# Patient Record
Sex: FEMALE | Race: WHITE | NOT HISPANIC OR LATINO | Employment: OTHER | URBAN - METROPOLITAN AREA
[De-identification: names, ages, dates, MRNs, and addresses within clinical notes are randomized per-mention and may not be internally consistent; named-entity substitution may affect disease eponyms.]

---

## 2019-02-21 ENCOUNTER — HOSPITAL ENCOUNTER (OUTPATIENT)
Dept: RADIOLOGY | Facility: MEDICAL CENTER | Age: 84
End: 2019-02-21

## 2019-02-22 ENCOUNTER — HOSPITAL ENCOUNTER (OUTPATIENT)
Dept: RADIATION ONCOLOGY | Facility: MEDICAL CENTER | Age: 84
End: 2019-02-28
Attending: RADIOLOGY
Payer: MEDICARE

## 2019-02-22 VITALS
OXYGEN SATURATION: 94 % | HEART RATE: 69 BPM | TEMPERATURE: 97.4 F | SYSTOLIC BLOOD PRESSURE: 155 MMHG | DIASTOLIC BLOOD PRESSURE: 70 MMHG | WEIGHT: 147.4 LBS

## 2019-02-22 PROCEDURE — 99214 OFFICE O/P EST MOD 30 MIN: CPT | Performed by: RADIOLOGY

## 2019-02-22 PROCEDURE — 99205 OFFICE O/P NEW HI 60 MIN: CPT | Performed by: RADIOLOGY

## 2019-02-22 RX ORDER — OXYCODONE HYDROCHLORIDE AND ACETAMINOPHEN 5; 325 MG/1; MG/1
1 TABLET ORAL 3 TIMES DAILY PRN
COMMUNITY
End: 2023-04-28

## 2019-02-22 RX ORDER — LORAZEPAM 0.5 MG/1
0.5 TABLET ORAL EVERY 4 HOURS PRN
COMMUNITY
End: 2022-05-12

## 2019-02-22 RX ORDER — ATENOLOL 25 MG/1
25 TABLET ORAL DAILY
COMMUNITY
End: 2022-04-13

## 2019-02-22 RX ORDER — TEMAZEPAM 30 MG/1
30 CAPSULE ORAL NIGHTLY PRN
COMMUNITY
End: 2022-05-12

## 2019-02-22 RX ORDER — RANITIDINE 150 MG/1
150 TABLET ORAL 2 TIMES DAILY
COMMUNITY
End: 2021-02-04

## 2019-02-22 RX ORDER — CYANOCOBALAMIN (VITAMIN B-12) 5000 MCG
TABLET,DISINTEGRATING ORAL
COMMUNITY
End: 2022-09-27

## 2019-02-22 RX ORDER — LISINOPRIL 20 MG/1
20 TABLET ORAL 2 TIMES DAILY
COMMUNITY
End: 2022-06-23

## 2019-02-22 ASSESSMENT — PAIN SCALES - GENERAL: PAINLEVEL: 4=SLIGHT-MODERATE PAIN

## 2019-02-22 NOTE — CONSULTS
RADIATION ONCOLOGY CONSULT    DATE OF SERVICE: 2019    IDENTIFICATION: A 83 y.o. female with PET positive solitary nodule that has increased in size since prior CT scans in the left lower lobe.  She is here at the kind request of Dr. Martinez.      HISTORY OF PRESENT ILLNESS: Patient's history dates back to December of last year when she underwent a CT scan of the chest abdomen and pelvis 2018 for other reasons.  She was found to have a 13 mm spiculated left lower lobe pulmonary nodule.  Subsequently a PET CT scan was performed 2019 confirming this spiculated hypermetabolic left lower lobe nodule measuring 13 mm with an SUV of 3.5 highly concerning for malignancy.  She is seen in consultation today about the possibility of stereotactic radiosurgery for definitive management.      PAST MEDICAL HISTORY:   Past Medical History:   Diagnosis Date   • Asthma    • Cervical cancer (HCC)     1968   • COPD (chronic obstructive pulmonary disease) (HCC)    • GERD (gastroesophageal reflux disease)    • Hypertension    • Solitary pulmonary nodule    • Spinal stenosis        PAST SURGICAL HISTORY:  Past Surgical History:   Procedure Laterality Date   • APPENDECTOMY     • CARPAL TUNNEL ENDOSCOPIC      bilat   • CHOLECYSTECTOMY     • HIP ARTHROPLASTY TOTAL Right    • HYSTERECTOMY LAPAROSCOPY     • OTHER      kidney stones removed   • TONSILLECTOMY         GYNECOLOGICAL STATUS:  , no hormone replacement    CURRENT MEDICATIONS:  Current Outpatient Prescriptions   Medication Sig Dispense Refill   • atenolol (TENORMIN) 25 MG Tab Take 25 mg by mouth every day.     • lisinopril (PRINIVIL) 20 MG Tab Take 20 mg by mouth every day.     • oxyCODONE-acetaminophen (PERCOCET) 5-325 MG Tab Take 1-2 Tabs by mouth every four hours as needed.     • raNITidine (ZANTAC) 150 MG Tab Take 150 mg by mouth 2 times a day.     • CYCLOBENZAPRINE HCL PO Take  by mouth.     • Potassium (POTASSIMIN PO) Take  by mouth.     • Thyroid  (NATURE-THROID PO) Take  by mouth.     • HYDROCORTISONE PO Take  by mouth.     • temazepam (RESTORIL) 30 MG capsule Take 30 mg by mouth at bedtime as needed for Sleep.     • LORazepam (ATIVAN) 0.5 MG Tab Take 0.5 mg by mouth every four hours as needed for Anxiety.     • Magnesium 400 MG Cap Take  by mouth.     • Melatonin 5 MG Cap Take  by mouth.     • Pumpkin Seed-Soy Germ (AZO BLADDER CONTROL/GO-LESS PO) Take  by mouth.     • Polyethylene Glycol 3350 (MIRALAX PO) Take  by mouth.     • Simethicone (GAS-X PO) Take  by mouth.     • Cyanocobalamin (VITAMIN B-12) 5000 MCG TABLET DISPERSIBLE Take  by mouth.     • Cholecalciferol (VITAMIN D3) 5000 units Cap Take 1 Cap by mouth every day.       No current facility-administered medications for this encounter.        ALLERGIES:    Erythromycin    FAMILY HISTORY:    No family history of cancer        SOCIAL HISTORY:     reports that she quit smoking about 4 years ago. She has a 60.00 pack-year smoking history. She has quit using smokeless tobacco. She reports that she does not drink alcohol or use drugs.  Patient lives in [place] near her daughter    REVIEW OF SYSTEMS: Pertinent positives consist of fatigue, dry mouth from her medications, abdominal pain constipation diarrhea hemorrhoids urinary frequency urgency nocturia she has an unsteady gait and decreased mobility of her lower extremities and neuropathy.  Her biggest complaint is pain with bowel movements and that is going to be worked up at [facility].  The rest of the review of systems is negative and has been reviewed by me. It is in the nursing note dated 2/22/2019 in [facility]  Patient has pelvic pain unrelated to her diagnosis and she has chronic back pain which is 4 out of 10 which increases to 8 out of 10 when she stands up  What makes the pain better: Lying flat  What makes the pain worse: Standing up and having bowel movements  Pain controlled with current regimen: yes  Pain related to condition being seen here  for: no      PHYSICAL EXAM:    1= Restricted in physically strenuous activity, but ambulatory and able to carry out work of a light sedentary nature, e.g., light housework, office work.  Vitals:    02/22/19 1337   BP: 155/70   BP Location: Left arm   Patient Position: Sitting   BP Cuff Size: Adult   Pulse: 69   Temp: 36.3 °C (97.4 °F)   TempSrc: Temporal   SpO2: 94%   Weight: 66.9 kg (147 lb 6.4 oz)   Pain Score: 4=Slight-Moderate Pain (back pain)        GENERAL: Well-appearing alert and oriented x3 appearing her stated age of 83  HEENT:  Pupils are equal, round, and reactive to light.  Extraocular muscles   are intact. Sclerae nonicteric.  Conjunctivae pink.  Oral cavity, tongue   protrudes midline.   NECK:   No peripheral adenopathy of the neck, supraclavicular fossa or axillae   bilaterally.  LUNGS:  Clear to ascultation, decreased breath sounds throughout  HEART:  Regular rate and rhythm.  No murmur appreciated  ABDOMEN:  Soft. No evidence of hepatosplenomegaly.    EXTREMITIES:  Without Edema.  NEUROLOGIC:  Cranial nerves II through XII were intact.  Decreased mobility because of pain getting from a sitting to a standing position but motor and sensory are grossly within normal limits              IMPRESSION:    A 83 y.o. with T1 N0 PET positive nodule consistent with a primary lung cancer, on biopsied.      RECOMMENDATIONS:   I had a long discussion with the patient and her daughter regarding diagnosis prognosis and treatment.  They understand there is a very high rate of control over the next 5 years with radiosurgery.  They understand that we certainly can biopsy this area but there was a concern that she could drop the lung and this would require a chest tube.  After discussing these options they would prefer to proceed forward with stereotactic radiosurgery assuming this is a lung cancer.  I've described the details of radiation along with the side effects both acute and chronic, including but not exclusive  to fatigue, dry cough, possibility of radiation pneumonitis anywhere between 6weeks to 6 months, skin reaction, local soreness, swelling. Ample time was allowed for questions, and patient understands.    We have her tentatively scheduled for simulation to get started soon there after.  The understand the treatments will be 5 treatments after we have completed a simulation    Thank you for the opportunity to participate in her care.  If any questions or comments, please do not hesitate in calling.    Please note that this dictation was created using voice recognition software. I have made every reasonable attempt to correct obvious errors, but I expect that there are errors of grammar and possibly content that I did not discover before finalizing the note.

## 2019-02-22 NOTE — NON-PROVIDER
Patient was seen today in clinic with Dr. Khan for Consult.  Vitals signs and weight were obtained and pain assessment was completed.  Allergies and medications were reviewed with the patient.  Review of systems completed.     Vitals/Pain:  Vitals:    02/22/19 1337   BP: 155/70   BP Location: Left arm   Patient Position: Sitting   BP Cuff Size: Adult   Pulse: 69   Temp: 36.3 °C (97.4 °F)   TempSrc: Temporal   SpO2: 94%   Weight: 66.9 kg (147 lb 6.4 oz)   Pain Score: 4=Slight-Moderate Pain (back pain)        Allergies:   Erythromycin    Current Medications:  Current Outpatient Prescriptions   Medication Sig Dispense Refill   • atenolol (TENORMIN) 25 MG Tab Take 25 mg by mouth every day.     • lisinopril (PRINIVIL) 20 MG Tab Take 20 mg by mouth every day.     • oxyCODONE-acetaminophen (PERCOCET) 5-325 MG Tab Take 1-2 Tabs by mouth every four hours as needed.     • raNITidine (ZANTAC) 150 MG Tab Take 150 mg by mouth 2 times a day.     • CYCLOBENZAPRINE HCL PO Take  by mouth.     • Potassium (POTASSIMIN PO) Take  by mouth.     • Thyroid (NATURE-THROID PO) Take  by mouth.     • HYDROCORTISONE PO Take  by mouth.     • temazepam (RESTORIL) 30 MG capsule Take 30 mg by mouth at bedtime as needed for Sleep.     • LORazepam (ATIVAN) 0.5 MG Tab Take 0.5 mg by mouth every four hours as needed for Anxiety.     • Magnesium 400 MG Cap Take  by mouth.     • Melatonin 5 MG Cap Take  by mouth.     • Pumpkin Seed-Soy Germ (AZO BLADDER CONTROL/GO-LESS PO) Take  by mouth.     • Polyethylene Glycol 3350 (MIRALAX PO) Take  by mouth.     • Simethicone (GAS-X PO) Take  by mouth.     • Cyanocobalamin (VITAMIN B-12) 5000 MCG TABLET DISPERSIBLE Take  by mouth.     • Cholecalciferol (VITAMIN D3) 5000 units Cap Take 1 Cap by mouth every day.       No current facility-administered medications for this encounter.          PCP:  Eugene Peoples, Med Ass't

## 2019-03-01 ENCOUNTER — PATIENT OUTREACH (OUTPATIENT)
Dept: OTHER | Facility: MEDICAL CENTER | Age: 84
End: 2019-03-01

## 2019-03-01 ENCOUNTER — HOSPITAL ENCOUNTER (OUTPATIENT)
Dept: RADIATION ONCOLOGY | Facility: MEDICAL CENTER | Age: 84
End: 2019-03-31
Attending: RADIOLOGY
Payer: MEDICARE

## 2019-03-01 NOTE — LETTER
[facility]   [address]  [place] [address]  Phone: [phone] - Fax: [phone]              Address:  [address]   [place] [address]     Date: 03/01/19  Medical Record Number: [id number]    Dear Chelly,    I am a Diagnostic Nurse Navigator, a certified oncology nurse and I spoke with you on the telephone Friday afternoon. My role is to assess any needs you may have with education, guidance and support. I am available to you and your family from diagnosis through your survivorship.       I am available to address your needs during your journey with the following services:     Care Coordination  I can assist you in facilitating communication between your cancer care treatment team to ensure timely treatment and follow-up.  I can also assist with transition of care back to your primary care provider, or other specialist, as needed.  My goal is to bridge gaps for you throughout the course of your active treatment.       Education Services  Understanding the recommended treatment course by your physician is key. I can provide educational resources personalized to your cancer diagnosis to help you understand your diagnosis and treatment. Please let me know if you would like to receive information about your diagnosis and treatment plan.  I am here to help.     Support Services/Resource Information  [facility] we offer a full scope of support services.  I can assist you with referral information to:  · Cancer Clinical Trials & Research  · Nutrition counseling  · Support groups  · Complementary Therapies such as Healing Touch and Mind-Body Techniques Meditation  · Patient Financial Advocates  ·   · [facility], an [employer] affiliate office, our volunteers can assist you with accessing our [employer] library, support services information, head coverings and comfort items  · Community and national resources, included  eligibility based get assistance and pharmaceutical access programs, if you are in need of additional information.     [facility] offers services that include:  · Behavioral Health  · Genetic counseling & testing  · Acupuncture  · Lymphedema prevention/treatment program  · Palliative care services.       Our care team includes a Survivorship Nurse Navigator, who meets with you after your treatment is completed to review your survivorship care plan and treatment summary.      I hope you have an excellent patient experience.  Please feel free to share with me your comments regarding the care you have received- we value your feedback.  I look forward to meeting you in person next Wednesday in the Radiation Therapy unit.    Sincerely,     Loren Car RN  Diagnostic Nurse Navigator  Office: [phone]  E-mail: [email or web address]

## 2019-03-01 NOTE — PROGRESS NOTES
Called to check in with patient.  Had gone to radiation therapy unit to see her but she was not there.  Introduced myself and explained role of navigator.  Planning to meet with patient next  Wed March 6 at 3 in rad onc.  Will send follow-up letter to patient

## 2019-03-06 ENCOUNTER — HOSPITAL ENCOUNTER (OUTPATIENT)
Dept: RADIATION ONCOLOGY | Facility: MEDICAL CENTER | Age: 84
End: 2019-03-06

## 2019-03-06 ENCOUNTER — PATIENT OUTREACH (OUTPATIENT)
Dept: OTHER | Facility: MEDICAL CENTER | Age: 84
End: 2019-03-06

## 2019-03-06 PROCEDURE — 77334 RADIATION TREATMENT AID(S): CPT | Mod: 26 | Performed by: RADIOLOGY

## 2019-03-06 PROCEDURE — 77290 THER RAD SIMULAJ FIELD CPLX: CPT | Performed by: RADIOLOGY

## 2019-03-06 PROCEDURE — 77334 RADIATION TREATMENT AID(S): CPT | Performed by: RADIOLOGY

## 2019-03-06 PROCEDURE — 77470 SPECIAL RADIATION TREATMENT: CPT | Performed by: RADIOLOGY

## 2019-03-06 PROCEDURE — 77263 THER RADIOLOGY TX PLNG CPLX: CPT | Performed by: RADIOLOGY

## 2019-03-06 PROCEDURE — 77470 SPECIAL RADIATION TREATMENT: CPT | Mod: 26 | Performed by: RADIOLOGY

## 2019-03-06 PROCEDURE — 77290 THER RAD SIMULAJ FIELD CPLX: CPT | Mod: 26 | Performed by: RADIOLOGY

## 2019-03-06 NOTE — PROGRESS NOTES
Met with patient and her daughter briefly in Radiation.  Gave her another card and re-introduced them to navigation.  They were also meeting with the [facility] Resource Advocate Shelby regarding the patient's health coverage.  Patient is here for mapping

## 2019-03-08 ENCOUNTER — PATIENT OUTREACH (OUTPATIENT)
Dept: OTHER | Facility: MEDICAL CENTER | Age: 84
End: 2019-03-08

## 2019-03-08 NOTE — LETTER
[facility]   [address]  [place] [address]  Phone: [phone] - Fax: [phone]              [place] [address] [place] [address]     Date: 03/08/19    Dear Chelly,      I am a Cancer Nurse Navigator, a certified oncology nurse. My role is to assess any needs you may have with education, guidance and support. I am available to you and your family from diagnosis through your survivorship.       I am available to address your needs during your journey with the following services:     Care Coordination  I can assist you in facilitating communication between your cancer care treatment team to ensure timely treatment and follow-up.  I can also assist with transition of care back to your primary care provider, or other specialist, as needed.  My goal is to bridge gaps for you throughout the course of your active treatment.       Education Services  Understanding the recommended treatment course by your physician is key. I can provide educational resources personalized to your cancer diagnosis to help you understand your diagnosis and treatment. Please let me know if you would like to receive information about your diagnosis and treatment plan.  I am here to help.     Support Services/Resource Information  [facility] we offer a full scope of support services.  I can assist you with referral information to:  · Cancer Clinical Trials & Research  · Nutrition counseling  · Support groups  · Complementary Therapies such as Healing Touch and Mind-Body Techniques Meditation  · Patient Financial Advocates  ·   · [facility], an [employer] affiliate office, our volunteers can assist you with accessing our [employer] library, support services information, head coverings and comfort items  · Community and national resources, included eligibility based get assistance and pharmaceutical access programs, if you are  in need of additional information.     [facility] offers services that include:  · Behavioral Health  · Genetic counseling & testing  · Acupuncture  · Lymphedema prevention/treatment program  · Palliative care services.       Our care team includes a Survivorship Nurse Navigator, who meets with you after your treatment is completed to review your survivorship care plan and treatment summary.      I hope you have an excellent patient experience.  Please feel free to share with me your comments regarding the care you have received- we value your feedback.    Sincerely,       Merline Ordaz R.N.  Cancer Nurse Navigator    Main: [phone]   Office:  [phone]  Email:  [email or web address]

## 2019-03-08 NOTE — PROGRESS NOTES
Oncology Nurse Navigator introduction letter sent    Pt will transition from diagnostic navigator to cancer nurse navigator.  Will follow up with patient next week.

## 2019-03-11 PROCEDURE — 77370 RADIATION PHYSICS CONSULT: CPT | Performed by: RADIOLOGY

## 2019-03-12 PROCEDURE — 77300 RADIATION THERAPY DOSE PLAN: CPT | Performed by: RADIOLOGY

## 2019-03-12 PROCEDURE — 77334 RADIATION TREATMENT AID(S): CPT | Performed by: RADIOLOGY

## 2019-03-12 PROCEDURE — 77295 3-D RADIOTHERAPY PLAN: CPT | Mod: 26 | Performed by: RADIOLOGY

## 2019-03-12 PROCEDURE — 77300 RADIATION THERAPY DOSE PLAN: CPT | Mod: 26 | Performed by: RADIOLOGY

## 2019-03-12 PROCEDURE — 77295 3-D RADIOTHERAPY PLAN: CPT | Performed by: RADIOLOGY

## 2019-03-12 PROCEDURE — 77334 RADIATION TREATMENT AID(S): CPT | Mod: 26 | Performed by: RADIOLOGY

## 2019-03-12 PROCEDURE — 77293 RESPIRATOR MOTION MGMT SIMUL: CPT | Performed by: RADIOLOGY

## 2019-03-12 PROCEDURE — 77293 RESPIRATOR MOTION MGMT SIMUL: CPT | Mod: 26 | Performed by: RADIOLOGY

## 2019-03-13 ENCOUNTER — HOSPITAL ENCOUNTER (OUTPATIENT)
Dept: RADIATION ONCOLOGY | Facility: MEDICAL CENTER | Age: 84
End: 2019-03-13

## 2019-03-13 PROCEDURE — 77435 SBRT MANAGEMENT: CPT | Performed by: RADIOLOGY

## 2019-03-13 PROCEDURE — 77280 THER RAD SIMULAJ FIELD SMPL: CPT | Performed by: RADIOLOGY

## 2019-03-13 PROCEDURE — 77280 THER RAD SIMULAJ FIELD SMPL: CPT | Mod: 26 | Performed by: RADIOLOGY

## 2019-03-13 PROCEDURE — 77373 STRTCTC BDY RAD THER TX DLVR: CPT | Performed by: RADIOLOGY

## 2019-03-14 ENCOUNTER — HOSPITAL ENCOUNTER (OUTPATIENT)
Dept: RADIATION ONCOLOGY | Facility: MEDICAL CENTER | Age: 84
End: 2019-03-14

## 2019-03-14 PROCEDURE — 77280 THER RAD SIMULAJ FIELD SMPL: CPT | Mod: 26 | Performed by: RADIOLOGY

## 2019-03-14 PROCEDURE — 77280 THER RAD SIMULAJ FIELD SMPL: CPT | Performed by: RADIOLOGY

## 2019-03-14 PROCEDURE — 77373 STRTCTC BDY RAD THER TX DLVR: CPT | Performed by: RADIOLOGY

## 2019-03-15 ENCOUNTER — HOSPITAL ENCOUNTER (OUTPATIENT)
Dept: RADIATION ONCOLOGY | Facility: MEDICAL CENTER | Age: 84
End: 2019-03-15

## 2019-03-15 PROCEDURE — 77336 RADIATION PHYSICS CONSULT: CPT | Mod: XU | Performed by: RADIOLOGY

## 2019-03-15 PROCEDURE — 77373 STRTCTC BDY RAD THER TX DLVR: CPT | Performed by: RADIOLOGY

## 2019-03-15 PROCEDURE — 77280 THER RAD SIMULAJ FIELD SMPL: CPT | Mod: 26 | Performed by: RADIOLOGY

## 2019-03-15 PROCEDURE — 77280 THER RAD SIMULAJ FIELD SMPL: CPT | Performed by: RADIOLOGY

## 2019-03-18 ENCOUNTER — HOSPITAL ENCOUNTER (OUTPATIENT)
Dept: RADIOLOGY | Facility: MEDICAL CENTER | Age: 84
End: 2019-03-18

## 2019-03-18 ENCOUNTER — HOSPITAL ENCOUNTER (OUTPATIENT)
Dept: RADIATION ONCOLOGY | Facility: MEDICAL CENTER | Age: 84
End: 2019-03-18

## 2019-03-18 LAB
CHEMOTHERAPY INFUSION START DATE: NORMAL
CHEMOTHERAPY RECORDS: 12
CHEMOTHERAPY RECORDS: 6000
CHEMOTHERAPY RECORDS: NORMAL
CHEMOTHERAPY RX CANCER: NORMAL
RAD ONC ARIA COURSE TREATMENT ELAPSED DAYS: NORMAL
RAD ONC ARIA PLAN TREATMENT DATES: NORMAL
RAD ONC ARIA REFERENCE POINT DOSAGE GIVEN TO DATE: 48
RAD ONC ARIA REFERENCE POINT DOSAGE GIVEN TO DATE: 54.9
RAD ONC ARIA REFERENCE POINT ID: NORMAL
RAD ONC ARIA REFERENCE POINT ID: NORMAL
RAD ONC ARIA REFERENCE POINT SESSION DOSAGE GIVEN: 12
RAD ONC ARIA REFERENCE POINT SESSION DOSAGE GIVEN: 13.73

## 2019-03-18 PROCEDURE — 77280 THER RAD SIMULAJ FIELD SMPL: CPT | Mod: 26 | Performed by: RADIOLOGY

## 2019-03-18 PROCEDURE — 77280 THER RAD SIMULAJ FIELD SMPL: CPT | Performed by: RADIOLOGY

## 2019-03-18 PROCEDURE — 77373 STRTCTC BDY RAD THER TX DLVR: CPT | Performed by: RADIOLOGY

## 2019-03-19 ENCOUNTER — HOSPITAL ENCOUNTER (OUTPATIENT)
Dept: RADIATION ONCOLOGY | Facility: MEDICAL CENTER | Age: 84
End: 2019-03-19

## 2019-03-19 LAB
CHEMOTHERAPY INFUSION START DATE: NORMAL
CHEMOTHERAPY RECORDS: 12
CHEMOTHERAPY RECORDS: 6000
CHEMOTHERAPY RECORDS: NORMAL
CHEMOTHERAPY RX CANCER: NORMAL
RAD ONC ARIA COURSE TREATMENT ELAPSED DAYS: NORMAL
RAD ONC ARIA PLAN TREATMENT DATES: NORMAL
RAD ONC ARIA REFERENCE POINT DOSAGE GIVEN TO DATE: 60
RAD ONC ARIA REFERENCE POINT DOSAGE GIVEN TO DATE: 68.63
RAD ONC ARIA REFERENCE POINT ID: NORMAL
RAD ONC ARIA REFERENCE POINT ID: NORMAL
RAD ONC ARIA REFERENCE POINT SESSION DOSAGE GIVEN: 12
RAD ONC ARIA REFERENCE POINT SESSION DOSAGE GIVEN: 13.73

## 2019-03-19 PROCEDURE — 77280 THER RAD SIMULAJ FIELD SMPL: CPT | Performed by: RADIOLOGY

## 2019-03-19 PROCEDURE — 77280 THER RAD SIMULAJ FIELD SMPL: CPT | Mod: 26 | Performed by: RADIOLOGY

## 2019-03-19 PROCEDURE — 77373 STRTCTC BDY RAD THER TX DLVR: CPT | Performed by: RADIOLOGY

## 2019-03-25 LAB
CHEMOTHERAPY INFUSION START DATE: NORMAL
CHEMOTHERAPY RECORDS: 12
CHEMOTHERAPY RECORDS: 6000
CHEMOTHERAPY RECORDS: NORMAL
CHEMOTHERAPY RX CANCER: NORMAL
RAD ONC ARIA COURSE TREATMENT ELAPSED DAYS: NORMAL
RAD ONC ARIA PLAN TREATMENT DATES: NORMAL
RAD ONC ARIA REFERENCE POINT DOSAGE GIVEN TO DATE: 60
RAD ONC ARIA REFERENCE POINT DOSAGE GIVEN TO DATE: 68.63
RAD ONC ARIA REFERENCE POINT ID: NORMAL
RAD ONC ARIA REFERENCE POINT ID: NORMAL

## 2019-03-26 ENCOUNTER — PATIENT OUTREACH (OUTPATIENT)
Dept: OTHER | Facility: MEDICAL CENTER | Age: 84
End: 2019-03-26

## 2019-03-26 NOTE — PROGRESS NOTES
Call placed to patient for follow up.  She reports she is doing fine and denies questions at this time.  Pt talked about is coughing a little bit, but unsure if from treatment or just has a cold.  Pt states she has no needs from navigator at this time.  She is aware of her follow up with Dr Khan at end of next month.  Available if needed.

## 2019-04-30 ENCOUNTER — HOSPITAL ENCOUNTER (OUTPATIENT)
Dept: RADIATION ONCOLOGY | Facility: MEDICAL CENTER | Age: 84
End: 2019-04-30
Attending: RADIOLOGY
Payer: MEDICARE

## 2019-04-30 ENCOUNTER — PATIENT OUTREACH (OUTPATIENT)
Dept: OTHER | Facility: MEDICAL CENTER | Age: 84
End: 2019-04-30

## 2019-04-30 VITALS
HEART RATE: 49 BPM | SYSTOLIC BLOOD PRESSURE: 139 MMHG | WEIGHT: 149.1 LBS | TEMPERATURE: 97.6 F | OXYGEN SATURATION: 96 % | DIASTOLIC BLOOD PRESSURE: 75 MMHG

## 2019-04-30 DIAGNOSIS — C34.90 MALIGNANT NEOPLASM OF UNSPECIFIED PART OF UNSPECIFIED BRONCHUS OR LUNG (HCC): ICD-10-CM

## 2019-04-30 PROCEDURE — 99212 OFFICE O/P EST SF 10 MIN: CPT | Performed by: RADIOLOGY

## 2019-04-30 ASSESSMENT — PAIN SCALES - GENERAL: PAINLEVEL: NO PAIN

## 2019-04-30 NOTE — NON-PROVIDER
Patient was seen today in clinic with Dr. Khan for follow up.  Vitals signs and weight were obtained and pain assessment was completed.  Allergies and medications were reviewed with the patient.  Toxicities of treatment assessed.     Vitals/Pain:  Vitals:    04/30/19 1457   BP: 139/75   BP Location: Left arm   Patient Position: Sitting   BP Cuff Size: Adult   Pulse: (!) 49   Temp: 36.4 °C (97.6 °F)   TempSrc: Oral   SpO2: 96%   Weight: 67.6 kg (149 lb 1.6 oz)   Pain Score: No pain        Allergies:   Erythromycin    Current Medications:  Current Outpatient Prescriptions   Medication Sig Dispense Refill   • atenolol (TENORMIN) 25 MG Tab Take 25 mg by mouth every day.     • lisinopril (PRINIVIL) 20 MG Tab Take 20 mg by mouth every day.     • raNITidine (ZANTAC) 150 MG Tab Take 150 mg by mouth 2 times a day.     • CYCLOBENZAPRINE HCL PO Take  by mouth.     • Potassium (POTASSIMIN PO) Take  by mouth.     • Thyroid (NATURE-THROID PO) Take  by mouth.     • HYDROCORTISONE PO Take  by mouth.     • temazepam (RESTORIL) 30 MG capsule Take 30 mg by mouth at bedtime as needed for Sleep.     • LORazepam (ATIVAN) 0.5 MG Tab Take 0.5 mg by mouth every four hours as needed for Anxiety.     • Magnesium 400 MG Cap Take  by mouth.     • Melatonin 5 MG Cap Take  by mouth.     • Polyethylene Glycol 3350 (MIRALAX PO) Take  by mouth.     • Simethicone (GAS-X PO) Take  by mouth.     • Cyanocobalamin (VITAMIN B-12) 5000 MCG TABLET DISPERSIBLE Take  by mouth.     • Cholecalciferol (VITAMIN D3) 5000 units Cap Take 1 Cap by mouth every day.     • oxyCODONE-acetaminophen (PERCOCET) 5-325 MG Tab Take 1-2 Tabs by mouth every four hours as needed.     • Pumpkin Seed-Soy Germ (AZO BLADDER CONTROL/GO-LESS PO) Take  by mouth.       No current facility-administered medications for this encounter.          PCP:  Eugene Peoples, Med Ass't

## 2019-04-30 NOTE — PROGRESS NOTES
RADIATION ONCOLOGY FOLLOW-UP    DATE OF SERVICE: 4/30/2019    IDENTIFICATION:   A 83 y.o. female with PET positive solitary nodule that has increased in size since prior CT scans in the left lower lobe.  Now status post SBRT to the left lower lobe lung lesion complete 3/20/2018    HISTORY OF PRESENT ILLNESS:   Since last seen patient has been essentially asymptomatic from our standpoint.  She has shortness of breath with exertion which is chronic fatigue shaking and weakness but her biggest complaint is rectal pain.  She also has immobility because of spinal stenosis and a total hip those are her biggest complaints and essentially nothing from the radiation.    CURRENT MEDICATIONS:  Current Outpatient Prescriptions   Medication Sig Dispense Refill   • atenolol (TENORMIN) 25 MG Tab Take 25 mg by mouth every day.     • lisinopril (PRINIVIL) 20 MG Tab Take 20 mg by mouth every day.     • raNITidine (ZANTAC) 150 MG Tab Take 150 mg by mouth 2 times a day.     • CYCLOBENZAPRINE HCL PO Take  by mouth.     • Potassium (POTASSIMIN PO) Take  by mouth.     • Thyroid (NATURE-THROID PO) Take  by mouth.     • HYDROCORTISONE PO Take  by mouth.     • temazepam (RESTORIL) 30 MG capsule Take 30 mg by mouth at bedtime as needed for Sleep.     • LORazepam (ATIVAN) 0.5 MG Tab Take 0.5 mg by mouth every four hours as needed for Anxiety.     • Magnesium 400 MG Cap Take  by mouth.     • Melatonin 5 MG Cap Take  by mouth.     • Polyethylene Glycol 3350 (MIRALAX PO) Take  by mouth.     • Simethicone (GAS-X PO) Take  by mouth.     • Cyanocobalamin (VITAMIN B-12) 5000 MCG TABLET DISPERSIBLE Take  by mouth.     • Cholecalciferol (VITAMIN D3) 5000 units Cap Take 1 Cap by mouth every day.     • oxyCODONE-acetaminophen (PERCOCET) 5-325 MG Tab Take 1-2 Tabs by mouth every four hours as needed.     • Pumpkin Seed-Soy Germ (AZO BLADDER CONTROL/GO-LESS PO) Take  by mouth.       No current facility-administered medications for this encounter.         ALLERGIES:  Erythromycin    FAMILY HISTORY:    No family history on file.[unfilled]        SOCIAL HISTORY:     reports that she quit smoking about 4 years ago. She has a 60.00 pack-year smoking history. She has quit using smokeless tobacco. She reports that she does not drink alcohol or use drugs.        REVIEW OF SYSTEMS: Is significant for that mentioned in the HPI  The rest of the review of systems has been reviewed by me and is documented in the nursing note in [facility] dated 2/22/2019    PHYSICAL EXAM:     ECOG PERFORMANCE STATUS:  1= Restricted in physically strenuous activity, but ambulatory and able to carry out work of a light sedentary nature, e.g., light housework, office work.       Vitals:    04/30/19 1457   BP: 139/75   BP Location: Left arm   Patient Position: Sitting   BP Cuff Size: Adult   Pulse: (!) 49   Temp: 36.4 °C (97.6 °F)   TempSrc: Oral   SpO2: 96%   Weight: 67.6 kg (149 lb 1.6 oz)   Pain Score: No pain        GENERAL: Well-appearing alert and oriented x3 somewhat anxious because of her rectal pain  HEENT:  Pupils are equal, round, and reactive to light.  Extraocular muscles   are intact. Sclerae nonicteric.  Conjunctivae pink.  Oral cavity, tongue   protrudes midline.   NECK: No adenopathy  LUNGS:  Clear to ascultation, decreased breath sounds throughout  HEART:  Regular rate and rhythm.  No murmur appreciated  EXTREMITIES:  Without Edema.  NEUROLOGIC:  Cranial nerves II through XII were intact.  Motor and sensory grossly within normal limits stance and gait not tested        IMPRESSION:    A 83 y.o. female with PET positive solitary nodule that has increased in size since prior CT scans in the left lower lobe.  Status post SBRT to the left lower lobe of the lung complete 3/20/2019    RECOMMENDATIONS:   I will see her in follow-up with a CT scan in about 2 months of the chest to assess response.  Her biggest concern is her rectal pain and she is going to see a specialist at [facility] for  that.  I am happy to see her sooner as needed      Thank you for the opportunity to participate in her care.  If any questions or comments, please do not hesitate in calling.      Please note that this dictation was created using voice recognition software. I have made every reasonable attempt to correct obvious errors, but I expect that there are errors of grammar and possibly content that I did not discover before finalizing the note.

## 2019-04-30 NOTE — PROGRESS NOTES
Touched base with patient after radiation oncology follow up.  Provided patient with treatment summary/survivorship care plan and information.  Pt denies any current question.  Does report fatigue and weakness, has had other medical issues going on.  Physicians are aware of concerns.  Pt understands follow up.  Summary scanned into EP[facility].  Pt has completed cancer nurse navigation.

## 2019-06-21 ENCOUNTER — OFFICE VISIT (OUTPATIENT)
Dept: URGENT CARE | Facility: CLINIC | Age: 84
End: 2019-06-21
Payer: MEDICARE

## 2019-06-21 ENCOUNTER — APPOINTMENT (OUTPATIENT)
Dept: RADIOLOGY | Facility: IMAGING CENTER | Age: 84
End: 2019-06-21
Attending: NURSE PRACTITIONER
Payer: MEDICARE

## 2019-06-21 VITALS
HEIGHT: 67 IN | RESPIRATION RATE: 12 BRPM | BODY MASS INDEX: 23.86 KG/M2 | HEART RATE: 75 BPM | WEIGHT: 152 LBS | TEMPERATURE: 97.3 F | OXYGEN SATURATION: 93 % | SYSTOLIC BLOOD PRESSURE: 132 MMHG | DIASTOLIC BLOOD PRESSURE: 90 MMHG

## 2019-06-21 DIAGNOSIS — M79.601 RIGHT ARM PAIN: ICD-10-CM

## 2019-06-21 PROCEDURE — 99214 OFFICE O/P EST MOD 30 MIN: CPT | Performed by: NURSE PRACTITIONER

## 2019-06-21 PROCEDURE — 73080 X-RAY EXAM OF ELBOW: CPT | Mod: TC,RT | Performed by: NURSE PRACTITIONER

## 2019-06-21 PROCEDURE — 73060 X-RAY EXAM OF HUMERUS: CPT | Mod: TC,RT | Performed by: NURSE PRACTITIONER

## 2019-06-22 NOTE — PROGRESS NOTES
"Subjective:      Chelly Decker is a 83 y.o. female who presents with Arm Pain (right arm)    Past Medical History:   Diagnosis Date   • Asthma    • Cervical cancer (HCC)     1968   • COPD (chronic obstructive pulmonary disease) (HCC)    • GERD (gastroesophageal reflux disease)    • Hypertension    • Solitary pulmonary nodule    • Spinal stenosis      Social History     Social History   • Marital status:      Spouse name: N/A   • Number of children: N/A   • Years of education: N/A     Occupational History   • retired book keeper      Social History Main Topics   • Smoking status: Former Smoker     Packs/day: 1.00     Years: 60.00     Quit date: 2015   • Smokeless tobacco: Former User   • Alcohol use No   • Drug use: No   • Sexual activity: Not on file     Other Topics Concern   • Not on file     Social History Narrative   • No narrative on file     History reviewed. No pertinent family history.    Allergies: Erythromycin    Patient is an 83-year-old female who presents today with complaint of pain to the right arm.  Pain is reproduced with pronation/supination and certain other random ranges of motion.  Patient states overall she believes she injured her arm 1 week ago when she slept on it wrong.  She states yesterday she pushed a heavy box and felt sudden pain at the level of her elbow that now radiates down the forearm and also up into the upper arm.  No numbness, tingling, or weakness.            Other   This is a new problem. The problem occurs constantly. The problem has been unchanged. Nothing aggravates the symptoms. She has tried nothing for the symptoms. The treatment provided no relief.       Review of Systems   Musculoskeletal:        Right arm pain   All other systems reviewed and are negative.         Objective:     /90 (BP Location: Left arm, Patient Position: Sitting)   Pulse 75   Temp 36.3 °C (97.3 °F)   Resp 12   Ht 1.702 m (5' 7\")   Wt 68.9 kg (152 lb)   SpO2 93%   BMI 23.81 " kg/m²      Physical Exam   Constitutional: She is oriented to person, place, and time. She appears well-developed and well-nourished.   Musculoskeletal:        Arms:  There is point tenderness over the forearm, elbow to the ventral aspect, and upper arm.  She is able to pronate and supinate though this is painful.  There is no asymmetry between the right and the left arm.   Neurological: She is alert and oriented to person, place, and time.   Skin: Skin is warm and dry.   Psychiatric: She has a normal mood and affect. Her behavior is normal. Judgment and thought content normal.   Vitals reviewed.    X-ray, elbow:       No radiographic evidence of acute traumatic injury.    Mild bony spurring    X-ray, humerus:     Normal right humerus radiographs     Assessment/Plan:     1. Right arm pain  2. Right arm strain    Ice PRN  Ibuprofen PRN  Follow up for persistent symptoms

## 2019-06-28 ENCOUNTER — TELEPHONE (OUTPATIENT)
Dept: RADIATION ONCOLOGY | Facility: MEDICAL CENTER | Age: 84
End: 2019-06-28

## 2019-06-28 DIAGNOSIS — C34.32 MALIGNANT NEOPLASM OF LOWER LOBE BRONCHUS, LEFT (HCC): ICD-10-CM

## 2019-09-03 ENCOUNTER — HOSPITAL ENCOUNTER (OUTPATIENT)
Dept: RADIATION ONCOLOGY | Facility: MEDICAL CENTER | Age: 84
End: 2019-09-30
Attending: RADIOLOGY
Payer: MEDICARE

## 2019-09-03 VITALS
HEART RATE: 76 BPM | OXYGEN SATURATION: 93 % | TEMPERATURE: 98.4 F | WEIGHT: 152.68 LBS | DIASTOLIC BLOOD PRESSURE: 83 MMHG | SYSTOLIC BLOOD PRESSURE: 158 MMHG | BODY MASS INDEX: 23.91 KG/M2

## 2019-09-03 DIAGNOSIS — C34.32 MALIGNANT NEOPLASM OF LOWER LOBE OF LEFT LUNG (HCC): ICD-10-CM

## 2019-09-03 PROCEDURE — 99213 OFFICE O/P EST LOW 20 MIN: CPT | Performed by: RADIOLOGY

## 2019-09-03 PROCEDURE — 99212 OFFICE O/P EST SF 10 MIN: CPT | Performed by: RADIOLOGY

## 2019-09-04 ENCOUNTER — HOSPITAL ENCOUNTER (OUTPATIENT)
Dept: RADIOLOGY | Facility: MEDICAL CENTER | Age: 84
End: 2019-09-04

## 2019-09-04 NOTE — PROGRESS NOTES
RADIATION ONCOLOGY FOLLOW-UP    DATE OF SERVICE: 9/3/2019    IDENTIFICATION:   83 y.o. female with PET positive solitary nodule that has increased in  size since prior CT scans in the left lower lobe. Now status post SBRT to  the left lower lobe lung lesion complete 3/20/2018     HISTORY OF PRESENT ILLNESS:   Patient is here to review her most recent CT scan.  This was done at [facility] on 8/21/2019.  It shows that the spiculated left lower lobe nodule is slightly decreased in size and may relate to treatment response or volume averaging from respiratory motion artifact.  There is a new tree-in-bud nodularity in the left lower lobe indeterminate for disease progression or posttreatment related change recommend attention to follow-up.  There is no pathologic adenopathy.  Patient's symptoms are essentially unchanged to what they were previously from a lung standpoint she is got more problems with her hip and she is going to be seeing a surgeon regarding that issue.    CURRENT MEDICATIONS:  Current Outpatient Medications   Medication Sig Dispense Refill   • ALBUTEROL INH Inhale  by mouth.     • atenolol (TENORMIN) 25 MG Tab Take 25 mg by mouth every day.     • lisinopril (PRINIVIL) 20 MG Tab Take 20 mg by mouth every day.     • oxyCODONE-acetaminophen (PERCOCET) 5-325 MG Tab Take 1-2 Tabs by mouth every four hours as needed.     • raNITidine (ZANTAC) 150 MG Tab Take 150 mg by mouth 2 times a day.     • CYCLOBENZAPRINE HCL PO Take  by mouth.     • Potassium (POTASSIMIN PO) Take  by mouth.     • Thyroid (NATURE-THROID PO) Take  by mouth.     • HYDROCORTISONE PO Take  by mouth.     • temazepam (RESTORIL) 30 MG capsule Take 30 mg by mouth at bedtime as needed for Sleep.     • LORazepam (ATIVAN) 0.5 MG Tab Take 0.5 mg by mouth every four hours as needed for Anxiety.     • Magnesium 400 MG Cap Take  by mouth.     • Melatonin 5 MG Cap Take  by mouth.     • Pumpkin Seed-Soy Germ (AZO BLADDER  CONTROL/GO-LESS PO) Take  by mouth.     • Polyethylene Glycol 3350 (MIRALAX PO) Take  by mouth.     • Simethicone (GAS-X PO) Take  by mouth.     • Cyanocobalamin (VITAMIN B-12) 5000 MCG TABLET DISPERSIBLE Take  by mouth.     • Cholecalciferol (VITAMIN D3) 5000 units Cap Take 1 Cap by mouth every day.       No current facility-administered medications for this encounter.        ALLERGIES:  Erythromycin    FAMILY HISTORY:    No family history on file.[unfilled]        SOCIAL HISTORY:     reports that she quit smoking about 4 years ago. She has a 60.00 pack-year smoking history. She has quit using smokeless tobacco. She reports that she does not drink alcohol or use drugs.    PAIN: Chronic pain in the hip as well as rectum unrelated to radiation    REVIEW OF SYSTEMS: Is significant for dry mouth nosebleeds constipation abdominal pain heartburn indigestion hemorrhoids urinary urgency nocturia muscle pain joint pain unsteady gait because of that neuropathy and cough all unchanged.  The rest of the review of systems has been reviewed by me and is documented in the nursing note in [facility] dated 9/3/2018    PHYSICAL EXAM:     ECOG PERFORMANCE STATUS:  3 = Capable of only limited self care, confined to bed or chair more than 50% of waking hours       Vitals:    09/03/19 1551   BP: 158/83   BP Location: Left arm   Patient Position: Sitting   BP Cuff Size: Adult   Pulse: 76   Temp: 36.9 °C (98.4 °F)   TempSrc: Temporal   SpO2: 93%   Weight: 69.3 kg (152 lb 10.9 oz)            GENERAL: Well-appearing alert and oriented x3 in no apparent distress  HEENT:  Pupils are equal, round, and reactive to light.  Extraocular muscles   are intact. Sclerae nonicteric.  Conjunctivae pink.  Oral cavity, tongue   protrudes midline.   NECK: No palpable nodes in the neck or supraclavicular fossa  LUNGS:  Clear to ascultation, decreased breath sounds throughout  HEART:  Regular rate and rhythm.  No murmur appreciated  ABDOMEN:  Soft. No evidence of  hepatosplenomegaly.  Positive bowel sounds.  EXTREMITIES:  Without Edema.  NEUROLOGIC:  Cranial nerves II through XII were intact.  Motor and sensory grossly within normal limits has limited walking ability due to rectal pain and hip pain        IMPRESSION:    83 y.o. female with PET positive solitary nodule that has increased in  size since prior CT scans in the left lower lobe. Now status post SBRT to  the left lower lobe lung lesion complete 3/20/2018    RECOMMENDATIONS:   We have a long discussion about her report.  I need to get the films and review them I will give her a call after I have done so.  Most likely this is post radiation change and will just repeat the CT scan in 3 to 4 months.  I will give her a call tomorrow.      Thank you for the opportunity to participate in her care.  If any questions or comments, please do not hesitate in calling.      Please note that this dictation was created using voice recognition software. I have made every reasonable attempt to correct obvious errors, but I expect that there are errors of grammar and possibly content that I did not discover before finalizing the note.      9/4/2019: I reviewed CT scan and it looks like post radiation change.  We will see her back in follow-up in 4 months with a repeat CT scan from [facility] at that time.

## 2019-09-04 NOTE — ADDENDUM NOTE
Encounter addended by: Katalina Khan M.D. on: 9/4/2019 1:40 PM   Actions taken: Sign clinical note, Visit diagnoses modified, Order list changed, Diagnosis association updated

## 2019-10-07 ENCOUNTER — OFFICE VISIT (OUTPATIENT)
Dept: URGENT CARE | Facility: CLINIC | Age: 84
End: 2019-10-07
Payer: MEDICARE

## 2019-10-07 VITALS
HEART RATE: 76 BPM | TEMPERATURE: 97.8 F | OXYGEN SATURATION: 95 % | RESPIRATION RATE: 14 BRPM | BODY MASS INDEX: 23.54 KG/M2 | SYSTOLIC BLOOD PRESSURE: 142 MMHG | WEIGHT: 150 LBS | DIASTOLIC BLOOD PRESSURE: 86 MMHG | HEIGHT: 67 IN

## 2019-10-07 DIAGNOSIS — H43.392 VITREOUS FLOATERS OF LEFT EYE: ICD-10-CM

## 2019-10-07 PROCEDURE — 99213 OFFICE O/P EST LOW 20 MIN: CPT | Performed by: PHYSICIAN ASSISTANT

## 2019-10-07 RX ORDER — TEMAZEPAM 15 MG/1
CAPSULE ORAL
Refills: 5 | COMMUNITY
Start: 2019-09-15 | End: 2021-02-04

## 2019-10-07 ASSESSMENT — ENCOUNTER SYMPTOMS
DOUBLE VISION: 1
HEADACHES: 1
WEAKNESS: 0
LOSS OF CONSCIOUSNESS: 0
SENSORY CHANGE: 0
FOCAL WEAKNESS: 0
SEIZURES: 0
BLURRED VISION: 1
SPEECH CHANGE: 0
EYE PAIN: 0

## 2019-10-07 NOTE — PROGRESS NOTES
"  Subjective:   Chelly Decker is a 83 y.o. female who presents today with   Chief Complaint   Patient presents with   • Migraine       HPI  Patient presents today with history of a new problem that is been occurring over the past 3 to 4 days.  She states that she has had ocular migraines in the past but they typically go away after 10 to 15 minutes.  She   Describes that this migraine feels \"different\" and she has had a floater in her left eye and loss of peripheral vision over the past 4 days.  Patient denies any recent trauma and does states she had cataract surgery approximately 1 year ago.  Patient denies any weakness, slurred speech, chest pain.  PMH:  has a past medical history of Asthma, Cervical cancer (HCC), COPD (chronic obstructive pulmonary disease) (HCC), GERD (gastroesophageal reflux disease), Hypertension, Solitary pulmonary nodule, and Spinal stenosis.  MEDS:   Current Outpatient Medications:   •  ALBUTEROL INH, Inhale  by mouth., Disp: , Rfl:   •  atenolol (TENORMIN) 25 MG Tab, Take 25 mg by mouth every day., Disp: , Rfl:   •  lisinopril (PRINIVIL) 20 MG Tab, Take 20 mg by mouth every day., Disp: , Rfl:   •  oxyCODONE-acetaminophen (PERCOCET) 5-325 MG Tab, Take 1-2 Tabs by mouth every four hours as needed., Disp: , Rfl:   •  CYCLOBENZAPRINE HCL PO, Take  by mouth., Disp: , Rfl:   •  Potassium (POTASSIMIN PO), Take  by mouth., Disp: , Rfl:   •  HYDROCORTISONE PO, Take  by mouth., Disp: , Rfl:   •  temazepam (RESTORIL) 30 MG capsule, Take 30 mg by mouth at bedtime as needed for Sleep., Disp: , Rfl:   •  temazepam (RESTORIL) 15 MG Cap, TAKE 1 TO 2 CAPSULES DAILY AT BEDTIME AS NEEDED BY MOUTH FOR 30 DAYS, Disp: , Rfl: 5  •  raNITidine (ZANTAC) 150 MG Tab, Take 150 mg by mouth 2 times a day., Disp: , Rfl:   •  Thyroid (NATURE-THROID PO), Take  by mouth., Disp: , Rfl:   •  LORazepam (ATIVAN) 0.5 MG Tab, Take 0.5 mg by mouth every four hours as needed for Anxiety., Disp: , Rfl:   •  Magnesium 400 MG " "Cap, Take  by mouth., Disp: , Rfl:   •  Melatonin 5 MG Cap, Take  by mouth., Disp: , Rfl:   •  Pumpkin Seed-Soy Germ (AZO BLADDER CONTROL/GO-LESS PO), Take  by mouth., Disp: , Rfl:   •  Polyethylene Glycol 3350 (MIRALAX PO), Take  by mouth., Disp: , Rfl:   •  Simethicone (GAS-X PO), Take  by mouth., Disp: , Rfl:   •  Cyanocobalamin (VITAMIN B-12) 5000 MCG TABLET DISPERSIBLE, Take  by mouth., Disp: , Rfl:   •  Cholecalciferol (VITAMIN D3) 5000 units Cap, Take 1 Cap by mouth every day., Disp: , Rfl:   ALLERGIES:   Allergies   Allergen Reactions   • Erythromycin Nausea     SURGHX:   Past Surgical History:   Procedure Laterality Date   • APPENDECTOMY     • CARPAL TUNNEL ENDOSCOPIC      bilat   • CHOLECYSTECTOMY     • HIP ARTHROPLASTY TOTAL Right    • HYSTERECTOMY LAPAROSCOPY     • OTHER      kidney stones removed   • TONSILLECTOMY       SOCHX:  reports that she quit smoking about 4 years ago. She has a 60.00 pack-year smoking history. She has quit using smokeless tobacco. She reports that she does not drink alcohol or use drugs.  FH: Reviewed with patient, not pertinent to this visit.       Review of Systems   Eyes: Positive for blurred vision and double vision. Negative for pain.   Neurological: Positive for headaches. Negative for sensory change, speech change, focal weakness, seizures, loss of consciousness and weakness.        Objective:   /86 (BP Location: Right arm, Patient Position: Sitting)   Pulse 76   Temp 36.6 °C (97.8 °F)   Resp 14   Ht 1.702 m (5' 7\")   Wt 68 kg (150 lb)   SpO2 95%   BMI 23.49 kg/m²   Physical Exam   Constitutional: She appears well-developed and well-nourished. No distress.   HENT:   Head: Normocephalic and atraumatic.   Right Ear: Hearing normal.   Left Ear: Hearing normal.   Eyes: Pupils are equal, round, and reactive to light.   Questionable retinal detachment of left eye limited examination given that we do not have proper equipment in the urgent care to evaluate the eye. "   Cardiovascular: Normal rate, regular rhythm and normal heart sounds.   Pulmonary/Chest: Effort normal and breath sounds normal.   Musculoskeletal:   Equal strength in upper and lower extremities bilaterally   Neurological: She is alert. She has normal strength. No cranial nerve deficit or sensory deficit. Coordination normal. GCS eye subscore is 4. GCS verbal subscore is 5. GCS motor subscore is 6.   Skin: Skin is warm and dry.   Psychiatric: She has a normal mood and affect.   Nursing note and vitals reviewed.    Assessment/Plan:   Assessment    1. Vitreous floaters of left eye    Other orders  - temazepam (RESTORIL) 15 MG Cap; TAKE 1 TO 2 CAPSULES DAILY AT BEDTIME AS NEEDED BY MOUTH FOR 30 DAYS; Refill: 5  Discussed with patient and her son that my concern would be potential retinal detachment given her symptoms and clinical presentation.  She needs to seek immediate evaluation.  Called [facility] and discussed patient with unit clerk.  He states that he did have an ophthalmologist on call and would be willing to evaluate the patient at this time.      Please note that this dictation was created using voice recognition software. I have made every reasonable attempt to correct obvious errors, but I expect that there are errors of grammar and possibly content that I did not discover before finalizing the note.    Jeancarlos Maldonado PA-C

## 2019-10-21 ENCOUNTER — APPOINTMENT (OUTPATIENT)
Dept: RADIOLOGY | Facility: IMAGING CENTER | Age: 84
End: 2019-10-21
Attending: PHYSICIAN ASSISTANT
Payer: MEDICARE

## 2019-10-21 ENCOUNTER — OFFICE VISIT (OUTPATIENT)
Dept: URGENT CARE | Facility: CLINIC | Age: 84
End: 2019-10-21
Payer: MEDICARE

## 2019-10-21 VITALS
WEIGHT: 152.2 LBS | DIASTOLIC BLOOD PRESSURE: 84 MMHG | OXYGEN SATURATION: 92 % | SYSTOLIC BLOOD PRESSURE: 130 MMHG | HEART RATE: 81 BPM | HEIGHT: 67 IN | BODY MASS INDEX: 23.89 KG/M2 | TEMPERATURE: 98.7 F | RESPIRATION RATE: 18 BRPM

## 2019-10-21 DIAGNOSIS — J06.9 UPPER RESPIRATORY TRACT INFECTION, UNSPECIFIED TYPE: ICD-10-CM

## 2019-10-21 DIAGNOSIS — R05.9 COUGH: ICD-10-CM

## 2019-10-21 PROCEDURE — 71046 X-RAY EXAM CHEST 2 VIEWS: CPT | Mod: TC | Performed by: PHYSICIAN ASSISTANT

## 2019-10-21 PROCEDURE — 99214 OFFICE O/P EST MOD 30 MIN: CPT | Performed by: PHYSICIAN ASSISTANT

## 2019-10-21 RX ORDER — DOXYCYCLINE HYCLATE 100 MG
100 TABLET ORAL 2 TIMES DAILY
Qty: 14 TAB | Refills: 0 | Status: SHIPPED | OUTPATIENT
Start: 2019-10-21 | End: 2019-10-28

## 2019-10-21 ASSESSMENT — ENCOUNTER SYMPTOMS
COUGH: 1
SHORTNESS OF BREATH: 1
MYALGIAS: 0
WHEEZING: 1
NAUSEA: 0
FEVER: 0
HEADACHES: 0
SORE THROAT: 0
EYE DISCHARGE: 0
VOMITING: 0
EYE REDNESS: 0

## 2019-10-22 NOTE — PATIENT INSTRUCTIONS
"Upper Respiratory Infection, Adult  Most upper respiratory infections (URIs) are a viral infection of the air passages leading to the lungs. A URI affects the nose, throat, and upper air passages. The most common type of URI is nasopharyngitis and is typically referred to as \"the common cold.\"  URIs run their course and usually go away on their own. Most of the time, a URI does not require medical attention, but sometimes a bacterial infection in the upper airways can follow a viral infection. This is called a secondary infection. Sinus and middle ear infections are common types of secondary upper respiratory infections.  Bacterial pneumonia can also complicate a URI. A URI can worsen asthma and chronic obstructive pulmonary disease (COPD). Sometimes, these complications can require emergency medical care and may be life threatening.  What are the causes?  Almost all URIs are caused by viruses. A virus is a type of germ and can spread from one person to another.  What increases the risk?  You may be at risk for a URI if:  · You smoke.  · You have chronic heart or lung disease.  · You have a weakened defense (immune) system.  · You are very young or very old.  · You have nasal allergies or asthma.  · You work in crowded or poorly ventilated areas.  · You work in health care facilities or schools.  What are the signs or symptoms?  Symptoms typically develop 2-3 days after you come in contact with a cold virus. Most viral URIs last 7-10 days. However, viral URIs from the influenza virus (flu virus) can last 14-18 days and are typically more severe. Symptoms may include:  · Runny or stuffy (congested) nose.  · Sneezing.  · Cough.  · Sore throat.  · Headache.  · Fatigue.  · Fever.  · Loss of appetite.  · Pain in your forehead, behind your eyes, and over your cheekbones (sinus pain).  · Muscle aches.  How is this diagnosed?  Your health care provider may diagnose a URI by:  · Physical exam.  · Tests to check that your " symptoms are not due to another condition such as:  ¨ Strep throat.  ¨ Sinusitis.  ¨ Pneumonia.  ¨ Asthma.  How is this treated?  A URI goes away on its own with time. It cannot be cured with medicines, but medicines may be prescribed or recommended to relieve symptoms. Medicines may help:  · Reduce your fever.  · Reduce your cough.  · Relieve nasal congestion.  Follow these instructions at home:  · Take medicines only as directed by your health care provider.  · Gargle warm saltwater or take cough drops to comfort your throat as directed by your health care provider.  · Use a warm mist humidifier or inhale steam from a shower to increase air moisture. This may make it easier to breathe.  · Drink enough fluid to keep your urine clear or pale yellow.  · Eat soups and other clear broths and maintain good nutrition.  · Rest as needed.  · Return to work when your temperature has returned to normal or as your health care provider advises. You may need to stay home longer to avoid infecting others. You can also use a face mask and careful hand washing to prevent spread of the virus.  · Increase the usage of your inhaler if you have asthma.  · Do not use any tobacco products, including cigarettes, chewing tobacco, or electronic cigarettes. If you need help quitting, ask your health care provider.  How is this prevented?  The best way to protect yourself from getting a cold is to practice good hygiene.  · Avoid oral or hand contact with people with cold symptoms.  · Wash your hands often if contact occurs.  There is no clear evidence that vitamin C, vitamin E, echinacea, or exercise reduces the chance of developing a cold. However, it is always recommended to get plenty of rest, exercise, and practice good nutrition.  Contact a health care provider if:  · You are getting worse rather than better.  · Your symptoms are not controlled by medicine.  · You have chills.  · You have worsening shortness of breath.  · You have brown  or red mucus.  · You have yellow or brown nasal discharge.  · You have pain in your face, especially when you bend forward.  · You have a fever.  · You have swollen neck glands.  · You have pain while swallowing.  · You have white areas in the back of your throat.  Get help right away if:  · You have severe or persistent:  ¨ Headache.  ¨ Ear pain.  ¨ Sinus pain.  ¨ Chest pain.  · You have chronic lung disease and any of the following:  ¨ Wheezing.  ¨ Prolonged cough.  ¨ Coughing up blood.  ¨ A change in your usual mucus.  · You have a stiff neck.  · You have changes in your:  ¨ Vision.  ¨ Hearing.  ¨ Thinking.  ¨ Mood.  This information is not intended to replace advice given to you by your health care provider. Make sure you discuss any questions you have with your health care provider.  Document Released: 06/13/2002 Document Revised: 08/20/2017 Document Reviewed: 03/25/2015  [employer] Interactive Patient Education © 2017 [employer]

## 2019-10-22 NOTE — PROGRESS NOTES
Subjective:      Chelly Decker is a 83 y.o. female who presents with Cough (x 2 days) and Laryngitis        Cough   This is a new problem. Episode onset: x 2-3 days. The problem occurs hourly. The cough is productive of sputum. Associated symptoms include shortness of breath and wheezing (intermittent, now improved). Pertinent negatives include no chest pain, ear pain, eye redness, fever, headaches, myalgias, nasal congestion, rash or sore throat. Nothing aggravates the symptoms. She has tried a beta-agonist inhaler and OTC cough suppressant for the symptoms. The treatment provided mild relief. Her past medical history is significant for asthma.     PMH:  has a past medical history of Asthma, Cervical cancer (HCC), COPD (chronic obstructive pulmonary disease) ([facility]), GERD (gastroesophageal reflux disease), Hypertension, Solitary pulmonary nodule, and Spinal stenosis.  MEDS:   Current Outpatient Medications:   •  ALBUTEROL INH, Inhale  by mouth., Disp: , Rfl:   •  atenolol (TENORMIN) 25 MG Tab, Take 25 mg by mouth every day., Disp: , Rfl:   •  lisinopril (PRINIVIL) 20 MG Tab, Take 20 mg by mouth every day., Disp: , Rfl:   •  oxyCODONE-acetaminophen (PERCOCET) 5-325 MG Tab, Take 1-2 Tabs by mouth every four hours as needed., Disp: , Rfl:   •  CYCLOBENZAPRINE HCL PO, Take  by mouth., Disp: , Rfl:   •  Potassium (POTASSIMIN PO), Take  by mouth., Disp: , Rfl:   •  temazepam (RESTORIL) 30 MG capsule, Take 30 mg by mouth at bedtime as needed for Sleep., Disp: , Rfl:   •  LORazepam (ATIVAN) 0.5 MG Tab, Take 0.5 mg by mouth every four hours as needed for Anxiety., Disp: , Rfl:   •  Magnesium 400 MG Cap, Take  by mouth., Disp: , Rfl:   •  Melatonin 5 MG Cap, Take  by mouth., Disp: , Rfl:   •  Polyethylene Glycol 3350 (MIRALAX PO), Take  by mouth., Disp: , Rfl:   •  Simethicone (GAS-X PO), Take  by mouth., Disp: , Rfl:   •  Cyanocobalamin (VITAMIN B-12) 5000 MCG TABLET DISPERSIBLE, Take  by mouth., Disp: , Rfl:   •   "Cholecalciferol (VITAMIN D3) 5000 units Cap, Take 1 Cap by mouth every day., Disp: , Rfl:   •  temazepam (RESTORIL) 15 MG Cap, TAKE 1 TO 2 CAPSULES DAILY AT BEDTIME AS NEEDED BY MOUTH FOR 30 DAYS, Disp: , Rfl: 5  •  raNITidine (ZANTAC) 150 MG Tab, Take 150 mg by mouth 2 times a day., Disp: , Rfl:   •  Thyroid (NATURE-THROID PO), Take  by mouth., Disp: , Rfl:   •  HYDROCORTISONE PO, Take  by mouth., Disp: , Rfl:   •  Pumpkin Seed-Soy Germ (AZO BLADDER CONTROL/GO-LESS PO), Take  by mouth., Disp: , Rfl:   ALLERGIES:   Allergies   Allergen Reactions   • Erythromycin Nausea     SURGHX:   Past Surgical History:   Procedure Laterality Date   • APPENDECTOMY     • CARPAL TUNNEL ENDOSCOPIC      bilat   • CHOLECYSTECTOMY     • HIP ARTHROPLASTY TOTAL Right    • HYSTERECTOMY LAPAROSCOPY     • OTHER      kidney stones removed   • TONSILLECTOMY       SOCHX:  reports that she quit smoking about 4 years ago. She has a 60.00 pack-year smoking history. She has quit using smokeless tobacco. She reports that she does not drink alcohol or use drugs.  FH: Family history was reviewed, no pertinent findings to report      Review of Systems   Constitutional: Negative for fever.   HENT: Negative for congestion, ear pain and sore throat.    Eyes: Negative for discharge and redness.   Respiratory: Positive for cough, shortness of breath and wheezing (intermittent, now improved).    Cardiovascular: Negative for chest pain and leg swelling.   Gastrointestinal: Negative for nausea and vomiting.   Musculoskeletal: Negative for myalgias.   Skin: Negative for rash.   Neurological: Negative for headaches.          Objective:     /84   Pulse 81   Temp 37.1 °C (98.7 °F) (Temporal)   Resp 18   Ht 1.702 m (5' 7\")   Wt 69 kg (152 lb 3.2 oz)   SpO2 92%   BMI 23.84 kg/m²      Physical Exam   Constitutional: She is oriented to person, place, and time. She appears well-developed and well-nourished. No distress.   HENT:   Head: Normocephalic and " atraumatic.   Right Ear: External ear normal.   Left Ear: External ear normal.   Nose: Nose normal.   Eyes: Conjunctivae and EOM are normal.   Neck: Normal range of motion.   Cardiovascular: Normal rate, regular rhythm and normal heart sounds.   Pulmonary/Chest: Effort normal and breath sounds normal. No respiratory distress. She has no wheezes.   Musculoskeletal: Normal range of motion.   Moves all 4 extremities.   Neurological: She is alert and oriented to person, place, and time.   Skin: Skin is warm and dry.          Progress:  CXR:  COMPARISON:  None.    FINDINGS:  The heart is normal in size.  No pulmonary infiltrates or consolidations are noted.  No pleural effusions are appreciated.        Impression       No active disease.        Assessment/Plan:     1. Upper respiratory tract infection, unspecified type  - DX-CHEST-2 VIEWS; Future  - doxycycline (VIBRAMYCIN) 100 MG Tab; Take 1 Tab by mouth 2 times a day for 7 days.  Dispense: 14 Tab; Refill: 0    Differential diagnoses, supportive care, and indications for immediate follow-up discussed with patient.   Instructed to return to clinic or nearest emergency department for any change in condition, further concerns, or worsening of symptoms.    OTC Tylenol or Motrin for fever/discomfort.  OTC cough/cold medication for symptomatic relief  OTC Supportive Care for Congestion - saline nasal spray or neti pot  Drink plenty of fluids  Follow-up with PCP  AVS Printed  Return to clinic or go to the ED if symptoms worsen or fail to improve, or if the patient should develop worsening/increasing cough, congestion, ear pain, sore throat, shortness of breath, wheezing, chest pain, fever/chills, and/or any concerning symptoms.    Discussed plan with the patient, and she agrees to the above.

## 2019-12-19 ENCOUNTER — HOSPITAL ENCOUNTER (OUTPATIENT)
Dept: RADIOLOGY | Facility: MEDICAL CENTER | Age: 84
End: 2019-12-19

## 2020-01-07 ENCOUNTER — HOSPITAL ENCOUNTER (OUTPATIENT)
Dept: RADIATION ONCOLOGY | Facility: MEDICAL CENTER | Age: 85
End: 2020-01-31
Attending: RADIOLOGY
Payer: MEDICARE

## 2020-01-07 VITALS
HEART RATE: 88 BPM | DIASTOLIC BLOOD PRESSURE: 94 MMHG | WEIGHT: 146.96 LBS | SYSTOLIC BLOOD PRESSURE: 164 MMHG | TEMPERATURE: 98.2 F | BODY MASS INDEX: 23.02 KG/M2 | OXYGEN SATURATION: 97 %

## 2020-01-07 DIAGNOSIS — C34.90 MALIGNANT NEOPLASM OF UNSPECIFIED PART OF UNSPECIFIED BRONCHUS OR LUNG (HCC): ICD-10-CM

## 2020-01-07 PROCEDURE — 99213 OFFICE O/P EST LOW 20 MIN: CPT | Performed by: RADIOLOGY

## 2020-01-07 PROCEDURE — 99212 OFFICE O/P EST SF 10 MIN: CPT | Performed by: RADIOLOGY

## 2020-01-07 ASSESSMENT — PAIN SCALES - GENERAL: PAINLEVEL: NO PAIN

## 2020-01-07 NOTE — PROGRESS NOTES
RADIATION ONCOLOGY FOLLOW-UP    DATE OF SERVICE: 1/7/2020    IDENTIFICATION:   A 84 y.o. female with PET positive solitary nodule that  has increased in size since prior CT scans in the left lower lobe radiation therapy to the left lower lobe SBRT complete 3/20/2019.  HISTORY OF PRESENT ILLNESS:   Since last seen patient is the same she still has the spasm problems in her pelvis going down to her knees but she is seeing different doctors for that.  She has had a weight loss she has some fatigue dry mouth abdominal pain nocturia muscle pain joint pain unsteady gait neuropathy all chronic.  She is here to review the results of her CT scan.  The CT scan shows no evidence of the tumor that was irradiated however posterior to this there is a discoid shaped left lower lobe subpleural pulmonary nodule measuring 14 mm.  It does not appear malignant could represent atelectasis or scarring.  Recommend 3-month follow-up.    CURRENT MEDICATIONS:  Current Outpatient Medications   Medication Sig Dispense Refill   • temazepam (RESTORIL) 15 MG Cap TAKE 1 TO 2 CAPSULES DAILY AT BEDTIME AS NEEDED BY MOUTH FOR 30 DAYS  5   • ALBUTEROL INH Inhale  by mouth.     • atenolol (TENORMIN) 25 MG Tab Take 25 mg by mouth every day.     • lisinopril (PRINIVIL) 20 MG Tab Take 20 mg by mouth every day.     • oxyCODONE-acetaminophen (PERCOCET) 5-325 MG Tab Take 1-2 Tabs by mouth every four hours as needed.     • raNITidine (ZANTAC) 150 MG Tab Take 150 mg by mouth 2 times a day.     • CYCLOBENZAPRINE HCL PO Take  by mouth.     • Potassium (POTASSIMIN PO) Take  by mouth.     • Thyroid (NATURE-THROID PO) Take  by mouth.     • HYDROCORTISONE PO Take  by mouth.     • temazepam (RESTORIL) 30 MG capsule Take 30 mg by mouth at bedtime as needed for Sleep.     • LORazepam (ATIVAN) 0.5 MG Tab Take 0.5 mg by mouth every four hours as needed for Anxiety.     • Magnesium 400 MG Cap Take  by mouth.     • Melatonin 5 MG Cap Take  by mouth.     • Pumpkin Seed-Soy  Germ (AZO BLADDER CONTROL/GO-LESS PO) Take  by mouth.     • Polyethylene Glycol 3350 (MIRALAX PO) Take  by mouth.     • Simethicone (GAS-X PO) Take  by mouth.     • Cyanocobalamin (VITAMIN B-12) 5000 MCG TABLET DISPERSIBLE Take  by mouth.     • Cholecalciferol (VITAMIN D3) 5000 units Cap Take 1 Cap by mouth every day.       No current facility-administered medications for this encounter.        ALLERGIES:  Erythromycin    FAMILY HISTORY:    No family history on file.[unfilled]        SOCIAL HISTORY:     reports that she quit smoking about 5 years ago. She has a 60.00 pack-year smoking history. She has quit using smokeless tobacco. She reports that she does not drink alcohol or use drugs.   She no longer smoke cigarettes.  She is here with her daughter.    REVIEW OF SYSTEMS: Is significant for weight loss fatigue dry mouth abdominal pain nocturia muscle pain joint pain unsteady gait neuropathy all which is chronic and being followed by other physicians  The rest of the review of systems has been reviewed by me and is documented in the nursing note in [facility] dated 1/7/2020    PHYSICAL EXAM:     ECOG PERFORMANCE STATUS:  3 = Capable of only limited self care, confined to bed or chair more than 50% of waking hours       Vitals:    01/07/20 1453   BP: (!) 164/94   BP Location: Left arm   Patient Position: Sitting   BP Cuff Size: Adult   Pulse: 88   Temp: 36.8 °C (98.2 °F)   TempSrc: Temporal   SpO2: 97%   Weight: 66.7 kg (146 lb 15.4 oz)   Pain Score: No pain        GENERAL: Well-appearing alert and oriented x3 in no apparent distress  HEENT:  Pupils are equal, round, and reactive to light.  Extraocular muscles   are intact. Sclerae nonicteric.  Conjunctivae pink.  Oral cavity, tongue   protrudes midline.   NECK: No palpable nodes in the neck supraclavicular fossa   lUNGS:  Clear to ascultation, decreased breath sounds throughout  HEART:  Regular rate and rhythm.  No murmur appreciated  ABDOMEN:  Soft. No evidence of  hepatosplenomegaly.  Positive bowel sounds.  EXTREMITIES:  Without Edema.  NEUROLOGIC:  Cranial nerves II through XII were intact. Normal stance and gait motor and sensory grossly within normal limits          IMPRESSION:    A 84 y.o. with PET positive solitary nodule that  has increased in size since prior CT scans in the left lower lobe.  Status post SBRT to the left lower lobe complete 3/20/2019.  Now with atelectatic changes probably on CT scan just posterior to this area  RECOMMENDATIONS:   We are going to repeat a CT scan in 3 months and I will see her after that.  I am happy to see her sooner as needed.      Thank you for the opportunity to participate in her care.  If any questions or comments, please do not hesitate in calling.      Please note that this dictation was created using voice recognition software. I have made every reasonable attempt to correct obvious errors, but I expect that there are errors of grammar and possibly content that I did not discover before finalizing the note.

## 2020-01-07 NOTE — NON-PROVIDER
Patient was seen today in clinic with Dr. Khan for follow/up.  Vitals signs and weight were obtained and pain assessment was completed.  Allergies and medications were reviewed with the patient.  Review of systems completed.     Vitals/Pain:  Vitals:    01/07/20 1453   BP: (!) 164/94   BP Location: Left arm   Patient Position: Sitting   BP Cuff Size: Adult   Pulse: 88   Temp: 36.8 °C (98.2 °F)   TempSrc: Temporal   SpO2: 97%   Weight: 66.7 kg (146 lb 15.4 oz)   Pain Score: No pain        Allergies:   Erythromycin    Current Medications:  Current Outpatient Medications   Medication Sig Dispense Refill   • temazepam (RESTORIL) 15 MG Cap TAKE 1 TO 2 CAPSULES DAILY AT BEDTIME AS NEEDED BY MOUTH FOR 30 DAYS  5   • ALBUTEROL INH Inhale  by mouth.     • atenolol (TENORMIN) 25 MG Tab Take 25 mg by mouth every day.     • lisinopril (PRINIVIL) 20 MG Tab Take 20 mg by mouth every day.     • oxyCODONE-acetaminophen (PERCOCET) 5-325 MG Tab Take 1-2 Tabs by mouth every four hours as needed.     • raNITidine (ZANTAC) 150 MG Tab Take 150 mg by mouth 2 times a day.     • CYCLOBENZAPRINE HCL PO Take  by mouth.     • Potassium (POTASSIMIN PO) Take  by mouth.     • Thyroid (NATURE-THROID PO) Take  by mouth.     • HYDROCORTISONE PO Take  by mouth.     • temazepam (RESTORIL) 30 MG capsule Take 30 mg by mouth at bedtime as needed for Sleep.     • LORazepam (ATIVAN) 0.5 MG Tab Take 0.5 mg by mouth every four hours as needed for Anxiety.     • Magnesium 400 MG Cap Take  by mouth.     • Melatonin 5 MG Cap Take  by mouth.     • Pumpkin Seed-Soy Germ (AZO BLADDER CONTROL/GO-LESS PO) Take  by mouth.     • Polyethylene Glycol 3350 (MIRALAX PO) Take  by mouth.     • Simethicone (GAS-X PO) Take  by mouth.     • Cyanocobalamin (VITAMIN B-12) 5000 MCG TABLET DISPERSIBLE Take  by mouth.     • Cholecalciferol (VITAMIN D3) 5000 units Cap Take 1 Cap by mouth every day.       No current facility-administered medications for this encounter.           PCP:  Eugene Rea R.N.

## 2020-01-15 PROBLEM — C34.32 PRIMARY CANCER OF LEFT LOWER LOBE OF LUNG (HCC): Status: ACTIVE | Noted: 2020-01-15

## 2020-04-10 ENCOUNTER — HOSPITAL ENCOUNTER (OUTPATIENT)
Dept: RADIOLOGY | Facility: MEDICAL CENTER | Age: 85
End: 2020-04-10
Payer: MEDICARE

## 2020-04-13 ENCOUNTER — TELEPHONE (OUTPATIENT)
Dept: RADIATION ONCOLOGY | Facility: MEDICAL CENTER | Age: 85
End: 2020-04-13

## 2020-04-13 ENCOUNTER — HOSPITAL ENCOUNTER (OUTPATIENT)
Dept: RADIATION ONCOLOGY | Facility: MEDICAL CENTER | Age: 85
End: 2020-04-30
Attending: RADIOLOGY
Payer: MEDICARE

## 2020-04-13 DIAGNOSIS — C34.90 MALIGNANT NEOPLASM OF UNSPECIFIED PART OF UNSPECIFIED BRONCHUS OR LUNG (HCC): ICD-10-CM

## 2020-04-13 PROCEDURE — 99441 PR PHYSICIAN TELEPHONE EVALUATION 5-10 MIN: CPT | Mod: CR | Performed by: RADIOLOGY

## 2020-04-13 NOTE — PROGRESS NOTES
Telephone Appointment Visit   As a means of avoiding spread of COVID-19, this visit is being conducted by telephone. This telephone visit was initiated by the patient and they verbally consented.    Time at start of call: 9:08    Reason for Call:  Review CT scan results    Patient Comments / History:   Since last seen, patient has been doing well with no new symptoms.     Labs / Images Reviewed   CT scan of the chest without done 4/7/20 shows no change from prior exam.     Assessment and Plan:     A 84 y.o. female with PET positive solitary nodule that  has increased in size since prior CT scans in the left lower lobe  radiation therapy to the left lower lobe SBRT complete 3/20/2019. Clinically JOSIE    Follow-up: I will follow up with the patient in 4 months with a repeat CT at that time.  Time at end of call: 9:15  Total Time Spent: 5-10 minutes    Katalina Khan M.D.

## 2020-08-04 ENCOUNTER — HOSPITAL ENCOUNTER (OUTPATIENT)
Dept: RADIATION ONCOLOGY | Facility: MEDICAL CENTER | Age: 85
End: 2020-08-31
Attending: RADIOLOGY
Payer: MEDICARE

## 2020-08-04 ENCOUNTER — HOSPITAL ENCOUNTER (OUTPATIENT)
Dept: RADIOLOGY | Facility: MEDICAL CENTER | Age: 85
End: 2020-08-04
Payer: MEDICARE

## 2020-08-04 DIAGNOSIS — C34.32 PRIMARY CANCER OF LEFT LOWER LOBE OF LUNG (HCC): ICD-10-CM

## 2020-08-04 PROCEDURE — 99442 PR PHYSICIAN TELEPHONE EVALUATION 11-20 MIN: CPT | Mod: 95 | Performed by: RADIOLOGY

## 2020-08-04 NOTE — PROGRESS NOTES
Telephone Appointment Visit   As a means of avoiding spread of COVID-19, this visit is being conducted by telephone. This telephone visit was initiated by the patient and they verbally consented.    Time at start of call: 2:39    Reason for Call:  Review CT chest.    HPI:    Since last seen patient has been doing fairly well she has been trying to be isolating herself because of her respiratory issues.  But she really has had no change in her symptoms or any of her review of systems since her last visit by phone in April.     Labs / Images Reviewed:   CT scan of the chest was performed 7/29/2020 and compared to the prior scan from April.  There was increased bandlike consolidation of the left lung base with subjacent groundglass opacities reflective of pneumonitis or evolving posttreatment change.  There was no nodular consolidation to suggest recurrent or residual disease.  They just recommend continued surveillance.  Other small nodules were unchanged.    Assessment and Plan:     84 y.o. female with PET positive solitary nodule that  has increased in size since prior CT scans in the left lower lobe  radiation therapy to the left lower lobe SBRT complete 3/20/2019. Clinically no evidence of disease.    Follow-up: In December with a repeat CT scan of the chest.    Time at end of call: 2:55  Total Time Spent: 11-20 minutes    Katalina Khan M.D.

## 2020-12-15 ENCOUNTER — HOSPITAL ENCOUNTER (OUTPATIENT)
Dept: RADIOLOGY | Facility: MEDICAL CENTER | Age: 85
End: 2020-12-15
Payer: MEDICARE

## 2020-12-15 ENCOUNTER — HOSPITAL ENCOUNTER (OUTPATIENT)
Dept: RADIATION ONCOLOGY | Facility: MEDICAL CENTER | Age: 85
End: 2020-12-31
Attending: RADIOLOGY
Payer: MEDICARE

## 2020-12-15 PROCEDURE — 99442 PR PHYSICIAN TELEPHONE EVALUATION 11-20 MIN: CPT | Mod: 95 | Performed by: RADIOLOGY

## 2020-12-15 NOTE — PROGRESS NOTES
Telephone Appointment Visit   As a means of avoiding spread of COVID-19, this visit is being conducted by telephone. This telephone visit was initiated by the patient and they verbally consented.    Time at start of call: 2:58    Reason for Call:  Review CT scans    HPI:    Patient is here to review results of her CT scan of the chest.  She also had a CT scan of the abdomen pelvis ordered by her primary care physician that she would like me to review.  CT scan of the chest done 11/30/20 shows stable left lower lobe bandlike scarring with surrounding groundglass consolidation.  There is stable posterior left upper lobe tree-in-bud opacities.  There is a new 5 mm right upper lobe nodule no additional nodules.  There is no mediastinal hilar or axillary adenopathy.  CT scan of the abdomen and pelvis also shows no metastatic disease.  She does have a diverticulosis without diverticulitis.  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  Her biggest complaint is just been her cough and issues with dry mucus in her nose but her doctor has been giving her some Flonase.Otherwise no major symptoms have changed.     Labs / Images Reviewed:   CT scan chest abdomen pelvis done 11/30/2020     Assessment and Plan:     85 y.o. female with PET positive solitary nodule that  has increased in size since prior CT scans in the left lower lobe  radiation therapy to the left lower lobe SBRT complete 3/20/2019. Clinically no  evidence of disease.    Follow-up: She has told me that her primary care physician has ordered and a CT scan of the chest to be seen in follow-up in February.  I told her we will check on that if that is not done we will certainly order it and I will do a phone follow up with her after that is done to review it with her as well.    Time at end of call: 3:09  Total Time Spent: 11-20 minutes    Katalina Khan M.D.

## 2021-01-11 DIAGNOSIS — Z23 NEED FOR VACCINATION: ICD-10-CM

## 2021-02-22 ENCOUNTER — HOSPITAL ENCOUNTER (OUTPATIENT)
Dept: RADIATION ONCOLOGY | Facility: MEDICAL CENTER | Age: 86
End: 2021-02-28
Attending: RADIOLOGY
Payer: MEDICARE

## 2021-02-22 DIAGNOSIS — C34.32 PRIMARY CANCER OF LEFT LOWER LOBE OF LUNG (HCC): ICD-10-CM

## 2021-02-22 PROCEDURE — 99442 PR PHYSICIAN TELEPHONE EVALUATION 11-20 MIN: CPT | Mod: 95 | Performed by: RADIOLOGY

## 2021-02-23 NOTE — PROGRESS NOTES
Telephone Appointment Visit   As a means of avoiding spread of COVID-19, this visit is being conducted by telephone. This telephone visit was initiated by the patient and they verbally consented.    Time at start of call: 1:57    Reason for Call:  Review results of most recent CT scan.    HPI:    Since last seen patient has been doing well from our standpoint she is got a lot of pain issues she had a pain pump placed as she has lumbosacral and bilateral hip pain.CT scan of the chest was done on 2/16/2021.  This showed that the 5 mm right upper lobe pulmonary nodule is not significantly changed from the prior exam this is new from the previous scan however.  It is thought to either be malignancy or infection.  Otherwise sub-5 mm nodules and areas of consolidation are not significantly changed suggestive of scarring or chronic lung disease.     Labs / Images Reviewed:   CT scan of the chest as outlined on 2/16/2021 in the HPI     Assessment and Plan:     85-year-old female with history of a left lower lobe lung cancer treated with SBRT 3/21/2019. This appears to be stable but she did develop a 5 mm right upper lobe pulmonary nodule on the prior scan from November 30, 2020 but appears to be stable at this time.    Follow-up: I am going to see the patient back in follow-up in 3 months after the next CT scan of the chest.    Time at end of call: 2:15  Total Time Spent: 11-20 minutes    Katalina Khan M.D.

## 2021-05-05 ENCOUNTER — PATIENT OUTREACH (OUTPATIENT)
Dept: HEALTH INFORMATION MANAGEMENT | Facility: OTHER | Age: 86
End: 2021-05-05

## 2021-05-05 NOTE — PROGRESS NOTES
Outcome: PHONE NUMBER INVALID    Please transfer to Patient Outreach Team at [phone] when patient returns call.    [employer] [employer] Checked & [employer] Updated:  no    [facility] Verified: no    Attempt # 1

## 2021-05-17 ENCOUNTER — HOSPITAL ENCOUNTER (OUTPATIENT)
Dept: RADIOLOGY | Facility: MEDICAL CENTER | Age: 86
End: 2021-05-17

## 2021-05-18 ENCOUNTER — HOSPITAL ENCOUNTER (OUTPATIENT)
Dept: RADIATION ONCOLOGY | Facility: MEDICAL CENTER | Age: 86
End: 2021-05-31
Attending: RADIOLOGY
Payer: MEDICARE

## 2021-05-18 DIAGNOSIS — C34.32 PRIMARY CANCER OF LEFT LOWER LOBE OF LUNG (HCC): ICD-10-CM

## 2021-05-18 PROCEDURE — 99442 PR PHYSICIAN TELEPHONE EVALUATION 11-20 MIN: CPT | Mod: 95 | Performed by: RADIOLOGY

## 2021-05-18 NOTE — PROGRESS NOTES
Patient would like communication of their results via:      Home Phone: [phone] (home)  Okay to leave a message containing results? Yes     RADIATION ONCOLOGY FOLLOW-UP  Telephone Appointment Visit   As a means of avoiding spread of COVID-19, this visit is being conducted by telephone. This telephone visit was initiated by the patient and they verbally consented    DATE OF SERVICE: 5/18/2021    IDENTIFICATION:   85-year-old female with history of a left lower lobe lung cancer treated with SBRT 3/21/2019. Here to review results of most recent CT scan     HISTORY OF PRESENT ILLNESS:   Last seen she has had a lot of other problems with failure to thrive chest pain for which she was admitted to the hospital at [facility] and a CT scan of the chest was done on 5/1/2021.  This scan showed no evidence of lung cancer no evidence of PE.  Because of the scan we did not order the scan that we had originally scheduled for this week.  From our standpoint she is doing well but as mentioned she has multiple other medical problems which she is being followed for by her primary care physician.    CURRENT MEDICATIONS:  Current Outpatient Medications   Medication Sig Dispense Refill   • Fesoterodine Fumarate 8 MG TABLET SR 24 HR Take 8 mg by mouth every day.     • sennosides (SENOKOT) 8.6 MG Tab Take 8.6 mg by mouth 1 time a day as needed.     • Non Formulary Request Swiss maya laxative     • psyllium (METAMUCIL) 58.12 % Pack Take 1 Packet by mouth every day.     • Omeprazole (PRILOSEC PO) Take 20 mg by mouth every day.     • Fexofenadine HCl (MUCINEX ALLERGY PO) Take  by mouth every day.     • Probiotic Product (PROBIOTIC ADVANCED PO) Take  by mouth every day.     • therapeutic multivitamin-minerals (THERAGRAN-M) Tab Take 1 Tab by mouth every day.     • fluticasone (FLONASE) 50 MCG/ACT nasal spray Administer 1 Spray into affected nostril(S) every day.     • ALBUTEROL INH Inhale  by mouth.     • atenolol (TENORMIN) 25 MG Tab Take 25 mg by mouth every day.     • lisinopril (PRINIVIL) 20 MG Tab Take 20 mg by mouth 2 times a day.     • oxyCODONE-acetaminophen  (PERCOCET) 5-325 MG Tab Take 1-2 Tabs by mouth every four hours as needed.     • CYCLOBENZAPRINE HCL PO Take  by mouth.     • Potassium (POTASSIMIN PO) Take 20 mEq by mouth every evening.     • Thyroid (NATURE-THROID PO) Take  by mouth.     • temazepam (RESTORIL) 30 MG capsule Take 30 mg by mouth at bedtime as needed for Sleep.     • LORazepam (ATIVAN) 0.5 MG Tab Take 0.5 mg by mouth every four hours as needed for Anxiety.     • Magnesium 400 MG Cap Take  by mouth.     • Melatonin 5 MG Cap Take  by mouth.     • Polyethylene Glycol 3350 (MIRALAX PO) Take  by mouth.     • Simethicone (GAS-X PO) Take  by mouth.     • Cyanocobalamin (VITAMIN B-12) 5000 MCG TABLET DISPERSIBLE Take  by mouth.     • Cholecalciferol (VITAMIN D3) 5000 units Cap Take 1 Cap by mouth every day.       No current facility-administered medications for this encounter.       ALLERGIES:  Erythromycin    FAMILY HISTORY:    No family history on file.[unfilled]        SOCIAL HISTORY:     reports that she quit smoking about 6 years ago. She has a 60.00 pack-year smoking history. She has never used smokeless tobacco. She reports that she does not drink alcohol and does not use drugs.    PAIN: had pain pump placed since last visit    REVIEW OF SYSTEMS: Is significant for SOB, weak and shaky  The rest of the review of systems has been reviewed.    PHYSICAL EXAM:     ECOG PERFORMANCE STATUS:  2= Ambulatory and capable of all self care, but unable to carry out any work activities.  Up and about more than 50% of waking hours.   There were no vitals filed for this visit.         GENERAL: Alert and oriented x3 no further exam because this was a telephone visit    IMPRESSION:    85-year-old female with history of a left lower lobe lung cancer treated with SBRT 3/21/2019.  CT scan shows no residual tumor.   .    RECOMMENDATIONS:     Going to repeat CT scan in 6 months with follow-up at that time.  She will continue to follow-up with her primary care physician  regarding her other multiple problems      Total Time 15 minutes:2:23-2:38    Thank you for the opportunity to participate in her care.  If any questions or comments, please do not hesitate in calling.      Please note that this dictation was created using voice recognition software. I have made every reasonable attempt to correct obvious errors, but I expect that there are errors of grammar and possibly content that I did not discover before finalizing the note.

## 2021-07-30 ENCOUNTER — PATIENT OUTREACH (OUTPATIENT)
Dept: HEALTH INFORMATION MANAGEMENT | Facility: OTHER | Age: 86
End: 2021-07-30

## 2021-07-30 NOTE — LETTER
[facility]   [address]  [place] [address]  Phone: [phone] - Fax: [phone]              Address:  [address]   [place] [address]     Date: 07/30/21  Medical Record Number: [id number]    Dear Chelly Decker,    I am a Cancer Nurse Navigator, a certified oncology nurse. My role is to assess any needs you may have with education, guidance and support. I am available to you and your family from diagnosis through your survivorship.       I am available to address your needs during your journey with the following services:     Care Coordination  I can assist you in facilitating communication between your cancer care treatment team to ensure timely treatment and follow-up.  I can also assist with transition of care back to your primary care provider, or other specialist, as needed.  My goal is to bridge gaps for you throughout the course of your active treatment.       Education Services  Understanding the recommended treatment course by your physician is key. I can provide educational resources personalized to your cancer diagnosis to help you understand your diagnosis and treatment. Please let me know if you would like to receive information about your diagnosis and treatment plan.  I am here to help.     Support Services/Resource Information  [facility] we offer a full scope of support services.  I can assist you with referral information to:  · Cancer Clinical Trials & Research  · Nutrition counseling  · Support groups  · Complementary Therapies such as Healing Touch and Mind-Body Techniques Meditation  · Patient Financial Advocates  ·   · [facility], an [employer] affiliate office, our volunteers can assist you with accessing our [employer]ing library, support services information, head coverings and comfort items  · Community and national resources, included eligibility based get assistance and  pharmaceutical access programs, if you are in need of additional information.     [facility] offers services that include:  · Behavioral Health  · Genetic counseling & testing  · Acupuncture  · Lymphedema prevention/treatment program  · Palliative care services.       Our care team includes a Survivorship Nurse Navigator, who meets with you after your treatment is completed to review your survivorship care plan and treatment summary.      I hope you have an excellent patient experience.  Please feel free to share with me your comments regarding the care you have received- we value your feedback.    Sincerely,     Palak Car R.N.  Cancer Nurse Navigator    Office: [phone]   Email:

## 2021-07-30 NOTE — PROGRESS NOTES
Patient called in because she was having trouble scheduling her CT scan ordered by Dr. Khan - that patient wanted to have completed in [place].  ( Chelly still has my card from when I met up with her in 2019)   Several attempts made by navigator to contact patient via telephone were unsuccessful.  Eventually patient called Dr. Khan's MA Sil Peoples.   Sil was able to explain to Chelly that Dr. Khan had ordered the CT scan to be done in November (6 months from OV with Dr. Khan).   Afterwards, Sil forwarded the call to me.  Patient now understands when her CT should be scheduled.  .  I told her that she is free to call me anytime she has questions or concerns, but that Sil would be the best person to call when she needs to schedule with Dr Khan, as I am not able to help her with that.   Verified that patient has Sil's full name and direct phone line.  Patient is reassured and problem resolved

## 2021-11-09 ENCOUNTER — HOSPITAL ENCOUNTER (OUTPATIENT)
Dept: RADIOLOGY | Facility: MEDICAL CENTER | Age: 86
End: 2021-11-09

## 2021-11-09 ENCOUNTER — HOSPITAL ENCOUNTER (OUTPATIENT)
Dept: RADIATION ONCOLOGY | Facility: MEDICAL CENTER | Age: 86
End: 2021-11-30
Attending: RADIOLOGY
Payer: MEDICARE

## 2021-11-09 PROCEDURE — 99441 PR PHYSICIAN TELEPHONE EVALUATION 5-10 MIN: CPT | Mod: 95 | Performed by: RADIOLOGY

## 2021-11-09 NOTE — PROGRESS NOTES
Telephone Appointment Visit   As a means of avoiding spread of COVID-19, this visit is being conducted by telephone. This telephone visit was initiated by the patient and they verbally consented.    Time at start of call: 10:05    Reason for Call:  Review CT scan done 11/2/2021  85-year-old female with history of a left lower lobe lung cancer treated with SBRT 3/21/2019  HPI:    Since last seen patient has had no breathing problems she really has only this rectal pain issue which is not been able to be solved.  The CT scan compared to the August scan shows nodules or not significantly changed.     Labs / Images Reviewed:   CT chest 11/2/2021     Assessment and Plan:     Left lower lobe lung cancer status post SBRT complete 3/21/2019.  Stable disease no evidence of tumor recurrence  Follow-up: Patient has an insurance change and she will be seeing doctors down in [place].  I am recommending a 6-month follow-up with CT scan as she is stable.    Time at end of call: 10.14  Total Time Spent: 5-10 minutes    Katalina Khan M.D.

## 2021-11-30 ENCOUNTER — TELEPHONE (OUTPATIENT)
Dept: HEALTH INFORMATION MANAGEMENT | Facility: OTHER | Age: 86
End: 2021-11-30

## 2021-11-30 NOTE — TELEPHONE ENCOUNTER
Outcome:   Warmed Transferred to [facility] for [facility] in [place]    Please transfer to Patient Outreach Team at [phone] when patient returns call.    [facility] Verified: yes    Attempt # 1

## 2022-02-10 ENCOUNTER — TELEPHONE (OUTPATIENT)
Dept: HEALTH INFORMATION MANAGEMENT | Facility: OTHER | Age: 87
End: 2022-02-10

## 2022-02-24 ENCOUNTER — PATIENT MESSAGE (OUTPATIENT)
Dept: HEALTH INFORMATION MANAGEMENT | Facility: OTHER | Age: 87
End: 2022-02-24
Payer: MEDICARE

## 2022-03-15 ENCOUNTER — APPOINTMENT (OUTPATIENT)
Dept: MEDICAL GROUP | Facility: PHYSICIAN GROUP | Age: 87
End: 2022-03-15
Payer: MEDICARE

## 2022-03-29 ENCOUNTER — OFFICE VISIT (OUTPATIENT)
Dept: MEDICAL GROUP | Facility: PHYSICIAN GROUP | Age: 87
End: 2022-03-29
Payer: MEDICARE

## 2022-03-29 VITALS
TEMPERATURE: 97.6 F | OXYGEN SATURATION: 94 % | HEIGHT: 67 IN | DIASTOLIC BLOOD PRESSURE: 60 MMHG | HEART RATE: 69 BPM | BODY MASS INDEX: 24.71 KG/M2 | RESPIRATION RATE: 15 BRPM | SYSTOLIC BLOOD PRESSURE: 134 MMHG | WEIGHT: 157.4 LBS

## 2022-03-29 DIAGNOSIS — R09.02 HYPOXIA: ICD-10-CM

## 2022-03-29 DIAGNOSIS — C34.32 PRIMARY CANCER OF LEFT LOWER LOBE OF LUNG (HCC): ICD-10-CM

## 2022-03-29 DIAGNOSIS — K62.89 RECTAL PAIN, CHRONIC: ICD-10-CM

## 2022-03-29 DIAGNOSIS — J45.909 MODERATE ASTHMA WITHOUT COMPLICATION, UNSPECIFIED WHETHER PERSISTENT: ICD-10-CM

## 2022-03-29 DIAGNOSIS — E55.9 VITAMIN D DEFICIENCY: ICD-10-CM

## 2022-03-29 DIAGNOSIS — M62.89 PELVIC FLOOR DYSFUNCTION: ICD-10-CM

## 2022-03-29 DIAGNOSIS — G89.29 RECTAL PAIN, CHRONIC: ICD-10-CM

## 2022-03-29 DIAGNOSIS — K22.70 BARRETT'S ESOPHAGUS WITHOUT DYSPLASIA: ICD-10-CM

## 2022-03-29 DIAGNOSIS — R35.0 URINE FREQUENCY: ICD-10-CM

## 2022-03-29 DIAGNOSIS — F51.01 PRIMARY INSOMNIA: ICD-10-CM

## 2022-03-29 DIAGNOSIS — E53.8 VITAMIN B12 DEFICIENCY: ICD-10-CM

## 2022-03-29 DIAGNOSIS — R15.0 INCOMPLETE DEFECATION: ICD-10-CM

## 2022-03-29 DIAGNOSIS — Z13.220 SCREENING CHOLESTEROL LEVEL: ICD-10-CM

## 2022-03-29 DIAGNOSIS — I49.9 IRREGULAR HEART RATE: ICD-10-CM

## 2022-03-29 DIAGNOSIS — M48.061 SPINAL STENOSIS OF LUMBAR REGION WITHOUT NEUROGENIC CLAUDICATION: ICD-10-CM

## 2022-03-29 DIAGNOSIS — I10 PRIMARY HYPERTENSION: ICD-10-CM

## 2022-03-29 PROCEDURE — 99214 OFFICE O/P EST MOD 30 MIN: CPT | Performed by: INTERNAL MEDICINE

## 2022-03-29 ASSESSMENT — PATIENT HEALTH QUESTIONNAIRE - PHQ9: CLINICAL INTERPRETATION OF PHQ2 SCORE: 0

## 2022-03-29 NOTE — PROGRESS NOTES
Annual Health Assessment Questions:    1.  Are you currently engaging in any exercise or physical activity? No    2.  How would you describe your mood or emotional well-being today? fair    3.  Have you had any falls in the last year? No    4.  Have you noticed any problems with your balance or had difficulty walking? Yes    5.  In the last six months have you experienced any leakage of urine? No    6. DPA/Advanced Directive: Patient has Advanced Directive, but it is not on file. Instructed to bring in a copy to scan into their chart.

## 2022-04-07 ENCOUNTER — HOSPITAL ENCOUNTER (OUTPATIENT)
Dept: RADIOLOGY | Facility: MEDICAL CENTER | Age: 87
End: 2022-04-07
Payer: MEDICARE

## 2022-04-11 ENCOUNTER — HOSPITAL ENCOUNTER (OUTPATIENT)
Dept: RADIATION ONCOLOGY | Facility: MEDICAL CENTER | Age: 87
End: 2022-04-30
Attending: RADIOLOGY
Payer: MEDICARE

## 2022-04-11 DIAGNOSIS — C34.32 PRIMARY CANCER OF LEFT LOWER LOBE OF LUNG (HCC): ICD-10-CM

## 2022-04-11 DIAGNOSIS — C34.90 MALIGNANT NEOPLASM OF UNSPECIFIED PART OF UNSPECIFIED BRONCHUS OR LUNG (HCC): ICD-10-CM

## 2022-04-11 PROCEDURE — 99442 PR PHYSICIAN TELEPHONE EVALUATION 11-20 MIN: CPT | Mod: 95 | Performed by: RADIOLOGY

## 2022-04-11 NOTE — PROGRESS NOTES
Telephone Appointment Visit   This telephone visit was initiated by the patient and they verbally consented.    Reason for Call:  Review CT scan    HPI:    Patient received SBRT to the left lower lobe for non-small cell lung cancer complete 3/21/2019.  Since that time she is really not had any issues from that.  She is on oxygen because of her COPD.  She also has continued rectal issues which is her biggest problem.  She is here to review the most recent CT scan of her abdomen pelvis even though I had ordered a CT scan of the chest.     Labs / Images Reviewed:   CT scan abdomen and pelvis     Assessment and Plan:     Left lower lobe lung cancer status post SBRT complete 3/21/2019.  I reviewed the CT and there are some changes in the left lower lobe probably consistent with the SBRT.  Because she is so far out I think it is reasonable just to repeat this the CT scan of the chest without contrast in about 6 months.  She is in agreement with that.  Follow-up: 6 months with CT chest    Total Time Spent (mins): 15    Katalina Khan M.D.

## 2022-04-13 ENCOUNTER — OFFICE VISIT (OUTPATIENT)
Dept: URGENT CARE | Facility: CLINIC | Age: 87
End: 2022-04-13
Payer: MEDICARE

## 2022-04-13 ENCOUNTER — HOSPITAL ENCOUNTER (OUTPATIENT)
Facility: MEDICAL CENTER | Age: 87
End: 2022-04-13
Attending: NURSE PRACTITIONER
Payer: MEDICARE

## 2022-04-13 VITALS
SYSTOLIC BLOOD PRESSURE: 142 MMHG | HEART RATE: 73 BPM | HEIGHT: 67 IN | BODY MASS INDEX: 24.64 KG/M2 | RESPIRATION RATE: 16 BRPM | OXYGEN SATURATION: 94 % | WEIGHT: 157 LBS | TEMPERATURE: 97.3 F | DIASTOLIC BLOOD PRESSURE: 88 MMHG

## 2022-04-13 DIAGNOSIS — R09.81 NASAL CONGESTION WITH RHINORRHEA: ICD-10-CM

## 2022-04-13 DIAGNOSIS — J06.9 VIRAL URI WITH COUGH: ICD-10-CM

## 2022-04-13 DIAGNOSIS — R05.9 COUGH: ICD-10-CM

## 2022-04-13 DIAGNOSIS — Z87.09 HISTORY OF ASTHMA: ICD-10-CM

## 2022-04-13 DIAGNOSIS — J34.89 NASAL CONGESTION WITH RHINORRHEA: ICD-10-CM

## 2022-04-13 PROCEDURE — U0005 INFEC AGEN DETEC AMPLI PROBE: HCPCS

## 2022-04-13 PROCEDURE — U0003 INFECTIOUS AGENT DETECTION BY NUCLEIC ACID (DNA OR RNA); SEVERE ACUTE RESPIRATORY SYNDROME CORONAVIRUS 2 (SARS-COV-2) (CORONAVIRUS DISEASE [COVID-19]), AMPLIFIED PROBE TECHNIQUE, MAKING USE OF HIGH THROUGHPUT TECHNOLOGIES AS DESCRIBED BY CMS-2020-01-R: HCPCS

## 2022-04-13 PROCEDURE — 99204 OFFICE O/P NEW MOD 45 MIN: CPT | Performed by: NURSE PRACTITIONER

## 2022-04-13 ASSESSMENT — ENCOUNTER SYMPTOMS
CHILLS: 0
GASTROINTESTINAL NEGATIVE: 1
COUGH: 1
SINUS PAIN: 0
RHINORRHEA: 1
HEARTBURN: 0
HEADACHES: 0
ORTHOPNEA: 0
NEUROLOGICAL NEGATIVE: 1
MYALGIAS: 0
SHORTNESS OF BREATH: 0
PALPITATIONS: 0
SORE THROAT: 0
WHEEZING: 0
FEVER: 0
MUSCULOSKELETAL NEGATIVE: 1

## 2022-04-13 NOTE — PROGRESS NOTES
Subjective     Chelly Decker is a 86 y.o. female who presents with Cough (X 1 week; Mucous runs down back of throat, cannot blow nose; cough)            States post nasal drainage, cough x 1 week. Nasal stuffiness, unable to get mucus out of throat. States feels like phlegm is stuck in throat with post nasal drainage. Taking Mucinex Allergy, Flonase, saline rinse 1x/day. No fever, ST, mild cough with post nasal drainage. States cough has improved just the sensation of phlegm caught in throat. Will use albuterol inhaler as needed for asthma. Has used over the counter cough syrup as needed. History of hypoxia and uses oxygen at night and during sometimes as needed on exertion. Uses wheelchair. History of lung left lung cancer but states currently stable.     Cough  The current episode started in the past 7 days. The problem has been gradually improving. The problem occurs constantly. The cough is non-productive. Associated symptoms include nasal congestion, postnasal drip and rhinorrhea. Pertinent negatives include no chest pain, chills, ear congestion, ear pain, fever, headaches, heartburn, myalgias, rash, sore throat, shortness of breath or wheezing. Nothing aggravates the symptoms. She has tried a beta-agonist inhaler and OTC cough suppressant for the symptoms. The treatment provided mild relief. Her past medical history is significant for asthma.       Review of Systems   Constitutional: Negative for chills, fever and malaise/fatigue.   HENT: Positive for congestion, postnasal drip and rhinorrhea. Negative for ear pain, sinus pain and sore throat.    Respiratory: Positive for cough. Negative for shortness of breath and wheezing.    Cardiovascular: Negative for chest pain, palpitations and orthopnea.   Gastrointestinal: Negative.  Negative for heartburn.   Musculoskeletal: Negative.  Negative for myalgias.   Skin: Negative for rash.   Neurological: Negative.  Negative for headaches.   All other systems reviewed  and are negative.    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    She has no past medical history of Malignant hyperthermia or PONV (postoperative nausea and vomiting).  MEDS:   Current Outpatient Medications:   •  HYDROmorphone HCl (DILAUDID INJ), Inject  as directed., Disp: , Rfl:   •  Fesoterodine Fumarate 8 MG TABLET SR 24 HR, Take 8 mg by mouth every day., Disp: , Rfl:   •  psyllium (METAMUCIL) 58.12 % Pack, Take 1 Packet by mouth every day., Disp: , Rfl:   •  Omeprazole (PRILOSEC PO), Take 20 mg by mouth every day., Disp: , Rfl:   •  Fexofenadine HCl (MUCINEX ALLERGY PO), Take  by mouth every day., Disp: , Rfl:   •  Probiotic Product (PROBIOTIC ADVANCED PO), Take  by mouth every day., Disp: , Rfl:   •  therapeutic multivitamin-minerals (THERAGRAN-M) Tab, Take 1 Tab by mouth every day., Disp: , Rfl:   •  fluticasone (FLONASE) 50 MCG/ACT nasal spray, Administer 1 Spray into affected nostril(S) every day., Disp: , Rfl:   •  ALBUTEROL INH, Inhale  by mouth., Disp: , Rfl:   •  oxyCODONE-acetaminophen (PERCOCET) 5-325 MG Tab, Take 1-2 Tabs by mouth every four hours as needed., Disp: , Rfl:   •  CYCLOBENZAPRINE HCL PO, Take  by mouth., Disp: , Rfl:   •  Potassium (POTASSIMIN PO), Take 20 mEq by mouth every evening., Disp: , Rfl:   •  temazepam (RESTORIL) 30 MG capsule, Take 30 mg by mouth at bedtime as needed for Sleep., Disp: , Rfl:   •  Magnesium 400 MG Cap, Take  by mouth., Disp: , Rfl:   •  Polyethylene Glycol 3350 (MIRALAX PO), Take  by  "mouth., Disp: , Rfl:   •  Simethicone (GAS-X PO), Take  by mouth., Disp: , Rfl:   •  Cyanocobalamin (VITAMIN B-12) 5000 MCG TABLET DISPERSIBLE, Take  by mouth., Disp: , Rfl:   •  Cholecalciferol (VITAMIN D3) 5000 units Cap, Take 1 Cap by mouth every day., Disp: , Rfl:   •  Non Formulary Request, Swiss maya laxative (Patient not taking: Reported on 4/13/2022), Disp: , Rfl:   •  lisinopril (PRINIVIL) 20 MG Tab, Take 20 mg by mouth 2 times a day. (Patient not taking: Reported on 4/13/2022), Disp: , Rfl:   •  LORazepam (ATIVAN) 0.5 MG Tab, Take 0.5 mg by mouth every four hours as needed for Anxiety. (Patient not taking: Reported on 4/13/2022), Disp: , Rfl:   ALLERGIES:   Allergies   Allergen Reactions   • Erythromycin Nausea   • Sulfa Drugs Unspecified     SURGHX:   Past Surgical History:   Procedure Laterality Date   • PB IMPLANT NEUROSTIM/ N/A 2/5/2021    Procedure: IMPLANT PUMP AND CATHETER;  Surgeon: Joel Alanis M.D.;  Location: [facility];  Service: Pain Management   • APPENDECTOMY     • CARPAL TUNNEL ENDOSCOPIC      bilat   • CHOLECYSTECTOMY     • GYN SURGERY      c section   • HIP ARTHROPLASTY TOTAL Right    • HYSTERECTOMY LAPAROSCOPY      total   • OTHER      kidney stones removed   • TONSILLECTOMY       SOCHX:  reports that she quit smoking about 7 years ago. She has a 60.00 pack-year smoking history. She has never used smokeless tobacco. She reports that she does not drink alcohol and does not use drugs.  FH: Family history was reviewed, no pertinent findings to report           Objective     /88 (BP Location: Left arm, Patient Position: Sitting)   Pulse 73   Temp 36.3 °C (97.3 °F) (Temporal)   Resp 16   Ht 1.702 m (5' 7\")   Wt 71.2 kg (157 lb)   SpO2 94%   BMI 24.59 kg/m²      Physical Exam  Vitals reviewed.   Constitutional:       General: She is awake. She is not in acute distress.     Appearance: Normal appearance. She is well-developed. She is not ill-appearing, " toxic-appearing or diaphoretic.   HENT:      Head: Normocephalic.      Right Ear: Ear canal and external ear normal. A middle ear effusion is present. There is no impacted cerumen.      Left Ear: Ear canal and external ear normal. A middle ear effusion is present. There is no impacted cerumen.      Nose: Congestion and rhinorrhea present.      Mouth/Throat:      Lips: Pink.      Mouth: Mucous membranes are dry.      Pharynx: Oropharynx is clear. Uvula midline.   Eyes:      Conjunctiva/sclera: Conjunctivae normal.      Pupils: Pupils are equal, round, and reactive to light.   Cardiovascular:      Rate and Rhythm: Normal rate.   Pulmonary:      Effort: Pulmonary effort is normal. No tachypnea, accessory muscle usage or respiratory distress.      Breath sounds: Normal breath sounds and air entry. No stridor, decreased air movement or transmitted upper airway sounds. No decreased breath sounds, wheezing, rhonchi or rales.   Musculoskeletal:         General: Normal range of motion.      Cervical back: Normal range of motion and neck supple.   Skin:     General: Skin is warm.   Neurological:      Mental Status: She is alert and oriented to person, place, and time.   Psychiatric:         Attention and Perception: Attention normal.         Mood and Affect: Mood normal.         Speech: Speech normal.         Behavior: Behavior normal. Behavior is cooperative.                             Assessment & Plan        1. Cough  - SARS-CoV-2 PCR (24 hour In-House): Collect NP swab in VTM; Future    2. Nasal congestion with rhinorrhea  - SARS-CoV-2 PCR (24 hour In-House): Collect NP swab in VTM; Future    3. Viral URI with cough    -Mild seasonal allergies with post nasal drainage, dry mucus membranes, poor water drinking may be exacerbating dry cough  -Maintain hydration/water intake  -May use humidifier/vaporizer at home as needed for dry cough/nasal passages  -Gargle salt water or throat lozenges as needed for throat  irritation/dry cough  -Get rest  -May use over the counter plain Mucinex, discontinue Mucinex Allergy med  -May use saline nasal spray/Flonase as needed to flush any nasal congestion/post nasal drainage   -May use albuterol inhaler as directed for shortness of breath   -Monitor for fevers, worse cough, phlegm, shortness of breath, wheeze, chest tightness- need re-evaluation    -Education provided: see above    -Return to urgent care as needed if new or worsening symptoms  -Patient expresses understanding to treatment and plan of care  -Questions were encouraged and answered  -Recommended over the counter meds were written down when requested   -Advised to follow-up with the primary care provider for recheck, reevaluation, and consideration of further management as needed     -Time spent evaluating this patient was at least 30 minutes. This time comprises of the following: preparing for visit/chart review, patient history taken into account pertinent to symptoms, patient counseling/education for needed treatment outpatient, exam/evaluation/treatment plan provided, ordering any appropriate lab/test/procedures/meds, independent interpretation of any clinic testing, medication management with any other listed medications and chart documentation.    [employer] release of COVID test, results in approx 48 hrs, patient notified, patient will have daughter help her retrieve this test result

## 2022-04-14 DIAGNOSIS — J34.89 NASAL CONGESTION WITH RHINORRHEA: ICD-10-CM

## 2022-04-14 DIAGNOSIS — R09.81 NASAL CONGESTION WITH RHINORRHEA: ICD-10-CM

## 2022-04-14 DIAGNOSIS — R05.9 COUGH: ICD-10-CM

## 2022-04-14 LAB
COVID ORDER STATUS COVID19: NORMAL
SARS-COV-2 RNA RESP QL NAA+PROBE: NOTDETECTED
SPECIMEN SOURCE: NORMAL

## 2022-04-15 ENCOUNTER — TELEPHONE (OUTPATIENT)
Dept: MEDICAL GROUP | Facility: PHYSICIAN GROUP | Age: 87
End: 2022-04-15
Payer: MEDICARE

## 2022-04-20 ENCOUNTER — TELEPHONE (OUTPATIENT)
Dept: MEDICAL GROUP | Facility: PHYSICIAN GROUP | Age: 87
End: 2022-04-20
Payer: MEDICARE

## 2022-04-20 NOTE — LETTER
[facility]  Kassi Carrillo M.D.  [address]  [place] [address]  Fax: [phone]   Authorization for Release/Disclosure of   Protected Health Information   Name: JACKIE STORY : 1935 SSN: [id number]   Address: [address] [place] [address] [address] [facility] [address] Phone:    [phone] (home) [phone] (work)   I authorize the entity listed below to release/disclose the PHI below to:   [facility]/Kassi Carrillo M.D. and Kassi Carrillo M.D.   Provider or Entity Name:  [facility]   Address   [facility], Zip            [address] [facility], [place] [address] Phone:  [phone]      Fax:       [phone]          Reason for request: continuity of care   Information to be released:    [ X ] LAST COLONOSCOPY,  including any PATH REPORT and follow-up  [ X ] LAST FIT/COLOGUARD RESULT [  ] LAST DEXA  [  ] LAST MAMMOGRAM  [  ] LAST PAP  [  ] LAST LABS [  ] RETINA EXAM REPORT  [  ] IMMUNIZATION RECORDS  [ XXX ] Release all info      [  ] Check here and initial the line next to each item to release ALL health information INCLUDING  _____ Care and treatment for drug and / or alcohol abuse  _____ HIV testing, infection status, or AIDS  _____ Genetic Testing    DATES OF SERVICE OR TIME PERIOD TO BE DISCLOSED: _____________  I understand and acknowledge that:  * This Authorization may be revoked at any time by you in writing, except if your health information has already been used or disclosed.  * Your health information that will be used or disclosed as a result of you signing this authorization could be re-disclosed by the recipient. If this occurs, your re-disclosed health information may no longer be protected by State or Federal laws.  * You may refuse to sign this Authorization. Your refusal will not affect your ability to obtain treatment.  * This Authorization becomes effective upon signing and will  on (date) __________.      If no date is indicated, this Authorization will   one (1) year from the signature date.    Name: Chelly Decker    Signature:Continuity of Care    Date:     2022       PLEASE FAX REQUESTED RECORDS BACK TO: [phone]

## 2022-04-20 NOTE — TELEPHONE ENCOUNTER
Please obtain medical records from the following:  [facility], Dr. Alanis.  Radiation oncologist, Dr. Khan.  [facility].  Urology.  Dr. Garcia in [place].

## 2022-04-20 NOTE — LETTER
[facility]  Kassi Carrillo M.D.  [address]  [place] [address]  Fax: [phone]   Authorization for Release/Disclosure of   Protected Health Information   Name: CHELLY DECKER : 1935 SSN: [id number]   Address: [address] [place] [address] [address] [facility] [address] Phone:    [phone] (home) [phone] (work)   I authorize the entity listed below to release/disclose the PHI below to:   [facility]/Kassi Carrillo M.D. and Kassi Carrillo M.D.   Provider or Entity Name:     Address   City, State, [facility]   Phone:      Fax:     Reason for request: continuity of care   Information to be released:    [  ] LAST COLONOSCOPY,  including any PATH REPORT and follow-up  [  ] LAST FIT/COLOGUARD RESULT [  ] LAST DEXA  [  ] LAST MAMMOGRAM  [  ] LAST PAP  [  ] LAST LABS [  ] RETINA EXAM REPORT  [  ] IMMUNIZATION RECORDS  [  ] Release all info      [  ] Check here and initial the line next to each item to release ALL health information INCLUDING  _____ Care and treatment for drug and / or alcohol abuse  _____ HIV testing, infection status, or AIDS  _____ Genetic Testing    DATES OF SERVICE OR TIME PERIOD TO BE DISCLOSED: _____________  I understand and acknowledge that:  * This Authorization may be revoked at any time by you in writing, except if your health information has already been used or disclosed.  * Your health information that will be used or disclosed as a result of you signing this authorization could be re-disclosed by the recipient. If this occurs, your re-disclosed health information may no longer be protected by State or Federal laws.  * You may refuse to sign this Authorization. Your refusal will not affect your ability to obtain treatment.  * This Authorization becomes effective upon signing and will  on (date) __________.      If no date is indicated, this Authorization will  one (1) year from the signature date.    Name: Chelly Decker    Signature:Continuity of care    Date:      4/20/2022       PLEASE FAX REQUESTED RECORDS BACK TO: [phone]

## 2022-04-20 NOTE — PROGRESS NOTES
CC: Establish medical care.  Prior PCP [facility].  Patient lives in [place], difficulty with transportation.    HPI:  Chelly presents with the following    1. 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  Chronic.  Followed by [facility] consultants in [place].    2. Primary hypertension  Blood pressure stable.  Followed by cardiology.  Patient treated with furosemide, losartan and diltiazem.    3. Incomplete defecation  Patient with history of fecal incontinence, rectal pain.  Followed by GI consultants.  Symptoms are severe.  Recommendations for biofeedback.  Patient has an appointment with [facility] to address rectal pain.    4. Moderate asthma without complication, unspecified whether persistent  Asthma is stable and associated with environmental allergies.  Uses albuterol as needed.  Notes asthma exacerbations.    5. Hypoxia  Patient recently completed all p.o.  Uses 4 L nasal cannula at bedtime.    6. Spinal stenosis of lumbar region without neurogenic claudication  Patient is currently followed by [facility].  Her physician, Dr. Alanis will be retiring in 4 months.  Patient receives pain management at this clinic.  Pain pump implant not helping.  This will be removed.  Patient requires oxycodone 5/325 mg tablet, occasionally 6 times per day.  Narcotic last filled 3/26/2022.  Patient states that laminectomy has been recommended?.    7. Primary cancer of left lower lobe of lung (HCC)  Chronic.  Stable.  Followed by radiation oncology.  Last radiation treatment course July 2021.  CT scan every 6 months per Dr. Khan.    8. Pelvic floor dysfunction  Diagnosed with neurogenic incontinence.  Patient experiences 14-20 times of urination per day.  Followed by Dr. Garcia her urologist in [place].  Patient has also been seen and evaluated at [facility].  Will have follow-up soon.  Pelvic floor biofeedback recommended by urology.    9. Irregular heart rate  Per  patient history.  Followed by her cardiologist in [place].    10. Primary insomnia  Patient occasionally will use temazepam 15 mg tablets for sleep.  Patient states that her sleep quality and rhythm is off.  Patient is up mostly all night.  Sleeps 1 to 2 hours at a time.      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     • Fesoterodine Fumarate 8 MG TABLET SR 24 HR Take 8 mg by mouth every day.     • Non Formulary Request Swiss maya laxative (Patient not taking: Reported on 4/13/2022)     • psyllium (METAMUCIL) 58.12 % Pack Take 1 Packet by mouth every day.     • Omeprazole (PRILOSEC PO) Take 20 mg by mouth every day.     • Fexofenadine HCl (MUCINEX ALLERGY PO) Take  by mouth every day.     • Probiotic Product (PROBIOTIC ADVANCED PO) Take  by mouth every day.     • therapeutic multivitamin-minerals (THERAGRAN-M) Tab Take 1 Tab by mouth every day.     • fluticasone (FLONASE) 50 MCG/ACT nasal spray Administer 1 Spray into affected nostril(S) every day.     • ALBUTEROL INH Inhale  by mouth.     • lisinopril (PRINIVIL) 20 MG Tab Take 20 mg by mouth 2 times a day. (Patient not taking: Reported on 4/13/2022)     • oxyCODONE-acetaminophen (PERCOCET) 5-325 MG Tab Take 1-2 Tabs by mouth every four hours as needed.     • CYCLOBENZAPRINE HCL PO Take  by mouth.     • Potassium (POTASSIMIN PO) Take 20 mEq by mouth every evening.     • temazepam (RESTORIL) 30 MG capsule Take  30 mg by mouth at bedtime as needed for Sleep.     • LORazepam (ATIVAN) 0.5 MG Tab Take 0.5 mg by mouth every four hours as needed for Anxiety. (Patient not taking: Reported on 2022)     • Magnesium 400 MG Cap Take  by mouth.     • Polyethylene Glycol 3350 (MIRALAX PO) Take  by mouth.     • Simethicone (GAS-X PO) Take  by mouth.     • Cyanocobalamin (VITAMIN B-12) 5000 MCG TABLET DISPERSIBLE Take  by mouth.     • Cholecalciferol (VITAMIN D3) 5000 units Cap Take 1 Cap by mouth every day.       No current facility-administered medications for this visit.         Allergies as of 2022 - Reviewed 2022   Allergen Reaction Noted   • Erythromycin Nausea 2019   • Sulfa drugs Unspecified 2021        Social History     Socioeconomic History   • Marital status:      Spouse name: Not on file   • Number of children: Not on file   • Years of education: Not on file   • Highest education level: Not on file   Occupational History   • Occupation: retired book keeper   Tobacco Use   • Smoking status: Former Smoker     Packs/day: 1.00     Years: 60.00     Pack years: 60.00     Quit date:      Years since quittin.3   • Smokeless tobacco: Never Used   Vaping Use   • Vaping Use: Never used   Substance and Sexual Activity   • Alcohol use: No   • Drug use: No   • Sexual activity: Not on file   Other Topics Concern   • Not on file   Social History Narrative   • Not on file     Social Determinants of Health     Financial Resource Strain: Not on file   Food Insecurity: Not on file   Transportation Needs: Not on file   Physical Activity: Not on file   Stress: Not on file   Social Connections: Not on file   Intimate Partner Violence: Not on file   Housing Stability: Not on file       History reviewed. No pertinent family history.    Past Surgical History:   Procedure Laterality Date   • PB IMPLANT NEUROSTIM/ N/A 2021    Procedure: IMPLANT PUMP AND CATHETER;  Surgeon: Joel Alanis M.D.;  " Location: SURGERY [facility];  Service: Pain Management   • APPENDECTOMY     • CARPAL TUNNEL ENDOSCOPIC      bilat   • CHOLECYSTECTOMY     • GYN SURGERY      c section   • HIP ARTHROPLASTY TOTAL Right    • HYSTERECTOMY LAPAROSCOPY      total   • OTHER      kidney stones removed   • TONSILLECTOMY         ROS: Positive ROS per HPI.  Denies any Headache,Chest pain,  Shortness of breath,  Abdominal pain,  Lower ext edema, Fevers, Nights sweats, Weight Changes, Focal weakness or numbness.  All other systems are negative.    /60 (BP Location: Right arm, Patient Position: Sitting, BP Cuff Size: Adult)   Pulse 69   Temp 36.4 °C (97.6 °F) (Temporal)   Resp 15   Ht 1.702 m (5' 7\")   Wt 71.4 kg (157 lb 6.4 oz)   SpO2 94%   BMI 24.65 kg/m²      Constitutional: Alert, no distress, well-groomed.  Skin: Warm, dry, good turgor, no rashes in visible areas.  Eye: Equal, round and reactive, conjunctiva clear, lids normal.  ENMT: Lips without lesions, good dentition, moist mucous membranes.  Neck: Trachea midline, no masses, no thyromegaly.  Respiratory: Unlabored respiratory effort, no cough.  Abdomen: Soft, no gross masses.  MSK: In a wheelchair., moves all extremities.  Neuro: Grossly non-focal. No cranial nerve deficit. Strength and sensation intact.   Psych: Alert and oriented x3, normal affect and mood.      Assessment and Plan.   86 y.o. female presenting with the following.     1. 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  Obtain medical records from [facility].    2. Primary hypertension  Stable.  Continue current antihypertensives at this time.  - Comp Metabolic Panel; Future  - CBC WITH DIFFERENTIAL; Future  - MAGNESIUM; Future    3. Incomplete defecation  Continue current management and treatment recommendations.    4. Moderate asthma without complication, unspecified whether persistent  Stable at this time.  Albuterol as needed.  Control allergy symptoms.    5. Hypoxia  Stable.  Continue current " plan of care per pulmonology.    6. Spinal stenosis of lumbar region without neurogenic claudication  Stable.  Obtain medical records from [facility].    7. Primary cancer of left lower lobe of lung (HCC)  Chronic.  Stable.  Continue follow-up with Dr. Khan.  Obtain medical records for review.    8. Pelvic floor dysfunction  Continue current plan of care.    9. Irregular heart rate  Obtain medical records from cardiology.  - MAGNESIUM; Future  - TSH WITH REFLEX TO FT4; Future    10. Rectal pain, chronic  Stable.  Follow-up with GI.    11. Screening cholesterol level    - Lipid Profile; Future    12. Vitamin D deficiency    - VITAMIN D,25 HYDROXY; Future    13. Vitamin B12 deficiency    - VITAMIN B12; Future  - FOLATE; Future    14. Urine frequency    - URINALYSIS,CULTURE IF INDICATED; Future    15. Primary insomnia  Temazepam as needed.  Sleep hygiene.  Control pain.    My total time spent caring for the patient on the day of the encounter was 45 minutes.   This does not include time spent on separately billable procedures/tests.

## 2022-04-22 DIAGNOSIS — M48.061 SPINAL STENOSIS OF LUMBAR REGION, UNSPECIFIED WHETHER NEUROGENIC CLAUDICATION PRESENT: ICD-10-CM

## 2022-04-27 ENCOUNTER — TELEPHONE (OUTPATIENT)
Dept: HEALTH INFORMATION MANAGEMENT | Facility: OTHER | Age: 87
End: 2022-04-27
Payer: MEDICARE

## 2022-04-27 ENCOUNTER — PATIENT MESSAGE (OUTPATIENT)
Dept: HEALTH INFORMATION MANAGEMENT | Facility: OTHER | Age: 87
End: 2022-04-27

## 2022-05-03 ENCOUNTER — HOSPITAL ENCOUNTER (OUTPATIENT)
Dept: LAB | Facility: MEDICAL CENTER | Age: 87
End: 2022-05-03
Attending: INTERNAL MEDICINE
Payer: MEDICARE

## 2022-05-03 DIAGNOSIS — E53.8 VITAMIN B12 DEFICIENCY: ICD-10-CM

## 2022-05-03 DIAGNOSIS — I10 PRIMARY HYPERTENSION: ICD-10-CM

## 2022-05-03 DIAGNOSIS — E55.9 VITAMIN D DEFICIENCY: ICD-10-CM

## 2022-05-03 DIAGNOSIS — I49.9 IRREGULAR HEART RATE: ICD-10-CM

## 2022-05-03 DIAGNOSIS — Z13.220 SCREENING CHOLESTEROL LEVEL: ICD-10-CM

## 2022-05-03 LAB
25(OH)D3 SERPL-MCNC: 67 NG/ML (ref 30–100)
ALBUMIN SERPL BCP-MCNC: 4.2 G/DL (ref 3.2–4.9)
ALBUMIN/GLOB SERPL: 1.5 G/DL
ALP SERPL-CCNC: 108 U/L (ref 30–99)
ALT SERPL-CCNC: 14 U/L (ref 2–50)
ANION GAP SERPL CALC-SCNC: 11 MMOL/L (ref 7–16)
AST SERPL-CCNC: 16 U/L (ref 12–45)
BASOPHILS # BLD AUTO: 0.8 % (ref 0–1.8)
BASOPHILS # BLD: 0.05 K/UL (ref 0–0.12)
BILIRUB SERPL-MCNC: 0.4 MG/DL (ref 0.1–1.5)
BUN SERPL-MCNC: 24 MG/DL (ref 8–22)
CALCIUM SERPL-MCNC: 10.4 MG/DL (ref 8.5–10.5)
CHLORIDE SERPL-SCNC: 104 MMOL/L (ref 96–112)
CHOLEST SERPL-MCNC: 125 MG/DL (ref 100–199)
CO2 SERPL-SCNC: 25 MMOL/L (ref 20–33)
CREAT SERPL-MCNC: 0.93 MG/DL (ref 0.5–1.4)
EOSINOPHIL # BLD AUTO: 0.57 K/UL (ref 0–0.51)
EOSINOPHIL NFR BLD: 8.9 % (ref 0–6.9)
ERYTHROCYTE [DISTWIDTH] IN BLOOD BY AUTOMATED COUNT: 45.3 FL (ref 35.9–50)
FOLATE SERPL-MCNC: 11.8 NG/ML
GFR SERPLBLD CREATININE-BSD FMLA CKD-EPI: 60 ML/MIN/1.73 M 2
GLOBULIN SER CALC-MCNC: 2.8 G/DL (ref 1.9–3.5)
GLUCOSE SERPL-MCNC: 108 MG/DL (ref 65–99)
HCT VFR BLD AUTO: 41.1 % (ref 37–47)
HDLC SERPL-MCNC: 65 MG/DL
HGB BLD-MCNC: 13.1 G/DL (ref 12–16)
IMM GRANULOCYTES # BLD AUTO: 0.01 K/UL (ref 0–0.11)
IMM GRANULOCYTES NFR BLD AUTO: 0.2 % (ref 0–0.9)
LDLC SERPL CALC-MCNC: 42 MG/DL
LYMPHOCYTES # BLD AUTO: 2.47 K/UL (ref 1–4.8)
LYMPHOCYTES NFR BLD: 38.7 % (ref 22–41)
MAGNESIUM SERPL-MCNC: 1.9 MG/DL (ref 1.5–2.5)
MCH RBC QN AUTO: 30.2 PG (ref 27–33)
MCHC RBC AUTO-ENTMCNC: 31.9 G/DL (ref 33.6–35)
MCV RBC AUTO: 94.7 FL (ref 81.4–97.8)
MONOCYTES # BLD AUTO: 0.74 K/UL (ref 0–0.85)
MONOCYTES NFR BLD AUTO: 11.6 % (ref 0–13.4)
NEUTROPHILS # BLD AUTO: 2.54 K/UL (ref 2–7.15)
NEUTROPHILS NFR BLD: 39.8 % (ref 44–72)
NRBC # BLD AUTO: 0 K/UL
NRBC BLD-RTO: 0 /100 WBC
PLATELET # BLD AUTO: 195 K/UL (ref 164–446)
PMV BLD AUTO: 10.7 FL (ref 9–12.9)
POTASSIUM SERPL-SCNC: 4.2 MMOL/L (ref 3.6–5.5)
PROT SERPL-MCNC: 7 G/DL (ref 6–8.2)
RBC # BLD AUTO: 4.34 M/UL (ref 4.2–5.4)
SODIUM SERPL-SCNC: 140 MMOL/L (ref 135–145)
TRIGL SERPL-MCNC: 90 MG/DL (ref 0–149)
TSH SERPL DL<=0.005 MIU/L-ACNC: 1.1 UIU/ML (ref 0.38–5.33)
VIT B12 SERPL-MCNC: 1258 PG/ML (ref 211–911)
WBC # BLD AUTO: 6.4 K/UL (ref 4.8–10.8)

## 2022-05-03 PROCEDURE — 83735 ASSAY OF MAGNESIUM: CPT

## 2022-05-03 PROCEDURE — 80061 LIPID PANEL: CPT

## 2022-05-03 PROCEDURE — 36415 COLL VENOUS BLD VENIPUNCTURE: CPT

## 2022-05-03 PROCEDURE — 80053 COMPREHEN METABOLIC PANEL: CPT

## 2022-05-03 PROCEDURE — 82306 VITAMIN D 25 HYDROXY: CPT

## 2022-05-03 PROCEDURE — 82746 ASSAY OF FOLIC ACID SERUM: CPT

## 2022-05-03 PROCEDURE — 85025 COMPLETE CBC W/AUTO DIFF WBC: CPT

## 2022-05-03 PROCEDURE — 82607 VITAMIN B-12: CPT

## 2022-05-03 PROCEDURE — 84443 ASSAY THYROID STIM HORMONE: CPT

## 2022-05-12 ENCOUNTER — OFFICE VISIT (OUTPATIENT)
Dept: MEDICAL GROUP | Facility: PHYSICIAN GROUP | Age: 87
End: 2022-05-12
Payer: MEDICARE

## 2022-05-12 VITALS
SYSTOLIC BLOOD PRESSURE: 110 MMHG | HEIGHT: 67 IN | DIASTOLIC BLOOD PRESSURE: 50 MMHG | OXYGEN SATURATION: 95 % | HEART RATE: 78 BPM | TEMPERATURE: 97.2 F | BODY MASS INDEX: 24.33 KG/M2 | RESPIRATION RATE: 15 BRPM | WEIGHT: 155 LBS

## 2022-05-12 DIAGNOSIS — L98.9 SKIN LESIONS: ICD-10-CM

## 2022-05-12 DIAGNOSIS — Z79.899 MEDICATION MANAGEMENT: ICD-10-CM

## 2022-05-12 DIAGNOSIS — N39.41 URGE INCONTINENCE OF URINE: ICD-10-CM

## 2022-05-12 DIAGNOSIS — K62.89 RECTAL PAIN: ICD-10-CM

## 2022-05-12 DIAGNOSIS — M62.89 PELVIC FLOOR DYSFUNCTION: ICD-10-CM

## 2022-05-12 DIAGNOSIS — R05.9 COUGH: ICD-10-CM

## 2022-05-12 DIAGNOSIS — M48.061 SPINAL STENOSIS OF LUMBAR REGION WITHOUT NEUROGENIC CLAUDICATION: ICD-10-CM

## 2022-05-12 PROBLEM — Z86.19 H/O CLOSTRIDIUM DIFFICILE INFECTION: Status: ACTIVE | Noted: 2022-05-12

## 2022-05-12 PROCEDURE — 99215 OFFICE O/P EST HI 40 MIN: CPT | Performed by: INTERNAL MEDICINE

## 2022-05-12 RX ORDER — LANOLIN ALCOHOL/MO/W.PET/CERES
CREAM (GRAM) TOPICAL
COMMUNITY
Start: 2022-04-22 | End: 2022-05-12

## 2022-05-12 RX ORDER — ALBUTEROL SULFATE 90 UG/1
2 AEROSOL, METERED RESPIRATORY (INHALATION) EVERY 6 HOURS PRN
COMMUNITY

## 2022-05-12 RX ORDER — CLONIDINE HYDROCHLORIDE 0.1 MG/1
0.1 TABLET ORAL 3 TIMES DAILY
COMMUNITY
Start: 2022-03-23 | End: 2022-05-12

## 2022-05-12 RX ORDER — DILTIAZEM HYDROCHLORIDE 180 MG/1
180 CAPSULE, COATED, EXTENDED RELEASE ORAL
COMMUNITY
Start: 2022-04-14 | End: 2022-09-27

## 2022-05-12 RX ORDER — LOSARTAN POTASSIUM 50 MG/1
25 TABLET ORAL EVERY EVENING
COMMUNITY
End: 2023-11-29

## 2022-05-12 RX ORDER — POTASSIUM CHLORIDE 20 MEQ/1
TABLET, EXTENDED RELEASE ORAL
COMMUNITY
Start: 2021-12-02 | End: 2022-06-23

## 2022-05-12 RX ORDER — POTASSIUM CHLORIDE 20 MEQ/1
20 TABLET, EXTENDED RELEASE ORAL DAILY
COMMUNITY
Start: 2022-02-12 | End: 2022-06-23

## 2022-05-12 RX ORDER — MULTIVIT WITH MINERALS/LUTEIN
TABLET ORAL DAILY
Status: ON HOLD | COMMUNITY
End: 2022-10-07

## 2022-05-12 RX ORDER — DIPHENHYDRAMINE HCL 25 MG
CAPSULE ORAL
COMMUNITY
End: 2022-09-27

## 2022-05-12 RX ORDER — AMLODIPINE BESYLATE 5 MG/1
TABLET ORAL
COMMUNITY
End: 2022-05-12

## 2022-05-12 RX ORDER — LOSARTAN POTASSIUM 25 MG/1
TABLET ORAL
COMMUNITY
Start: 2022-04-04 | End: 2022-06-23

## 2022-05-12 RX ORDER — LOSARTAN POTASSIUM 50 MG/1
50 TABLET ORAL DAILY
COMMUNITY
Start: 2022-01-14 | End: 2022-05-12

## 2022-05-12 RX ORDER — ATENOLOL 25 MG/1
25 TABLET ORAL DAILY
COMMUNITY
End: 2022-09-27

## 2022-05-12 RX ORDER — DOXYCYCLINE HYCLATE 100 MG
TABLET ORAL
COMMUNITY
Start: 2022-05-05 | End: 2022-06-23

## 2022-05-12 RX ORDER — FUROSEMIDE 20 MG/1
20 TABLET ORAL EVERY MORNING
COMMUNITY
Start: 2021-12-02 | End: 2023-10-31

## 2022-05-12 RX ORDER — MAGNESIUM OXIDE TAB 400 MG (241.3 MG ELEMENTAL MG) 400 (241.3 MG) MG
TAB ORAL
COMMUNITY
Start: 2022-04-22 | End: 2022-06-23

## 2022-05-12 RX ORDER — IPRATROPIUM BROMIDE 42 UG/1
SPRAY, METERED NASAL
COMMUNITY
Start: 2022-04-21 | End: 2022-09-27

## 2022-05-12 RX ORDER — OMEPRAZOLE 20 MG/1
CAPSULE, DELAYED RELEASE ORAL DAILY
COMMUNITY
Start: 2022-05-10 | End: 2022-09-27

## 2022-05-12 RX ORDER — LORAZEPAM 0.5 MG/1
TABLET ORAL
COMMUNITY
End: 2022-05-12

## 2022-05-12 RX ORDER — ATORVASTATIN CALCIUM 40 MG/1
40 TABLET, FILM COATED ORAL EVERY EVENING
COMMUNITY
Start: 2022-04-14 | End: 2023-11-29

## 2022-05-12 RX ORDER — VIBEGRON 75 MG/1
75 TABLET, FILM COATED ORAL DAILY
COMMUNITY
End: 2022-05-12

## 2022-05-12 RX ORDER — SIMETHICONE 80 MG
TABLET,CHEWABLE ORAL
COMMUNITY
End: 2022-05-12

## 2022-05-12 RX ORDER — FLUTICASONE PROPIONATE 50 MCG
2 SPRAY, SUSPENSION (ML) NASAL DAILY
COMMUNITY
Start: 2022-01-06 | End: 2022-05-12

## 2022-05-12 RX ORDER — AMLODIPINE BESYLATE 5 MG/1
5 TABLET ORAL
COMMUNITY
Start: 2022-03-06 | End: 2022-05-12

## 2022-05-12 RX ORDER — AZELASTINE 1 MG/ML
2 SPRAY, METERED NASAL 2 TIMES DAILY
COMMUNITY
Start: 2022-05-05 | End: 2023-04-04

## 2022-05-12 RX ORDER — MELATONIN 3 MG
LOZENGE ORAL
COMMUNITY
End: 2022-05-12

## 2022-05-12 ASSESSMENT — FIBROSIS 4 INDEX: FIB4 SCORE: 1.84

## 2022-06-22 ENCOUNTER — OFFICE VISIT (OUTPATIENT)
Dept: MEDICAL GROUP | Facility: PHYSICIAN GROUP | Age: 87
End: 2022-06-22
Payer: MEDICARE

## 2022-06-22 VITALS
OXYGEN SATURATION: 92 % | SYSTOLIC BLOOD PRESSURE: 130 MMHG | HEART RATE: 97 BPM | HEIGHT: 67 IN | RESPIRATION RATE: 15 BRPM | WEIGHT: 159.8 LBS | TEMPERATURE: 97.1 F | BODY MASS INDEX: 25.08 KG/M2 | DIASTOLIC BLOOD PRESSURE: 70 MMHG

## 2022-06-22 DIAGNOSIS — M48.061 SPINAL STENOSIS OF LUMBAR REGION WITHOUT NEUROGENIC CLAUDICATION: ICD-10-CM

## 2022-06-22 DIAGNOSIS — M62.89 PELVIC FLOOR DYSFUNCTION: ICD-10-CM

## 2022-06-22 DIAGNOSIS — N39.41 URGE INCONTINENCE OF URINE: ICD-10-CM

## 2022-06-22 DIAGNOSIS — R60.0 LOWER EXTREMITY EDEMA: ICD-10-CM

## 2022-06-22 DIAGNOSIS — J44.0 CHRONIC OBSTRUCTIVE PULMONARY DISEASE WITH ACUTE LOWER RESPIRATORY INFECTION (HCC): ICD-10-CM

## 2022-06-22 PROBLEM — I50.30 DIASTOLIC HEART FAILURE (HCC): Status: ACTIVE | Noted: 2022-06-22

## 2022-06-22 PROBLEM — I49.3 PVC'S (PREMATURE VENTRICULAR CONTRACTIONS): Status: ACTIVE | Noted: 2022-06-06

## 2022-06-22 PROBLEM — J44.1 ACUTE EXACERBATION OF CHRONIC OBSTRUCTIVE AIRWAYS DISEASE (HCC): Status: ACTIVE | Noted: 2022-06-22

## 2022-06-22 PROBLEM — I34.0 MODERATE MITRAL INSUFFICIENCY: Status: ACTIVE | Noted: 2022-06-06

## 2022-06-22 PROBLEM — R91.1 SOLITARY PULMONARY NODULE: Status: ACTIVE | Noted: 2022-06-22

## 2022-06-22 PROBLEM — G89.4 CHRONIC PAIN SYNDROME: Status: ACTIVE | Noted: 2022-06-06

## 2022-06-22 PROBLEM — R09.89 BILATERAL CAROTID BRUITS: Status: ACTIVE | Noted: 2022-06-06

## 2022-06-22 PROBLEM — I35.0 MODERATE AORTIC STENOSIS: Status: ACTIVE | Noted: 2022-06-06

## 2022-06-22 PROBLEM — J44.9 CHRONIC OBSTRUCTIVE PULMONARY DISEASE (HCC): Status: ACTIVE | Noted: 2022-06-22

## 2022-06-22 PROCEDURE — 99214 OFFICE O/P EST MOD 30 MIN: CPT | Performed by: INTERNAL MEDICINE

## 2022-06-22 ASSESSMENT — FIBROSIS 4 INDEX: FIB4 SCORE: 1.84

## 2022-06-23 NOTE — PROGRESS NOTES
CC:  Medication management, spinal stenosis, leg swelling.  Patient lives in [place].  Here with her daughter.    HPI:  Chelly presents with the following    1. Pelvic floor dysfunction  Diagnosed with neurogenic incontinence.  Patient experiences 14-20 times of urination per day.  Followed by Dr. Garcia her urologist in [place].  Patient has also been seen and evaluated at [facility].  Will have follow-up soon.  Patient did not complete pelvic MRI as ordered.    2. Urge incontinence of urine  5/12/2022:Patient with severe urge incontinence is followed by [facility].  Her symptoms are severe and spends 9 to 10 hours in the restroom.  She states that she uses the restroom about 14-20 times per day.  It is debilitating.  Patient tried Toviaz in the past without any benefit.  Currently trying Gemtesa.  She is also undergoing PTNS treatments and is currently on her fifth treatment of 12.  She has also been referred to physical therapy for pelvic floor therapy along with biofeedback.  Patient believes that she would do well with the catheter.  Urology notes reviewed with patient and daughter.    6/22/2022:.  Patient followed up with urology.  After a short insertion of urinary catheter, patient removed it due to discomfort.  Patient was prescribed a trial of Gemtesa.  She has 2 more weeks of acupuncture left with 3 visits.  Not very helpful at this time.    3. Spinal stenosis of lumbar region without neurogenic claudication  5/12/2022:.  Patient is currently followed by [facility].  Her physician, Dr. Alanis will be retiring in 4 months.  Patient receives pain management at this clinic.  Pain pump implant not helping.  This will be removed.  Patient requires oxycodone 5/325 mg tablet, occasionally 6 times per day.  Narcotic last filled 4/25/2022.  Patient states that laminectomy has been recommended.      Unfortunately, Dr. Alanis will be retiring.  Patient is experiencing  malfunctioning of her pain pump with Dilaudid.  She has been found multiple times admitted to the ER with withdrawal symptoms from Dilaudid.  Daughter will be requesting referral to pain management physician in [place] will be able to change/manage her Dilaudid pain pump.    6/22/2022:.  Patient was seen and evaluated at [facility].  Patient will have her intrathecal pain pump removed and reinserted.  Patient will be following up with Dr. Lozoya for pain management.  Patient is requiring intrathecal pain control with Dilaudid.  Patient is also taking oxycodone 5 mg 4 times daily for breakthrough pain.  Last filled June 8, 2022.    4. Chronic obstructive pulmonary disease with acute lower respiratory infection (HCC)  Chronic.  Stable.  Requiring 2 L of oxygen 24/7.    5. Lower extremity edema  Patient was seen by her cardiologist on June 6.  Patient has complaints of increasing lower extremity edema.  Her Lasix was increased to 40 mg and increasing her potassium chloride supplementation.  Patient completed lower extremity ultrasound, DVT negative.  Patient is scheduled for an echocardiogram in July.  Patient is very sedentary.  Uses a walker for ambulation.  BNP ordered.      Patient Active Problem List    Diagnosis Date Noted   • Acute exacerbation of chronic obstructive airways disease (HCC) 06/22/2022   • Chronic obstructive pulmonary disease (HCC) 06/22/2022   • Solitary pulmonary nodule 06/22/2022   • Diastolic heart failure (HCC) 06/22/2022   • Bilateral carotid bruits 06/06/2022   • Chronic pain syndrome 06/06/2022   • PVC's (premature ventricular contractions) 06/06/2022   • Moderate mitral insufficiency 06/06/2022   • Moderate aortic stenosis 06/06/2022   • Urge incontinence of urine 05/12/2022   • H/O Clostridium difficile infection 05/12/2022   • Skin lesions 05/12/2022   • 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   • Primary hypertension 03/29/2022   • Incomplete defecation  03/29/2022   • Moderate asthma without complication 03/29/2022   • Spinal stenosis of lumbar region without neurogenic claudication 03/29/2022   • Pelvic floor dysfunction 03/29/2022   • Irregular heart rate 03/29/2022   • Hypoxia 03/29/2022   • Primary cancer of left lower lobe of lung (HCC) 01/15/2020       Current Outpatient Medications   Medication Sig Dispense Refill   • Cranberry-Vit C-Lactobacillus (RA CRANBERRY SUPPLEMENTS) 450-30 MG Tab as directed     • atorvastatin (LIPITOR) 40 MG Tab TAKE 1 TABLET BY MOUTH ONCE A DAY AT NIGHT     • potassium chloride SA (KDUR) 20 MEQ Tab CR TAKE 1 TABLET DAILY. TAKE 2 TABLETS WHEN YOU TAKE THE HIGHER LASIX DOSING     • omeprazole (PRILOSEC) 20 MG delayed-release capsule      • losartan (COZAAR) 25 MG Tab 1 tab(s)     • ipratropium (ATROVENT) 0.06 % Solution 2 SPRAY(S) INTRANASALLY 3 TIMES A DAY     • furosemide (LASIX) 20 MG Tab Take 40 mg (2 tablets) daily for then next 7 days then take 20 mg (1 tablet) daily.  Take an extra tablet daily as needed for weight gain of 1 pound in 24 hours or 4 pounds in 1 week.     • doxycycline (VIBRAMYCIN) 100 MG Tab TAKE 1 TABLET BY MOUTH TWICE A DAY FOR 7 DAYS     • diphenhydrAMINE (BENADRYL) 25 MG capsule Take  by mouth.     • DILTIAZem CD (CARDIZEM CD) 180 MG CAPSULE SR 24 HR Take 180 mg by mouth every day.     • azelastine (ASTELIN) 137 MCG/SPRAY nasal spray 2 Sprays 2 times a day.     • atenolol (TENORMIN) 25 MG Tab 1 tab(s)     • Ascorbic Acid (VITAMIN C) 1000 MG Tab 1 tab(s)     • albuterol 108 (90 Base) MCG/ACT Aero Soln inhalation aerosol 1 puff(s)     • HYDROmorphone HCl (DILAUDID INJ) Inject  as directed.     • Non Formulary Request Swiss maya laxative     • psyllium (METAMUCIL) 58.12 % Pack Take 1 Packet by mouth every day.     • Fexofenadine HCl (MUCINEX ALLERGY PO) Take  by mouth every day.     • Probiotic Product (PROBIOTIC ADVANCED PO) Take  by mouth every day.     • lisinopril (PRINIVIL) 20 MG Tab Take 20 mg by mouth  in the morning and 20 mg in the evening.     • oxyCODONE-acetaminophen (PERCOCET) 5-325 MG Tab Take 1-2 Tabs by mouth every four hours as needed.     • Magnesium 400 MG Cap Take  by mouth.     • Polyethylene Glycol 3350 (MIRALAX PO) Take  by mouth.     • Simethicone (GAS-X PO) Take  by mouth.     • Cyanocobalamin (VITAMIN B-12) 5000 MCG TABLET DISPERSIBLE Take  by mouth.     • Cholecalciferol (VITAMIN D3) 5000 units Cap Take 1 Cap by mouth every day.     • diphenhydrAMINE (BENADRYL) 25 MG capsule 1 tab(s) (Patient not taking: Reported on 6/22/2022)     • CRANBERRY EXTRACT PO Take 500 mcg by mouth every day.     • potassium chloride SA (KDUR) 20 MEQ Tab CR Take 1 tablet daily.  Take 2 tablets when you take the higher Lasix dosing (Patient not taking: Reported on 6/22/2022)     • MAGNESIUM-OXIDE 400 (240 Mg) MG Tab TAKE ONE TABLET IN THE AM AND 1/2 TABLET IN THE EVENING. (Patient not taking: Reported on 6/22/2022)     • losartan (COZAAR) 25 MG Tab  (Patient not taking: Reported on 6/22/2022)     • vitamin D3 (CHOLECALCIFEROL) 5000 Unit (125 mcg) Tab Take 5,000 Units by mouth every day. (Patient not taking: Reported on 6/22/2022)     • Fesoterodine Fumarate 8 MG TABLET SR 24 HR Take 8 mg by mouth every day.     • therapeutic multivitamin-minerals (THERAGRAN-M) Tab Take 1 Tab by mouth every day.     • ALBUTEROL INH Inhale  by mouth. (Patient not taking: Reported on 6/22/2022)     • CYCLOBENZAPRINE HCL PO Take  by mouth.     • Potassium (POTASSIMIN PO) Take 20 mEq by mouth every evening. (Patient not taking: Reported on 6/22/2022)       No current facility-administered medications for this visit.         Allergies as of 06/22/2022 - Reviewed 06/22/2022   Allergen Reaction Noted   • Erythromycin Nausea 02/22/2019   • Sulfa drugs Unspecified 07/12/2021        Social History     Socioeconomic History   • Marital status:      Spouse name: Not on file   • Number of children: Not on file   • Years of education: Not on  "file   • Highest education level: Not on file   Occupational History   • Occupation: retired book keeper   Tobacco Use   • Smoking status: Former Smoker     Packs/day: 1.00     Years: 60.00     Pack years: 60.00     Quit date:      Years since quittin.4   • Smokeless tobacco: Never Used   Vaping Use   • Vaping Use: Never used   Substance and Sexual Activity   • Alcohol use: No   • Drug use: No   • Sexual activity: Not on file   Other Topics Concern   • Not on file   Social History Narrative   • Not on file     Social Determinants of Health     Financial Resource Strain: Not on file   Food Insecurity: Not on file   Transportation Needs: Not on file   Physical Activity: Not on file   Stress: Not on file   Social Connections: Not on file   Intimate Partner Violence: Not on file   Housing Stability: Not on file       History reviewed. No pertinent family history.    Past Surgical History:   Procedure Laterality Date   • PB IMPLANT NEUROSTIM/ N/A 2021    Procedure: IMPLANT PUMP AND CATHETER;  Surgeon: Joel Alanis M.D.;  Location: SURGERY [facility];  Service: Pain Management   • APPENDECTOMY     • CARPAL TUNNEL ENDOSCOPIC      bilat   • CHOLECYSTECTOMY     • GYN SURGERY      c section   • HIP ARTHROPLASTY TOTAL Right    • HYSTERECTOMY LAPAROSCOPY      total   • OTHER      kidney stones removed   • TONSILLECTOMY         ROS: Positive ROS per HPI.  Denies any Headache,Chest pain,  Shortness of breath,  Abdominal pain, Fevers, Nights sweats, Weight Changes, Focal weakness or numbness.  All other systems are negative.    /70 (BP Location: Right arm, Patient Position: Sitting, BP Cuff Size: Adult)   Pulse 97   Temp 36.2 °C (97.1 °F) (Temporal)   Resp 15   Ht 1.702 m (5' 7\")   Wt 72.5 kg (159 lb 12.8 oz)   SpO2 92%   BMI 25.03 kg/m²      Constitutional: Alert, no distress, well-groomed.  Skin: Warm, dry, good turgor, no rashes in visible areas.  Eye: Equal, round and reactive, " conjunctiva clear, lids normal.  ENMT: Lips without lesions, good dentition, moist mucous membranes.  Neck: Trachea midline, no masses, no thyromegaly.  Respiratory: Unlabored respiratory effort, no cough.  Abdomen: Soft, no gross masses.  MSK: Normal gait, moves all extremities.  2+ pitting edema lower extremity bilaterally.  Mild erythema.  Neuro: Grossly non-focal. No cranial nerve deficit. Strength and sensation intact.   Psych: Alert and oriented x3, normal affect and mood.      Assessment and Plan.   86 y.o. female presenting with the following.     1. Pelvic floor dysfunction  Follow-up with urology.  Complete pelvic MRI.    2. Urge incontinence of urine  Follow-up with urology.  Complete acupuncture series.  Continue Gemtesa.    3. Spinal stenosis of lumbar region without neurogenic claudication  Stable.  Continue follow-up with pain management.    4. Chronic obstructive pulmonary disease with acute lower respiratory infection (HCC)  Chronic.  Stable.  Continue O2 supplementation.    5. Lower extremity edema  Continue increased dose of Lasix.  Complete echocardiogram as ordered.  Follow-up with cardiology as scheduled.  BNP results pending.    My total time spent caring for the patient on the day of the encounter was 32 minutes.   This does not include time spent on separately billable procedures/tests.

## 2022-07-12 ENCOUNTER — TELEPHONE (OUTPATIENT)
Dept: MEDICAL GROUP | Facility: PHYSICIAN GROUP | Age: 87
End: 2022-07-12
Payer: MEDICARE

## 2022-07-12 DIAGNOSIS — N18.32 STAGE 3B CHRONIC KIDNEY DISEASE: ICD-10-CM

## 2022-07-12 NOTE — TELEPHONE ENCOUNTER
Patient was recently in the hospital for cellulitis. Patients furosemide was doubled and was told to f/v with her nephrologist. Her previous Nephrologist was Dr. Glez.  She needs a new nephrology referral please advise

## 2022-07-18 ENCOUNTER — PATIENT OUTREACH (OUTPATIENT)
Dept: HEALTH INFORMATION MANAGEMENT | Facility: OTHER | Age: 87
End: 2022-07-18
Payer: MEDICARE

## 2022-07-18 DIAGNOSIS — J44.1 ACUTE EXACERBATION OF CHRONIC OBSTRUCTIVE AIRWAYS DISEASE (HCC): ICD-10-CM

## 2022-07-18 NOTE — PROGRESS NOTES
"7/18/22 3:45pm:  Nurse CM outreach call to patient for follow-up. Pt left message with MA at  PCP office to schedule ER follow-up after several visits to ER in [facility]. Pt states it is very difficult for her to make early morning appointments. Nurse was able to schedule soonest afternoon appointment for patient for 8/3/22 with provider at [facility].   Nurse provided patient information on CCM program.  Pt is agreeable to enroll in program. Nurse did partial intake assessment. Pt agreeable to follow-up call on Thursday 7/21/22 to complete rest of intake.   Patient states she is concerned about the number of follow-up appointments she currently has. Pt states she is going to contact her cardiologist, Dr. Salavdor to confirm her lasix dose after she completes her current dose of 80 mg daily. Pt reports she will also contact nephrology provider's office to schedule an appointment. Pt reports she was seen at [facility] for edema in her legs. Reports she has lost 4 lbs over the past week. States her legs were so swollen that she had a hard time ambulating. Pt using a walker to assist with mobility. Pt reports she is always very sleepy. Reports she has followed with a sleep specialist. Pt reports she also follows with urology. Reports she is constantly going to the bathroom and needs to stand to urinate. Pt states she lives in in-law quarters in her daughter's house.   Plan:  Nurse will follow-up with patient on Thursday 7/21.     7/21/22 3:20pm:  Nurse outreach call to patient to complete intake assessment for CCM program. Pt states she didn't sleep well last night because \"I pulled something in my shoulder\".  Pt requesting nurse call back tomorrow in the afternoon to complete intake assessment.  Nurse offered patient sooner appt at PCP office. Pt declined.    7/22/22 2:00pm:  Nurse CM outreach call to complete intake. Pt answered telephone. Pt states she is ready to complete intake assessment today. Pt states she " has been a little lightheaded at times when getting up. Pt states she doesn't have her blood pressure monitor to check a reading. States she continues to take lasix and potassium as instructed from hospital discharge. States today is her last day of taking 80 mg of lasix. Nurse recommended patient contact her cardiologist regarding feeling intermittent dizziness. Pt states she does have a call to her cardiologist and is waiting on a call back. Nurse discussed if any worsening symptoms or feeling faint to go to ER for evaluation. Pt voiced understanding. States her daughter is available to help her. Pt informs nurse DYLAN that she has a pain pump. Reports she follows with pain management specialist. States she has chronic back pain. Pt reports her new pain management provider is Dr. Lozoya in [place].    INITIAL CARE MANAGEMENT CARE PLAN/ASSESEMENT         Medication Self-Management Goals:  Nurse reviewed current medication list. Nurse recommended discuss with PCP referral to pharmacist for review of medications. Pt states she follows closely with her pharmacist for medication review. Pt reports pulmonary provider recently added Trelegy inhaler to her medications.     Reviewed medications listed below with patient.      Current Outpatient Medications:   •  pantoprazole (PROTONIX) 40 MG Tablet Delayed Response, Take 40 mg by mouth every day. Indications: Gastroesophageal Reflux Disease, Disp: , Rfl:   •  Glucosamine-Chondroit-Vit C-Mn (GLUCOSAMINE 1500 COMPLEX PO), Take  by mouth 3 times a day., Disp: , Rfl:   •  potassium chloride SA (KDUR) 20 MEQ Tab CR, Take 20 mEq by mouth every day. One daily, but takes 2 daily when she has edema, Disp: , Rfl:   •  Vibegron (GEMTESA) 75 MG Tab, Take  by mouth every day. Indications: Overactive Bladder, Frequent Urination, Urinary Incontinence, Urinary Urgency, Disp: , Rfl:   •  vitamin D3 (CHOLECALCIFEROL) 400 UNIT Tab, Take 1,000 Units by mouth every day., Disp: , Rfl:   •   Cyanocobalamin (B-12) 500 MCG SL Tab, Place  under the tongue every day., Disp: , Rfl:   •  temazepam (RESTORIL) 15 MG Cap, Take 15 mg by mouth at bedtime as needed for Sleep., Disp: , Rfl:   •  diphenhydrAMINE (BENADRYL) 25 MG capsule, 1 tab(s) (Patient not taking: No sig reported), Disp: , Rfl:   •  Cranberry-Vit C-Lactobacillus (RA CRANBERRY SUPPLEMENTS) 450-30 MG Tab, as directed, Disp: , Rfl:   •  CRANBERRY EXTRACT PO, Take 500 mcg by mouth every day., Disp: , Rfl:   •  atorvastatin (LIPITOR) 40 MG Tab, 40 mg. Indications: High Amount of Fats in the Blood, Disp: , Rfl:   •  omeprazole (PRILOSEC) 20 MG delayed-release capsule, every day. Takes at nite, Disp: , Rfl:   •  losartan (COZAAR) 50 MG Tab, Take 50 mg by mouth every day., Disp: , Rfl:   •  ipratropium (ATROVENT) 0.06 % Solution, 2 SPRAY(S) INTRANASALLY 3 TIMES A DAY, Disp: , Rfl:   •  furosemide (LASIX) 20 MG Tab, Edema  Indications: Edema, Disp: , Rfl:   •  diphenhydrAMINE (BENADRYL) 25 MG capsule, Take  by mouth., Disp: , Rfl:   •  DILTIAZem CD (CARDIZEM CD) 180 MG CAPSULE SR 24 HR, Take 180 mg by mouth every day. Indications: High Blood Pressure Disorder, Disp: , Rfl:   •  azelastine (ASTELIN) 137 MCG/SPRAY nasal spray, 2 Sprays 2 times a day. Indications: Hayfever, Disp: , Rfl:   •  atenolol (TENORMIN) 25 MG Tab, 25 mg every day. Taking 2 tabs daily, Disp: , Rfl:   •  Ascorbic Acid (VITAMIN C) 1000 MG Tab, 1 tab(s), Disp: , Rfl:   •  albuterol 108 (90 Base) MCG/ACT Aero Soln inhalation aerosol, 2 Puffs every 6 hours as needed for Shortness of Breath., Disp: , Rfl:   •  HYDROmorphone HCl (DILAUDID INJ), Inject  as directed., Disp: , Rfl:   •  Fesoterodine Fumarate 8 MG TABLET SR 24 HR, Take 8 mg by mouth every day., Disp: , Rfl:   •  Non Formulary Request, Swiss maya laxative, Disp: , Rfl:   •  psyllium (METAMUCIL) 58.12 % Pack, Take 1 Packet by mouth every day. 2 tsp otc, Disp: , Rfl:   •  Fexofenadine HCl (MUCINEX ALLERGY PO), Take  by mouth every  day., Disp: , Rfl:   •  Probiotic Product (PROBIOTIC ADVANCED PO), Take  by mouth every day., Disp: , Rfl:   •  therapeutic multivitamin-minerals (THERAGRAN-M) Tab, Take 1 Tab by mouth every day., Disp: , Rfl:   •  ALBUTEROL INH, Inhale  by mouth. (Patient not taking: Reported on 6/22/2022), Disp: , Rfl:   •  oxyCODONE-acetaminophen (PERCOCET) 5-325 MG Tab, Take 1 Tablet by mouth 3 times a day as needed for Severe Pain., Disp: , Rfl:   •  CYCLOBENZAPRINE HCL PO, Take  by mouth., Disp: , Rfl:   •  Magnesium 400 MG Cap, Take  by mouth. Takes one tab in a.m., 1\2 tab in evening, Disp: , Rfl:   •  Polyethylene Glycol 3350 (MIRALAX PO), Take  by mouth every day., Disp: , Rfl:   •  Simethicone (GAS-X PO), Take  by mouth 3 times a day., Disp: , Rfl:   •  Cyanocobalamin (VITAMIN B-12) 5000 MCG TABLET DISPERSIBLE, Take  by mouth., Disp: , Rfl:   •  Cholecalciferol (VITAMIN D3) 5000 units Cap, Take 1 Cap by mouth every day., Disp: , Rfl:          Goal: Follow-up with pharmacist for medication review       Physical/Functional/Environmental Status:  Improve strength, prevent falls        One or more falls in the last year: Yes  Advised to use a cane or walker to get around safely: Yes  Feels unsteady when walking: No  Steadies self on furniture while walking at home: No  Worried about falling: No  Needs to push with hands when rising from a chair: No  Has trouble stepping up onto a curb / using stairs: No  Often has to rush to the toilet: No  Has lost some feeling in feet: Yes  Takes medicine that makes him/her feel lightheaded or more tired than usual: Yes  Takes medicine to sleep or improve mood:  (reports sometimes)  Often feels sad or depressed: No       Goal: Reports no problems related to depression     Financial Status: Reports no problems     Transportation Status:  Pt states she coordinates transportation with her daughter.    Mental/Behavioral/Psychosocial Status:    Depression Screen (PHQ-2/PHQ-9) 3/29/2022 7/18/2022    PHQ-2 Total Score 0 0       Interpretation of PHQ-9 Total Score   Score Severity   1-4 No Depression   5-9 Mild Depression   10-14 Moderate Depression   15-19 Moderately Severe Depression   20-27 Severe Depression       Goal:  Reports no current concerns      Chronic Care Management Care Plan         Goal:  Improve strength, prevent falls.   Barriers:Chronic pain, leg edema, weakness  Interventions: Continue to use walker at all times to prevent falls  Take medications as directed  Follow-up with specialist  Discuss physical therapy at PCP appt.    Start Date: 7/22  End Date:          Next Scheduled patient outreach: One month  Care Coordinator  Radha Martinez RN  Personal Care Management  [phone]

## 2022-07-19 ENCOUNTER — TELEPHONE (OUTPATIENT)
Dept: MEDICAL GROUP | Facility: PHYSICIAN GROUP | Age: 87
End: 2022-07-19
Payer: MEDICARE

## 2022-07-19 NOTE — TELEPHONE ENCOUNTER
Patient called and LVM requesting an appointment with . Called neha and offered her appointment on 7/27. Patient denied appointment and will call when she's ready

## 2022-07-19 NOTE — TELEPHONE ENCOUNTER
"Nurse DYLAN spoke to patient and provided information on CCM program. Pt is interested in enrollment. Pt states she has been to [facility] ER several times over the past month for bilateral leg edema. Pt states provider in ER has increased her Lasix to 80 mg daily for 7 days. Pt was also given prescription for Aldactone. Pt states she was told she does not have cellulitis. Reports she is having a difficult time ambulating related to increased edema in her legs. Pt would like referral to home care if possible. States she used [facility] in the past and would like referral. Pt may benefit from home care evaluation for safety as pt reports she has a hard time ambulating related to leg edema and \"falling asleep while standing\". Pt states she has been following with sleep specialist and was told she does not have narcolepsy. Update to PCP.  "

## 2022-07-21 ENCOUNTER — HOME HEALTH ADMISSION (OUTPATIENT)
Dept: HOME HEALTH SERVICES | Facility: HOME HEALTHCARE | Age: 87
End: 2022-07-21
Payer: MEDICARE

## 2022-07-21 DIAGNOSIS — R60.0 BILATERAL LOWER EXTREMITY EDEMA: ICD-10-CM

## 2022-07-21 DIAGNOSIS — R54 AGE-RELATED PHYSICAL DEBILITY: ICD-10-CM

## 2022-07-22 SDOH — ECONOMIC STABILITY: FOOD INSECURITY: WITHIN THE PAST 12 MONTHS, THE FOOD YOU BOUGHT JUST DIDN'T LAST AND YOU DIDN'T HAVE MONEY TO GET MORE.: NEVER TRUE

## 2022-07-22 SDOH — ECONOMIC STABILITY: HOUSING INSECURITY
IN THE LAST 12 MONTHS, WAS THERE A TIME WHEN YOU DID NOT HAVE A STEADY PLACE TO SLEEP OR SLEPT IN A SHELTER (INCLUDING NOW)?: NO

## 2022-07-22 SDOH — ECONOMIC STABILITY: TRANSPORTATION INSECURITY
IN THE PAST 12 MONTHS, HAS THE LACK OF TRANSPORTATION KEPT YOU FROM MEDICAL APPOINTMENTS OR FROM GETTING MEDICATIONS?: YES

## 2022-07-22 SDOH — ECONOMIC STABILITY: INCOME INSECURITY: IN THE LAST 12 MONTHS, WAS THERE A TIME WHEN YOU WERE NOT ABLE TO PAY THE MORTGAGE OR RENT ON TIME?: NO

## 2022-07-22 SDOH — HEALTH STABILITY: PHYSICAL HEALTH: ON AVERAGE, HOW MANY MINUTES DO YOU ENGAGE IN EXERCISE AT THIS LEVEL?: 0 MIN

## 2022-07-22 SDOH — ECONOMIC STABILITY: TRANSPORTATION INSECURITY
IN THE PAST 12 MONTHS, HAS LACK OF TRANSPORTATION KEPT YOU FROM MEETINGS, WORK, OR FROM GETTING THINGS NEEDED FOR DAILY LIVING?: NO

## 2022-07-22 SDOH — ECONOMIC STABILITY: FOOD INSECURITY: WITHIN THE PAST 12 MONTHS, YOU WORRIED THAT YOUR FOOD WOULD RUN OUT BEFORE YOU GOT MONEY TO BUY MORE.: NEVER TRUE

## 2022-07-22 SDOH — HEALTH STABILITY: PHYSICAL HEALTH: ON AVERAGE, HOW MANY DAYS PER WEEK DO YOU ENGAGE IN MODERATE TO STRENUOUS EXERCISE (LIKE A BRISK WALK)?: 0 DAYS

## 2022-07-22 ASSESSMENT — PATIENT HEALTH QUESTIONNAIRE - PHQ9: CLINICAL INTERPRETATION OF PHQ2 SCORE: 0

## 2022-07-22 ASSESSMENT — SOCIAL DETERMINANTS OF HEALTH (SDOH): HOW HARD IS IT FOR YOU TO PAY FOR THE VERY BASICS LIKE FOOD, HOUSING, MEDICAL CARE, AND HEATING?: NOT VERY HARD

## 2022-08-03 ENCOUNTER — OFFICE VISIT (OUTPATIENT)
Dept: MEDICAL GROUP | Facility: PHYSICIAN GROUP | Age: 87
End: 2022-08-03

## 2022-08-03 VITALS
BODY MASS INDEX: 24.23 KG/M2 | RESPIRATION RATE: 12 BRPM | HEART RATE: 65 BPM | HEIGHT: 67 IN | DIASTOLIC BLOOD PRESSURE: 52 MMHG | WEIGHT: 154.4 LBS | OXYGEN SATURATION: 97 % | SYSTOLIC BLOOD PRESSURE: 116 MMHG | TEMPERATURE: 97.6 F

## 2022-08-03 DIAGNOSIS — N28.9 RENAL INSUFFICIENCY: ICD-10-CM

## 2022-08-03 DIAGNOSIS — M48.061 SPINAL STENOSIS OF LUMBAR REGION WITHOUT NEUROGENIC CLAUDICATION: ICD-10-CM

## 2022-08-03 DIAGNOSIS — R60.0 BILATERAL LOWER EXTREMITY EDEMA: ICD-10-CM

## 2022-08-03 DIAGNOSIS — I35.0 MODERATE AORTIC STENOSIS: ICD-10-CM

## 2022-08-03 DIAGNOSIS — I10 PRIMARY HYPERTENSION: ICD-10-CM

## 2022-08-03 PROCEDURE — 99215 OFFICE O/P EST HI 40 MIN: CPT | Performed by: INTERNAL MEDICINE

## 2022-08-03 ASSESSMENT — FIBROSIS 4 INDEX: FIB4 SCORE: 2.19

## 2022-08-03 NOTE — PROGRESS NOTES
CC: ER follow up    HPI:  Chelly presents with the following    1. Bilateral lower extremity edema  Patient is here for ER follow-up visit from [facility] emergency room.  Patient was seen 3 times in the last month.  Most recent ER visit was July 15.  Patient presented to the ER with severely swollen extremities.  DVT ruled out.  Patient has been struggling with her Lasix dose.  Upon discharge from most recent ER visit, Lasix was increased to 80 mg daily for 7 days with the addition of Aldactone 50 mg daily for 7 days.  She then resumed her Lasix dose of 40 mg daily along with 40 mEq of potassium chloride.  Patient was also discharged with oral linezolid which she completed.  Follow-up lab work completed 7/28/2022 showed a GFR of 60 with a BUN/creatinine of 31/1.13.  Potassium level 4.2.  Patient is almost at her baseline, responded well to the adjustments of diuretics.    2. Moderate aortic stenosis  Patient is followed by her cardiologist and was last seen on June 6.  Patient completed an echocardiogram which showed moderate aortic stenosis with an EF of 55 to 60%.  Patient does not have a follow-up appointment with cardiology at this time.    3. Primary hypertension  Patient is concerned with her low blood pressure readings.  Patient has been feeling a bit weak.  Patient's blood pressure monitor has not been working.  Blood pressure today 115/54 which is on the low side.    4. Renal insufficiency  Most recent BUN and creatinine 31/1.3 up from 24/0.93.  GFR 60.  Cardiology would like patient to follow-up with nephrology.    5. Spinal stenosis of lumbar region without neurogenic claudication  Patient is due for her Dilaudid intrathecal pain pump refill.      Patient Active Problem List    Diagnosis Date Noted   • Bilateral lower extremity edema 08/03/2022   • Renal insufficiency 08/03/2022   • Acute exacerbation of chronic obstructive airways disease (HCC) 06/22/2022   • Chronic obstructive pulmonary  disease ([facility]) 06/22/2022   • Solitary pulmonary nodule 06/22/2022   • Diastolic heart failure ([facility]) 06/22/2022   • Bilateral carotid bruits 06/06/2022   • Chronic pain syndrome 06/06/2022   • PVC's (premature ventricular contractions) 06/06/2022   • Moderate mitral insufficiency 06/06/2022   • Moderate aortic stenosis 06/06/2022   • Urge incontinence of urine 05/12/2022   • H/O Clostridium difficile infection 05/12/2022   • Skin lesions 05/12/2022   • 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 Indications: Gastroesophageal Reflux Disease     • Glucosamine-Chondroit-Vit C-Mn (GLUCOSAMINE 1500 COMPLEX PO) Take  by mouth 3 times a day.     • potassium chloride SA (KDUR) 20 MEQ Tab CR Take 20 mEq by mouth every day. One daily, but takes 2 daily when she has edema     • Vibegron (GEMTESA) 75 MG Tab Take  by mouth every day. Indications: Overactive Bladder, Frequent Urination, Urinary Incontinence, Urinary Urgency     • vitamin D3 (CHOLECALCIFEROL) 400 UNIT Tab Take 1,000 Units by mouth every day.     • Cyanocobalamin (B-12) 500 MCG SL Tab Place  under the tongue every day.     • temazepam (RESTORIL) 15 MG Cap Take 15 mg by mouth at bedtime as needed for Sleep.     • diphenhydrAMINE (BENADRYL) 25 MG capsule 1 tab(s)     • Cranberry-Vit C-Lactobacillus (RA CRANBERRY SUPPLEMENTS) 450-30 MG Tab as directed     • CRANBERRY EXTRACT PO Take 500 mcg by mouth every day.     • atorvastatin (LIPITOR) 40 MG Tab  40 mg. Indications: High Amount of Fats in the Blood     • omeprazole (PRILOSEC) 20 MG delayed-release capsule every day. Takes at nite     • losartan (COZAAR) 50 MG Tab Take 50 mg by mouth every day.     • ipratropium (ATROVENT) 0.06 % Solution 2 SPRAY(S) INTRANASALLY 3 TIMES A DAY     • furosemide (LASIX) 20 MG Tab Edema  Indications: Edema     • diphenhydrAMINE (BENADRYL) 25 MG capsule Take  by mouth.     • DILTIAZem CD (CARDIZEM CD) 180 MG CAPSULE SR 24 HR Take 180 mg by mouth every day. Indications: High Blood Pressure Disorder     • azelastine (ASTELIN) 137 MCG/SPRAY nasal spray 2 Sprays 2 times a day. Indications: Hayfever     • atenolol (TENORMIN) 25 MG Tab 25 mg every day. Taking 2 tabs daily     • Ascorbic Acid (VITAMIN C) 1000 MG Tab 1 tab(s)     • albuterol 108 (90 Base) MCG/ACT Aero Soln inhalation aerosol 2 Puffs every 6 hours as needed for Shortness of Breath.     • HYDROmorphone HCl (DILAUDID INJ) Inject  as directed.     • Fesoterodine Fumarate 8 MG TABLET SR 24 HR Take 8 mg by mouth every day.     • Non Formulary Request Swiss maya laxative     • psyllium (METAMUCIL) 58.12 % Pack Take 1 Packet by mouth every day. 2 tsp otc     • Fexofenadine HCl (MUCINEX ALLERGY PO) Take  by mouth every day.     • Probiotic Product (PROBIOTIC ADVANCED PO) Take  by mouth every day.     • therapeutic multivitamin-minerals (THERAGRAN-M) Tab Take 1 Tab by mouth every day.     • ALBUTEROL INH Inhale.     • oxyCODONE-acetaminophen (PERCOCET) 5-325 MG Tab Take 1 Tablet by mouth 3 times a day as needed for Severe Pain.     • CYCLOBENZAPRINE HCL PO Take  by mouth.     • Magnesium 400 MG Cap Take  by mouth. Takes one tab in a.m., 1\2 tab in evening     • Polyethylene Glycol 3350 (MIRALAX PO) Take  by mouth every day.     • Simethicone (GAS-X PO) Take  by mouth 3 times a day.     • Cyanocobalamin (VITAMIN B-12) 5000 MCG TABLET DISPERSIBLE Take  by mouth.     • Cholecalciferol (VITAMIN D3) 5000 units Cap Take 1 Cap by mouth  every day.       No current facility-administered medications for this visit.         Allergies as of 2022 - Reviewed 2022   Allergen Reaction Noted   • Erythromycin Nausea 2019   • Sulfa drugs Unspecified 2021        Social History     Socioeconomic History   • Marital status:      Spouse name: Not on file   • Number of children: Not on file   • Years of education: Not on file   • Highest education level: Not on file   Occupational History   • Occupation: retired book keeper   Tobacco Use   • Smoking status: Former Smoker     Packs/day: 1.00     Years: 60.00     Pack years: 60.00     Quit date:      Years since quittin.5   • Smokeless tobacco: Never Used   Vaping Use   • Vaping Use: Former   • Substances: Nicotine   Substance and Sexual Activity   • Alcohol use: No   • Drug use: No   • Sexual activity: Not on file   Other Topics Concern   • Not on file   Social History Narrative   • Not on file     Social Determinants of Health     Financial Resource Strain: Low Risk    • Difficulty of Paying Living Expenses: Not very hard   Food Insecurity: No Food Insecurity   • Worried About Running Out of Food in the Last Year: Never true   • Ran Out of Food in the Last Year: Never true   Transportation Needs: Unmet Transportation Needs   • Lack of Transportation (Medical): Yes   • Lack of Transportation (Non-Medical): No   Physical Activity: Inactive   • Days of Exercise per Week: 0 days   • Minutes of Exercise per Session: 0 min   Stress: Not on file   Social Connections: Not on file   Intimate Partner Violence: Not on file   Housing Stability: Unknown   • Unable to Pay for Housing in the Last Year: No   • Number of Places Lived in the Last Year: Not on file   • Unstable Housing in the Last Year: No       History reviewed. No pertinent family history.    Past Surgical History:   Procedure Laterality Date   • PB IMPLANT NEUROSTIM/ N/A 2021    Procedure: IMPLANT PUMP AND  "CATHETER;  Surgeon: Joel Alanis M.D.;  Location: SURGERY [facility];  Service: Pain Management   • APPENDECTOMY     • CARPAL TUNNEL ENDOSCOPIC      bilat   • CHOLECYSTECTOMY     • GYN SURGERY      c section   • HIP ARTHROPLASTY TOTAL Right    • HYSTERECTOMY LAPAROSCOPY      total   • OTHER      kidney stones removed   • TONSILLECTOMY         ROS:  Denies any Headache,Chest pain,  Shortness of breath,  Abdominal pain, Changes of bowel or bladder, Lower ext edema, Fevers, Nights sweats, Weight Changes, Focal weakness or numbness.  All other systems are negative.    /52   Pulse 65   Temp 36.4 °C (97.6 °F) (Temporal)   Resp 12   Ht 1.702 m (5' 7\")   Wt 70 kg (154 lb 6.4 oz)   SpO2 97%   BMI 24.18 kg/m²      Constitutional: Alert, no distress, well-groomed.  Skin: Warm, dry, good turgor, no rashes in visible areas.  Eye: Equal, round and reactive, conjunctiva clear, lids normal.  ENMT: Lips without lesions, good dentition, moist mucous membranes.  Neck: Trachea midline, no masses, no thyromegaly.  Respiratory: Unlabored respiratory effort, no cough.  Abdomen: Soft, no gross masses.  MSK: +1 pitting edema lower extremity bilaterally, resolving erythema of right lower extremity greater than left.  No sign of cellulitis.  Neuro: Grossly non-focal. No cranial nerve deficit. Strength and sensation intact.   Psych: Alert and oriented x3, normal affect and mood.        Assessment and Plan.   86 y.o. female presenting with the following.     1. Bilateral lower extremity edema  Patient to continue Lasix 40 mg daily with potassium chloride 40 mEq daily.  Patient will check daily weights.  Consider adding low-dose Aldactone if clinically indicated.  ER records reviewed.  Instructed to wear compression hose, elevate.  Low-sodium diet.    2. Moderate aortic stenosis  Echocardiogram reviewed with patient and daughter.  Patient to make follow-up appointment with cardiology.    3. Primary hypertension  Patient will " monitor her blood pressure with new blood pressure monitor.  Continue losartan 50 mg, diltiazem at current dose at this time.  May need adjusting.    4. Renal insufficiency  Continue to monitor CMP.  Follow-up with nephrology per cardiology.    5. Spinal stenosis of lumbar region without neurogenic claudication  Patient is stable and will have her dialysis pump refilled for pain management.    My total time spent caring for the patient on the day of the encounter was 43 minutes.   This does not include time spent on separately billable procedures/tests.

## 2022-08-05 ENCOUNTER — HOME CARE VISIT (OUTPATIENT)
Dept: HOME HEALTH SERVICES | Facility: HOME HEALTHCARE | Age: 87
End: 2022-08-05

## 2022-08-17 ENCOUNTER — TELEMEDICINE (OUTPATIENT)
Dept: MEDICAL GROUP | Facility: PHYSICIAN GROUP | Age: 87
End: 2022-08-17
Payer: MEDICARE

## 2022-08-17 VITALS
BODY MASS INDEX: 23.7 KG/M2 | DIASTOLIC BLOOD PRESSURE: 63 MMHG | RESPIRATION RATE: 16 BRPM | HEIGHT: 67 IN | WEIGHT: 151 LBS | SYSTOLIC BLOOD PRESSURE: 113 MMHG | HEART RATE: 75 BPM

## 2022-08-17 DIAGNOSIS — M87.052 AVASCULAR NECROSIS OF BONE OF LEFT HIP (HCC): ICD-10-CM

## 2022-08-17 DIAGNOSIS — N18.31 CHRONIC KIDNEY DISEASE, STAGE 3A: ICD-10-CM

## 2022-08-17 DIAGNOSIS — I95.89 HYPOTENSION DUE TO HYPOVOLEMIA: ICD-10-CM

## 2022-08-17 DIAGNOSIS — R60.0 BILATERAL LOWER EXTREMITY EDEMA: ICD-10-CM

## 2022-08-17 DIAGNOSIS — E86.1 HYPOTENSION DUE TO HYPOVOLEMIA: ICD-10-CM

## 2022-08-17 PROBLEM — N28.9 RENAL INSUFFICIENCY: Status: RESOLVED | Noted: 2022-08-03 | Resolved: 2022-08-17

## 2022-08-17 PROCEDURE — 99214 OFFICE O/P EST MOD 30 MIN: CPT | Mod: 95 | Performed by: INTERNAL MEDICINE

## 2022-08-17 ASSESSMENT — FIBROSIS 4 INDEX: FIB4 SCORE: 2.19

## 2022-08-17 NOTE — PROGRESS NOTES
Virtual Visit: Established Patient   This visit was conducted via Zoom using secure and encrypted videoconferencing technology.   The patient was in their home in the [facility].    The patient's identity was confirmed and verbal consent was obtained for this virtual visit.     Subjective:   CC:   Chief Complaint   Patient presents with    Follow-Up       Chelly Decker is a 86 y.o. female presenting for evaluation and management of:    1. Chronic kidney disease, stage 3a (HCC)  Chronic.  Stable.  Labs completed 7/20/2022.  BUN/creatinine 31/1.13.  GFR 46.  Patient has an appointment with nephrology 9/12/2022 with Dr. Glez in [place].    2. Hypotension due to hypovolemia  Recently, patient's Lasix dosage has been adjusted.  Taking Lasix 40 mg daily.  Patient's blood pressure ranges in the systolic 103-119.  Currently taking losartan 50 mg daily, Cardizem 180 mg daily.  Patient has been feeling lightheaded and dizzy    3. Bilateral lower extremity edema  Patient has been monitoring her weight.  Current weight is 151 pounds, down from 154.  Currently taking Lasix 40 mg grams daily with potassium chloride supplementation.  Patient has an appointment with nephrology next month.  Patient is also followed by cardiology.    4. Avascular necrosis of bone of left hip (HCC)  Chronic.  Stable.  Ongoing.  Pelvic MRI was ordered by Dr. Escoto a colorectal surgeon.  Patient has been having rectal pain for the last 2 to 3 years.  Recent MRI showed avascular necrosis of the left femoral head with resulting mild subchondral collapse of the left femoral head.  Severe osteoarthritis of left hip joint.  Large incompletely visualized lipoma in the abductor musculature of the right upper thigh.  Patient has also complained about right leg discomfort.  Status post right hip replacement.  Patient has an appointment with her orthopedist on August 24 to discuss MRI results.  Patient also recently received a cortisone injection  into the left hip.      ROS   Patient complains of lightheadedness and dizziness along with rectal pain, right leg pain, mild lower extremity edema.  Patient denies all other cardiopulmonary, GI, neurologic symptoms.    Current medicines (including changes today)  Current Outpatient Medications   Medication Sig Dispense Refill    pantoprazole (PROTONIX) 40 MG Tablet Delayed Response Take 40 mg by mouth every day. Indications: Gastroesophageal Reflux Disease      potassium chloride SA (KDUR) 20 MEQ Tab CR Take 20 mEq by mouth every day. One daily, but takes 2 daily when she has edema      Vibegron (GEMTESA) 75 MG Tab Take  by mouth every day. Indications: Overactive Bladder, Frequent Urination, Urinary Incontinence, Urinary Urgency      temazepam (RESTORIL) 15 MG Cap Take 15 mg by mouth at bedtime as needed for Sleep.      CRANBERRY EXTRACT PO Take 500 mcg by mouth every day.      atorvastatin (LIPITOR) 40 MG Tab 40 mg. Indications: High Amount of Fats in the Blood      losartan (COZAAR) 50 MG Tab Take 50 mg by mouth every day.      furosemide (LASIX) 20 MG Tab Edema  Indications: Edema      DILTIAZem CD (CARDIZEM CD) 180 MG CAPSULE SR 24 HR Take 180 mg by mouth every day. Indications: High Blood Pressure Disorder      azelastine (ASTELIN) 137 MCG/SPRAY nasal spray 2 Sprays 2 times a day. Indications: Hayfever      Ascorbic Acid (VITAMIN C) 1000 MG Tab 1 tab(s)      albuterol 108 (90 Base) MCG/ACT Aero Soln inhalation aerosol 2 Puffs every 6 hours as needed for Shortness of Breath.      HYDROmorphone HCl (DILAUDID INJ) Inject  as directed.      Fesoterodine Fumarate 8 MG TABLET SR 24 HR Take 8 mg by mouth every day.      psyllium (METAMUCIL) 58.12 % Pack Take 1 Packet by mouth every day. 2 tsp otc      Fexofenadine HCl (MUCINEX ALLERGY PO) Take  by mouth every day.      Probiotic Product (PROBIOTIC ADVANCED PO) Take  by mouth every day.      oxyCODONE-acetaminophen (PERCOCET) 5-325 MG Tab Take 1 Tablet by mouth 3  times a day as needed for Severe Pain.      Magnesium 400 MG Cap Take  by mouth. Takes one tab in a.m., 1\2 tab in evening      Polyethylene Glycol 3350 (MIRALAX PO) Take  by mouth every day.      Simethicone (GAS-X PO) Take  by mouth 3 times a day.      Glucosamine-Chondroit-Vit C-Mn (GLUCOSAMINE 1500 COMPLEX PO) Take  by mouth 3 times a day.      vitamin D3 (CHOLECALCIFEROL) 400 UNIT Tab Take 1,000 Units by mouth every day.      Cyanocobalamin (B-12) 500 MCG SL Tab Place  under the tongue every day.      diphenhydrAMINE (BENADRYL) 25 MG capsule 1 tab(s)      Cranberry-Vit C-Lactobacillus (RA CRANBERRY SUPPLEMENTS) 450-30 MG Tab as directed      omeprazole (PRILOSEC) 20 MG delayed-release capsule every day. Takes at nite      ipratropium (ATROVENT) 0.06 % Solution 2 SPRAY(S) INTRANASALLY 3 TIMES A DAY      diphenhydrAMINE (BENADRYL) 25 MG capsule Take  by mouth.      atenolol (TENORMIN) 25 MG Tab 25 mg every day. Taking 2 tabs daily      Non Formulary Request Swiss maya laxative      therapeutic multivitamin-minerals (THERAGRAN-M) Tab Take 1 Tab by mouth every day.      ALBUTEROL INH Inhale.      CYCLOBENZAPRINE HCL PO Take  by mouth.      Cyanocobalamin (VITAMIN B-12) 5000 MCG TABLET DISPERSIBLE Take  by mouth.      Cholecalciferol (VITAMIN D3) 5000 units Cap Take 1 Cap by mouth every day.       No current facility-administered medications for this visit.       Patient Active Problem List    Diagnosis Date Noted    Chronic kidney disease, stage 3a ([facility]) 08/17/2022    Hypotension due to hypovolemia 08/17/2022    Avascular necrosis of bone of left hip (HCC) 08/17/2022    Bilateral lower extremity edema 08/03/2022    Acute exacerbation of chronic obstructive airways disease (HCC) 06/22/2022    Chronic obstructive pulmonary disease (HCC) 06/22/2022    Solitary pulmonary nodule 06/22/2022    Diastolic heart failure (HCC) 06/22/2022    Bilateral carotid bruits 06/06/2022    Chronic pain syndrome 06/06/2022    PVC's  "(premature ventricular contractions) 06/06/2022    Moderate mitral insufficiency 06/06/2022    Moderate aortic stenosis 06/06/2022    Urge incontinence of urine 05/12/2022    H/O Clostridium difficile infection 05/12/2022    Skin lesions 05/12/2022    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  Skin: No rashes in visible areas.  Eye: Round. Conjunctiva clear, lids normal. No icterus.   ENMT: Lips pink without lesions, good dentition, moist mucous membranes. Phonation normal.  Neck: No masses, no thyromegaly. Moves freely without pain.  Respiratory: Unlabored respiratory effort, no cough or audible wheeze  Psych: Alert and oriented x3, normal affect and mood.     Assessment and Plan:   The following treatment plan was discussed:     1. Chronic kidney disease, stage 3a (HCC)  Chronic.  Stable.  Patient will follow-up with nephrology in September.  Monitor BMP.    2. Hypotension due to hypovolemia  Patient will decrease losartan 50 mg tablets to half a tablet daily.  Continue Cardizem at current dose.  Continue to monitor blood pressure readings.    3. Bilateral lower extremity edema  Continue to monitor.  Continue to check daily weights.  Continue Lasix 40 mg daily along with potassium chloride supplementation.  Keep follow-up appointment with nephrology.    4. Avascular necrosis of bone of left hip (HCC)  Chronic.  Stable.  Patient to keep appointment with " orthopedist to review MRI results.    Follow-up: No follow-ups on file.      My total time spent caring for the patient on the day of the encounter was 33 minutes.   This does not include time spent on separately billable procedures/tests.

## 2022-08-18 ENCOUNTER — PATIENT OUTREACH (OUTPATIENT)
Dept: HEALTH INFORMATION MANAGEMENT | Facility: OTHER | Age: 87
End: 2022-08-18
Payer: MEDICARE

## 2022-08-18 DIAGNOSIS — J44.0 CHRONIC OBSTRUCTIVE PULMONARY DISEASE WITH ACUTE LOWER RESPIRATORY INFECTION (HCC): ICD-10-CM

## 2022-08-18 NOTE — PROGRESS NOTES
8/18/22 3:55pm:  Nurse CM outreach call for follow-up on monthly CCM program. No answer at home number. No VM option. CM will attempt outreach call at a later date/time.    8/19/22 10:40am:  Nurse CM outreach call to patient for follow-up call.  No answer at home number. Nurse tried mobile number. Patient answered telephone. Pt reports she has been coughing. States she sent a message to PCP and it was recommended she go to urgent care. Pt states her daughter will take her to urgent care. Pt agreeable to nurse outreach call on Monday for follow-up.

## 2022-08-22 ENCOUNTER — PATIENT OUTREACH (OUTPATIENT)
Dept: MEDICAL GROUP | Facility: PHYSICIAN GROUP | Age: 87
End: 2022-08-22
Payer: MEDICARE

## 2022-08-22 DIAGNOSIS — J44.0 CHRONIC OBSTRUCTIVE PULMONARY DISEASE WITH ACUTE LOWER RESPIRATORY INFECTION (HCC): ICD-10-CM

## 2022-08-22 NOTE — PROGRESS NOTES
8/22/22 3:45pm:  Nurse outreach call for follow-up. No answer. No VM option. CM will attempt another outreach later this week.    8/24/22 4:20pm:  Nurse outreach call for follow-up on monthly program. Pt states she is currently at the urgent care because of her cough and elevated blood pressure. States she had a reading earlier of 159/77. Pt agreeable to outreach call tomorrow from nurse CM for follow-up.

## 2022-08-24 ENCOUNTER — APPOINTMENT (OUTPATIENT)
Dept: RADIOLOGY | Facility: IMAGING CENTER | Age: 87
End: 2022-08-24
Attending: NURSE PRACTITIONER
Payer: MEDICARE

## 2022-08-24 ENCOUNTER — OFFICE VISIT (OUTPATIENT)
Dept: URGENT CARE | Facility: CLINIC | Age: 87
End: 2022-08-24
Payer: MEDICARE

## 2022-08-24 VITALS
SYSTOLIC BLOOD PRESSURE: 140 MMHG | WEIGHT: 157.9 LBS | HEART RATE: 84 BPM | DIASTOLIC BLOOD PRESSURE: 62 MMHG | TEMPERATURE: 98.8 F | OXYGEN SATURATION: 95 % | HEIGHT: 67 IN | BODY MASS INDEX: 24.78 KG/M2 | RESPIRATION RATE: 16 BRPM

## 2022-08-24 DIAGNOSIS — R60.0 BILATERAL LEG EDEMA: ICD-10-CM

## 2022-08-24 DIAGNOSIS — Z87.448 HISTORY OF CHRONIC KIDNEY DISEASE: ICD-10-CM

## 2022-08-24 DIAGNOSIS — R05.9 COUGH: ICD-10-CM

## 2022-08-24 DIAGNOSIS — Z87.09 HISTORY OF COPD: ICD-10-CM

## 2022-08-24 DIAGNOSIS — I10 ELEVATED BLOOD PRESSURE READING IN OFFICE WITH DIAGNOSIS OF HYPERTENSION: ICD-10-CM

## 2022-08-24 DIAGNOSIS — R09.82 PND (POST-NASAL DRIP): ICD-10-CM

## 2022-08-24 PROCEDURE — 99214 OFFICE O/P EST MOD 30 MIN: CPT | Performed by: NURSE PRACTITIONER

## 2022-08-24 PROCEDURE — 71046 X-RAY EXAM CHEST 2 VIEWS: CPT | Mod: TC | Performed by: NURSE PRACTITIONER

## 2022-08-24 ASSESSMENT — ENCOUNTER SYMPTOMS
CARDIOVASCULAR NEGATIVE: 1
WEAKNESS: 0
COUGH: 1
MYALGIAS: 0
FEVER: 0
EYE DISCHARGE: 0
WHEEZING: 0
HEADACHES: 0
EYE REDNESS: 0
SPUTUM PRODUCTION: 1
CHILLS: 0
VOMITING: 0
CONSTIPATION: 0
HEARTBURN: 0
NAUSEA: 0
SORE THROAT: 0
ABDOMINAL PAIN: 0
SHORTNESS OF BREATH: 1
DIZZINESS: 0
DIARRHEA: 0

## 2022-08-24 ASSESSMENT — FIBROSIS 4 INDEX: FIB4 SCORE: 2.19

## 2022-08-24 NOTE — PROGRESS NOTES
Subjective     Chelly Decker is a 86 y.o. female who presents with Cough (Pt states had for a couple months now, phlegm, pneumonia concern ) and Hypertension (High hp of 163/89 at home)            HPI  States has been experiencing intermittent cough with phlegm x 6 days. States phlegm is intermittent clear to green but feels this is not in her chest. Denies shortness of breath and will use her albuterol twice daily if needed. History of COPD and left lower lobe cancer. Uses oxygen at night and when she is outside her home. Denies fever but feels fatigued. No body aches. No sore throat but does have post nasal drainage. Uses azelastine and saline nasal rinse daily for nasal congestion. Feels she has mucus stuck in her throat that she cannot clear. History of stage 3 chronic kidney disease, she is taking lasix 40 mg daily with her KCL 40 meq. Denies any increase in lower extremity swelling as she measures this daily. States concern for pneumonia with cough. States recently in [facility] ER for leg swelling and had increased her lasix to 80 mg x 7 days. States she is back down to 40 mg. Seen by her primary care provider after ER visit on 3 weeks ago. She is taking Losartan 25 mg (as instructed recently by her primary care provider due to low blood pressure) and Cardizem 180 mg daily as instructed. She has been taking generic Mucinex this week as well. Unsure if there is a decongestant with this. Seen in  4 months ago with similar symptoms and blood pressure reading.    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 (03/29/2022), Bilateral lower extremity edema (8/3/2022), Cervical cancer ([facility]), GERD (gastroesophageal reflux disease), H/O Clostridium difficile infection (05/12/2022), Hypertension, Hypotension due to hypovolemia (8/17/2022), Hypoxia (03/29/2022), Incomplete defecation (03/29/2022), Irregular heart  rate (03/29/2022), Moderate asthma without complication (03/29/2022), Pain, Pelvic floor dysfunction (03/29/2022), Primary hypertension (03/29/2022), Renal insufficiency (8/3/2022), Skin lesions (05/12/2022), Solitary pulmonary nodule, Spinal stenosis, Spinal stenosis of lumbar region without neurogenic claudication (03/29/2022), and Urge incontinence of urine (05/12/2022).    She has no past medical history of Malignant hyperthermia or PONV (postoperative nausea and vomiting).  MEDS:   Current Outpatient Medications:     pantoprazole (PROTONIX) 40 MG Tablet Delayed Response, Take 40 mg by mouth every day. Indications: Gastroesophageal Reflux Disease, Disp: , Rfl:     potassium chloride SA (KDUR) 20 MEQ Tab CR, Take 20 mEq by mouth every day. One daily, but takes 2 daily when she has edema, Disp: , Rfl:     Vibegron (GEMTESA) 75 MG Tab, Take  by mouth every day. Indications: Overactive Bladder, Frequent Urination, Urinary Incontinence, Urinary Urgency, Disp: , Rfl:     temazepam (RESTORIL) 15 MG Cap, Take 15 mg by mouth at bedtime as needed for Sleep., Disp: , Rfl:     CRANBERRY EXTRACT PO, Take 500 mcg by mouth every day., Disp: , Rfl:     atorvastatin (LIPITOR) 40 MG Tab, 40 mg. Indications: High Amount of Fats in the Blood, Disp: , Rfl:     losartan (COZAAR) 50 MG Tab, Take 50 mg by mouth every day., Disp: , Rfl:     furosemide (LASIX) 20 MG Tab, Edema  Indications: Edema, Disp: , Rfl:     DILTIAZem CD (CARDIZEM CD) 180 MG CAPSULE SR 24 HR, Take 180 mg by mouth every day. Indications: High Blood Pressure Disorder, Disp: , Rfl:     azelastine (ASTELIN) 137 MCG/SPRAY nasal spray, 2 Sprays 2 times a day. Indications: Hayfever, Disp: , Rfl:     Ascorbic Acid (VITAMIN C) 1000 MG Tab, 1 tab(s), Disp: , Rfl:     albuterol 108 (90 Base) MCG/ACT Aero Soln inhalation aerosol, 2 Puffs every 6 hours as needed for Shortness of Breath., Disp: , Rfl:     HYDROmorphone HCl (DILAUDID INJ), Inject  as directed., Disp: , Rfl:      Fesoterodine Fumarate 8 MG TABLET SR 24 HR, Take 8 mg by mouth every day., Disp: , Rfl:     psyllium (METAMUCIL) 58.12 % Pack, Take 1 Packet by mouth every day. 2 tsp otc, Disp: , Rfl:     Fexofenadine HCl (MUCINEX ALLERGY PO), Take  by mouth every day., Disp: , Rfl:     Probiotic Product (PROBIOTIC ADVANCED PO), Take  by mouth every day., Disp: , Rfl:     oxyCODONE-acetaminophen (PERCOCET) 5-325 MG Tab, Take 1 Tablet by mouth 3 times a day as needed for Severe Pain., Disp: , Rfl:     Magnesium 400 MG Cap, Take  by mouth. Takes one tab in a.m., 1\2 tab in evening, Disp: , Rfl:     Polyethylene Glycol 3350 (MIRALAX PO), Take  by mouth every day., Disp: , Rfl:     Simethicone (GAS-X PO), Take  by mouth 3 times a day., Disp: , Rfl:     Glucosamine-Chondroit-Vit C-Mn (GLUCOSAMINE 1500 COMPLEX PO), Take  by mouth 3 times a day., Disp: , Rfl:     vitamin D3 (CHOLECALCIFEROL) 400 UNIT Tab, Take 1,000 Units by mouth every day., Disp: , Rfl:     Cyanocobalamin (B-12) 500 MCG SL Tab, Place  under the tongue every day., Disp: , Rfl:     diphenhydrAMINE (BENADRYL) 25 MG capsule, 1 tab(s), Disp: , Rfl:     Cranberry-Vit C-Lactobacillus (RA CRANBERRY SUPPLEMENTS) 450-30 MG Tab, as directed, Disp: , Rfl:     omeprazole (PRILOSEC) 20 MG delayed-release capsule, every day. Takes at nite, Disp: , Rfl:     ipratropium (ATROVENT) 0.06 % Solution, 2 SPRAY(S) INTRANASALLY 3 TIMES A DAY, Disp: , Rfl:     diphenhydrAMINE (BENADRYL) 25 MG capsule, Take  by mouth., Disp: , Rfl:     atenolol (TENORMIN) 25 MG Tab, 25 mg every day. Taking 2 tabs daily, Disp: , Rfl:     Non Formulary Request, Swiss maya laxative, Disp: , Rfl:     therapeutic multivitamin-minerals (THERAGRAN-M) Tab, Take 1 Tab by mouth every day., Disp: , Rfl:     ALBUTEROL INH, Inhale., Disp: , Rfl:     CYCLOBENZAPRINE HCL PO, Take  by mouth., Disp: , Rfl:     Cyanocobalamin (VITAMIN B-12) 5000 MCG TABLET DISPERSIBLE, Take  by mouth., Disp: , Rfl:     Cholecalciferol (VITAMIN  "D3) 5000 units Cap, Take 1 Cap by mouth every day., Disp: , Rfl:   ALLERGIES:   Allergies   Allergen Reactions    Erythromycin Nausea    Sulfa Drugs Unspecified     Nauseated, diarrhea      SURGHX:   Past Surgical History:   Procedure Laterality Date    PB IMPLANT NEUROSTIM/ N/A 2/5/2021    Procedure: IMPLANT PUMP AND CATHETER;  Surgeon: Joel Alanis M.D.;  [facility] SURG[facility];  Service: Pain Management    APPENDECTOMY      CARPAL TUNNEL ENDOSCOPIC      bilat    CHOLECYSTECTOMY      GYN SURGERY      c section    HIP ARTHROPLASTY TOTAL Right     HYSTERECTOMY LAPAROSCOPY      total    OTHER      kidney stones removed    TONSILLECTOMY       SOCHX:  reports that she quit smoking about 7 years ago. Her smoking use included cigarettes. She has a 60.00 pack-year smoking history. She has never used smokeless tobacco. She reports that she does not drink alcohol and does not use drugs.  FH: Family history was reviewed, no pertinent findings to report    Review of Systems   Constitutional:  Positive for malaise/fatigue. Negative for chills and fever.   HENT:  Positive for congestion. Negative for ear pain and sore throat.    Eyes:  Negative for discharge and redness.   Respiratory:  Positive for cough, sputum production and shortness of breath. Negative for wheezing.    Cardiovascular: Negative.    Gastrointestinal:  Negative for abdominal pain, constipation, diarrhea, heartburn, nausea and vomiting.   Musculoskeletal:  Negative for myalgias.   Skin:  Negative for itching and rash.   Neurological:  Negative for dizziness, weakness and headaches.   Endo/Heme/Allergies:  Positive for environmental allergies.   All other systems reviewed and are negative.           Objective     BP (!) 140/62 (BP Location: Left arm, Patient Position: Sitting, BP Cuff Size: Adult)   Pulse 84   Temp 37.1 °C (98.8 °F) (Temporal)   Resp 16   Ht 1.702 m (5' 7\")   Wt 71.6 kg (157 lb 14.4 oz)   SpO2 95%   BMI 24.73 kg/m²  "     Physical Exam  Vitals reviewed.   Constitutional:       General: She is awake. She is not in acute distress.     Appearance: Normal appearance. She is not ill-appearing, toxic-appearing or diaphoretic.   HENT:      Head: Normocephalic.      Nose: Congestion present. No rhinorrhea.      Mouth/Throat:      Lips: Pink.      Mouth: Mucous membranes are dry.      Pharynx: Uvula midline.   Eyes:      Conjunctiva/sclera: Conjunctivae normal.   Cardiovascular:      Rate and Rhythm: Normal rate and regular rhythm.   Pulmonary:      Effort: Pulmonary effort is normal. No tachypnea, bradypnea, accessory muscle usage, prolonged expiration, respiratory distress or retractions.      Breath sounds: Normal breath sounds and air entry. No stridor, decreased air movement or transmitted upper airway sounds. No decreased breath sounds, wheezing, rhonchi or rales.   Musculoskeletal:      Cervical back: Normal range of motion and neck supple.   Neurological:      Mental Status: She is alert and oriented to person, place, and time.   Psychiatric:         Attention and Perception: Attention normal.         Mood and Affect: Mood normal.         Speech: Speech normal.         Behavior: Behavior normal. Behavior is cooperative.              IMPRESSION:     1.  No gross interval change in the LEFT lower lobe linear opacity, favor scarring  2.  No evidence of acute intrathoracic pathology  3.  Atherosclerosis             Assessment & Plan        1. Cough    - DX-CHEST-2 VIEWS; Future    2. PND (post-nasal drip)      3. History of COPD      4. Bilateral leg edema      5. History of chronic kidney disease       6. Elevated blood pressure reading in office with diagnosis of hypertension         -Recommend to look at generic Mucinex to only contain guaifenesin with any decongestant of cough suppressant as this may be elevating blood pressure (may not need to take this as her lungs are clear)  -Recommend to use albuterol inhaler if experiencing  "any shortness of breath up to 4x/day in 24 hr period  -Recommend to continue Azelastine and saline rinse to nasal passages  -Recommend salt water gargle after nasal sprays and if post nasal drainage continues (use minimal salt 1 tsp in 80 oz water)  -Continue to use Lasix and Cardizem daily as prescribed  -May temporarily increase Losartan to 50 mg and check blood pressure to see if it has come down to your \"normal\" range (do this only if no decongestant component in Mucinex)  -Monitor for any increased blood pressure reading, increased leg edema, shortness of breath, chest pain/pressure, worse coughing, lethargy, fever- must be rechecked in UC or may need to go to ER for possible for further evaluation or possible diuresis  -Contact primary care provider regarding clear chest xray and concern for yo-yo blood pressures (states takes Lasix, KCL 20 mg and Cardizem in AM and Losartan and KCL in PM) and any recommendations to change time of day to take meds  -Follow up with primary care provider as needed   -Keep appt with kidney provider next month    -Education provided: see above    -Return to urgent care as needed if new or worsening symptoms  -Patient expresses understanding to treatment and plan of care  -Questions were encouraged and answered  -Recommended over the counter meds were written down when requested   -Advised to follow-up with the primary care provider for recheck, reevaluation, and consideration of further management as needed     -Time spent evaluating this patient was at least 30 minutes. This time comprises of the following: preparing for visit/chart review, patient history taken into account pertinent to symptoms, patient counseling/education for needed treatment outpatient, exam/evaluation/treatment plan provided, ordering any appropriate lab/test/procedures/meds, independent interpretation of any clinic testing, medication management with any other listed medications and chart " documentation.

## 2022-09-20 ENCOUNTER — OFFICE VISIT (OUTPATIENT)
Dept: URGENT CARE | Facility: CLINIC | Age: 87
End: 2022-09-20
Payer: MEDICARE

## 2022-09-20 VITALS
OXYGEN SATURATION: 96 % | HEIGHT: 67 IN | TEMPERATURE: 97.3 F | DIASTOLIC BLOOD PRESSURE: 66 MMHG | BODY MASS INDEX: 24.77 KG/M2 | RESPIRATION RATE: 16 BRPM | WEIGHT: 157.8 LBS | HEART RATE: 84 BPM | SYSTOLIC BLOOD PRESSURE: 132 MMHG

## 2022-09-20 DIAGNOSIS — R04.0 FREQUENT NOSEBLEEDS: ICD-10-CM

## 2022-09-20 PROCEDURE — 99213 OFFICE O/P EST LOW 20 MIN: CPT | Performed by: PHYSICIAN ASSISTANT

## 2022-09-20 RX ORDER — DIPHENHYDRAMINE HCL 25 MG
CAPSULE ORAL
COMMUNITY
End: 2023-08-23

## 2022-09-20 RX ORDER — AZELASTINE 1 MG/ML
SPRAY, METERED NASAL
COMMUNITY
End: 2022-09-29

## 2022-09-20 RX ORDER — MAGNESIUM OXIDE TAB 400 MG (241.3 MG ELEMENTAL MG) 400 (241.3 MG) MG
400 TAB ORAL DAILY
COMMUNITY
Start: 2022-07-27 | End: 2023-06-07

## 2022-09-20 RX ORDER — FUROSEMIDE 20 MG/1
40 TABLET ORAL DAILY
COMMUNITY
Start: 2022-08-31 | End: 2022-09-27

## 2022-09-20 RX ORDER — PANTOPRAZOLE SODIUM 40 MG/1
40 TABLET, DELAYED RELEASE ORAL
COMMUNITY
End: 2023-11-29

## 2022-09-20 RX ORDER — OXYCODONE HYDROCHLORIDE AND ACETAMINOPHEN 5; 325 MG/1; MG/1
TABLET ORAL
COMMUNITY
End: 2022-09-29

## 2022-09-20 RX ORDER — DILTIAZEM HYDROCHLORIDE 120 MG/1
120 CAPSULE, EXTENDED RELEASE ORAL EVERY EVENING
COMMUNITY
Start: 2022-09-13 | End: 2024-02-14

## 2022-09-20 RX ORDER — LINEZOLID 600 MG/1
TABLET, FILM COATED ORAL
COMMUNITY
Start: 2022-07-13 | End: 2022-09-27

## 2022-09-20 RX ORDER — FUROSEMIDE 80 MG
TABLET ORAL
COMMUNITY
Start: 2022-07-15 | End: 2022-09-27

## 2022-09-20 RX ORDER — SPIRONOLACTONE 25 MG/1
25 TABLET ORAL EVERY MORNING
COMMUNITY
Start: 2022-09-13 | End: 2023-10-31

## 2022-09-20 RX ORDER — SPIRONOLACTONE 50 MG/1
TABLET, FILM COATED ORAL
COMMUNITY
Start: 2022-07-15 | End: 2022-09-27

## 2022-09-20 RX ORDER — VIBEGRON 75 MG/1
TABLET, FILM COATED ORAL
COMMUNITY
End: 2022-09-29

## 2022-09-20 RX ORDER — AMOXICILLIN 500 MG/1
CAPSULE ORAL
COMMUNITY
Start: 2022-07-08 | End: 2022-09-27

## 2022-09-20 ASSESSMENT — ENCOUNTER SYMPTOMS
NAUSEA: 0
VOMITING: 0
EYE DISCHARGE: 0
HEADACHES: 0
EYE REDNESS: 0

## 2022-09-20 ASSESSMENT — FIBROSIS 4 INDEX: FIB4 SCORE: 2.19

## 2022-09-21 DIAGNOSIS — R04.0 EPISTAXIS: ICD-10-CM

## 2022-09-21 NOTE — PROGRESS NOTES
Subjective     Chelly Decker is a 86 y.o. female who presents with Epistaxis (1 everyday X 8 days)            This is a new problem.  The patient presents to clinic complaining of frequent nosebleeds.  The patient states she has been experiencing 1 nosebleed per day for the past 8 days.  The patient states only her right nostril is affected.  The patient states her nosebleeds last approximately 10-15 minutes prior to resolving.  The patient states her nosebleeds can occur at random, but states her nosebleeds typically occur after wiping away congestion from her nostrils.  The patient states that she is trying not to blow her nose due to her recent nosebleeds.  The patient reports no injury or trauma to her nose.  The patient denies the use of anticoagulants.  The patient does use supplemental O2 at night and as needed throughout the day.  However, patient states she has been using her supplemental O2 more frequently due to the increased environmental smoke.  The patient has been applying a saline nasal spray to her nostrils, as well as a water-based nasal gel.  The patient reports no associated headaches, vomiting, or chest pain.    Epistaxis   The bleeding has been from the right nare. This is a new problem. Episode onset: x 8 days ago. The problem has been resolved. The bleeding is associated with nose-picking.     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 (03/29/2022), Bilateral lower extremity edema (8/3/2022), Cervical cancer ([facility]), GERD (gastroesophageal reflux disease), H/O Clostridium difficile infection (05/12/2022), Hypertension, Hypotension due to hypovolemia (8/17/2022), Hypoxia (03/29/2022), Incomplete defecation (03/29/2022), Irregular heart rate (03/29/2022), Moderate asthma without complication (03/29/2022), Pain, Pelvic floor dysfunction (03/29/2022), Primary hypertension (03/29/2022), Renal  insufficiency (8/3/2022), Skin lesions (05/12/2022), Solitary pulmonary nodule, Spinal stenosis, Spinal stenosis of lumbar region without neurogenic claudication (03/29/2022), and Urge incontinence of urine (05/12/2022).    She has no past medical history of Malignant hyperthermia or PONV (postoperative nausea and vomiting).  MEDS:   Current Outpatient Medications:     azelastine (ASTELIN) 137 MCG/SPRAY nasal spray, Administer  into affected nostril(S)., Disp: , Rfl:     diltiazem (TIAZAC) 120 MG SR capsule, Take 120 mg by mouth every day., Disp: , Rfl:     diphenhydrAMINE (BENADRYL) 25 MG capsule, Take  by mouth., Disp: , Rfl:     MAGNESIUM-OXIDE 400 (240 Mg) MG Tab, TAKE ONE TABLET IN THE AM AND 1/2 TABLET IN THE EVENING., Disp: , Rfl:     pantoprazole (PROTONIX) 40 MG Tablet Delayed Response, Take 40 mg by mouth every morning before breakfast., Disp: , Rfl:     spironolactone (ALDACTONE) 25 MG Tab, Take 25 mg by mouth every day., Disp: , Rfl:     furosemide (LASIX) 20 MG Tab, Take 40 mg by mouth every day., Disp: , Rfl:     oxyCODONE-acetaminophen (PERCOCET) 5-325 MG Tab, Take  by mouth., Disp: , Rfl:     Vibegron (GEMTESA) 75 MG Tab, Take  by mouth., Disp: , Rfl:     pantoprazole (PROTONIX) 40 MG Tablet Delayed Response, Take 40 mg by mouth every day. Indications: Gastroesophageal Reflux Disease, Disp: , Rfl:     Vibegron (GEMTESA) 75 MG Tab, Take  by mouth every day. Indications: Overactive Bladder, Frequent Urination, Urinary Incontinence, Urinary Urgency, Disp: , Rfl:     temazepam (RESTORIL) 15 MG Cap, Take 15 mg by mouth at bedtime as needed for Sleep., Disp: , Rfl:     CRANBERRY EXTRACT PO, Take 500 mcg by mouth every day., Disp: , Rfl:     atorvastatin (LIPITOR) 40 MG Tab, 40 mg. Indications: High Amount of Fats in the Blood, Disp: , Rfl:     losartan (COZAAR) 50 MG Tab, Take 50 mg by mouth every day., Disp: , Rfl:     furosemide (LASIX) 20 MG Tab, Edema  Indications: Edema, Disp: , Rfl:     azelastine  (ASTELIN) 137 MCG/SPRAY nasal spray, 2 Sprays 2 times a day. Indications: Hayfever, Disp: , Rfl:     Ascorbic Acid (VITAMIN C) 1000 MG Tab, 1 tab(s), Disp: , Rfl:     albuterol 108 (90 Base) MCG/ACT Aero Soln inhalation aerosol, 2 Puffs every 6 hours as needed for Shortness of Breath., Disp: , Rfl:     HYDROmorphone HCl (DILAUDID INJ), Inject  as directed., Disp: , Rfl:     Fesoterodine Fumarate 8 MG TABLET SR 24 HR, Take 8 mg by mouth every day., Disp: , Rfl:     psyllium (METAMUCIL) 58.12 % Pack, Take 1 Packet by mouth every day. 2 tsp otc, Disp: , Rfl:     Fexofenadine HCl (MUCINEX ALLERGY PO), Take  by mouth every day., Disp: , Rfl:     Probiotic Product (PROBIOTIC ADVANCED PO), Take  by mouth every day., Disp: , Rfl:     oxyCODONE-acetaminophen (PERCOCET) 5-325 MG Tab, Take 1 Tablet by mouth 3 times a day as needed for Severe Pain., Disp: , Rfl:     Magnesium 400 MG Cap, Take  by mouth. Takes one tab in a.m., 1\2 tab in evening, Disp: , Rfl:     Polyethylene Glycol 3350 (MIRALAX PO), Take  by mouth every day., Disp: , Rfl:     amoxicillin (AMOXIL) 500 MG Cap, TAKE 2 (TWO) TABLETS (1,000 MG) BY MOUTH 3 TIMES DAILY FOR 7 DAYS. (Patient not taking: Reported on 9/20/2022), Disp: , Rfl:     TRELEGY ELLIPTA 100-62.5-25 MCG/INH AEROSOL POWDER, BREATH ACTIVATED inhalation, INHALE 1 PUFF BY MOUTH ONCE A DAY 30 DAYS (Patient not taking: Reported on 9/20/2022), Disp: , Rfl:     Fluticasone-Umeclidin-Vilant (TRELEGY ELLIPTA) 100-62.5-25 MCG/INH AEROSOL POWDER, BREATH ACTIVATED inhalation, 1 puff(s) (Patient not taking: Reported on 9/20/2022), Disp: , Rfl:     linezolid (ZYVOX) 600 MG Tab, TAKE 1 (ONE) TABLET (600 MG) BY MOUTH 2 TIMES A DAY FOR 5 DAYS., Disp: , Rfl:     spironolactone (ALDACTONE) 50 MG Tab, TAKE 1 TABLET (50 MG) BY MOUTH ONCE DAILY FOR 7 DAYS. (Patient not taking: Reported on 9/20/2022), Disp: , Rfl:     furosemide (LASIX) 80 MG Tab, TAKE 1 TABLET (80 MG) BY MOUTH ONCE DAILY FOR 7 DAYS. (Patient not taking:  Reported on 9/20/2022), Disp: , Rfl:     Glucosamine-Chondroit-Vit C-Mn (GLUCOSAMINE 1500 COMPLEX PO), Take  by mouth 3 times a day., Disp: , Rfl:     potassium chloride SA (KDUR) 20 MEQ Tab CR, Take 20 mEq by mouth every day. One daily, but takes 2 daily when she has edema (Patient not taking: Reported on 9/20/2022), Disp: , Rfl:     vitamin D3 (CHOLECALCIFEROL) 400 UNIT Tab, Take 1,000 Units by mouth every day., Disp: , Rfl:     Cyanocobalamin (B-12) 500 MCG SL Tab, Place  under the tongue every day., Disp: , Rfl:     diphenhydrAMINE (BENADRYL) 25 MG capsule, 1 tab(s), Disp: , Rfl:     Cranberry-Vit C-Lactobacillus (RA CRANBERRY SUPPLEMENTS) 450-30 MG Tab, as directed, Disp: , Rfl:     omeprazole (PRILOSEC) 20 MG delayed-release capsule, every day. Takes at nite, Disp: , Rfl:     ipratropium (ATROVENT) 0.06 % Solution, 2 SPRAY(S) INTRANASALLY 3 TIMES A DAY, Disp: , Rfl:     diphenhydrAMINE (BENADRYL) 25 MG capsule, Take  by mouth., Disp: , Rfl:     DILTIAZem CD (CARDIZEM CD) 180 MG CAPSULE SR 24 HR, Take 180 mg by mouth every day. Indications: High Blood Pressure Disorder (Patient not taking: Reported on 9/20/2022), Disp: , Rfl:     atenolol (TENORMIN) 25 MG Tab, 25 mg every day. Taking 2 tabs daily, Disp: , Rfl:     Non Formulary Request, Swiss maya laxative, Disp: , Rfl:     therapeutic multivitamin-minerals (THERAGRAN-M) Tab, Take 1 Tab by mouth every day., Disp: , Rfl:     ALBUTEROL INH, Inhale., Disp: , Rfl:     CYCLOBENZAPRINE HCL PO, Take  by mouth., Disp: , Rfl:     Simethicone (GAS-X PO), Take  by mouth 3 times a day. (Patient not taking: Reported on 9/20/2022), Disp: , Rfl:     Cyanocobalamin (VITAMIN B-12) 5000 MCG TABLET DISPERSIBLE, Take  by mouth., Disp: , Rfl:     Cholecalciferol (VITAMIN D3) 5000 units Cap, Take 1 Cap by mouth every day., Disp: , Rfl:   ALLERGIES:   Allergies   Allergen Reactions    Erythromycin Nausea    Sulfa Drugs Unspecified     Nauseated, diarrhea      SURGHX:   Past  "Surgical History:   Procedure Laterality Date    PB IMPLANT NEUROSTIM/ N/A 2/5/2021    Procedure: IMPLANT PUMP AND CATHETER;  Surgeon: Joel Alanis M.D.;  Location: SURGERY [facility];  Service: Pain Management    APPENDECTOMY      CARPAL TUNNEL ENDOSCOPIC      bilat    CHOLECYSTECTOMY      GYN SURGERY      c section    HIP ARTHROPLASTY TOTAL Right     HYSTERECTOMY LAPAROSCOPY      total    OTHER      kidney stones removed    TONSILLECTOMY       SOCHX:  reports that she quit smoking about 7 years ago. Her smoking use included cigarettes. She has a 60.00 pack-year smoking history. She has never used smokeless tobacco. She reports that she does not drink alcohol and does not use drugs.  FH: Family history was reviewed, no pertinent findings to report    Review of Systems   HENT:  Positive for nosebleeds.    Eyes:  Negative for discharge and redness.   Gastrointestinal:  Negative for nausea and vomiting.   Neurological:  Negative for headaches.            Objective     /66 (BP Location: Right arm, Patient Position: Sitting)   Pulse 84   Temp 36.3 °C (97.3 °F) (Temporal)   Resp 16   Ht 1.702 m (5' 7\")   Wt 71.6 kg (157 lb 12.8 oz)   SpO2 96%   BMI 24.71 kg/m²      Physical Exam  Constitutional:       General: She is not in acute distress.     Appearance: Normal appearance. She is well-developed. She is not ill-appearing.   HENT:      Head: Normocephalic and atraumatic.      Right Ear: External ear normal.      Left Ear: External ear normal.      Nose:      Right Nostril: No epistaxis.      Left Nostril: No epistaxis.      Comments:   The patient has visible dried blood to the right nostril overlying Kiesselbach's plexus with overall dryness of the nasal mucosa.  No active epistaxis  Eyes:      Extraocular Movements: Extraocular movements intact.      Conjunctiva/sclera: Conjunctivae normal.   Cardiovascular:      Rate and Rhythm: Normal rate and regular rhythm.      Heart sounds: Normal " heart sounds.   Pulmonary:      Effort: Pulmonary effort is normal. No respiratory distress.      Breath sounds: Normal breath sounds. No wheezing.   Musculoskeletal:         General: Normal range of motion.      Cervical back: Normal range of motion and neck supple.   Skin:     General: Skin is warm and dry.   Neurological:      Mental Status: She is alert and oriented to person, place, and time.                           Assessment & Plan        1. Frequent nosebleeds    The patient's presenting symptoms and physical exam endings are consistent with frequent nosebleeds.  The patient is currently not experiencing any epistaxis.  On physical exam, the patient had visible dried blood to the right nostril overlying Kiesselbach's plexus with overall dryness of the nasal mucosa.  I suspect the patient's frequent nosebleeds to be related to increased dryness due to increased use of her supplemental O2.  Advised the patient she may apply Vaseline to her nostrils as a moisturizer.  The patient may continue to use the nasal lubricant gel as well.  Instructed the patient to avoid picking/blowing her nose.  Advised the patient to monitor for worsening signs and or symptoms.  Recommend patient follow-up with her PCP as needed.  Discussed return precautions with the patient, and she verbalized understanding.    Differential diagnoses, supportive care, and indications for immediate follow-up discussed with patient.   Instructed to return to clinic or nearest emergency department for any change in condition, further concerns, or worsening of symptoms.      OTC Tylenol or Motrin for fever/discomfort.  Apply Vaseline to the nostrils  Continue use of nasal lubricant  Avoid blowing/picking nose  Follow-up with PCP as needed  Return to clinic or go to the ED if symptoms worsen or fail to improve, or if the patient should develop worsening/increasing/persistent nosebleeds, nasal dryness, pain/tenderness, swelling, increased redness or  warmth, discharge/drainage, fever/chills, and/or any concerning symptoms.    Discussed plan with the patient, and she agrees to the above.     I personally reviewed prior external notes and test results pertinent to today's visit.  I have independently reviewed and interpreted all diagnostics ordered during this urgent care visit.     Please note that this dictation was created using voice recognition software. I have made every reasonable attempt to correct obvious errors, but I expect that there may be errors of grammar and possibly content that I did not discover before finalizing the note.     This note was electronically signed by Opal Redding PA-C

## 2022-09-23 RX ORDER — AMOXICILLIN 875 MG/1
875 TABLET, COATED ORAL 2 TIMES DAILY
Qty: 20 TABLET | Refills: 0 | Status: SHIPPED | OUTPATIENT
Start: 2022-09-23 | End: 2022-11-30

## 2022-09-26 ENCOUNTER — PATIENT OUTREACH (OUTPATIENT)
Dept: HEALTH INFORMATION MANAGEMENT | Facility: OTHER | Age: 87
End: 2022-09-26
Payer: MEDICARE

## 2022-09-26 DIAGNOSIS — I10 PRIMARY HYPERTENSION: ICD-10-CM

## 2022-09-26 DIAGNOSIS — J45.909 MODERATE ASTHMA WITHOUT COMPLICATION, UNSPECIFIED WHETHER PERSISTENT: ICD-10-CM

## 2022-09-26 PROCEDURE — 99999 PR NO CHARGE: CPT | Performed by: INTERNAL MEDICINE

## 2022-09-26 NOTE — PROGRESS NOTES
"9/26/22 11:00am:  Nurse CM outreach call for monthly CCM assessment. Pt states \"I'm sleeping\".  Requesting a call back at another time.    9/26/22 3:40:  Nurse CM outreach call second attempt for CCM assessment. Pt requesting nurse call back at 4:30pm.    9/26/22 5:00pm Nurse called patient back for monthly CCM assessment. Pt reports she has been having ongoing problems with nose bleeds. States she was seen in the urgent care. Reports she continues to have intermittent nose bleeds. Pt will follow-up with PCP at scheduled appointment. Pt states she did message provider and was started on an antibiotic. States she is taking antibiotic as directed. Pt states she has been having a problem with mucus in the back of her throat.  Reports she will see if antibiotic helps. Pt has a scheduled follow-up with PCP in October.    Nurse reviewed care plan for CCM. Pt reports no recent falls.  Patient states she has multiple appointments coming up. Reports she will get labs completed. Pt states she continues  to feel sleepy all the time. Patient will contact PCP office if needing sooner appointment if nose bleeds continue.Pt agreeable to outreach call next week for follow-up. Pt has no further questions or concerns at this time.  "

## 2022-09-27 ENCOUNTER — OFFICE VISIT (OUTPATIENT)
Dept: URGENT CARE | Facility: CLINIC | Age: 87
End: 2022-09-27
Payer: MEDICARE

## 2022-09-27 ENCOUNTER — APPOINTMENT (OUTPATIENT)
Dept: RADIOLOGY | Facility: IMAGING CENTER | Age: 87
End: 2022-09-27
Attending: FAMILY MEDICINE
Payer: MEDICARE

## 2022-09-27 VITALS
WEIGHT: 154 LBS | OXYGEN SATURATION: 93 % | RESPIRATION RATE: 14 BRPM | HEART RATE: 95 BPM | SYSTOLIC BLOOD PRESSURE: 132 MMHG | BODY MASS INDEX: 24.17 KG/M2 | HEIGHT: 67 IN | DIASTOLIC BLOOD PRESSURE: 64 MMHG | TEMPERATURE: 97.8 F

## 2022-09-27 DIAGNOSIS — M79.89 TOE SWELLING: ICD-10-CM

## 2022-09-27 PROCEDURE — 99213 OFFICE O/P EST LOW 20 MIN: CPT | Performed by: FAMILY MEDICINE

## 2022-09-27 PROCEDURE — 73660 X-RAY EXAM OF TOE(S): CPT | Mod: TC,RT | Performed by: FAMILY MEDICINE

## 2022-09-27 ASSESSMENT — ENCOUNTER SYMPTOMS
WEIGHT LOSS: 0
EYE DISCHARGE: 0
MYALGIAS: 0
VOMITING: 0
EYE REDNESS: 0
NAUSEA: 0

## 2022-09-27 ASSESSMENT — FIBROSIS 4 INDEX: FIB4 SCORE: 2.19

## 2022-10-13 ENCOUNTER — OFFICE VISIT (OUTPATIENT)
Dept: MEDICAL GROUP | Facility: PHYSICIAN GROUP | Age: 87
End: 2022-10-13
Payer: MEDICARE

## 2022-10-13 VITALS
DIASTOLIC BLOOD PRESSURE: 50 MMHG | BODY MASS INDEX: 24.01 KG/M2 | HEIGHT: 67 IN | RESPIRATION RATE: 15 BRPM | TEMPERATURE: 98 F | SYSTOLIC BLOOD PRESSURE: 118 MMHG | OXYGEN SATURATION: 95 % | WEIGHT: 153 LBS | HEART RATE: 84 BPM

## 2022-10-13 DIAGNOSIS — Z23 NEED FOR VACCINATION: ICD-10-CM

## 2022-10-13 DIAGNOSIS — R60.0 LOWER EXTREMITY EDEMA: ICD-10-CM

## 2022-10-13 DIAGNOSIS — M25.50 PAIN IN JOINT INVOLVING MULTIPLE SITES: ICD-10-CM

## 2022-10-13 DIAGNOSIS — I10 PRIMARY HYPERTENSION: ICD-10-CM

## 2022-10-13 DIAGNOSIS — M48.061 SPINAL STENOSIS OF LUMBAR REGION WITHOUT NEUROGENIC CLAUDICATION: ICD-10-CM

## 2022-10-13 DIAGNOSIS — M87.052 AVASCULAR NECROSIS OF BONE OF LEFT HIP (HCC): ICD-10-CM

## 2022-10-13 DIAGNOSIS — G47.01 INSOMNIA DUE TO MEDICAL CONDITION: ICD-10-CM

## 2022-10-13 PROBLEM — I49.9 IRREGULAR HEART RATE: Status: RESOLVED | Noted: 2022-03-29 | Resolved: 2022-10-13

## 2022-10-13 PROCEDURE — G0008 ADMIN INFLUENZA VIRUS VAC: HCPCS | Performed by: INTERNAL MEDICINE

## 2022-10-13 PROCEDURE — 99215 OFFICE O/P EST HI 40 MIN: CPT | Mod: 25 | Performed by: INTERNAL MEDICINE

## 2022-10-13 PROCEDURE — 90662 IIV NO PRSV INCREASED AG IM: CPT | Performed by: INTERNAL MEDICINE

## 2022-10-13 ASSESSMENT — FIBROSIS 4 INDEX: FIB4 SCORE: 2.19

## 2022-10-14 NOTE — PROGRESS NOTES
CC: 3-month follow-up visit.  Patient is here with her daughter-in-law Myrtle.    HPI:  Chelly presents with the following    1. Spinal stenosis of lumbar region without neurogenic claudication  Patient is status post [employer] pump replacement on October 7 for her Dilaudid.  Followed by Dr. Alanis.  Patient has not been in for postoperative evaluation/care.  Patient is also using cocci codon 3 times daily however pain is not managed well.  Patient will also have a appointment with pain management physician, Dr. Lozoya in the near future.    2. Avascular necrosis of bone of left hip ([facility])  8/17/2022:Chronic.  Stable.  Ongoing.  Pelvic MRI was ordered by Dr. Escoto a colorectal surgeon.  Patient has been having rectal pain for the last 2 to 3 years.  Recent MRI showed avascular necrosis of the left femoral head with resulting mild subchondral collapse of the left femoral head.  Severe osteoarthritis of left hip joint.  Large incompletely visualized lipoma in the abductor musculature of the right upper thigh.  Patient has also complained about right leg discomfort.  Status post right hip replacement.  Patient has an appointment with her orthopedist on August 24 to discuss MRI results.  Patient also recently received a cortisone injection into the left hip.    10/13/2022: Patient was referred to orthopedics, Dr. Carrillo however missed 2 appointments.  Patient was referred back to Dr. Olsen for further evaluation and treatment.  Patient continues to have severe left hip and groin pain.    3. Insomnia due to medical condition  Patient is having great difficulty with sleep.  Patient states that she sleeps 20 minutes at a time and continues to wake up.  Patient discontinued temazepam about 2 months ago.  Thought that this may interfere with her Dilaudid and oxycodone.  She gets up to use the restroom quite often and pain will wake her up.    4. Lower extremity edema  Patient responded well to spironolactone and  furosemide.  Maintaining weight and checking daily.  Using compression hose.    5. Need for vaccination  Due for flu vaccine    6. Primary hypertension  Patient is monitoring her blood pressure regularly.  Patient discontinued amlodipine but continues on Cozaar, Lasix and spironolactone with diltiazem.    7. Pain in joint involving multiple sites  Patient complains of chronic pain throughout her body involving multiple joints.  Daughter-in-law concerned about autoimmune etiology.      Patient Active Problem List    Diagnosis Date Noted    Insomnia due to medical condition 10/13/2022    Pain in joint involving multiple sites 10/13/2022    Chronic kidney disease, stage 3a ([facility]) 08/17/2022    Hypotension due to hypovolemia 08/17/2022    Avascular necrosis of bone of left hip ([facility]) 08/17/2022    Lower extremity edema 08/03/2022    Acute exacerbation of chronic obstructive airways disease ([facility]) 06/22/2022    Chronic obstructive pulmonary disease ([facility]) 06/22/2022    Solitary pulmonary nodule 06/22/2022    Diastolic heart failure ([facility]) 06/22/2022    Bilateral carotid bruits 06/06/2022    Chronic pain syndrome 06/06/2022    PVC's (premature ventricular contractions) 06/06/2022    Moderate mitral insufficiency 06/06/2022    Moderate aortic stenosis 06/06/2022    Urge incontinence of urine 05/12/2022    H/O Clostridium difficile infection 05/12/2022    Skin lesions 05/12/2022    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 Indications: Cough       Cyanocobalamin (VITAMIN B-12 PO) Take  by mouth every day.      Cholecalciferol (VITAMIN D3 PO) Take  by mouth every day.      diltiazem (TIAZAC) 120 MG SR capsule Take 120 mg by mouth every day.      diphenhydrAMINE (BENADRYL) 25 MG capsule Take  by mouth.      MAGNESIUM-OXIDE 400 (240 Mg) MG Tab 400 mg every day.      pantoprazole (PROTONIX) 40 MG Tablet Delayed Response Take 40 mg by mouth every morning before breakfast.      spironolactone (ALDACTONE) 25 MG Tab Take 25 mg by mouth every day.      temazepam (RESTORIL) 15 MG Cap Take 15 mg by mouth at bedtime as needed for Sleep.      CRANBERRY EXTRACT PO Take 5,000 mcg by mouth every day.      atorvastatin (LIPITOR) 40 MG Tab 40 mg. Indications: High Amount of Fats in the Blood      losartan (COZAAR) 50 MG Tab Take 50 mg by mouth every day. 1/2 a tablet      furosemide (LASIX) 20 MG Tab 20 mg every day. Edema  Indications: Edema      albuterol 108 (90 Base) MCG/ACT Aero Soln inhalation aerosol 2 Puffs every 6 hours as needed for Shortness of Breath.      HYDROmorphone HCl (DILAUDID INJ) Inject  as directed.      psyllium (METAMUCIL) 58.12 % Pack Take 1 Packet by mouth every day. 2 tsp otc      Probiotic Product (PROBIOTIC ADVANCED PO) Take  by mouth every day.      oxyCODONE-acetaminophen (PERCOCET) 5-325 MG Tab Take 1 Tablet by mouth 3 times a day as needed for Severe Pain.      Polyethylene Glycol 3350 (MIRALAX PO) Take  by mouth every day.      Simethicone (GAS-X PO) Take  by mouth 3 times a day.      amLODIPine (NORVASC) 5 MG Tab Take 5 mg by mouth every day. (Patient not taking: Reported on 10/13/2022)      amoxicillin (AMOXIL) 875 MG tablet Take 1 Tablet by mouth 2 times a day. 20 Tablet 0    Vibegron (GEMTESA) 75 MG Tab Take  by mouth every day. Indications: Overactive Bladder, Frequent Urination, Urinary Incontinence, Urinary Urgency      azelastine (ASTELIN) 137 MCG/SPRAY nasal spray 2 Sprays 2 times a day. Indications: Hayfever       No current  "facility-administered medications for this visit.         Allergies as of 10/13/2022 - Reviewed 10/13/2022   Allergen Reaction Noted    Sulfa drugs Unspecified 2021    Erythromycin Nausea 2019        Social History     Socioeconomic History    Marital status:      Spouse name: Not on file    Number of children: Not on file    Years of education: Not on file    Highest education level: Not on file   Occupational History    Occupation: retired book keeper   Tobacco Use    Smoking status: Former     Packs/day: 1.00     Years: 60.00     Pack years: 60.00     Types: Cigarettes     Quit date:      Years since quittin.7    Smokeless tobacco: Never   Vaping Use    Vaping Use: Former    Substances: Nicotine   Substance and Sexual Activity    Alcohol use: No    Drug use: Not Currently     Comment: cocaine while in her 40\"s    Sexual activity: Not on file   Other Topics Concern    Not on file   Social History Narrative    Not on file     Social Determinants of Health     Financial Resource Strain: Low Risk     Difficulty of Paying Living Expenses: Not very hard   Food Insecurity: No Food Insecurity    Worried About Running Out of Food in the Last Year: Never true    Ran Out of Food in the Last Year: Never true   Transportation Needs: Unmet Transportation Needs    Lack of Transportation (Medical): Yes    Lack of Transportation (Non-Medical): No   Physical Activity: Inactive    Days of Exercise per Week: 0 days    Minutes of Exercise per Session: 0 min   Stress: Not on file   Social Connections: Not on file   Intimate Partner Violence: Not on file   Housing Stability: Unknown    Unable to Pay for Housing in the Last Year: No    Number of Places Lived in the Last Year: Not on file    Unstable Housing in the Last Year: No       History reviewed. No pertinent family history.    Past Surgical History:   Procedure Laterality Date    PUMP INSERT/REMOVE Right 10/7/2022    Procedure: MEDTRONIC PUMP " "REPLACEMENT;  Surgeon: Joel Alanis M.D.;  Location: SURGERY [facility];  Service: Pain Management    PB IMPLANT NEUROSTIM/ N/A 2/5/2021    Procedure: IMPLANT PUMP AND CATHETER;  Surgeon: Joel Alanis M.D.;  Location: SURGERY [facility];  Service: Pain Management    APPENDECTOMY      CARPAL TUNNEL ENDOSCOPIC      bilat    CHOLECYSTECTOMY      GYN SURGERY      c section    HIP ARTHROPLASTY TOTAL Right     HYSTERECTOMY LAPAROSCOPY      total    OTHER      kidney stones removed    TONSILLECTOMY         ROS: Positive ROS per HPI.  Denies any Headache,Chest pain,  Shortness of breath,  Abdominal pain, Changes of bowel or bladder, Lower ext edema, Fevers, Nights sweats, Weight Changes, Focal weakness or numbness.  All other systems are negative.    /50 (BP Location: Left arm, Patient Position: Sitting, BP Cuff Size: Adult)   Pulse 84   Temp 36.7 °C (98 °F) (Temporal)   Resp 15   Ht 1.702 m (5' 7\")   Wt 69.4 kg (153 lb)   SpO2 95%   BMI 23.96 kg/m²      Constitutional: Alert, no distress, well-groomed.  Skin: Warm, dry, good turgor, no rashes in visible areas.  Eye: Equal, round and reactive, conjunctiva clear, lids normal.  ENMT: Lips without lesions, good dentition, moist mucous membranes.  Neck: Trachea midline, no masses, no thyromegaly.  Respiratory: Unlabored respiratory effort, no cough.  Abdomen: Soft, no gross masses.  MSK: Ambulating with walker, moves all extremities.  Neuro: Grossly non-focal. No cranial nerve deficit. Strength and sensation intact.   Psych: Alert and oriented x3, normal affect and mood.      Assessment and Plan.   86 y.o. female presenting with the following.     1. 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 Alanis and Dr. Lozoya.  Consider titrating Dilantin and continue to use oxycodone codon for breakthrough pain.    2. Avascular necrosis of bone of left hip (HCC)  Chronic.  " Stable.  Reviewed pelvic MRI from July with patient and daughter-in-law.  Provided copy of MRI to take to her appointment with Dr. Olsen.    3. Insomnia due to medical condition  Patient will restart temazepam 15 mg tablets at bedtime.  Patient still has prescriptions available.    4. Lower extremity edema  Continue current plan of care.  Stable.    5. Need for vaccination    - INFLUENZA VACCINE, HIGH DOSE (65+ ONLY)    6. Primary hypertension  Stable.  Medications reviewed.  Continue current antihypertensives.    7. Pain in joint involving multiple sites    - RHEUMATOID ARTHRITIS FACTOR; Future  - Sed Rate; Future  - CCP ANTIBODY; Future  - CARRIE W/REFLEX IF POSITIVE  - CRP QUANTITIVE (NON-CARDIAC); Future    My total time spent caring for the patient on the day of the encounter was 41 minutes.   This does not include time spent on separately billable procedures/tests.

## 2022-10-21 ENCOUNTER — PATIENT OUTREACH (OUTPATIENT)
Dept: HEALTH INFORMATION MANAGEMENT | Facility: OTHER | Age: 87
End: 2022-10-21
Payer: MEDICARE

## 2022-10-21 DIAGNOSIS — I10 PRIMARY HYPERTENSION: ICD-10-CM

## 2022-10-21 DIAGNOSIS — J44.0 CHRONIC OBSTRUCTIVE PULMONARY DISEASE WITH ACUTE LOWER RESPIRATORY INFECTION (HCC): ICD-10-CM

## 2022-10-21 NOTE — PROGRESS NOTES
Nurse CM outreach call for monthly assessment. Pt reports she is doing okay. Reports her pain management provider made a visit. Reports her pain pump has not been working. Pt states she is scheduled to see a new pain management provider in [place]. States no recent falls. Pt states she will be getting her labs completed on Monday. Pt states she is currently resting and doesn't need anything from CM. Pt agreeable to outreach call next month. Nurse provided patient with contact number of nurse CM to call if any questions or needing assistance. Pt states she doesn't need anything and ended call. Nurse will follow-up next month.

## 2022-11-04 ENCOUNTER — TELEPHONE (OUTPATIENT)
Dept: MEDICAL GROUP | Facility: PHYSICIAN GROUP | Age: 87
End: 2022-11-04
Payer: MEDICARE

## 2022-11-04 NOTE — TELEPHONE ENCOUNTER
Chelly having issues with dizziness. Please schedule an appointment on November 11th, I have availabilities. Thanks!

## 2022-11-09 ENCOUNTER — PATIENT MESSAGE (OUTPATIENT)
Dept: HEALTH INFORMATION MANAGEMENT | Facility: OTHER | Age: 87
End: 2022-11-09

## 2022-11-22 ENCOUNTER — PATIENT OUTREACH (OUTPATIENT)
Dept: HEALTH INFORMATION MANAGEMENT | Facility: OTHER | Age: 87
End: 2022-11-22
Payer: MEDICARE

## 2022-11-22 DIAGNOSIS — J44.0 CHRONIC OBSTRUCTIVE PULMONARY DISEASE WITH ACUTE LOWER RESPIRATORY INFECTION (HCC): ICD-10-CM

## 2022-11-22 NOTE — PROGRESS NOTES
Nurse CM outreach call for monthly CCM assessment. Pt reports she just got home from an appointment. Reports she went to receive injection for back pain. Reports she didn't receive injection as she is to follow-up with her pain management provider, Dr. Lozoya. Pt reports she has a lot of upcoming appointments with urology, kidney specialist and orthopedic provider. Pt reports she does get short of breath with exertion. Reports her lower extremity edema has improved. States she was wearing compression stockings. Pt states she has all of her medications. Reports she will follow-up with PCP in December. Pt has no other concerns at this time. Patient will contact nurse CM if any questions or concerns. Nurse will follow-up with patient next month.

## 2022-11-30 ENCOUNTER — OFFICE VISIT (OUTPATIENT)
Dept: URGENT CARE | Facility: CLINIC | Age: 87
End: 2022-11-30
Payer: MEDICARE

## 2022-11-30 VITALS
RESPIRATION RATE: 16 BRPM | SYSTOLIC BLOOD PRESSURE: 136 MMHG | OXYGEN SATURATION: 96 % | WEIGHT: 153 LBS | HEIGHT: 67 IN | BODY MASS INDEX: 24.01 KG/M2 | TEMPERATURE: 99 F | DIASTOLIC BLOOD PRESSURE: 58 MMHG | HEART RATE: 92 BPM

## 2022-11-30 DIAGNOSIS — R05.8 PRODUCTIVE COUGH: ICD-10-CM

## 2022-11-30 DIAGNOSIS — J98.8 RESPIRATORY TRACT INFECTION: ICD-10-CM

## 2022-11-30 PROCEDURE — 99213 OFFICE O/P EST LOW 20 MIN: CPT | Performed by: PHYSICIAN ASSISTANT

## 2022-11-30 RX ORDER — FLUTICASONE FUROATE, UMECLIDINIUM BROMIDE AND VILANTEROL TRIFENATATE 100; 62.5; 25 UG/1; UG/1; UG/1
POWDER RESPIRATORY (INHALATION)
COMMUNITY
Start: 2022-07-20 | End: 2023-04-28

## 2022-11-30 RX ORDER — ERYTHROMYCIN 5 MG/G
OINTMENT OPHTHALMIC
COMMUNITY
Start: 2022-11-18 | End: 2023-04-28

## 2022-11-30 RX ORDER — MAGNESIUM OXIDE 400 MG/1
TABLET ORAL
COMMUNITY
Start: 2022-10-24 | End: 2023-08-23

## 2022-11-30 RX ORDER — ESTRADIOL 0.1 MG/G
CREAM VAGINAL
COMMUNITY
Start: 2022-11-16 | End: 2023-04-28

## 2022-11-30 RX ORDER — AMOXICILLIN 875 MG/1
875 TABLET, COATED ORAL 2 TIMES DAILY
Qty: 20 TABLET | Refills: 0 | Status: SHIPPED | OUTPATIENT
Start: 2022-11-30 | End: 2022-12-10

## 2022-11-30 ASSESSMENT — ENCOUNTER SYMPTOMS
EYE REDNESS: 0
COUGH: 1
EYE DISCHARGE: 0
NAUSEA: 0
DIARRHEA: 0
SORE THROAT: 0
HEADACHES: 0
MYALGIAS: 0
FEVER: 0
VOMITING: 0

## 2022-11-30 ASSESSMENT — FIBROSIS 4 INDEX: FIB4 SCORE: 2.21

## 2022-12-01 ASSESSMENT — ENCOUNTER SYMPTOMS: SHORTNESS OF BREATH: 1

## 2022-12-01 NOTE — PROGRESS NOTES
Subjective     Chelly Decker is a 87 y.o. female who presents with Cough (Thick phlegm x July )            Cough  This is a new problem. Episode onset: x several months - worse in the past 10 days.  The patient reports a history of a similar cough, which occurs intermittently.  The patient states she developed her current cough 10 days ago. The problem has been gradually worsening. The cough is Productive of sputum. Associated symptoms include shortness of breath (The patient reports shortness of breath at baseline.  The patient states she currently uses 4 L of supplemental O2 as needed.  The patient states she has not needed to increase her supplemental O2.). Pertinent negatives include no ear pain, eye redness, fever, headaches, myalgias or sore throat. She has tried OTC cough suppressant (The patient has been taking OTC guaifenesin for her increased mucus production and cough.) for the symptoms.     The patient reports no recent sick contacts.  No known exposure to COVID-19.  No known exposure to influenza.    The patient states overall she does not feel sick.    The patient states in the past she was prescribed amoxicillin with improvement of her symptoms.  The patient is hoping to receive an additional prescription for amoxicillin at this time.    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 (03/29/2022), Bilateral lower extremity edema (08/03/2022), Breath shortness, Cancer (HCC), Cervical cancer (HCC), Congestive heart failure (HCC), GERD (gastroesophageal reflux disease), H/O Clostridium difficile infection (05/12/2022), Heart burn, Heart murmur, Hypertension, Hypotension due to hypovolemia (08/17/2022), Hypoxia (03/29/2022), Incomplete defecation (03/29/2022), Insomnia due to medical condition (10/13/2022), Irregular heart rate (03/29/2022), Kidney stones, Moderate asthma without complication  (03/29/2022), Oxygen dependent, Pain, Pain in joint involving multiple sites (10/13/2022), Pain in joint involving multiple sites (10/13/2022), Pelvic floor dysfunction (03/29/2022), Primary hypertension (03/29/2022), Renal insufficiency (08/03/2022), Skin lesions (05/12/2022), Solitary pulmonary nodule, Spinal stenosis, Spinal stenosis of lumbar region without neurogenic claudication (03/29/2022), and Urge incontinence of urine (05/12/2022).    She has no past medical history of Anesthesia, Malignant hyperthermia, or PONV (postoperative nausea and vomiting).  MEDS:   Current Outpatient Medications:     magnesium oxide (MAG-OX) 400 MG Tab tablet, 1 tablet (400 mg) every morning AND 0.5 tablets (200 mg) every evening., Disp: , Rfl:     fluticasone-umeclidin-vilant (TRELEGY ELLIPTA) 100-62.5-25 MCG/ACT AEROSOL POWDER, BREATH ACTIVATED inhalation, 1 puff(s), Disp: , Rfl:     erythromycin 5 MG/GM Ointment, 1 APPLICATION IN EACH NOSTRIL 2 TIMES A DAY 30 DAY(S), Disp: , Rfl:     estradiol (ESTRACE) 0.1 MG/GM vaginal cream, USE 0.5 GRAM VAGINAL OR VULVAR EVERY NIGHT FOR 2 WEEKS, THEN DECREASE TO ONLY TWICE PER WEEK 30 DAYS, Disp: , Rfl:     guaiFENesin ER (MUCINEX) 600 MG TABLET SR 12 HR, Take 600 mg by mouth every 12 hours. Indications: Cough, Disp: , Rfl:     Cyanocobalamin (VITAMIN B-12 PO), Take  by mouth every day., Disp: , Rfl:     Cholecalciferol (VITAMIN D3 PO), Take  by mouth every day., Disp: , Rfl:     diltiazem (TIAZAC) 120 MG SR capsule, Take 120 mg by mouth every day., Disp: , Rfl:     diphenhydrAMINE (BENADRYL) 25 MG capsule, Take  by mouth., Disp: , Rfl:     MAGNESIUM-OXIDE 400 (240 Mg) MG Tab, 400 mg every day., Disp: , Rfl:     pantoprazole (PROTONIX) 40 MG Tablet Delayed Response, Take 40 mg by mouth every morning before breakfast., Disp: , Rfl:     spironolactone (ALDACTONE) 25 MG Tab, Take 25 mg by mouth every day., Disp: , Rfl:     temazepam (RESTORIL) 15 MG Cap, Take 15 mg by mouth at bedtime as  needed for Sleep., Disp: , Rfl:     CRANBERRY EXTRACT PO, Take 5,000 mcg by mouth every day., Disp: , Rfl:     atorvastatin (LIPITOR) 40 MG Tab, 40 mg. Indications: High Amount of Fats in the Blood, Disp: , Rfl:     losartan (COZAAR) 50 MG Tab, Take 50 mg by mouth every day. 1/2 a tablet, Disp: , Rfl:     furosemide (LASIX) 20 MG Tab, 20 mg every day. Edema  Indications: Edema, Disp: , Rfl:     albuterol 108 (90 Base) MCG/ACT Aero Soln inhalation aerosol, 2 Puffs every 6 hours as needed for Shortness of Breath., Disp: , Rfl:     HYDROmorphone HCl (DILAUDID INJ), Inject  as directed., Disp: , Rfl:     psyllium (METAMUCIL) 58.12 % Pack, Take 1 Packet by mouth every day. 2 tsp otc, Disp: , Rfl:     Probiotic Product (PROBIOTIC ADVANCED PO), Take  by mouth every day., Disp: , Rfl:     oxyCODONE-acetaminophen (PERCOCET) 5-325 MG Tab, Take 1 Tablet by mouth 3 times a day as needed for Severe Pain., Disp: , Rfl:     Polyethylene Glycol 3350 (MIRALAX PO), Take  by mouth every day., Disp: , Rfl:     Simethicone (GAS-X PO), Take  by mouth 3 times a day., Disp: , Rfl:     amLODIPine (NORVASC) 5 MG Tab, Take 5 mg by mouth every day. (Patient not taking: Reported on 10/13/2022), Disp: , Rfl:     amoxicillin (AMOXIL) 875 MG tablet, Take 1 Tablet by mouth 2 times a day., Disp: 20 Tablet, Rfl: 0    Vibegron (GEMTESA) 75 MG Tab, Take  by mouth every day. Indications: Overactive Bladder, Frequent Urination, Urinary Incontinence, Urinary Urgency, Disp: , Rfl:     azelastine (ASTELIN) 137 MCG/SPRAY nasal spray, 2 Sprays 2 times a day. Indications: Hayfever, Disp: , Rfl:   ALLERGIES:   Allergies   Allergen Reactions    Sulfa Drugs Unspecified     Nauseated, diarrhea     Erythromycin Nausea     SURGHX:   Past Surgical History:   Procedure Laterality Date    PUMP INSERT/REMOVE Right 10/7/2022    Procedure: MEDTRONIC PUMP REPLACEMENT;  Surgeon: Joel Alanis M.D.;  Location: SURGERY [facility];  Service: Pain Management    PB  "IMPLANT NEUROSTIM/ N/A 2/5/2021    Procedure: IMPLANT PUMP AND CATHETER;  Surgeon: Joel Alanis M.D.;  Location: SURGERY [facility];  Service: Pain Management    APPENDECTOMY      CARPAL TUNNEL ENDOSCOPIC      bilat    CHOLECYSTECTOMY      GYN SURGERY      c section    HIP ARTHROPLASTY TOTAL Right     HYSTERECTOMY LAPAROSCOPY      total    OTHER      kidney stones removed    TONSILLECTOMY       SOCHX:  reports that she quit smoking about 7 years ago. Her smoking use included cigarettes. She has a 60.00 pack-year smoking history. She has never used smokeless tobacco. She reports that she does not currently use drugs. She reports that she does not drink alcohol.  FH: Family history was reviewed, no pertinent findings to report      Review of Systems   Constitutional:  Negative for fever.   HENT:  Positive for congestion. Negative for ear pain and sore throat.    Eyes:  Negative for discharge and redness.   Respiratory:  Positive for cough and shortness of breath (The patient reports shortness of breath at baseline.  The patient states she currently uses 4 L of supplemental O2 as needed.  The patient states she has not needed to increase her supplemental O2.).    Gastrointestinal:  Negative for diarrhea, nausea and vomiting.   Musculoskeletal:  Negative for myalgias.   Neurological:  Negative for headaches.            Objective     /58 (BP Location: Right arm, Patient Position: Sitting, BP Cuff Size: Adult)   Pulse 92   Temp 37.2 °C (99 °F) (Temporal)   Resp 16   Ht 1.702 m (5' 7\")   Wt 69.4 kg (153 lb)   SpO2 96%   BMI 23.96 kg/m²      Physical Exam  Constitutional:       General: She is not in acute distress.     Appearance: Normal appearance. She is not ill-appearing.   HENT:      Head: Normocephalic and atraumatic.      Right Ear: External ear normal.      Left Ear: External ear normal.      Nose: Nose normal.      Mouth/Throat:      Mouth: Mucous membranes are moist.      Pharynx: " Oropharynx is clear. Uvula midline. No posterior oropharyngeal erythema.      Tonsils: No tonsillar exudate.      Comments: + post-nasal drip  Eyes:      Extraocular Movements: Extraocular movements intact.      Conjunctiva/sclera: Conjunctivae normal.   Cardiovascular:      Rate and Rhythm: Normal rate and regular rhythm.      Heart sounds: Normal heart sounds.   Pulmonary:      Effort: Pulmonary effort is normal. No respiratory distress.      Breath sounds: Normal breath sounds. No wheezing.   Musculoskeletal:         General: Normal range of motion.      Cervical back: Normal range of motion and neck supple.   Skin:     General: Skin is warm and dry.   Neurological:      Mental Status: She is alert and oriented to person, place, and time.             Progress:  The patient declined a chest x-ray at this time.             Assessment & Plan          1. Respiratory tract infection  - amoxicillin (AMOXIL) 875 MG tablet; Take 1 Tablet by mouth 2 times a day for 10 days.  Dispense: 20 Tablet; Refill: 0    2. Productive cough    The patient's presenting symptoms and physical exam endings are consistent with a respiratory tract infection with an associated productive cough.  On physical exam, the patient had visible postnasal drip.  The remainder the patient's physical exam today in clinic was normal.  The patient's lungs were clear to auscultation without wheezing, rhonchi, or rales, and her pulse ox was within normal limits.  Patient is nontoxic and appears in no acute distress.  The patient's vital signs are stable and within normal limits.  She is afebrile today in clinic.  Offered the patient a chest x-ray today in clinic to further evaluate her current symptoms.  The patient declined a chest x-ray at this time.  The patient states overall she does not feel sick.  However -given the duration of the patient's symptoms, will prescribe the patient amoxicillin to cover for a possible respiratory tract infection.   Advised the patient to monitor worsening signs and or symptoms.  Instructed the patient to continue OTC guaifenesin as needed for her current symptoms.  Recommend OTC medications and supportive care for symptomatic management.  Recommend patient follow-up with her PCP as needed.  Discussed return precautions with the patient, and she verbalized understanding.    Differential diagnoses, supportive care, and indications for immediate follow-up discussed with patient.   Instructed to return to clinic or nearest emergency department for any change in condition, further concerns, or worsening of symptoms.    OTC Tylenol or Motrin for fever/discomfort.  OTC cough/cold medication for symptomatic relief  OTC Supportive Care for Congestion - saline nasal spray or neti pot  Drink plenty of fluids  Follow-up with PCP  Return to clinic or go to the ED if symptoms worsen or fail to improve, or if the patient should develop worsening/increasing cough, congestion, ear pain, sore throat, shortness of breath, wheezing, chest pain, fever/chills, and/or any concerning symptoms.    Discussed plan with the patient, and she agrees to the above.     I personally reviewed prior external notes and test results pertinent to today's visit.  I have independently reviewed and interpreted all diagnostics ordered during this urgent care visit.     Please note that this dictation was created using voice recognition software. I have made every reasonable attempt to correct obvious errors, but I expect that there may be errors of grammar and possibly content that I did not discover before finalizing the note.     This note was electronically signed by Opal Redding PA-C

## 2022-12-07 DIAGNOSIS — C34.32 PRIMARY CANCER OF LEFT LOWER LOBE OF LUNG (HCC): ICD-10-CM

## 2022-12-15 ENCOUNTER — APPOINTMENT (OUTPATIENT)
Dept: MEDICAL GROUP | Facility: PHYSICIAN GROUP | Age: 87
End: 2022-12-15
Payer: MEDICARE

## 2022-12-15 DIAGNOSIS — N39.41 URGE INCONTINENCE OF URINE: ICD-10-CM

## 2022-12-15 DIAGNOSIS — M62.89 PELVIC FLOOR DYSFUNCTION: ICD-10-CM

## 2022-12-29 ENCOUNTER — OFFICE VISIT (OUTPATIENT)
Dept: MEDICAL GROUP | Facility: PHYSICIAN GROUP | Age: 87
End: 2022-12-29
Payer: MEDICARE

## 2022-12-29 VITALS
OXYGEN SATURATION: 95 % | TEMPERATURE: 96.7 F | DIASTOLIC BLOOD PRESSURE: 62 MMHG | WEIGHT: 155 LBS | HEIGHT: 67 IN | HEART RATE: 87 BPM | BODY MASS INDEX: 24.33 KG/M2 | SYSTOLIC BLOOD PRESSURE: 136 MMHG | RESPIRATION RATE: 16 BRPM

## 2022-12-29 DIAGNOSIS — L03.115 CELLULITIS AND ABSCESS OF RIGHT LEG: ICD-10-CM

## 2022-12-29 DIAGNOSIS — L02.415 CELLULITIS AND ABSCESS OF RIGHT LEG: ICD-10-CM

## 2022-12-29 DIAGNOSIS — M48.061 SPINAL STENOSIS OF LUMBAR REGION WITHOUT NEUROGENIC CLAUDICATION: ICD-10-CM

## 2022-12-29 DIAGNOSIS — R42 DIZZINESS: ICD-10-CM

## 2022-12-29 DIAGNOSIS — M20.41 HAMMER TOES OF BOTH FEET: ICD-10-CM

## 2022-12-29 DIAGNOSIS — M20.42 HAMMER TOES OF BOTH FEET: ICD-10-CM

## 2022-12-29 DIAGNOSIS — N39.41 URGE INCONTINENCE OF URINE: ICD-10-CM

## 2022-12-29 PROCEDURE — 99214 OFFICE O/P EST MOD 30 MIN: CPT | Performed by: INTERNAL MEDICINE

## 2022-12-29 RX ORDER — DULOXETIN HYDROCHLORIDE 20 MG/1
20 CAPSULE, DELAYED RELEASE ORAL 2 TIMES DAILY
COMMUNITY
Start: 2022-12-12

## 2022-12-29 RX ORDER — CEPHALEXIN 500 MG/1
500 CAPSULE ORAL 4 TIMES DAILY
Qty: 28 CAPSULE | Refills: 0 | Status: SHIPPED | OUTPATIENT
Start: 2022-12-29 | End: 2023-04-04

## 2022-12-29 RX ORDER — DILTIAZEM HYDROCHLORIDE 120 MG/1
120 CAPSULE, COATED, EXTENDED RELEASE ORAL DAILY
COMMUNITY
Start: 2022-12-01 | End: 2023-04-28

## 2022-12-29 ASSESSMENT — FIBROSIS 4 INDEX: FIB4 SCORE: 1.92

## 2022-12-30 ENCOUNTER — PATIENT OUTREACH (OUTPATIENT)
Dept: HEALTH INFORMATION MANAGEMENT | Facility: OTHER | Age: 87
End: 2022-12-30
Payer: MEDICARE

## 2022-12-30 DIAGNOSIS — J44.0 CHRONIC OBSTRUCTIVE PULMONARY DISEASE WITH ACUTE LOWER RESPIRATORY INFECTION (HCC): ICD-10-CM

## 2022-12-30 NOTE — PROGRESS NOTES
Nurse CM outreach call to patient for monthly CCM assessment. Patient had recent visit with PCP on 12/29/22. Nurse reviewed PCP recommendations with patient. Pt reports she did  antibiotic for cellulitis and abscess on right leg. Pt states she is taking as recommended. Reports she does have a scheduled follow-up with ENT for 1/3/23. Pt reports no recent falls. States she is using her walker to assist with mobility. Pt reports no current respiratory symptoms. States she currently has all of her medications. Reports she follows with Dr. Lozoya for pain management. Patient states she doesn't need any assistance at this time and has no current care management needs. Education provided on watching for any s/s of respiratory problems, following closely with specialists. Pt will contact nurse CM if needing any assistance or resources for care. Nurse will follow-up with patient next month.

## 2022-12-30 NOTE — PROGRESS NOTES
CC: 3-month follow-up, lumbar spinal stenosis, hammertoes.    HPI:  Chelly presents with the following    1. Hammer toes of both feet  Patient requesting a referral to podiatry in [place].  She has been struggling with hammertoes for quite some time.    2. Spinal stenosis of lumbar region without neurogenic claudication  10/13/2022:Patient is status post [employer] pump replacement on October 7 for her Dilaudid.  Followed by Dr. Alanis.  Patient has not been in for postoperative evaluation/care.  Patient is also using oxycodone 3 times daily however pain is not managed well.  Patient will also have a appointment with pain management physician, Dr. Lozoya in the near future.    12/29/2022:.  Patient was seen by Dr. Lozoya for pain management.  She would like to change from oxycodone to hydrocodone, however patient left the office without a prescription.  She was started on duloxetine 20 mg tablets and is currently taking it daily and will titrate up to twice daily.    3. Dizziness  Dizziness/lightheadedness has been an ongoing problem and patient finally has an appointment with her ENT, Dr. Qureshi on January 3.    4. Urge incontinence of urine  Patient also has an appointment with urology with Dr. Melendez.  Currently being treated with Gemtesa.    5. Cellulitis and abscess of right leg  About 3 days ago, patient scratched her right lower shin.  This left a large excoriation.  Using topical antibiotics but is concerned about infection over the weekend.      Patient Active Problem List    Diagnosis Date Noted    Hammer toes of both feet 12/29/2022    Dizziness 12/29/2022    Insomnia due to medical condition 10/13/2022    Pain in joint involving multiple sites 10/13/2022    Chronic kidney disease, stage 3a (HCC) 08/17/2022    Hypotension due to hypovolemia 08/17/2022    Avascular necrosis of bone of left hip (HCC) 08/17/2022    Lower extremity edema 08/03/2022    Acute exacerbation of chronic obstructive  airways disease ([facility]) 06/22/2022    Chronic obstructive pulmonary disease ([facility]) 06/22/2022    Solitary pulmonary nodule 06/22/2022    Diastolic heart failure ([facility]) 06/22/2022    Bilateral carotid bruits 06/06/2022    Chronic pain syndrome 06/06/2022    PVC's (premature ventricular contractions) 06/06/2022    Moderate mitral insufficiency 06/06/2022    Moderate aortic stenosis 06/06/2022    Urge incontinence of urine 05/12/2022    H/O Clostridium difficile infection 05/12/2022    Skin lesions 05/12/2022    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      cephALEXin (KEFLEX) 500 MG Cap Take 1 Capsule by mouth 4 times a day. 28 Capsule 0    magnesium oxide (MAG-OX) 400 MG Tab tablet 1 tablet (400 mg) every morning AND 0.5 tablets (200 mg) every evening.      guaiFENesin ER (MUCINEX) 600 MG TABLET SR 12 HR Take 600 mg by mouth every 12 hours. Indications: Cough      Cyanocobalamin (VITAMIN B-12 PO) Take  by mouth every day.      Cholecalciferol (VITAMIN D3 PO) Take  by mouth every day.      diltiazem (TIAZAC) 120 MG SR capsule Take 120 mg by mouth every day.      diphenhydrAMINE (BENADRYL) 25 MG capsule Take  by mouth.      MAGNESIUM-OXIDE 400 (240 Mg) MG Tab 400 mg every day.      pantoprazole (PROTONIX) 40 MG Tablet Delayed Response Take 40 mg by mouth every morning before breakfast.      spironolactone (ALDACTONE) 25 MG Tab Take 25 mg by mouth every day.      CRANBERRY EXTRACT PO Take 5,000 mcg by mouth every day.      atorvastatin (LIPITOR) 40  MG Tab 40 mg. Indications: High Amount of Fats in the Blood      furosemide (LASIX) 20 MG Tab 20 mg every day. Edema  Indications: Edema      albuterol 108 (90 Base) MCG/ACT Aero Soln inhalation aerosol 2 Puffs every 6 hours as needed for Shortness of Breath.      HYDROmorphone HCl (DILAUDID INJ) Inject  as directed.      Probiotic Product (PROBIOTIC ADVANCED PO) Take  by mouth every day.      Polyethylene Glycol 3350 (MIRALAX PO) Take  by mouth every day.      Simethicone (GAS-X PO) Take  by mouth 3 times a day.      DILTIAZem CD (CARDIZEM CD) 120 MG CAPSULE SR 24 HR Take 120 mg by mouth every day. (Patient not taking: Reported on 12/29/2022)      fluticasone-umeclidin-vilant (TRELEGY ELLIPTA) 100-62.5-25 MCG/ACT AEROSOL POWDER, BREATH ACTIVATED inhalation 1 puff(s)      erythromycin 5 MG/GM Ointment 1 APPLICATION IN EACH NOSTRIL 2 TIMES A DAY 30 DAY(S)      estradiol (ESTRACE) 0.1 MG/GM vaginal cream USE 0.5 GRAM VAGINAL OR VULVAR EVERY NIGHT FOR 2 WEEKS, THEN DECREASE TO ONLY TWICE PER WEEK 30 DAYS      amLODIPine (NORVASC) 5 MG Tab Take 5 mg by mouth every day. (Patient not taking: Reported on 10/13/2022)      Vibegron (GEMTESA) 75 MG Tab Take  by mouth every day. Indications: Overactive Bladder, Frequent Urination, Urinary Incontinence, Urinary Urgency      temazepam (RESTORIL) 15 MG Cap Take 15 mg by mouth at bedtime as needed for Sleep.      losartan (COZAAR) 50 MG Tab Take 50 mg by mouth every day. 1/2 a tablet      azelastine (ASTELIN) 137 MCG/SPRAY nasal spray 2 Sprays 2 times a day. Indications: Hayfever      psyllium (METAMUCIL) 58.12 % Pack Take 1 Packet by mouth every day. 2 tsp otc      oxyCODONE-acetaminophen (PERCOCET) 5-325 MG Tab Take 1 Tablet by mouth 3 times a day as needed for Severe Pain.       No current facility-administered medications for this visit.         Allergies as of 12/29/2022 - Reviewed 12/29/2022   Allergen Reaction Noted    Sulfa drugs Unspecified 07/12/2021    Erythromycin  "Nausea 2019        Social History     Socioeconomic History    Marital status:      Spouse name: Not on file    Number of children: Not on file    Years of education: Not on file    Highest education level: Not on file   Occupational History    Occupation: retired book keeper   Tobacco Use    Smoking status: Former     Packs/day: 1.00     Years: 60.00     Pack years: 60.00     Types: Cigarettes     Quit date:      Years since quittin.9    Smokeless tobacco: Never   Vaping Use    Vaping Use: Former    Substances: Nicotine   Substance and Sexual Activity    Alcohol use: No    Drug use: Not Currently     Comment: cocaine while in her 40\"s    Sexual activity: Not on file   Other Topics Concern    Not on file   Social History Narrative    Not on file     Social Determinants of Health     Financial Resource Strain: Low Risk     Difficulty of Paying Living Expenses: Not very hard   Food Insecurity: No Food Insecurity    Worried About Running Out of Food in the Last Year: Never true    Ran Out of Food in the Last Year: Never true   Transportation Needs: Unmet Transportation Needs    Lack of Transportation (Medical): Yes    Lack of Transportation (Non-Medical): No   Physical Activity: Inactive    Days of Exercise per Week: 0 days    Minutes of Exercise per Session: 0 min   Stress: Not on file   Social Connections: Not on file   Intimate Partner Violence: Not on file   Housing Stability: Unknown    Unable to Pay for Housing in the Last Year: No    Number of Places Lived in the Last Year: Not on file    Unstable Housing in the Last Year: No       History reviewed. No pertinent family history.    Past Surgical History:   Procedure Laterality Date    PUMP INSERT/REMOVE Right 10/7/2022    Procedure: MEDTRONIC PUMP REPLACEMENT;  Surgeon: Joel Alanis M.D.;  Location: SURGERY [facility];  Service: Pain Management    PB IMPLANT NEUROSTIM/ N/A 2021    Procedure: IMPLANT PUMP AND CATHETER;  " "Surgeon: Joel Alanis M.D.;  Location: SURGERY [facility];  Service: Pain Management    APPENDECTOMY      CARPAL TUNNEL ENDOSCOPIC      bilat    CHOLECYSTECTOMY      GYN SURGERY      c section    HIP ARTHROPLASTY TOTAL Right     HYSTERECTOMY LAPAROSCOPY      total    OTHER      kidney stones removed    TONSILLECTOMY         ROS: Positive ROS per HPI.  Denies any Headache,Chest pain,  Shortness of breath,  Abdominal pain, Changes of bowel or bladder, Lower ext edema, Fevers, Nights sweats, Weight Changes, Focal weakness or numbness.  All other systems are negative.    /62   Pulse 87   Temp 35.9 °C (96.7 °F) (Temporal)   Resp 16   Ht 1.702 m (5' 7\")   Wt 70.3 kg (155 lb)   SpO2 95%   BMI 24.28 kg/m²      Constitutional: Alert, no distress, well-groomed.  Skin: Warm, dry, good turgor, no rashes in visible areas.  Eye: Equal, round and reactive, conjunctiva clear, lids normal.  ENMT: Lips without lesions, good dentition, moist mucous membranes.  Neck: Trachea midline, no masses, no thyromegaly.  Respiratory: Unlabored respiratory effort, no cough.  Abdomen: Soft, no gross masses.  MSK: Normal gait, moves all extremities.  No lower extremity edema.  Right lower shin with 5 inch long excoriation with mild erythema.  Neuro: Grossly non-focal. No cranial nerve deficit. Strength and sensation intact.   Psych: Alert and oriented x3, normal affect and mood.      Assessment and Plan.   87 y.o. female presenting with the following.     1. Hammer toes of both feet    - Referral to Podiatry    2. Spinal stenosis of lumbar region without neurogenic claudication  Obtain medical records from pain management, Dr. Lozoya.    3. Dizziness  Follow-up with ENT    4. Urge incontinence of urine  Follow-up with urology    5. Cellulitis and abscess of right leg  Prescription for Keflex sent to the pharmacy    My total time spent caring for the patient on the day of the encounter was 32 minutes.   This does not include " time spent on separately billable procedures/tests.

## 2023-01-13 ENCOUNTER — HOME HEALTH ADMISSION (OUTPATIENT)
Dept: HOME HEALTH SERVICES | Facility: HOME HEALTHCARE | Age: 88
End: 2023-01-13
Payer: MEDICARE

## 2023-01-30 ENCOUNTER — PATIENT OUTREACH (OUTPATIENT)
Dept: HEALTH INFORMATION MANAGEMENT | Facility: OTHER | Age: 88
End: 2023-01-30
Payer: MEDICARE

## 2023-01-30 DIAGNOSIS — J44.0 CHRONIC OBSTRUCTIVE PULMONARY DISEASE WITH ACUTE LOWER RESPIRATORY INFECTION (HCC): ICD-10-CM

## 2023-01-30 PROCEDURE — 99490 CHRNC CARE MGMT STAFF 1ST 20: CPT | Performed by: INTERNAL MEDICINE

## 2023-01-30 NOTE — PROGRESS NOTES
1/30/23 2:25 pm:  Nurse CM outreach call for monthly CCM assessment. VM left with CM contact number requesting return call to nurse CM at [phone].    1/31/23 11:10am:  Nurse CM outreach call for monthly CCM assessment. Pt answered telephone. States she is currently sleeping. Nurse offered to call pt back later this afternoon. Pt states she has an appointment. Pt agreeable to call tomorrow afternoon.     2/1/23 3:30pm:   Nurse CM outreach call for monthly CCM assessment. Nurse spoke to patient and reviewed care plan for CCM program. Pt reports no falls over the past month. Reports [facility] will be making visits. Pt states ENT provider referred her to home care. States she had visit with ENT for ongoing congestion and intermittent cough. States she also continues to feel dizzy.  Pt using walker to assist with mobility. Pt reports her blood pressure readings have been in good range, 112/70's. States weight has been stable at 152.8. Reports no edema in her legs. Pt states she had follow-up with pain management provider. [facility] provider recently increased her duloxetine to 30 mg two times daily. Nurse reviewed upcoming appointments. Pt has nurse CM contact number if any questions or concerns before next appointment. Nurse will follow-up with patient next month.

## 2023-03-07 ENCOUNTER — PATIENT OUTREACH (OUTPATIENT)
Dept: HEALTH INFORMATION MANAGEMENT | Facility: OTHER | Age: 88
End: 2023-03-07
Payer: MEDICARE

## 2023-03-07 DIAGNOSIS — J44.0 CHRONIC OBSTRUCTIVE PULMONARY DISEASE WITH ACUTE LOWER RESPIRATORY INFECTION (HCC): ICD-10-CM

## 2023-03-08 NOTE — PROGRESS NOTES
Nurse CM outreach call to patient for monthly CCM assessment. Pt reports she is currently busy. States [facility] is making visits. Nurse CM will follow-up with patient after discharged from home care. Pt agreeable with plan.

## 2023-03-25 ENCOUNTER — HOME HEALTH ADMISSION (OUTPATIENT)
Dept: HOME HEALTH SERVICES | Facility: HOME HEALTHCARE | Age: 88
End: 2023-03-25
Payer: MEDICARE

## 2023-04-04 ENCOUNTER — OFFICE VISIT (OUTPATIENT)
Dept: MEDICAL GROUP | Facility: PHYSICIAN GROUP | Age: 88
End: 2023-04-04
Payer: MEDICARE

## 2023-04-04 ENCOUNTER — PATIENT OUTREACH (OUTPATIENT)
Dept: HEALTH INFORMATION MANAGEMENT | Facility: OTHER | Age: 88
End: 2023-04-04

## 2023-04-04 VITALS
OXYGEN SATURATION: 90 % | RESPIRATION RATE: 16 BRPM | WEIGHT: 152.5 LBS | DIASTOLIC BLOOD PRESSURE: 58 MMHG | TEMPERATURE: 97.3 F | HEART RATE: 88 BPM | BODY MASS INDEX: 23.93 KG/M2 | HEIGHT: 67 IN | SYSTOLIC BLOOD PRESSURE: 124 MMHG

## 2023-04-04 DIAGNOSIS — N18.31 CHRONIC KIDNEY DISEASE, STAGE 3A: ICD-10-CM

## 2023-04-04 DIAGNOSIS — R42 DIZZY: ICD-10-CM

## 2023-04-04 DIAGNOSIS — J44.0 CHRONIC OBSTRUCTIVE PULMONARY DISEASE WITH ACUTE LOWER RESPIRATORY INFECTION (HCC): ICD-10-CM

## 2023-04-04 DIAGNOSIS — M87.052 AVASCULAR NECROSIS OF BONE OF LEFT HIP (HCC): ICD-10-CM

## 2023-04-04 DIAGNOSIS — J40 BRONCHITIS: ICD-10-CM

## 2023-04-04 DIAGNOSIS — R42 DIZZINESS: ICD-10-CM

## 2023-04-04 DIAGNOSIS — M48.061 SPINAL STENOSIS OF LUMBAR REGION WITHOUT NEUROGENIC CLAUDICATION: ICD-10-CM

## 2023-04-04 DIAGNOSIS — I50.30 DIASTOLIC HEART FAILURE, UNSPECIFIED HF CHRONICITY (HCC): ICD-10-CM

## 2023-04-04 DIAGNOSIS — M20.42 HAMMER TOES OF BOTH FEET: ICD-10-CM

## 2023-04-04 DIAGNOSIS — M20.41 HAMMER TOES OF BOTH FEET: ICD-10-CM

## 2023-04-04 DIAGNOSIS — N39.41 URGE INCONTINENCE OF URINE: ICD-10-CM

## 2023-04-04 DIAGNOSIS — I10 PRIMARY HYPERTENSION: ICD-10-CM

## 2023-04-04 PROBLEM — M16.12 OSTEOARTHRITIS OF LEFT HIP: Status: ACTIVE | Noted: 2022-11-30

## 2023-04-04 PROBLEM — E78.49 OTHER HYPERLIPIDEMIA: Status: ACTIVE | Noted: 2021-01-13

## 2023-04-04 PROBLEM — M25.552 PAIN OF LEFT HIP JOINT: Status: ACTIVE | Noted: 2022-11-30

## 2023-04-04 PROBLEM — M70.62 TROCHANTERIC BURSITIS OF LEFT HIP: Status: ACTIVE | Noted: 2022-11-30

## 2023-04-04 PROCEDURE — 99215 OFFICE O/P EST HI 40 MIN: CPT | Performed by: INTERNAL MEDICINE

## 2023-04-04 ASSESSMENT — FIBROSIS 4 INDEX: FIB4 SCORE: 1.92

## 2023-04-04 NOTE — PROGRESS NOTES
CC: Multiple medical issues.  Patient lives in [place], here with her son from [place].    HPI:  Chelly presents with the following    1. Chronic kidney disease, stage 3a (HCC)  Chronic.  Stable.  Most recent GFR is 48 from September 2022.    2. Diastolic heart failure, unspecified HF chronicity (HCC)  Chronic.  Stable.  Followed by cardiology.  Patient was last seen in September 22.  Patient does complain of occasional bilateral lower extremity edema.  Patient is treated with Aldactone and Lasix as needed    3. Spinal stenosis of lumbar region without neurogenic claudication  10/13/2022:Patient is status post Medtronic pump replacement on October 7 for her Dilaudid.  Followed by Dr. Alanis.  Patient has not been in for postoperative evaluation/care.  Patient is also using oxycodone 3 times daily however pain is not managed well.  Patient will also have a appointment with pain management physician, Dr. Lozoya in the near future.     12/29/2022:.  Patient was seen by Dr. Lozoya for pain management.  She would like to change from oxycodone to hydrocodone, however patient left the office without a prescription.  She was started on duloxetine 20 mg tablets and is currently taking it daily and will titrate up to twice daily.    4/4/23: Patient states that she is having increased severity of low back pain, left hip pain, left-sided radiculopathy.  Patient tried to increase Cymbalta to 30 mg twice daily however had side effects of somnolence and is now back on Cymbalta 20 mg twice daily.  Patient completed an MRI recently and will undergo an epidural in the near future.  Her Dilaudid pain pump was last changed in October.  Patient is having difficulty bending over as her pain pump is in her right lower abdomen and causing discomfort.    4. Avascular necrosis of bone of left hip (HCC)  8/17/2022:Chronic.  Stable.  Ongoing.  Pelvic MRI was ordered by Dr. Escoto a colorectal surgeon.  Patient has been having  rectal pain for the last 2 to 3 years.  Recent MRI showed avascular necrosis of the left femoral head with resulting mild subchondral collapse of the left femoral head.  Severe osteoarthritis of left hip joint.  Large incompletely visualized lipoma in the abductor musculature of the right upper thigh.  Patient has also complained about right leg discomfort.  Status post right hip replacement.  Patient has an appointment with her orthopedist on August 24 to discuss MRI results.  Patient also recently received a cortisone injection into the left hip.     10/13/2022: Patient was referred to orthopedics, Dr. Carrillo however missed 2 appointments.  Patient was referred back to Dr. Olsen for further evaluation and treatment.  Patient continues to have severe left hip and groin pain.    4/4/23: Patient is having increased severity of left hip and groin pain.  He will be establishing with [facility] in [place].  Having more more difficulty ambulating and is using a wheelchair/walker.    5. Hammer toes of both feet  Patient was seen by podiatry in [place] for bilateral hammertoes.  Using a topical gel that was prescribed to her however because it was too expensive.  Patient would like an alternative.    6. Dizzy  12/29/2022:Dizziness/lightheadedness has been an ongoing problem and patient finally has an appointment with her ENT, Dr. Qureshi on January 3.    4/4/23: Patient was seen and evaluated by ENT.  Physical therapy was recommended for patient and has established with [facility] who will be coming out to her home.    7. Bronchitis  Patient with chronic O2 supplementation was recently treated for an acute bronchitis with Omnicef on March 21.  She completed her antibiotics along with Mucinex for productive cough.  Feeling better.    8. Urge incontinence of urine  5/12/2022:Patient with severe urge incontinence is followed by [facility].  Her symptoms are severe and spends 9 to 10 hours in the restroom.  She states that  she uses the restroom about 14-20 times per day.  It is debilitating.  Patient tried Toviaz in the past without any benefit.  Currently trying Gemtesa.  She is also undergoing PTNS treatments and is currently on her fifth treatment of 12.  She has also been referred to physical therapy for pelvic floor therapy along with biofeedback.  Patient believes that she would do well with the catheter.  Urology notes reviewed with patient and daughter.     6/22/2022:.  Patient followed up with urology.  After a short insertion of urinary catheter, patient removed it due to discomfort.  Patient was prescribed a trial of Gemtesa.  She has 2 more weeks of acupuncture left with 3 visits.  Not very helpful at this time.    4/4/23: Patient has had more more difficulty with increased urinary frequency.  She gets up 13-15 times in a day to urinate, worse at nighttime.  She is unable to sit on the toilet seat due to her pain pump and therefore tends to hover over the toilet seat.  She is quite frail and unstable.  She is unable to pay for her Gemtesa which was around $300.  Patient was unable to follow-up with pelvic floor therapy but would like to consider a neurostimulator.  She has an appointment with urology on May 2.  Patient would like to discuss the option of a catheter.      Patient Active Problem List    Diagnosis Date Noted    Hammer toes of both feet 12/29/2022    Dizziness 12/29/2022    Aseptic necrosis of bone of left hip (HCC) 11/30/2022    Osteoarthritis of left hip 11/30/2022    Pain of left hip joint 11/30/2022    Trochanteric bursitis of left hip 11/30/2022    Insomnia due to medical condition 10/13/2022    Pain in joint involving multiple sites 10/13/2022    Chronic kidney disease, stage 3a ([facility]) 08/17/2022    Hypotension due to hypovolemia 08/17/2022    Avascular necrosis of bone of left hip ([facility]) 08/17/2022    Lower extremity edema 08/03/2022    Acute exacerbation of chronic obstructive airways disease ([facility])  06/22/2022    Chronic obstructive pulmonary disease (HCC) 06/22/2022    Solitary pulmonary nodule 06/22/2022    Diastolic heart failure (HCC) 06/22/2022    Bilateral carotid bruits 06/06/2022    Chronic pain syndrome 06/06/2022    PVC's (premature ventricular contractions) 06/06/2022    Moderate mitral insufficiency 06/06/2022    Moderate aortic stenosis 06/06/2022    Urge incontinence of urine 05/12/2022    H/O Clostridium difficile infection 05/12/2022    Skin lesions 05/12/2022    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    Primary hypertension 03/29/2022    Incomplete defecation 03/29/2022    Moderate asthma without complication 03/29/2022    Spinal stenosis of lumbar region without neurogenic claudication 03/29/2022    Pelvic floor dysfunction 03/29/2022    Hypoxia 03/29/2022    Other hyperlipidemia 01/13/2021    Primary cancer of left lower lobe of lung (HCC) 01/15/2020       Current Outpatient Medications   Medication Sig Dispense Refill    diclofenac sodium (VOLTAREN) 1 % Gel Apply to affected are twice daily as needed 50 g 2    DULoxetine (CYMBALTA) 20 MG Cap DR Particles Take 20 mg by mouth 2 times a day.      magnesium oxide (MAG-OX) 400 MG Tab tablet 1 tablet (400 mg) every morning AND 0.5 tablets (200 mg) every evening.      Cholecalciferol (VITAMIN D3 PO) Take  by mouth every day.      diphenhydrAMINE (BENADRYL) 25 MG capsule Take  by mouth.      MAGNESIUM-OXIDE 400 (240 Mg) MG Tab 400 mg every day.      pantoprazole (PROTONIX) 40 MG Tablet Delayed Response Take 40 mg by mouth every morning before breakfast.      spironolactone (ALDACTONE) 25 MG Tab Take 25 mg by mouth every day.      atorvastatin (LIPITOR) 40 MG Tab 40 mg. Indications: High Amount of Fats in the Blood      losartan (COZAAR) 50 MG Tab Take 50 mg by mouth every day. 1/2 a tablet      furosemide (LASIX) 20 MG Tab 20 mg every day. Edema  Indications: Edema      albuterol 108 (90 Base) MCG/ACT Aero Soln inhalation aerosol 2 Puffs  every 6 hours as needed for Shortness of Breath.      DILTIAZem CD (CARDIZEM CD) 120 MG CAPSULE SR 24 HR Take 120 mg by mouth every day. (Patient not taking: Reported on 12/29/2022)      cephALEXin (KEFLEX) 500 MG Cap Take 1 Capsule by mouth 4 times a day. 28 Capsule 0    fluticasone-umeclidin-vilant (TRELEGY ELLIPTA) 100-62.5-25 MCG/ACT AEROSOL POWDER, BREATH ACTIVATED inhalation 1 puff(s)      erythromycin 5 MG/GM Ointment 1 APPLICATION IN EACH NOSTRIL 2 TIMES A DAY 30 DAY(S)      estradiol (ESTRACE) 0.1 MG/GM vaginal cream USE 0.5 GRAM VAGINAL OR VULVAR EVERY NIGHT FOR 2 WEEKS, THEN DECREASE TO ONLY TWICE PER WEEK 30 DAYS      amLODIPine (NORVASC) 5 MG Tab Take 5 mg by mouth every day. (Patient not taking: Reported on 10/13/2022)      guaiFENesin ER (MUCINEX) 600 MG TABLET SR 12 HR Take 600 mg by mouth every 12 hours. Indications: Cough      Cyanocobalamin (VITAMIN B-12 PO) Take  by mouth every day.      diltiazem (TIAZAC) 120 MG SR capsule Take 120 mg by mouth every day.      Vibegron (GEMTESA) 75 MG Tab Take  by mouth every day. Indications: Overactive Bladder, Frequent Urination, Urinary Incontinence, Urinary Urgency      temazepam (RESTORIL) 15 MG Cap Take 15 mg by mouth at bedtime as needed for Sleep.      CRANBERRY EXTRACT PO Take 5,000 mcg by mouth every day.      azelastine (ASTELIN) 137 MCG/SPRAY nasal spray 2 Sprays 2 times a day. Indications: Hayfever      HYDROmorphone HCl (DILAUDID INJ) Inject  as directed.      psyllium (METAMUCIL) 58.12 % Pack Take 1 Packet by mouth every day. 2 tsp otc      Probiotic Product (PROBIOTIC ADVANCED PO) Take  by mouth every day.      oxyCODONE-acetaminophen (PERCOCET) 5-325 MG Tab Take 1 Tablet by mouth 3 times a day as needed for Severe Pain.      Polyethylene Glycol 3350 (MIRALAX PO) Take  by mouth every day.      Simethicone (GAS-X PO) Take  by mouth 3 times a day.       No current facility-administered medications for this visit.         Allergies as of 04/04/2023  "- Reviewed 2023   Allergen Reaction Noted    Sulfa drugs Unspecified 2021    Erythromycin Nausea 2019        Social History     Socioeconomic History    Marital status:      Spouse name: Not on file    Number of children: Not on file    Years of education: Not on file    Highest education level: Not on file   Occupational History    Occupation: retired book keeper   Tobacco Use    Smoking status: Former     Packs/day: 1.00     Years: 60.00     Pack years: 60.00     Types: Cigarettes     Quit date:      Years since quittin.2    Smokeless tobacco: Never   Vaping Use    Vaping Use: Former    Substances: Nicotine   Substance and Sexual Activity    Alcohol use: No    Drug use: Not Currently     Comment: cocaine while in her 40\"s    Sexual activity: Not on file   Other Topics Concern    Not on file   Social History Narrative    Not on file     Social Determinants of Health     Financial Resource Strain: Low Risk     Difficulty of Paying Living Expenses: Not very hard   Food Insecurity: No Food Insecurity    Worried About Running Out of Food in the Last Year: Never true    Ran Out of Food in the Last Year: Never true   Transportation Needs: Unmet Transportation Needs    Lack of Transportation (Medical): Yes    Lack of Transportation (Non-Medical): No   Physical Activity: Inactive    Days of Exercise per Week: 0 days    Minutes of Exercise per Session: 0 min   Stress: Not on file   Social Connections: Not on file   Intimate Partner Violence: Not on file   Housing Stability: Unknown    Unable to Pay for Housing in the Last Year: No    Number of Places Lived in the Last Year: Not on file    Unstable Housing in the Last Year: No       History reviewed. No pertinent family history.    Past Surgical History:   Procedure Laterality Date    PUMP INSERT/REMOVE Right 10/7/2022    Procedure: MEDTRONIC PUMP REPLACEMENT;  Surgeon: Joel Alanis M.D.;  Location: SURGERY [facility];  Service: Pain " "Management    PB IMPLANT NEUROSTIM/ N/A 2/5/2021    Procedure: IMPLANT PUMP AND CATHETER;  Surgeon: Joel Alanis M.D.;  Location: SURGERY [facility];  Service: Pain Management    APPENDECTOMY      CARPAL TUNNEL ENDOSCOPIC      bilat    CHOLECYSTECTOMY      GYN SURGERY      c section    HIP ARTHROPLASTY TOTAL Right     HYSTERECTOMY LAPAROSCOPY      total    OTHER      kidney stones removed    TONSILLECTOMY         ROS: Positive ROS per HPI.  Denies any Headache,Chest pain,  Shortness of breath,  Abdominal pain, Changes of bowel , Lower ext edema, Fevers, Nights sweats, Weight Changes, Focal weakness or numbness.  All other systems are negative.    /58 (BP Location: Left arm, Patient Position: Sitting, BP Cuff Size: Adult long)   Pulse 88   Temp 36.3 °C (97.3 °F)   Resp 16   Ht 1.702 m (5' 7\")   Wt 69.2 kg (152 lb 8 oz)   SpO2 90%   BMI 23.88 kg/m²      Constitutional: Alert, no distress, well-groomed.  Skin: Warm, dry, good turgor, no rashes in visible areas.  Eye: Equal, round and reactive, conjunctiva clear, lids normal.  ENMT: Lips without lesions, good dentition, moist mucous membranes.  Neck: Trachea midline, no masses, no thyromegaly.  Respiratory: Unlabored respiratory effort, no cough.  Abdomen: Soft, no gross masses.  MSK: Normal gait, moves all extremities.  Neuro: Grossly non-focal. No cranial nerve deficit. Strength and sensation intact.   Psych: Alert and oriented x3, normal affect and mood.      Assessment and Plan.   87 y.o. female presenting with the following.     1. Chronic kidney disease, stage 3a (HCC)  Chronic.  Stable.  Continue to monitor.    2. Diastolic heart failure, unspecified HF chronicity (HCC)  Chronic.  Stable.  Continue plan of care per cardiology.    3. Spinal stenosis of lumbar region without neurogenic claudication  Patient will follow-up with pain management, Dr. Lozoya, for possible epidural injection.    4. Avascular necrosis of bone of left hip " (HCC)  Chronic.  Worsening in severity.  Patient will keep appointment with [facility] in [place].    5. Hammer toes of both feet  Trial with diclofenac gel    6. Dizzy  Patient to start with physical therapy    7. Bronchitis  Resolved    8. Urge incontinence of urine  Patient to keep appointment with urology    My total time spent caring for the patient on the day of the encounter was 43 minutes.   This does not include time spent on separately billable procedures/tests.

## 2023-04-04 NOTE — PROGRESS NOTES
Nurse DYLAN met with patient briefly after PCP appointment today. Pt accompanied by her son at appointment. Pt reports her son or daughter will bring her to appointments. Pt is using her wheelchair to assist with mobility. Reports ongoing problems with pain in her hip. Pt follows with Dr. Lozoya for pain management. Pt reports she was following with [facility]. States it has been on hold for a while but will be resuming. Pt agreeable to having nurse do outreach call for follow-up to assess care management needs.  Nurse provided pt with AVS summary from today's visit with PCP.  Pt will contact nurse DYLAN if any questions or if needing assistance with scheduling any follow-up appointments.

## 2023-04-26 ENCOUNTER — APPOINTMENT (OUTPATIENT)
Dept: ADMISSIONS | Facility: MEDICAL CENTER | Age: 88
End: 2023-04-26
Attending: PAIN MEDICINE
Payer: MEDICARE

## 2023-04-28 ENCOUNTER — PRE-ADMISSION TESTING (OUTPATIENT)
Dept: ADMISSIONS | Facility: MEDICAL CENTER | Age: 88
End: 2023-04-28
Attending: PAIN MEDICINE
Payer: MEDICARE

## 2023-04-28 VITALS — BODY MASS INDEX: 23.81 KG/M2 | HEIGHT: 67 IN

## 2023-04-28 NOTE — PREPROCEDURE INSTRUCTIONS
Pre-admit telephone appointment completed. Pt states all instructions given are understood. Medications the patient will take the morning of surgery per anesthesia protocol: Diltiazem, Cymbalta, Magnesium, Pantoprazole, and if needed, albuterol, benadryl, Mucinex. Pt given instructions for other prescribed medications per protocol.    Denies anesthesia complications    Pt states she had blood work and urine sample done at Surgeon's office on 4/26. Called and left message for results needed. Need EKG and CXR orders to done. Pt states she does not have a ride until day of surgery and will not be able to do them until DOS. Message left on voicemail to notify Surgeon's office.

## 2023-05-02 ENCOUNTER — PATIENT OUTREACH (OUTPATIENT)
Dept: HEALTH INFORMATION MANAGEMENT | Facility: OTHER | Age: 88
End: 2023-05-02
Payer: MEDICARE

## 2023-05-02 DIAGNOSIS — I10 PRIMARY HYPERTENSION: ICD-10-CM

## 2023-05-02 DIAGNOSIS — J44.0 CHRONIC OBSTRUCTIVE PULMONARY DISEASE WITH ACUTE LOWER RESPIRATORY INFECTION (HCC): ICD-10-CM

## 2023-05-02 NOTE — PROGRESS NOTES
Nurse CM outreach call for monthly CCM assessment.      Assessment:  Pt reports she is getting her pain pump replaced on 5/4/23. Reports she is having a revision and site change. Pt reports her son will be coming to assist her and take her for procedure. Pt reports son will also be putting more grab bars around her home to assist with mobility. Pt reports no falls over the past month. States she is using her cane or walker to assist with mobility. Pt reports she is being followed by [facility] in [place] for physical therapy.     Education:  Continue to use cane or wheelchair to prevent falls. Follow-up with specialists as recommended.    Care Plan: reviewed with pt    Progress: Continue to work on goals    Next Outreach:  One month  Nurse Care Coordinator:  Radha Martinez  [phone]

## 2023-05-03 NOTE — OR NURSING
Spoke with pt, no testing done, states she will get it DOS. Spoke with Adilene at Dr Lozoya's office and they are aware.

## 2023-05-04 ENCOUNTER — ANESTHESIA EVENT (OUTPATIENT)
Dept: SURGERY | Facility: MEDICAL CENTER | Age: 88
End: 2023-05-04
Payer: MEDICARE

## 2023-05-04 ENCOUNTER — ANESTHESIA (OUTPATIENT)
Dept: SURGERY | Facility: MEDICAL CENTER | Age: 88
End: 2023-05-04
Payer: MEDICARE

## 2023-05-04 ENCOUNTER — HOSPITAL ENCOUNTER (OUTPATIENT)
Facility: MEDICAL CENTER | Age: 88
End: 2023-05-09
Attending: PAIN MEDICINE | Admitting: INTERNAL MEDICINE
Payer: MEDICARE

## 2023-05-04 ENCOUNTER — APPOINTMENT (OUTPATIENT)
Dept: RADIOLOGY | Facility: MEDICAL CENTER | Age: 88
End: 2023-05-04
Attending: PAIN MEDICINE
Payer: MEDICARE

## 2023-05-04 DIAGNOSIS — M87.052 ASEPTIC NECROSIS OF BONE OF LEFT HIP (HCC): ICD-10-CM

## 2023-05-04 DIAGNOSIS — G89.18 POSTOPERATIVE PAIN: ICD-10-CM

## 2023-05-04 DIAGNOSIS — M87.052 AVASCULAR NECROSIS OF BONE OF LEFT HIP (HCC): ICD-10-CM

## 2023-05-04 DIAGNOSIS — R09.89 BILATERAL CAROTID BRUITS: ICD-10-CM

## 2023-05-04 DIAGNOSIS — G47.01 INSOMNIA DUE TO MEDICAL CONDITION: ICD-10-CM

## 2023-05-04 DIAGNOSIS — M62.89 PELVIC FLOOR DYSFUNCTION: ICD-10-CM

## 2023-05-04 DIAGNOSIS — N39.41 URGE INCONTINENCE OF URINE: ICD-10-CM

## 2023-05-04 DIAGNOSIS — M70.62 TROCHANTERIC BURSITIS OF LEFT HIP: ICD-10-CM

## 2023-05-04 DIAGNOSIS — M20.41 HAMMER TOES OF BOTH FEET: ICD-10-CM

## 2023-05-04 DIAGNOSIS — I50.30 HEART FAILURE WITH PRESERVED EJECTION FRACTION, UNSPECIFIED HF CHRONICITY (HCC): ICD-10-CM

## 2023-05-04 DIAGNOSIS — K22.70 BARRETT'S ESOPHAGUS WITHOUT DYSPLASIA: ICD-10-CM

## 2023-05-04 DIAGNOSIS — C34.32 PRIMARY CANCER OF LEFT LOWER LOBE OF LUNG (HCC): ICD-10-CM

## 2023-05-04 DIAGNOSIS — R09.02 HYPOXIA: ICD-10-CM

## 2023-05-04 DIAGNOSIS — G89.4 CHRONIC PAIN SYNDROME: ICD-10-CM

## 2023-05-04 DIAGNOSIS — I49.3 PVC'S (PREMATURE VENTRICULAR CONTRACTIONS): ICD-10-CM

## 2023-05-04 DIAGNOSIS — J44.0 CHRONIC OBSTRUCTIVE PULMONARY DISEASE WITH ACUTE LOWER RESPIRATORY INFECTION (HCC): ICD-10-CM

## 2023-05-04 DIAGNOSIS — I10 PRIMARY HYPERTENSION: ICD-10-CM

## 2023-05-04 DIAGNOSIS — M48.061 SPINAL STENOSIS OF LUMBAR REGION WITHOUT NEUROGENIC CLAUDICATION: ICD-10-CM

## 2023-05-04 DIAGNOSIS — K64.4 EXTERNAL HEMORRHOID: ICD-10-CM

## 2023-05-04 DIAGNOSIS — Z86.19 H/O CLOSTRIDIUM DIFFICILE INFECTION: ICD-10-CM

## 2023-05-04 DIAGNOSIS — I34.0 MODERATE MITRAL INSUFFICIENCY: ICD-10-CM

## 2023-05-04 DIAGNOSIS — E78.49 OTHER HYPERLIPIDEMIA: ICD-10-CM

## 2023-05-04 DIAGNOSIS — R60.0 LOWER EXTREMITY EDEMA: ICD-10-CM

## 2023-05-04 DIAGNOSIS — L98.9 SKIN LESIONS: ICD-10-CM

## 2023-05-04 DIAGNOSIS — I95.89 HYPOTENSION DUE TO HYPOVOLEMIA: ICD-10-CM

## 2023-05-04 DIAGNOSIS — R91.1 SOLITARY PULMONARY NODULE: ICD-10-CM

## 2023-05-04 DIAGNOSIS — Z98.890 S/P INSERTION OF INTRATHECAL PUMP: ICD-10-CM

## 2023-05-04 DIAGNOSIS — G45.9 TIA (TRANSIENT ISCHEMIC ATTACK): ICD-10-CM

## 2023-05-04 DIAGNOSIS — M25.552 PAIN OF LEFT HIP JOINT: ICD-10-CM

## 2023-05-04 DIAGNOSIS — R42 DIZZINESS: ICD-10-CM

## 2023-05-04 DIAGNOSIS — R15.0 INCOMPLETE DEFECATION: ICD-10-CM

## 2023-05-04 DIAGNOSIS — I35.0 MODERATE AORTIC STENOSIS: ICD-10-CM

## 2023-05-04 DIAGNOSIS — J44.1 ACUTE EXACERBATION OF CHRONIC OBSTRUCTIVE AIRWAYS DISEASE (HCC): ICD-10-CM

## 2023-05-04 DIAGNOSIS — M25.50 PAIN IN JOINT INVOLVING MULTIPLE SITES: ICD-10-CM

## 2023-05-04 DIAGNOSIS — M20.42 HAMMER TOES OF BOTH FEET: ICD-10-CM

## 2023-05-04 DIAGNOSIS — J45.909 MODERATE ASTHMA WITHOUT COMPLICATION, UNSPECIFIED WHETHER PERSISTENT: ICD-10-CM

## 2023-05-04 DIAGNOSIS — N18.31 CHRONIC KIDNEY DISEASE, STAGE 3A: ICD-10-CM

## 2023-05-04 DIAGNOSIS — E86.1 HYPOTENSION DUE TO HYPOVOLEMIA: ICD-10-CM

## 2023-05-04 LAB
ALBUMIN SERPL BCP-MCNC: 4.4 G/DL (ref 3.2–4.9)
ALBUMIN/GLOB SERPL: 1.5 G/DL
ALP SERPL-CCNC: 127 U/L (ref 30–99)
ALT SERPL-CCNC: 17 U/L (ref 2–50)
ANION GAP SERPL CALC-SCNC: 8 MMOL/L (ref 7–16)
AST SERPL-CCNC: 18 U/L (ref 12–45)
BILIRUB SERPL-MCNC: 0.5 MG/DL (ref 0.1–1.5)
BUN SERPL-MCNC: 29 MG/DL (ref 8–22)
CALCIUM ALBUM COR SERPL-MCNC: 10.4 MG/DL (ref 8.5–10.5)
CALCIUM SERPL-MCNC: 10.7 MG/DL (ref 8.4–10.2)
CHLORIDE SERPL-SCNC: 101 MMOL/L (ref 96–112)
CO2 SERPL-SCNC: 29 MMOL/L (ref 20–33)
CREAT SERPL-MCNC: 0.93 MG/DL (ref 0.5–1.4)
EKG IMPRESSION: NORMAL
ERYTHROCYTE [DISTWIDTH] IN BLOOD BY AUTOMATED COUNT: 47.8 FL (ref 35.9–50)
GFR SERPLBLD CREATININE-BSD FMLA CKD-EPI: 60 ML/MIN/1.73 M 2
GLOBULIN SER CALC-MCNC: 2.9 G/DL (ref 1.9–3.5)
GLUCOSE SERPL-MCNC: 109 MG/DL (ref 65–99)
HCT VFR BLD AUTO: 40.7 % (ref 37–47)
HGB BLD-MCNC: 13.1 G/DL (ref 12–16)
INR PPP: 0.92 (ref 0.87–1.13)
MAGNESIUM SERPL-MCNC: 1.9 MG/DL (ref 1.5–2.5)
MCH RBC QN AUTO: 29.3 PG (ref 27–33)
MCHC RBC AUTO-ENTMCNC: 32.2 G/DL (ref 33.6–35)
MCV RBC AUTO: 91.1 FL (ref 81.4–97.8)
PLATELET # BLD AUTO: 226 K/UL (ref 164–446)
PMV BLD AUTO: 10 FL (ref 9–12.9)
POTASSIUM SERPL-SCNC: 3.8 MMOL/L (ref 3.6–5.5)
PROT SERPL-MCNC: 7.3 G/DL (ref 6–8.2)
PROTHROMBIN TIME: 12.3 SEC (ref 12–14.6)
RBC # BLD AUTO: 4.47 M/UL (ref 4.2–5.4)
SODIUM SERPL-SCNC: 138 MMOL/L (ref 135–145)
WBC # BLD AUTO: 8.9 K/UL (ref 4.8–10.8)

## 2023-05-04 PROCEDURE — 83735 ASSAY OF MAGNESIUM: CPT

## 2023-05-04 PROCEDURE — 160035 HCHG PACU - 1ST 60 MINS PHASE I: Performed by: PAIN MEDICINE

## 2023-05-04 PROCEDURE — G0378 HOSPITAL OBSERVATION PER HR: HCPCS

## 2023-05-04 PROCEDURE — 700111 HCHG RX REV CODE 636 W/ 250 OVERRIDE (IP): Performed by: PAIN MEDICINE

## 2023-05-04 PROCEDURE — 700102 HCHG RX REV CODE 250 W/ 637 OVERRIDE(OP): Performed by: INTERNAL MEDICINE

## 2023-05-04 PROCEDURE — A9270 NON-COVERED ITEM OR SERVICE: HCPCS | Performed by: STUDENT IN AN ORGANIZED HEALTH CARE EDUCATION/TRAINING PROGRAM

## 2023-05-04 PROCEDURE — 160029 HCHG SURGERY MINUTES - 1ST 30 MINS LEVEL 4: Performed by: PAIN MEDICINE

## 2023-05-04 PROCEDURE — 700111 HCHG RX REV CODE 636 W/ 250 OVERRIDE (IP): Performed by: STUDENT IN AN ORGANIZED HEALTH CARE EDUCATION/TRAINING PROGRAM

## 2023-05-04 PROCEDURE — 700101 HCHG RX REV CODE 250: Performed by: PAIN MEDICINE

## 2023-05-04 PROCEDURE — A9270 NON-COVERED ITEM OR SERVICE: HCPCS | Performed by: INTERNAL MEDICINE

## 2023-05-04 PROCEDURE — 160009 HCHG ANES TIME/MIN: Performed by: PAIN MEDICINE

## 2023-05-04 PROCEDURE — 160002 HCHG RECOVERY MINUTES (STAT): Performed by: PAIN MEDICINE

## 2023-05-04 PROCEDURE — 36415 COLL VENOUS BLD VENIPUNCTURE: CPT

## 2023-05-04 PROCEDURE — 160048 HCHG OR STATISTICAL LEVEL 1-5: Performed by: PAIN MEDICINE

## 2023-05-04 PROCEDURE — 700117 HCHG RX CONTRAST REV CODE 255: Performed by: PAIN MEDICINE

## 2023-05-04 PROCEDURE — 01938 ANES DRG/ASPIR LMBR/SAC: CPT | Performed by: STUDENT IN AN ORGANIZED HEALTH CARE EDUCATION/TRAINING PROGRAM

## 2023-05-04 PROCEDURE — 700105 HCHG RX REV CODE 258: Performed by: STUDENT IN AN ORGANIZED HEALTH CARE EDUCATION/TRAINING PROGRAM

## 2023-05-04 PROCEDURE — 93010 ELECTROCARDIOGRAM REPORT: CPT | Performed by: INTERNAL MEDICINE

## 2023-05-04 PROCEDURE — 99223 1ST HOSP IP/OBS HIGH 75: CPT | Performed by: INTERNAL MEDICINE

## 2023-05-04 PROCEDURE — 700102 HCHG RX REV CODE 250 W/ 637 OVERRIDE(OP): Performed by: STUDENT IN AN ORGANIZED HEALTH CARE EDUCATION/TRAINING PROGRAM

## 2023-05-04 PROCEDURE — 160041 HCHG SURGERY MINUTES - EA ADDL 1 MIN LEVEL 4: Performed by: PAIN MEDICINE

## 2023-05-04 PROCEDURE — 85610 PROTHROMBIN TIME: CPT

## 2023-05-04 PROCEDURE — 160036 HCHG PACU - EA ADDL 30 MINS PHASE I: Performed by: PAIN MEDICINE

## 2023-05-04 PROCEDURE — 502000 HCHG MISC OR IMPLANTS RC 0278: Performed by: PAIN MEDICINE

## 2023-05-04 PROCEDURE — C1772 INFUSION PUMP, PROGRAMMABLE: HCPCS | Performed by: PAIN MEDICINE

## 2023-05-04 PROCEDURE — 80053 COMPREHEN METABOLIC PANEL: CPT

## 2023-05-04 PROCEDURE — 99100 ANES PT EXTEME AGE<1 YR&>70: CPT | Performed by: STUDENT IN AN ORGANIZED HEALTH CARE EDUCATION/TRAINING PROGRAM

## 2023-05-04 PROCEDURE — 96374 THER/PROPH/DIAG INJ IV PUSH: CPT | Mod: XU

## 2023-05-04 PROCEDURE — 700101 HCHG RX REV CODE 250: Performed by: STUDENT IN AN ORGANIZED HEALTH CARE EDUCATION/TRAINING PROGRAM

## 2023-05-04 PROCEDURE — 93005 ELECTROCARDIOGRAM TRACING: CPT | Performed by: PAIN MEDICINE

## 2023-05-04 PROCEDURE — 85027 COMPLETE CBC AUTOMATED: CPT

## 2023-05-04 DEVICE — PUMP SYNCHRO MEDII 20ML: Type: IMPLANTABLE DEVICE | Status: FUNCTIONAL

## 2023-05-04 DEVICE — IMPLANTABLE DEVICE: Type: IMPLANTABLE DEVICE | Status: FUNCTIONAL

## 2023-05-04 RX ORDER — ONDANSETRON 2 MG/ML
4 INJECTION INTRAMUSCULAR; INTRAVENOUS EVERY 4 HOURS PRN
Status: DISCONTINUED | OUTPATIENT
Start: 2023-05-04 | End: 2023-05-09 | Stop reason: HOSPADM

## 2023-05-04 RX ORDER — POLYETHYLENE GLYCOL 3350 17 G/17G
1 POWDER, FOR SOLUTION ORAL DAILY
Status: DISCONTINUED | OUTPATIENT
Start: 2023-05-04 | End: 2023-05-09 | Stop reason: HOSPADM

## 2023-05-04 RX ORDER — DIPHENHYDRAMINE HYDROCHLORIDE 50 MG/ML
12.5 INJECTION INTRAMUSCULAR; INTRAVENOUS
Status: DISCONTINUED | OUTPATIENT
Start: 2023-05-04 | End: 2023-05-04 | Stop reason: HOSPADM

## 2023-05-04 RX ORDER — BISACODYL 10 MG
10 SUPPOSITORY, RECTAL RECTAL
Status: DISCONTINUED | OUTPATIENT
Start: 2023-05-04 | End: 2023-05-09 | Stop reason: HOSPADM

## 2023-05-04 RX ORDER — IPRATROPIUM BROMIDE AND ALBUTEROL SULFATE 2.5; .5 MG/3ML; MG/3ML
3 SOLUTION RESPIRATORY (INHALATION)
Status: DISCONTINUED | OUTPATIENT
Start: 2023-05-04 | End: 2023-05-04 | Stop reason: HOSPADM

## 2023-05-04 RX ORDER — OXYCODONE HYDROCHLORIDE 10 MG/1
10 TABLET ORAL
Status: DISCONTINUED | OUTPATIENT
Start: 2023-05-04 | End: 2023-05-06

## 2023-05-04 RX ORDER — ONDANSETRON 2 MG/ML
4 INJECTION INTRAMUSCULAR; INTRAVENOUS
Status: DISCONTINUED | OUTPATIENT
Start: 2023-05-04 | End: 2023-05-04 | Stop reason: HOSPADM

## 2023-05-04 RX ORDER — LOSARTAN POTASSIUM 25 MG/1
25 TABLET ORAL EVERY EVENING
Status: DISCONTINUED | OUTPATIENT
Start: 2023-05-04 | End: 2023-05-07

## 2023-05-04 RX ORDER — BISACODYL 10 MG
10 SUPPOSITORY, RECTAL RECTAL
Status: DISCONTINUED | OUTPATIENT
Start: 2023-05-04 | End: 2023-05-04

## 2023-05-04 RX ORDER — HYDROMORPHONE HYDROCHLORIDE 1 MG/ML
0.1 INJECTION, SOLUTION INTRAMUSCULAR; INTRAVENOUS; SUBCUTANEOUS
Status: DISCONTINUED | OUTPATIENT
Start: 2023-05-04 | End: 2023-05-04 | Stop reason: HOSPADM

## 2023-05-04 RX ORDER — SODIUM CHLORIDE, SODIUM LACTATE, POTASSIUM CHLORIDE, CALCIUM CHLORIDE 600; 310; 30; 20 MG/100ML; MG/100ML; MG/100ML; MG/100ML
INJECTION, SOLUTION INTRAVENOUS
Status: DISCONTINUED | OUTPATIENT
Start: 2023-05-04 | End: 2023-05-04 | Stop reason: SURG

## 2023-05-04 RX ORDER — OXYCODONE HCL 5 MG/5 ML
10 SOLUTION, ORAL ORAL
Status: COMPLETED | OUTPATIENT
Start: 2023-05-04 | End: 2023-05-04

## 2023-05-04 RX ORDER — ATORVASTATIN CALCIUM 40 MG/1
40 TABLET, FILM COATED ORAL EVERY EVENING
Status: DISCONTINUED | OUTPATIENT
Start: 2023-05-04 | End: 2023-05-09 | Stop reason: HOSPADM

## 2023-05-04 RX ORDER — AMOXICILLIN 250 MG
2 CAPSULE ORAL 2 TIMES DAILY
Status: DISCONTINUED | OUTPATIENT
Start: 2023-05-04 | End: 2023-05-09 | Stop reason: HOSPADM

## 2023-05-04 RX ORDER — OXYCODONE HYDROCHLORIDE 5 MG/1
5 TABLET ORAL
Status: DISCONTINUED | OUTPATIENT
Start: 2023-05-04 | End: 2023-05-06

## 2023-05-04 RX ORDER — CHOLECALCIFEROL (VITAMIN D3) 125 MCG
5 CAPSULE ORAL NIGHTLY
Status: DISCONTINUED | OUTPATIENT
Start: 2023-05-04 | End: 2023-05-06

## 2023-05-04 RX ORDER — EPHEDRINE SULFATE 50 MG/ML
5 INJECTION, SOLUTION INTRAVENOUS
Status: DISCONTINUED | OUTPATIENT
Start: 2023-05-04 | End: 2023-05-04 | Stop reason: HOSPADM

## 2023-05-04 RX ORDER — ALBUTEROL SULFATE 90 UG/1
2 AEROSOL, METERED RESPIRATORY (INHALATION)
Status: DISCONTINUED | OUTPATIENT
Start: 2023-05-04 | End: 2023-05-09 | Stop reason: HOSPADM

## 2023-05-04 RX ORDER — AMOXICILLIN 250 MG
2 CAPSULE ORAL 2 TIMES DAILY
Status: DISCONTINUED | OUTPATIENT
Start: 2023-05-04 | End: 2023-05-04

## 2023-05-04 RX ORDER — OMEPRAZOLE 20 MG/1
20 CAPSULE, DELAYED RELEASE ORAL EVERY MORNING
Status: DISCONTINUED | OUTPATIENT
Start: 2023-05-05 | End: 2023-05-09 | Stop reason: HOSPADM

## 2023-05-04 RX ORDER — LABETALOL HYDROCHLORIDE 5 MG/ML
5 INJECTION, SOLUTION INTRAVENOUS
Status: DISCONTINUED | OUTPATIENT
Start: 2023-05-04 | End: 2023-05-04 | Stop reason: HOSPADM

## 2023-05-04 RX ORDER — LIDOCAINE HYDROCHLORIDE 20 MG/ML
INJECTION, SOLUTION EPIDURAL; INFILTRATION; INTRACAUDAL; PERINEURAL PRN
Status: DISCONTINUED | OUTPATIENT
Start: 2023-05-04 | End: 2023-05-04 | Stop reason: SURG

## 2023-05-04 RX ORDER — DILTIAZEM HYDROCHLORIDE 120 MG/1
120 CAPSULE, COATED, EXTENDED RELEASE ORAL DAILY
Status: DISCONTINUED | OUTPATIENT
Start: 2023-05-05 | End: 2023-05-07

## 2023-05-04 RX ORDER — POLYETHYLENE GLYCOL 3350 17 G/17G
1 POWDER, FOR SOLUTION ORAL
Status: DISCONTINUED | OUTPATIENT
Start: 2023-05-04 | End: 2023-05-04

## 2023-05-04 RX ORDER — SODIUM CHLORIDE, SODIUM LACTATE, POTASSIUM CHLORIDE, CALCIUM CHLORIDE 600; 310; 30; 20 MG/100ML; MG/100ML; MG/100ML; MG/100ML
INJECTION, SOLUTION INTRAVENOUS CONTINUOUS
Status: DISCONTINUED | OUTPATIENT
Start: 2023-05-04 | End: 2023-05-04 | Stop reason: HOSPADM

## 2023-05-04 RX ORDER — DULOXETIN HYDROCHLORIDE 20 MG/1
20 CAPSULE, DELAYED RELEASE ORAL 2 TIMES DAILY
Status: DISCONTINUED | OUTPATIENT
Start: 2023-05-04 | End: 2023-05-09 | Stop reason: HOSPADM

## 2023-05-04 RX ORDER — FUROSEMIDE 20 MG/1
20 TABLET ORAL
Status: DISCONTINUED | OUTPATIENT
Start: 2023-05-05 | End: 2023-05-08

## 2023-05-04 RX ORDER — LIDOCAINE HYDROCHLORIDE 10 MG/ML
INJECTION, SOLUTION EPIDURAL; INFILTRATION; INTRACAUDAL; PERINEURAL
Status: ACTIVE
Start: 2023-05-04 | End: 2023-05-05

## 2023-05-04 RX ORDER — ALPRAZOLAM 0.5 MG/1
0.25 TABLET ORAL 4 TIMES DAILY PRN
Status: DISCONTINUED | OUTPATIENT
Start: 2023-05-04 | End: 2023-05-09 | Stop reason: HOSPADM

## 2023-05-04 RX ORDER — ONDANSETRON 4 MG/1
4 TABLET, ORALLY DISINTEGRATING ORAL EVERY 4 HOURS PRN
Status: DISCONTINUED | OUTPATIENT
Start: 2023-05-04 | End: 2023-05-09 | Stop reason: HOSPADM

## 2023-05-04 RX ORDER — HYDROMORPHONE HYDROCHLORIDE 1 MG/ML
0.2 INJECTION, SOLUTION INTRAMUSCULAR; INTRAVENOUS; SUBCUTANEOUS
Status: DISCONTINUED | OUTPATIENT
Start: 2023-05-04 | End: 2023-05-04 | Stop reason: HOSPADM

## 2023-05-04 RX ORDER — SPIRONOLACTONE 25 MG/1
25 TABLET ORAL DAILY
Status: DISCONTINUED | OUTPATIENT
Start: 2023-05-05 | End: 2023-05-07

## 2023-05-04 RX ORDER — HYDROMORPHONE HYDROCHLORIDE 1 MG/ML
0.5 INJECTION, SOLUTION INTRAMUSCULAR; INTRAVENOUS; SUBCUTANEOUS
Status: DISCONTINUED | OUTPATIENT
Start: 2023-05-04 | End: 2023-05-06

## 2023-05-04 RX ORDER — HYDROMORPHONE HYDROCHLORIDE 1 MG/ML
0.4 INJECTION, SOLUTION INTRAMUSCULAR; INTRAVENOUS; SUBCUTANEOUS
Status: DISCONTINUED | OUTPATIENT
Start: 2023-05-04 | End: 2023-05-04 | Stop reason: HOSPADM

## 2023-05-04 RX ORDER — BUPIVACAINE HYDROCHLORIDE AND EPINEPHRINE 5; 5 MG/ML; UG/ML
INJECTION, SOLUTION EPIDURAL; INTRACAUDAL; PERINEURAL
Status: DISCONTINUED | OUTPATIENT
Start: 2023-05-04 | End: 2023-05-04 | Stop reason: HOSPADM

## 2023-05-04 RX ORDER — OXYCODONE HCL 5 MG/5 ML
5 SOLUTION, ORAL ORAL
Status: COMPLETED | OUTPATIENT
Start: 2023-05-04 | End: 2023-05-04

## 2023-05-04 RX ORDER — ACETAMINOPHEN 325 MG/1
650 TABLET ORAL EVERY 6 HOURS PRN
Status: DISCONTINUED | OUTPATIENT
Start: 2023-05-04 | End: 2023-05-05

## 2023-05-04 RX ORDER — HYDRALAZINE HYDROCHLORIDE 20 MG/ML
5 INJECTION INTRAMUSCULAR; INTRAVENOUS
Status: DISCONTINUED | OUTPATIENT
Start: 2023-05-04 | End: 2023-05-04 | Stop reason: HOSPADM

## 2023-05-04 RX ORDER — ONDANSETRON 2 MG/ML
INJECTION INTRAMUSCULAR; INTRAVENOUS PRN
Status: DISCONTINUED | OUTPATIENT
Start: 2023-05-04 | End: 2023-05-04 | Stop reason: SURG

## 2023-05-04 RX ORDER — LABETALOL HYDROCHLORIDE 5 MG/ML
10 INJECTION, SOLUTION INTRAVENOUS EVERY 4 HOURS PRN
Status: DISCONTINUED | OUTPATIENT
Start: 2023-05-04 | End: 2023-05-07

## 2023-05-04 RX ORDER — CEFAZOLIN SODIUM 1 G/3ML
INJECTION, POWDER, FOR SOLUTION INTRAMUSCULAR; INTRAVENOUS PRN
Status: DISCONTINUED | OUTPATIENT
Start: 2023-05-04 | End: 2023-05-04 | Stop reason: SURG

## 2023-05-04 RX ORDER — SODIUM CHLORIDE, SODIUM LACTATE, POTASSIUM CHLORIDE, CALCIUM CHLORIDE 600; 310; 30; 20 MG/100ML; MG/100ML; MG/100ML; MG/100ML
INJECTION, SOLUTION INTRAVENOUS CONTINUOUS
Status: ACTIVE | OUTPATIENT
Start: 2023-05-04 | End: 2023-05-04

## 2023-05-04 RX ORDER — DEXAMETHASONE SODIUM PHOSPHATE 4 MG/ML
INJECTION, SOLUTION INTRA-ARTICULAR; INTRALESIONAL; INTRAMUSCULAR; INTRAVENOUS; SOFT TISSUE PRN
Status: DISCONTINUED | OUTPATIENT
Start: 2023-05-04 | End: 2023-05-04 | Stop reason: SURG

## 2023-05-04 RX ORDER — VANCOMYCIN HYDROCHLORIDE 1 G/20ML
INJECTION, POWDER, LYOPHILIZED, FOR SOLUTION INTRAVENOUS
Status: COMPLETED | OUTPATIENT
Start: 2023-05-04 | End: 2023-05-04

## 2023-05-04 RX ADMIN — PROPOFOL 100 MG: 10 INJECTION, EMULSION INTRAVENOUS at 14:53

## 2023-05-04 RX ADMIN — Medication 5 MG: at 22:26

## 2023-05-04 RX ADMIN — FENTANYL CITRATE 50 MCG: 50 INJECTION, SOLUTION INTRAMUSCULAR; INTRAVENOUS at 17:12

## 2023-05-04 RX ADMIN — LOSARTAN POTASSIUM 25 MG: 25 TABLET, FILM COATED ORAL at 22:26

## 2023-05-04 RX ADMIN — POLYETHYLENE GLYCOL 3350 1 PACKET: 17 POWDER, FOR SOLUTION ORAL at 21:00

## 2023-05-04 RX ADMIN — FENTANYL CITRATE 50 MCG: 50 INJECTION, SOLUTION INTRAMUSCULAR; INTRAVENOUS at 17:05

## 2023-05-04 RX ADMIN — OXYCODONE HYDROCHLORIDE 10 MG: 5 SOLUTION ORAL at 17:30

## 2023-05-04 RX ADMIN — ONDANSETRON 4 MG: 2 INJECTION INTRAMUSCULAR; INTRAVENOUS at 16:33

## 2023-05-04 RX ADMIN — FENTANYL CITRATE 25 MCG: 50 INJECTION, SOLUTION INTRAMUSCULAR; INTRAVENOUS at 16:33

## 2023-05-04 RX ADMIN — FENTANYL CITRATE 50 MCG: 50 INJECTION, SOLUTION INTRAMUSCULAR; INTRAVENOUS at 18:09

## 2023-05-04 RX ADMIN — FENTANYL CITRATE 25 MCG: 50 INJECTION, SOLUTION INTRAMUSCULAR; INTRAVENOUS at 15:16

## 2023-05-04 RX ADMIN — FENTANYL CITRATE 50 MCG: 50 INJECTION, SOLUTION INTRAMUSCULAR; INTRAVENOUS at 14:53

## 2023-05-04 RX ADMIN — SENNOSIDES AND DOCUSATE SODIUM 2 TABLET: 50; 8.6 TABLET ORAL at 21:00

## 2023-05-04 RX ADMIN — FENTANYL CITRATE 25 MCG: 50 INJECTION, SOLUTION INTRAMUSCULAR; INTRAVENOUS at 15:35

## 2023-05-04 RX ADMIN — CEFAZOLIN 2 G: 330 INJECTION, POWDER, FOR SOLUTION INTRAMUSCULAR; INTRAVENOUS at 14:57

## 2023-05-04 RX ADMIN — FENTANYL CITRATE 25 MCG: 50 INJECTION, SOLUTION INTRAMUSCULAR; INTRAVENOUS at 15:45

## 2023-05-04 RX ADMIN — LIDOCAINE HYDROCHLORIDE 80 MG: 20 INJECTION, SOLUTION EPIDURAL; INFILTRATION; INTRACAUDAL at 14:53

## 2023-05-04 RX ADMIN — ATORVASTATIN CALCIUM 40 MG: 40 TABLET, FILM COATED ORAL at 21:00

## 2023-05-04 RX ADMIN — DEXAMETHASONE SODIUM PHOSPHATE 4 MG: 4 INJECTION, SOLUTION INTRA-ARTICULAR; INTRALESIONAL; INTRAMUSCULAR; INTRAVENOUS; SOFT TISSUE at 14:57

## 2023-05-04 RX ADMIN — FENTANYL CITRATE 25 MCG: 50 INJECTION, SOLUTION INTRAMUSCULAR; INTRAVENOUS at 16:04

## 2023-05-04 RX ADMIN — SODIUM CHLORIDE, POTASSIUM CHLORIDE, SODIUM LACTATE AND CALCIUM CHLORIDE: 600; 310; 30; 20 INJECTION, SOLUTION INTRAVENOUS at 14:49

## 2023-05-04 RX ADMIN — OXYCODONE HYDROCHLORIDE 10 MG: 10 TABLET ORAL at 21:00

## 2023-05-04 RX ADMIN — DULOXETINE HYDROCHLORIDE 20 MG: 20 CAPSULE, DELAYED RELEASE ORAL at 21:00

## 2023-05-04 ASSESSMENT — PAIN DESCRIPTION - PAIN TYPE
TYPE: SURGICAL PAIN
TYPE: CHRONIC PAIN
TYPE: SURGICAL PAIN
TYPE: ACUTE PAIN
TYPE: ACUTE PAIN
TYPE: SURGICAL PAIN
TYPE: SURGICAL PAIN

## 2023-05-04 ASSESSMENT — LIFESTYLE VARIABLES
ALCOHOL_USE: NO
HAVE YOU EVER FELT YOU SHOULD CUT DOWN ON YOUR DRINKING: NO
TOTAL SCORE: 0
ON A TYPICAL DAY WHEN YOU DRINK ALCOHOL HOW MANY DRINKS DO YOU HAVE: 0
CONSUMPTION TOTAL: NEGATIVE
HAVE PEOPLE ANNOYED YOU BY CRITICIZING YOUR DRINKING: NO
TOTAL SCORE: 0
AVERAGE NUMBER OF DAYS PER WEEK YOU HAVE A DRINK CONTAINING ALCOHOL: 0
HOW MANY TIMES IN THE PAST YEAR HAVE YOU HAD 5 OR MORE DRINKS IN A DAY: 0
TOTAL SCORE: 0
EVER HAD A DRINK FIRST THING IN THE MORNING TO STEADY YOUR NERVES TO GET RID OF A HANGOVER: NO
EVER FELT BAD OR GUILTY ABOUT YOUR DRINKING: NO

## 2023-05-04 ASSESSMENT — PATIENT HEALTH QUESTIONNAIRE - PHQ9
8. MOVING OR SPEAKING SO SLOWLY THAT OTHER PEOPLE COULD HAVE NOTICED. OR THE OPPOSITE, BEING SO FIGETY OR RESTLESS THAT YOU HAVE BEEN MOVING AROUND A LOT MORE THAN USUAL: SEVERAL DAYS
7. TROUBLE CONCENTRATING ON THINGS, SUCH AS READING THE NEWSPAPER OR WATCHING TELEVISION: SEVERAL DAYS
3. TROUBLE FALLING OR STAYING ASLEEP OR SLEEPING TOO MUCH: SEVERAL DAYS
6. FEELING BAD ABOUT YOURSELF - OR THAT YOU ARE A FAILURE OR HAVE LET YOURSELF OR YOUR FAMILY DOWN: SEVERAL DAYS
SUM OF ALL RESPONSES TO PHQ QUESTIONS 1-9: 6
5. POOR APPETITE OR OVEREATING: SEVERAL DAYS
2. FEELING DOWN, DEPRESSED, IRRITABLE, OR HOPELESS: SEVERAL DAYS
4. FEELING TIRED OR HAVING LITTLE ENERGY: NOT AT ALL
SUM OF ALL RESPONSES TO PHQ9 QUESTIONS 1 AND 2: 1
9. THOUGHTS THAT YOU WOULD BE BETTER OFF DEAD, OR OF HURTING YOURSELF: NOT AT ALL
1. LITTLE INTEREST OR PLEASURE IN DOING THINGS: NOT AT ALL

## 2023-05-04 ASSESSMENT — COPD QUESTIONNAIRES
DURING THE PAST 4 WEEKS HOW MUCH DID YOU FEEL SHORT OF BREATH: NONE/LITTLE OF THE TIME
COPD SCREENING SCORE: 2
DO YOU EVER COUGH UP ANY MUCUS OR PHLEGM?: NO/ONLY WITH OCCASIONAL COLDS OR INFECTIONS
HAVE YOU SMOKED AT LEAST 100 CIGARETTES IN YOUR ENTIRE LIFE: NO/DON'T KNOW

## 2023-05-04 ASSESSMENT — FIBROSIS 4 INDEX: FIB4 SCORE: 1.92

## 2023-05-04 NOTE — OR SURGEON
Immediate Post OP Note    * No Diagnosis Codes entered *  WOUND DEHISENCE    * No Diagnosis Codes entered *  AS ABOVE    Procedure(s):  INTRATHECAL PAIN PUMP REVISION - Wound Class: Clean    Surgeon(s):  Tavo Lozoya M.D.    Anesthesiologist/Type of Anesthesia:  Anesthesiologist: Kyler Peralta M.D./General    Surgical Staff:  Circulator: Felicity Oneil R.N.  Relief Scrub: Yehuda Dee  Scrub Person: Waldo Atwood  Radiology Technologist: Renny Colin    Specimens removed if any:  * No specimens in log *    Estimated Blood Loss: <25CC    Findings:  3MM OPENING IN INCISION SITE WITH EXPOSED PUMP    Complications: None        5/4/2023 4:47 PM Tavo Lozoya M.D.

## 2023-05-04 NOTE — OR NURSING
1650: Patient arrived to PACU. Respirations even and unlabored. VSS. Dressing clean, dry and intact. +Pedal/radial pulses.     1700: Hospitalist at bedside, will put admit orders in for patient    1705: pain 10/10 and given fentanyl 50mcg    1712: pain remains 10/10 and given fentanyl 50mcg, states that pain medications never work, not even the medication in the pain pump    1720: intermittently sleeping and wakes to state he has pain and returns to sleep, respirations even and unlabored, denies nausea    1730: Denies nausea, tolerating water, given Oxycodone 10mg Oral Solution.     1745: sleeping intermittently, states she still has pain and medications do not work, family updated on plane of care    1800: no change in status, sleeping and rouses to verbal stimuli, respirations even and unlabored    1809: Pain 7/10, given fentanyl 50mcg     1822: Report given to Luis Carlos [facility]    1830: pain improving, denies nausea, repositioned for comfort, tolerating water    1900: states pain is much better, respirations even and unlabored    1918:  transported to [facility]

## 2023-05-04 NOTE — ANESTHESIA PROCEDURE NOTES
Airway    Date/Time: 5/4/2023 2:55 PM  Performed by: Kyler Peralta M.D.  Authorized by: Kyler Peralta M.D.     Location:  OR  Urgency:  Elective  Indications for Airway Management:  Anesthesia      Spontaneous Ventilation: absent    Sedation Level:  Deep  Preoxygenated: Yes    Mask Difficulty Assessment:  2 - vent by mask + OA or adjuvant +/- NMBA  Final Airway Type:  Supraglottic airway  Final Supraglottic Airway:  Standard LMA    SGA Size:  4  Number of Attempts at Approach:  2  Ventilation Between Attempts:  Supraglottic airway   Tried LMA3 first, large leak

## 2023-05-04 NOTE — ANESTHESIA PREPROCEDURE EVALUATION
Case: [id number] Date/Time: 05/04/23 1445    Procedure: INTRATHECAL PAIN PUMP REVISION    Pre-op diagnosis: CHRONIC PAIN, SPINAL STENOSIS    Location:  OR  / SURGERY [facility]    Surgeons: Tavo Lozoya M.D.        86 yo F w/ HTN, asthma/COPD on home O2, mod aortic stenosis, left lung cancer, CKD3, spinal stenosis    Relevant Problems   PULMONARY   (positive) Chronic obstructive pulmonary disease (HCC)   (positive) Moderate asthma without complication      CARDIAC   (positive) Moderate aortic stenosis   (positive) Moderate mitral insufficiency   (positive) PVC's (premature ventricular contractions)   (positive) Primary hypertension         (positive) Chronic kidney disease, stage 3a (HCC)       Physical Exam    Airway   Mallampati: II  TM distance: >3 FB  Neck ROM: full       Cardiovascular - normal exam  Rhythm: regular  Rate: normal  (-) murmur     Dental - normal exam           Pulmonary - normal exam  Breath sounds clear to auscultation     Abdominal    Neurological - normal exam                 Anesthesia Plan    ASA 3 (COPD)   ASA physical status 3 criteria: other (comment)    Plan - general       Airway plan will be LMA          Induction: intravenous    Postoperative Plan: Postoperative administration of opioids is intended.    Pertinent diagnostic labs and testing reviewed    Informed Consent:    Anesthetic plan and risks discussed with patient.    Use of blood products discussed with: patient whom consented to blood products.

## 2023-05-04 NOTE — OR NURSING
1410  Pt allergies and NPO status verified.  Home medications reconciled.  Belongings secured.  Pt verbalizes understanding of pain scale, expected course of stay, and plan of care.  Surgical site verified with pt.  IV access established.  Triple AIM completed. All questions answered.  Bed in low position.  Call light in reach.

## 2023-05-04 NOTE — ANESTHESIA POSTPROCEDURE EVALUATION
Patient: Chelly Decker    Procedure Summary     Date: 05/04/23 Room / Location:  OR 03 / SURGERY [facility]    Anesthesia Start: 1449 Anesthesia Stop: 1657    Procedure: INTRATHECAL PAIN PUMP REVISION (Right: Abdomen) Diagnosis: (CHRONIC PAIN, SPINAL STENOSIS)    Surgeons: Tavo Lozoya M.D. Responsible Provider: Kyler Peralta M.D.    Anesthesia Type: general ASA Status: 3          Final Anesthesia Type: general  Last vitals  BP   Blood Pressure : (!) 154/68    Temp   36.6 °C (97.9 °F)    Pulse   72   Resp   16    SpO2   95 %      Anesthesia Post Evaluation    Patient location during evaluation: PACU  Patient participation: complete - patient participated  Level of consciousness: awake and alert    Airway patency: patent  Anesthetic complications: no  Cardiovascular status: hemodynamically stable  Respiratory status: acceptable  Hydration status: euvolemic    PONV: none          There were no known notable events for this encounter.     Nurse Pain Score: 7 (NPRS)

## 2023-05-04 NOTE — DISCHARGE INSTRUCTIONS
If any questions arise, call your provider.  If your provider is not available, please feel free to call the [facility] at [phone].    MEDICATIONS: Resume taking daily medication.  Take prescribed pain medication with food.  If no medication is prescribed, you may take non-aspirin pain medication if needed.  PAIN MEDICATION CAN BE VERY CONSTIPATING.  Take a stool softener or laxative such as senokot, pericolace, or milk of magnesia if needed.    Last pain medication given at     What to Expect Post Anesthesia    Rest and take it easy for the first 24 hours.  A responsible adult is recommended to remain with you during that time.  It is normal to feel sleepy.  We encourage you to not do anything that requires balance, judgment or coordination.    FOR 24 HOURS DO NOT:  Drive, operate machinery or run household appliances.  Drink beer or alcoholic beverages.  Make important decisions or sign legal documents.    To avoid nausea, slowly advance diet as tolerated, avoiding spicy or greasy foods for the first day.  Add more substantial food to your diet according to your provider's instructions. INCREASE FLUIDS AND FIBER TO AVOID CONSTIPATION.    MILD FLU-LIKE SYMPTOMS ARE NORMAL.  YOU MAY EXPERIENCE GENERALIZED MUSCLE ACHES, THROAT IRRITATION, HEADACHE AND/OR SOME NAUSEA.    Intrathecal Pain Pump Implantation, Care After  This sheet gives you information about how to care for yourself after your procedure. Your health care provider may also give you more specific instructions. If you have problems or questions, contact your health care provider.  What can I expect after the procedure?  After the procedure, it is common to have:  Pain in your back and abdomen.  A slight headache.  A reaction to your pain medicine that may include itchy skin, drowsiness, and nausea.  Follow these instructions at home:  Incision care    Follow instructions from your health care provider about how to take care of your incisions.  Make sure you:  Wash your hands with soap and water before and after you change your bandage (dressing). If soap and water are not available, use hand .  Change your dressings as told by your health care provider.  Leave stitches (sutures), skin glue, or adhesive strips in place. These skin closures may need to stay in place for 2 weeks or longer. If adhesive strip edges start to loosen and curl up, you may trim the loose edges. Do not remove adhesive strips completely unless your health care provider tells you to do that.  Check your incision areas every day for signs of infection. Check for:  Redness, swelling, or pain.  Fluid or blood.  Warmth.  Pus or a bad smell.  Medicines  Take over-the-counter and prescription medicines only as told by your health care provider.  If you were prescribed an antibiotic medicine, take it as told by your health care provider. Do not stop taking the antibiotic even if you start to feel better.  Ask your health care provider if the medicine prescribed to you:  Requires you to avoid driving or using heavy machinery.  Can cause constipation. You may need to take these actions to prevent or treat constipation:  Drink enough fluid to keep your urine pale yellow.  Take over-the-counter or prescription medicines.  Eat foods that are high in fiber, such as beans, whole grains, and fresh fruits and vegetables.  Limit foods that are high in fat and processed sugars, such as fried or sweet foods.  Bathing  Do not take baths, swim, or use a hot tub until your health care provider approves. Ask your health care provider if you may take showers. You may only be allowed to take sponge baths.  Keep the dressing dry until your health care provider says it can be removed.  Activity  Limit your movement and activities as told by your health care provider. You may be told to:  Avoid sleeping on your belly.  Avoid bending or stretching.  Avoid raising your arms over your head.  Avoid lifting  anything that is heavier than 5 lb (2.3 kg).  Avoid doing work around the house, such as loading the , vacuuming, or mowing the lawn.  Return to your normal activities as told by your health care provider. Ask your health care provider what activities are safe for you.  General instructions  Do not drive for 2 weeks or until your health care provider says it is okay.  Do not use any products that contain nicotine or tobacco, such as cigarettes, e-cigarettes, and chewing tobacco. If you need help quitting, ask your health care provider.  Do not drink alcohol if:  Your health care provider tells you not to drink.  You are pregnant, may be pregnant, or are planning to become pregnant.  If you drink alcohol:  Limit how much you use to:  0-1 drink a day for women.  0-2 drinks a day for men.  Be aware of how much alcohol is in your drink. In the [place] one drink equals one 12 oz bottle of beer (355 mL), one 5 oz glass of wine (148 mL), or one 1½ oz glass of hard liquor (44 mL).  Do not use recreational drugs.  Keep all follow-up visits as told by your health care provider. This is important.  Contact a health care provider if:  You have redness, swelling, or pain around your incision.  You have fluid or blood coming from your incision.  Your incision feels warm to the touch.  You have pus or a bad smell coming from your incision.  You vomit.  You have a headache for more than 2 days.  You feel confused, anxious, dizzy, or depressed.  You have trouble urinating or having a bowel movement.  Your pain is not being relieved by the pump.  You have a fever.  The alarm on your pump starts to beep.  Get help right away if you:  Have trouble breathing.  Have a seizure or you pass out.  Have sudden back pain or weakness in your legs.  Lose control over urination or bowel movements (have incontinence).  Summary  After the procedure, it is common to have back or abdominal pain, a slight headache, and a reaction to the drug  that may include itchiness, drowsiness, and nausea.  Follow instructions from your health care provider about how to take care of your incisions.  Limit your movement and activities as told by your health care provider.  Do not use nicotine or tobacco products or recreational drugs. Do not drink alcohol if your health care provider tells you not to.  This information is not intended to replace advice given to you by your health care provider. Make sure you discuss any questions you have with your health care provider.  Document Released: 04/05/2010 Document Revised: 07/17/2019 Document Reviewed: 07/17/2019  Elsevier Patient Education © 2020 [employer]

## 2023-05-04 NOTE — ANESTHESIA TIME REPORT
Anesthesia Start and Stop Event Times     Date Time Event    5/4/2023 1446 Ready for Procedure     1449 Anesthesia Start     1657 Anesthesia Stop        Responsible Staff  05/04/23    Name Role Begin End    Kyler Peralta M.D. Anesth 1449 1653        Overtime Reason:  overtime    Comments:

## 2023-05-05 PROBLEM — Z98.890 S/P INSERTION OF INTRATHECAL PUMP: Status: ACTIVE | Noted: 2023-05-05

## 2023-05-05 LAB
ANION GAP SERPL CALC-SCNC: 7 MMOL/L (ref 7–16)
BASOPHILS # BLD AUTO: 0.1 % (ref 0–1.8)
BASOPHILS # BLD: 0.01 K/UL (ref 0–0.12)
BUN SERPL-MCNC: 24 MG/DL (ref 8–22)
CALCIUM SERPL-MCNC: 9.5 MG/DL (ref 8.4–10.2)
CHLORIDE SERPL-SCNC: 103 MMOL/L (ref 96–112)
CO2 SERPL-SCNC: 27 MMOL/L (ref 20–33)
CREAT SERPL-MCNC: 0.88 MG/DL (ref 0.5–1.4)
EOSINOPHIL # BLD AUTO: 0 K/UL (ref 0–0.51)
EOSINOPHIL NFR BLD: 0 % (ref 0–6.9)
ERYTHROCYTE [DISTWIDTH] IN BLOOD BY AUTOMATED COUNT: 49.2 FL (ref 35.9–50)
GFR SERPLBLD CREATININE-BSD FMLA CKD-EPI: 64 ML/MIN/1.73 M 2
GLUCOSE SERPL-MCNC: 172 MG/DL (ref 65–99)
HCT VFR BLD AUTO: 35.1 % (ref 37–47)
HGB BLD-MCNC: 11 G/DL (ref 12–16)
IMM GRANULOCYTES # BLD AUTO: 0.03 K/UL (ref 0–0.11)
IMM GRANULOCYTES NFR BLD AUTO: 0.3 % (ref 0–0.9)
LYMPHOCYTES # BLD AUTO: 1.21 K/UL (ref 1–4.8)
LYMPHOCYTES NFR BLD: 13.3 % (ref 22–41)
MCH RBC QN AUTO: 29.1 PG (ref 27–33)
MCHC RBC AUTO-ENTMCNC: 31.3 G/DL (ref 33.6–35)
MCV RBC AUTO: 92.9 FL (ref 81.4–97.8)
MONOCYTES # BLD AUTO: 0.88 K/UL (ref 0–0.85)
MONOCYTES NFR BLD AUTO: 9.7 % (ref 0–13.4)
NEUTROPHILS # BLD AUTO: 6.96 K/UL (ref 2–7.15)
NEUTROPHILS NFR BLD: 76.6 % (ref 44–72)
NRBC # BLD AUTO: 0 K/UL
NRBC BLD-RTO: 0 /100 WBC
PLATELET # BLD AUTO: 205 K/UL (ref 164–446)
PMV BLD AUTO: 10.3 FL (ref 9–12.9)
POTASSIUM SERPL-SCNC: 4.8 MMOL/L (ref 3.6–5.5)
RBC # BLD AUTO: 3.78 M/UL (ref 4.2–5.4)
SODIUM SERPL-SCNC: 137 MMOL/L (ref 135–145)
WBC # BLD AUTO: 9.1 K/UL (ref 4.8–10.8)

## 2023-05-05 PROCEDURE — 80048 BASIC METABOLIC PNL TOTAL CA: CPT

## 2023-05-05 PROCEDURE — 36415 COLL VENOUS BLD VENIPUNCTURE: CPT

## 2023-05-05 PROCEDURE — 94664 DEMO&/EVAL PT USE INHALER: CPT

## 2023-05-05 PROCEDURE — 99232 SBSQ HOSP IP/OBS MODERATE 35: CPT | Performed by: INTERNAL MEDICINE

## 2023-05-05 PROCEDURE — 700111 HCHG RX REV CODE 636 W/ 250 OVERRIDE (IP): Performed by: INTERNAL MEDICINE

## 2023-05-05 PROCEDURE — A9270 NON-COVERED ITEM OR SERVICE: HCPCS | Performed by: INTERNAL MEDICINE

## 2023-05-05 PROCEDURE — 85025 COMPLETE CBC W/AUTO DIFF WBC: CPT

## 2023-05-05 PROCEDURE — 96375 TX/PRO/DX INJ NEW DRUG ADDON: CPT

## 2023-05-05 PROCEDURE — 96372 THER/PROPH/DIAG INJ SC/IM: CPT

## 2023-05-05 PROCEDURE — 700102 HCHG RX REV CODE 250 W/ 637 OVERRIDE(OP): Performed by: INTERNAL MEDICINE

## 2023-05-05 PROCEDURE — G0378 HOSPITAL OBSERVATION PER HR: HCPCS

## 2023-05-05 PROCEDURE — 96376 TX/PRO/DX INJ SAME DRUG ADON: CPT

## 2023-05-05 PROCEDURE — 94760 N-INVAS EAR/PLS OXIMETRY 1: CPT

## 2023-05-05 RX ORDER — HEPARIN SODIUM 5000 [USP'U]/ML
5000 INJECTION, SOLUTION INTRAVENOUS; SUBCUTANEOUS EVERY 8 HOURS
Status: DISCONTINUED | OUTPATIENT
Start: 2023-05-05 | End: 2023-05-09 | Stop reason: HOSPADM

## 2023-05-05 RX ORDER — ACETAMINOPHEN 500 MG
1000 TABLET ORAL 3 TIMES DAILY
Status: DISCONTINUED | OUTPATIENT
Start: 2023-05-05 | End: 2023-05-09 | Stop reason: HOSPADM

## 2023-05-05 RX ADMIN — HEPARIN SODIUM 5000 UNITS: 5000 INJECTION, SOLUTION INTRAVENOUS; SUBCUTANEOUS at 21:33

## 2023-05-05 RX ADMIN — HEPARIN SODIUM 5000 UNITS: 5000 INJECTION, SOLUTION INTRAVENOUS; SUBCUTANEOUS at 15:36

## 2023-05-05 RX ADMIN — OXYCODONE HYDROCHLORIDE 10 MG: 10 TABLET ORAL at 14:02

## 2023-05-05 RX ADMIN — DILTIAZEM HYDROCHLORIDE 120 MG: 120 CAPSULE, COATED, EXTENDED RELEASE ORAL at 05:44

## 2023-05-05 RX ADMIN — ATORVASTATIN CALCIUM 40 MG: 40 TABLET, FILM COATED ORAL at 17:42

## 2023-05-05 RX ADMIN — SPIRONOLACTONE 25 MG: 25 TABLET ORAL at 05:44

## 2023-05-05 RX ADMIN — FUROSEMIDE 20 MG: 20 TABLET ORAL at 05:44

## 2023-05-05 RX ADMIN — HYDROMORPHONE HYDROCHLORIDE 0.5 MG: 1 INJECTION, SOLUTION INTRAMUSCULAR; INTRAVENOUS; SUBCUTANEOUS at 17:46

## 2023-05-05 RX ADMIN — POLYETHYLENE GLYCOL 3350 1 PACKET: 17 POWDER, FOR SOLUTION ORAL at 17:43

## 2023-05-05 RX ADMIN — OXYCODONE HYDROCHLORIDE 10 MG: 10 TABLET ORAL at 08:50

## 2023-05-05 RX ADMIN — DULOXETINE HYDROCHLORIDE 20 MG: 20 CAPSULE, DELAYED RELEASE ORAL at 17:42

## 2023-05-05 RX ADMIN — DULOXETINE HYDROCHLORIDE 20 MG: 20 CAPSULE, DELAYED RELEASE ORAL at 05:43

## 2023-05-05 RX ADMIN — HYDROMORPHONE HYDROCHLORIDE 0.5 MG: 1 INJECTION, SOLUTION INTRAMUSCULAR; INTRAVENOUS; SUBCUTANEOUS at 10:29

## 2023-05-05 RX ADMIN — OMEPRAZOLE 20 MG: 20 CAPSULE, DELAYED RELEASE ORAL at 05:43

## 2023-05-05 RX ADMIN — LOSARTAN POTASSIUM 25 MG: 25 TABLET, FILM COATED ORAL at 17:41

## 2023-05-05 RX ADMIN — Medication 5 MG: at 21:33

## 2023-05-05 RX ADMIN — SENNOSIDES AND DOCUSATE SODIUM 2 TABLET: 50; 8.6 TABLET ORAL at 17:41

## 2023-05-05 ASSESSMENT — COGNITIVE AND FUNCTIONAL STATUS - GENERAL
HELP NEEDED FOR BATHING: A LITTLE
DAILY ACTIVITIY SCORE: 18
MOVING TO AND FROM BED TO CHAIR: A LITTLE
MOVING FROM LYING ON BACK TO SITTING ON SIDE OF FLAT BED: A LITTLE
PERSONAL GROOMING: A LITTLE
SUGGESTED CMS G CODE MODIFIER MOBILITY: CK
TOILETING: A LITTLE
MOBILITY SCORE: 18
EATING MEALS: A LITTLE
WALKING IN HOSPITAL ROOM: A LITTLE
CLIMB 3 TO 5 STEPS WITH RAILING: A LITTLE
TURNING FROM BACK TO SIDE WHILE IN FLAT BAD: A LITTLE
SUGGESTED CMS G CODE MODIFIER DAILY ACTIVITY: CK
STANDING UP FROM CHAIR USING ARMS: A LITTLE
DRESSING REGULAR LOWER BODY CLOTHING: A LITTLE
DRESSING REGULAR UPPER BODY CLOTHING: A LITTLE

## 2023-05-05 ASSESSMENT — PATIENT HEALTH QUESTIONNAIRE - PHQ9
6. FEELING BAD ABOUT YOURSELF - OR THAT YOU ARE A FAILURE OR HAVE LET YOURSELF OR YOUR FAMILY DOWN: SEVERAL DAYS
2. FEELING DOWN, DEPRESSED, IRRITABLE, OR HOPELESS: SEVERAL DAYS
1. LITTLE INTEREST OR PLEASURE IN DOING THINGS: NOT AT ALL
4. FEELING TIRED OR HAVING LITTLE ENERGY: NOT AT ALL
8. MOVING OR SPEAKING SO SLOWLY THAT OTHER PEOPLE COULD HAVE NOTICED. OR THE OPPOSITE, BEING SO FIGETY OR RESTLESS THAT YOU HAVE BEEN MOVING AROUND A LOT MORE THAN USUAL: NOT AT ALL
3. TROUBLE FALLING OR STAYING ASLEEP OR SLEEPING TOO MUCH: NOT AT ALL
5. POOR APPETITE OR OVEREATING: NOT AT ALL
1. LITTLE INTEREST OR PLEASURE IN DOING THINGS: NOT AT ALL
7. TROUBLE CONCENTRATING ON THINGS, SUCH AS READING THE NEWSPAPER OR WATCHING TELEVISION: NOT AT ALL
9. THOUGHTS THAT YOU WOULD BE BETTER OFF DEAD, OR OF HURTING YOURSELF: NOT AT ALL
SUM OF ALL RESPONSES TO PHQ QUESTIONS 1-9: 2
SUM OF ALL RESPONSES TO PHQ9 QUESTIONS 1 AND 2: 0
SUM OF ALL RESPONSES TO PHQ9 QUESTIONS 1 AND 2: 1
2. FEELING DOWN, DEPRESSED, IRRITABLE, OR HOPELESS: NOT AT ALL

## 2023-05-05 ASSESSMENT — PAIN DESCRIPTION - PAIN TYPE
TYPE: ACUTE PAIN

## 2023-05-05 NOTE — ASSESSMENT & PLAN NOTE
- Not in acute exacerbation.  Continue d as needed albuterol.  She will be on RT protocol while in the hospital.

## 2023-05-05 NOTE — DISCHARGE PLANNING
H[facility] TCN chart review completed. Collaborated with [facility] prior to meeting with the pt. The most current review of medical record, knowledge of pt's PLOF and social support, LACE+ score of 55, 6 clicks scores of 18 mobility were considered.      Pt seen at bedside. Introduced TCN program. Provided education regarding post acute levels of care. Discussed HTH/[facility] plan benefits (Meds to Beds, medical uber and [facility] transitional care). Pt verbalizes understanding.     Patient reports she does have some concern with functional mobility, as she reports she fell at home a few days prior to hospitalization.  Patient reports she lives with daughter, but has her own inlaw area.  Patient reports she was independent with use of FWW at baseline.  TCN will monitor mobility, as per Kardex patient has ambulated 2 x 5 feet with HHA.  HH/SNF choice obtained.  Patient is on RA at baseline, and currently on 2L, so DME choice obtained.  No further TCN needs.     Choice proactively obtained for SNF, HH, DME (O2 if needed) and faxed to [facility]. TCN will continue to follow and collaborate with discharge planning team as additional post acute needs arise. Thank you.     Completed today:  Choice obtained: SNF, HH DME (O2 if needed)  [facility] referral sent

## 2023-05-05 NOTE — ASSESSMENT & PLAN NOTE
- Renal function appears to be stable at baseline.  Continue to monitor.  - avoid nephrotoxins, and continue to renally dose all medications.

## 2023-05-05 NOTE — H&P
Hospital Medicine History & Physical Note    Date of Service  5/4/2023    Primary Care Physician  Kassi Carrillo M.D.    Consultants  Pain management    Specialist Names: Dr. Lozoya    Code Status  Full Code    Chief Complaint  Postoperative pain    History of Presenting Illness  Chelly Decker is a 87 y.o. female with CKD stage III, HFpEF, chronic low back pain due to lumbar spinal stenosis, and follows outpatient pain management (Dr. Lozoya).  She has an existing intrathecal pain pump in place, which unfortunately has dehisced, and so today Dr. Lozoya did an intrathecal pain pump revision/replacement.  Postoperatively, she had significant pain uncontrolled with multiple doses of IV analgesics, and thus pain management requested hospitalist service to admit for overnight observation and pain control.    I saw the patient in the PACU.  She states that her pain was 10 out of 10 in severity, and is asking for as needed IV pain meds.  She feels anxious.  She denies any shortness of breath.  No nausea or vomiting.  No abdominal pain.  Preoperative labs were reviewed, which showed unremarkable CBC and BMP.    I discussed the plan of care with patient and pain management (Dr. Lozoya) .    Review of Systems  ROS    Pertinent positives/negatives as mentioned above.     A complete review of systems was personally done by me. All other systems were negative.       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), Bilateral lower extremity edema (08/03/2022), Bowel habit changes (Last year or so), Breath shortness, Cancer (HCC), Cervical cancer (HCC), Congestive heart failure (HCC), Dental disorder (Ongoing), Dizziness (12/29/2022), GERD (gastroesophageal reflux disease), H/O Clostridium difficile infection (05/12/2022), Hammer toes of both feet (12/29/2022), Heart burn,  Heart murmur, High cholesterol (10+ years), Hypertension, Hypotension due to hypovolemia (08/17/2022), Hypoxia (03/29/2022), Incomplete defecation (03/29/2022), Infectious disease (4/25/23), Insomnia due to medical condition (10/13/2022), Irregular heart rate (03/29/2022), Kidney stones, Moderate asthma without complication (03/29/2022), Oxygen dependent, Pain, Pain in joint involving multiple sites (10/13/2022), Pain in joint involving multiple sites (10/13/2022), Pelvic floor dysfunction (03/29/2022), Primary hypertension (03/29/2022), Renal insufficiency (08/03/2022), Skin lesions (05/12/2022), Solitary pulmonary nodule, Spinal stenosis, Spinal stenosis of lumbar region without neurogenic claudication (03/29/2022), Urge incontinence of urine (05/12/2022), and Urinary bladder disorder (Past [id number]).    Surgical History   has a past surgical history that includes cholecystectomy; hip arthroplasty total (Right); carpal tunnel endoscopic; tonsillectomy; appendectomy; other; hysterectomy laparoscopy; gyn surgery; pr implant neurostim/ (N/A, 02/05/2021); pump insert/remove (Right, 10/07/2022); and no pertinent past surgical history (Various).     Family History  family history includes Heart Disease in her mother.     Social History   reports that she quit smoking about 8 years ago. Her smoking use included cigarettes and electronic cigarettes. She started smoking about 69 years ago. She has a 60.00 pack-year smoking history. She has never used smokeless tobacco. She reports that she does not currently use alcohol. She reports that she does not currently use drugs after having used the following drugs: Inhaled.    Allergies  Allergies   Allergen Reactions    Sulfa Drugs Unspecified     Nauseated, diarrhea     Erythromycin Nausea       Medications  Prior to Admission Medications   Prescriptions Last Dose Informant Patient Reported? Taking?   CRANBERRY EXTRACT PO   Yes No   Sig: Take 5,000 mcg by mouth every  day.   Cholecalciferol (VITAMIN D3 PO) 2023  Yes No   Sig: Take  by mouth every day.   Cyanocobalamin (VITAMIN B-12 PO) 2023  Yes No   Sig: Take  by mouth every day.   DULoxetine (CYMBALTA) 20 MG Cap DR Particles 2023 at 1130  Yes No   Sig: Take 20 mg by mouth 2 times a day.   HYDROmorphone HCl (DILAUDID INJ) 2023  Yes No   Sig: Inject  as directed.   MAGNESIUM-OXIDE 400 (240 Mg) MG Tab   Yes No   Si mg every day.   Melatonin 5 MG Cap 5/3/2023  Yes No   Polyethylene Glycol 3350 (MIRALAX PO) 2023  Yes No   Sig: Take  by mouth every day.   Probiotic Product (PROBIOTIC ADVANCED PO) 5/3/2023  Yes No   Sig: Take  by mouth every day.   Simethicone (GAS-X PO) 2023 at 1130  Yes No   Sig: Take  by mouth 3 times a day.   albuterol 108 (90 Base) MCG/ACT Aero Soln inhalation aerosol 2023 at 1130  Yes No   Si Puffs every 6 hours as needed for Shortness of Breath.   atorvastatin (LIPITOR) 40 MG Tab 2023 at 0100  Yes No   Si mg. Indications: High Amount of Fats in the Blood   diclofenac sodium (VOLTAREN) 1 % Gel 2023  No No   Sig: Apply to affected are twice daily as needed   diltiazem (TIAZAC) 120 MG SR capsule 5/3/2023  Yes No   Sig: Take 120 mg by mouth every day.   diphenhydrAMINE (BENADRYL) 25 MG capsule 2023  Yes No   Sig: Take  by mouth.   furosemide (LASIX) 20 MG Tab 5/3/2023  Yes No   Si mg every day. Edema  Indications: Edema   guaiFENesin ER (MUCINEX) 600 MG TABLET SR 12 HR 5/3/2023  Yes No   Sig: Take 600 mg by mouth every 12 hours. Indications: Cough   losartan (COZAAR) 50 MG Tab 5/3/2023  Yes No   Sig: Take 50 mg by mouth every day. 1/2 a tablet   magnesium oxide (MAG-OX) 400 MG Tab tablet 5/3/2023  Yes No   Si tablet (400 mg) every morning AND 0.5 tablets (200 mg) every evening.   pantoprazole (PROTONIX) 40 MG Tablet Delayed Response 2023 at 1130  Yes No   Sig: Take 40 mg by mouth every morning before breakfast.   spironolactone (ALDACTONE) 25 MG  Tab 5/3/2023  Yes No   Sig: Take 25 mg by mouth every day.      Facility-Administered Medications: None       Physical Exam  Temp:  [36.6 °C (97.9 °F)-36.9 °C (98.4 °F)] 36.9 °C (98.4 °F)  Pulse:  [72-90] 85  Resp:  [16-20] 20  BP: (142-154)/(61-76) 142/61  SpO2:  [95 %-100 %] 100 %  Blood Pressure : (!) 154/68   Temperature: 36.6 °C (97.9 °F)   Pulse: 72   Respiration: 16   Pulse Oximetry: 95 %       Physical Exam  Vitals reviewed.   Constitutional:       General: She is not in acute distress.     Appearance: Normal appearance. She is normal weight. She is not ill-appearing or diaphoretic.   HENT:      Head: Normocephalic and atraumatic.      Right Ear: External ear normal.      Left Ear: External ear normal.      Mouth/Throat:      Mouth: Mucous membranes are moist.      Pharynx: No oropharyngeal exudate or posterior oropharyngeal erythema.   Eyes:      General: No scleral icterus.     Extraocular Movements: Extraocular movements intact.      Conjunctiva/sclera: Conjunctivae normal.      Pupils: Pupils are equal, round, and reactive to light.   Cardiovascular:      Rate and Rhythm: Normal rate and regular rhythm.      Heart sounds: Normal heart sounds. No murmur heard.  Pulmonary:      Effort: Pulmonary effort is normal. No respiratory distress.      Breath sounds: Normal breath sounds. No stridor. No wheezing, rhonchi or rales.   Chest:      Chest wall: No tenderness.   Abdominal:      General: Bowel sounds are normal. There is no distension.      Palpations: Abdomen is soft. There is no mass.      Tenderness: There is no abdominal tenderness. There is no guarding or rebound.   Musculoskeletal:         General: No swelling. Normal range of motion.      Cervical back: Normal range of motion and neck supple. No rigidity. No muscular tenderness.      Right lower leg: No edema.      Left lower leg: No edema.      Comments: Surgical site intact, dressing CDI   Lymphadenopathy:      Cervical: No cervical adenopathy.    Skin:     General: Skin is warm and dry.      Coloration: Skin is not jaundiced.      Findings: No rash.   Neurological:      General: No focal deficit present.      Mental Status: She is alert and oriented to person, place, and time. Mental status is at baseline.      Cranial Nerves: No cranial nerve deficit.   Psychiatric:         Behavior: Behavior normal.         Thought Content: Thought content normal.         Judgment: Judgment normal.      Comments: Anxious       Laboratory:  Recent Labs     05/04/23  1340   WBC 8.9   RBC 4.47   HEMOGLOBIN 13.1   HEMATOCRIT 40.7   MCV 91.1   MCH 29.3   MCHC 32.2*   RDW 47.8   PLATELETCT 226   MPV 10.0     Recent Labs     05/04/23  1340   SODIUM 138   POTASSIUM 3.8   CHLORIDE 101   CO2 29   GLUCOSE 109*   BUN 29*   CREATININE 0.93   CALCIUM 10.7*     Recent Labs     05/04/23  1340   ALTSGPT 17   ASTSGOT 18   ALKPHOSPHAT 127*   TBILIRUBIN 0.5   GLUCOSE 109*     Recent Labs     05/04/23  1340   INR 0.92     No results for input(s): NTPROBNP in the last 72 hours.      No results for input(s): TROPONINT in the last 72 hours.    Imaging:  DX-PORTABLE FLUORO > 1 HOUR    (Results Pending)         Imaging studies and EKG results were independently reviewed as above.      Assessment/Plan:  Justification for Admission Status  I anticipate this patient is appropriate for observation status at this time because of uncontrolled pain, and need to optimize analgesia.      * Postoperative pain- (present on admission)  Assessment & Plan  - This is in the setting of chronic pain. S/p intrathecal pain pump revision/replacement.  Anticipate that pain control will be challenging overnight.  -Admitted to medical floors.  -I will put her on as needed oxycodone and IV Dilaudid, in addition to her intrathecal pain pump which is currently running.  -She may have a significant component of anxiety per Dr. Lozoya.  I will start her on as needed Xanax.  -High risk for opioid induced constipation.  Start  daily MiraLAX, along with stool softeners, and as needed bowel regimen.    Other hyperlipidemia- (present on admission)  Assessment & Plan  - Resume home Lipitor.    Chronic kidney disease, stage 3a (HCC)- (present on admission)  Assessment & Plan  - Renal function appears to be stable at baseline.  Continue to monitor.  - avoid nephrotoxins, and continue to renally dose all medications.    (HFpEF) heart failure with preserved ejection fraction (HCC)- (present on admission)  Assessment & Plan  - Stable, not decompensated.  -Monitor for signs of volume overload.  -Resume guideline directed medical therapy.    Chronic obstructive pulmonary disease (HCC)- (present on admission)  Assessment & Plan  - Not in acute exacerbation.  Resume home DuoNeb, and as needed albuterol.  She will be on RT protocol while in the hospital.    Primary hypertension- (present on admission)  Assessment & Plan  Optimize blood pressure control.- Resume home diltiazem, Lasix, losartan, and Aldactone.  Monitor blood pressure trend closely with as needed IV labetalol for significant hypertension.        VTE prophylaxis: SCDs/TEDs

## 2023-05-05 NOTE — PROGRESS NOTES
Pt admitted to 111 from PACU. Pt able to pivot from gurney to bed. Pain stated as tolerable at this time. Dressing to lower back is CDI. Dressing to RLQ CDI. Abdominal binder in place. Pt on 4L with spO2 at 96%. Discussed with pt that she needs to void by midnight. Pt verbalized understanding. No other needs at this time, call light in reach, bed in lowest position.

## 2023-05-05 NOTE — ASSESSMENT & PLAN NOTE
- Stable, not decompensated.  -Monitor for signs of volume overload.  -home meds currently held to allow permissive HTN

## 2023-05-05 NOTE — PROGRESS NOTES
Report from АННА Kelley. Patient currently sleeping, equal rise and fall of chest noted. Bed alarm on and armed. Hourly rounding in place, call light within reach.

## 2023-05-05 NOTE — CARE PLAN
The patient is Stable - Low risk of patient condition declining or worsening    Shift Goals  Clinical Goals: pt pain will be tolerable AEB pt being able to sleep at least 5 hours overnight.    Progress made toward(s) clinical / shift goals:  Pt was able to sleep at least 5 hours overnight. Pt only required on PO PRN pain medication overnight.    Patient is not progressing towards the following goals: n/a

## 2023-05-05 NOTE — PROGRESS NOTES
"Hospital Medicine Daily Progress Note    Date of Service  5/5/2023    Chief Complaint  Chelly Decker is a 87 y.o. female admitted 5/4/2023 with post op pain    Hospital Course  Per HPI \"87 y.o. female with CKD stage III, HFpEF, chronic low back pain due to lumbar spinal stenosis, and follows outpatient pain management (Dr. Lozoya).  She has an existing intrathecal pain pump in place, which unfortunately has dehisced, and so today Dr. Lozoya did an intrathecal pain pump revision/replacement.  Postoperatively, she had significant pain uncontrolled with multiple doses of IV analgesics, and thus pain management requested hospitalist service to admit for overnight observation and pain control.\"    Interval Problem Update  - pt reports a lot of pain at the site of pump that was replaced on her back  - IV dilaudid given while interviewing , 0.5mg dosing, and patient stated she is feeling dizzy/flushed/unwell  - I sent a message to Dr. Lozoya regarding the basal dilaudid dose and whether appropriate to increase that  - pt reports not seeing any benefit of her pump the last 2 years she's had it    I have discussed this patient's plan of care and discharge plan at IDT rounds today with Case Management, Nursing, Nursing leadership, and other members of the IDT team.    Consultants/Specialty  Dr. Lozoya    Code Status  Full Code    Disposition  Patient is not medically cleared for discharge.   Anticipate discharge to to home with close outpatient follow-up.  I have placed the appropriate orders for post-discharge needs.    Review of Systems  Review of Systems   All other systems reviewed and are negative.     Physical Exam  Temp:  [36.4 °C (97.5 °F)-36.9 °C (98.4 °F)] 36.8 °C (98.2 °F)  Pulse:  [] 70  Resp:  [12-20] 16  BP: (103-160)/(46-79) 107/54  SpO2:  [94 %-100 %] 95 %    Physical Exam  General: NAD, appears in pain  HEENT: PERRLA, EOMI  Cards: RRR, no murmur or gallops, no tenderness of chest wall, JVP nl  Pulm: " normal respiratory effort, CTAB, no wheezes or rhonchi  Abdomen: soft, incision site anteriorly c/d/I, pump palpable  MSK: back pump with dressing on, minimal saturation w/ blood, TTP  Neuro: CN II-XII grossly intact, sensation/strength intact, AAOx3  Psych: Appropriate mood   Fluids    Intake/Output Summary (Last 24 hours) at 5/5/2023 1417  Last data filed at 5/5/2023 0850  Gross per 24 hour   Intake 1113.33 ml   Output 200 ml   Net 913.33 ml       Laboratory  Recent Labs     05/04/23  1340 05/05/23  0305   WBC 8.9 9.1   RBC 4.47 3.78*   HEMOGLOBIN 13.1 11.0*   HEMATOCRIT 40.7 35.1*   MCV 91.1 92.9   MCH 29.3 29.1   MCHC 32.2* 31.3*   RDW 47.8 49.2   PLATELETCT 226 205   MPV 10.0 10.3     Recent Labs     05/04/23  1340 05/05/23  0305   SODIUM 138 137   POTASSIUM 3.8 4.8   CHLORIDE 101 103   CO2 29 27   GLUCOSE 109* 172*   BUN 29* 24*   CREATININE 0.93 0.88   CALCIUM 10.7* 9.5     Recent Labs     05/04/23  1340   INR 0.92               Imaging  DX-PORTABLE FLUORO > 1 HOUR   Final Result      Portable fluoroscopy utilized for 10.8 seconds.         INTERPRETING LOCATION: [address] [place] [address], [id number]           Assessment/Plan  * Postoperative pain- (present on admission)  Assessment & Plan  - This is in the setting of chronic pain. S/p intrathecal pain pump revision/replacement  - Requiring frequent oxycodone doses and now IV dilaudid PRN  - pt reports dilaudid basal infusion of 0.075mg continuously, pharmacy to verify  - I have reached out to Dr. Lozoya regarding changes to pain pump to help manage the pain better  -I will continue bowel regimen to avoid constipation while patient is on high doses of narcotics  - anxiety medications were also started  Continue on PRN basis    S/P insertion of intrathecal pump  Assessment & Plan  As above, followed by Dr. Lozoya as outpatient    Other hyperlipidemia- (present on admission)  Assessment & Plan  - Resume home Lipitor.    Chronic kidney disease, stage 3a (HCC)- (present on  admission)  Assessment & Plan  - Renal function appears to be stable at baseline.  Continue to monitor.  - avoid nephrotoxins, and continue to renally dose all medications.    (HFpEF) heart failure with preserved ejection fraction (HCC)- (present on admission)  Assessment & Plan  - Stable, not decompensated.  -Monitor for signs of volume overload.  -Resume guideline directed medical therapy.    Chronic obstructive pulmonary disease (HCC)- (present on admission)  Assessment & Plan  - Not in acute exacerbation.  Continue DuoNeb, and as needed albuterol.  She will be on RT protocol while in the hospital.    Primary hypertension- (present on admission)  Assessment & Plan  Continue diltiazem, Lasix, losartan, and Aldactone.  Monitor blood pressure trend closely with as needed IV labetalol for significant hypertension.  Treat pain         VTE prophylaxis: heparin ppx ordered    I have performed a physical exam and reviewed and updated ROS and Plan today (5/5/2023). In review of yesterday's note (5/4/2023), there are no changes except as documented above.

## 2023-05-05 NOTE — CARE PLAN
The patient is Stable - Low risk of patient condition declining or worsening    Shift Goals  Clinical Goals: remain free from injury  Patient Goals: d/c home  Family Goals: n/a    Progress made toward(s) clinical / shift goals:  patient remained free from injury    Patient is not progressing towards the following goals:    Patient did not d/c home

## 2023-05-05 NOTE — RESPIRATORY CARE
"   COPD EDUCATION by COPD CLINICAL EDUCATOR  5/5/2023 at 11:50 AM by Ana Laura Luke, RRT     Patient reviewed by COPD education team. Patient does not have a formal history or diagnosis of COPD, but does carry Asthma hx and is a former smoker.  Patient was not interested in full COPD program. Provided a short intervention completed with this patient covering: What is COPD (how the lungs work), daily medications rescue and maintenance, breathing techniques, infection prevention and oxygen safety were covered in detail.  A comprehensive packet including information about COPD, treatments, and oxygen safety was given. Provided spacer with instruction for use, care, and cleaning. A personalized \"COPD Action Plan\" was reviewed with and provided to the patient.     COPD Screen  COPD Risk Screening  Do you have a history of COPD?: Yes  Do you have a Pulmonologist?: Yes  COPD Population Screener  During the past 4 weeks, how much did you feel short of breath?: None/Little of the time  Do you ever cough up any mucus or phlegm?: No/only with occasional colds or infections  In the past 12 months, you do less than you used to because of your breathing problems: Disagree/unsure  Have you smoked at least 100 cigarettes in your entire life?: No/don't know  How old are you?: 60+  COPD Screening Score: 2  COPD Coordinator Not Recommended: Yes    COPD Assessment  COPD Clinical Specialists ONLY  COPD Education Initiated: Yes--Short Intervention (COPD education provided, NO PFT, No hx dx COPD, is on medical problem list, Pulmologist Dr Martinez, pt states has many allergies and is seening an ENT, encourage OP PFT, quit smoking 2015 60yrs, spacer and action plan given)  DME Equipment Type: oxygen  Physician Name: Kassi Carrillo M.D.  Pulmonologist Name: Narayan Martinez M.D.  Referrals Initiated: Yes  Pulmonary Rehab: Declined  Smoking Cessation: N/A  Hospice: N/A  Home Health Care: Yes  [facility] Community Outreach: N/A  Geriatric " "Specialty Group: Yes  [facility]: N/A  Private In-Home Care Agency: N/A  Is this a COPD exacerbation patient?: No  $ Demo/Eval of SVN's, MDI's and Aerosols: Yes  (OP) Pulmonary Function Testing: Yes    PFT Results    No results found for: PFT    Meds to Beds  Would the patient like to opt in for Bedside Medication Delivery at Discharge?: No     MY COPD ACTION PLAN     It is recommended that patients and physicians /healthcare providers complete this action plan together. This plan should be discussed at each physician visit and updated as needed.    The green, yellow and red zones show groups of symptoms of COPD. This list of symptoms is not comprehensive, and you may experience other symptoms. In the \"Actions\" column, your healthcare provider has recommended actions for you to take based on your symptoms.    Patient Name: Chelly Decker   YOB: 1935   Last Updated on: 5/5/2023 11:50 AM   Green Zone:  I am doing well today Actions     Usual activitiy and exercise level   Take daily medications     Usual amounts of cough and phlegm/mucus   Use oxygen as prescribed     Sleep well at night   Continue regular exercise/diet plan     Appetite is good   At all times avoid cigarette smoke, inhaled irritants     Daily Medications (these medications are taken every day):   Albuterol (Accuneb, Proair, Proventil, Ventolin) 2 Puffs Every 4 hours PRN     Additional Information:  Use spacer with rescue inhaler, and always rinse mouth after    Yellow Zone:  I am having a bad day or a COPD flare Actions     More breathless than usual   Continue daily medications     I have less energy for my daily activities   Use quick relief inhaler as ordered     Increased or thicker phlegm/mucus   Use oxygen as prescribed     Using quick relief inhaler/nebulizer more often   Get plenty of rest     Swelling of ankles more than usual   Use pursed lip breathing     More coughing than usual   At all times avoid cigarette " "smoke, inhaled irritants     I feel like I have a \"chest cold\"     Poor sleep and my symptoms woke me up     My appetite is not good     My medicine is not helping      Call provider immediately if symptoms don’t improve     Continue daily medications, add rescue medications:   Albuterol 2 Puffs Every 4 hours       Medications to be used during a flare up, (as Discussed with Provider):           Additional Information:  If symptoms do not improve call provider immediately     Red Zone:  I need urgent medical care Actions     Severe shortness of breath even at rest   Call 911 or seek medical care immediately     Not able to do any activity because of breathing      Fever or shaking chills      Feeling confused or very drowsy       Chest pains      Coughing up blood                  "

## 2023-05-05 NOTE — OP REPORT
DATE OF SERVICE:  05/04/2023     NAME OF PROCEDURE:  Revision Med[employer] intrathecal infusion pump.     PREPROCEDURAL DIAGNOSES:  The patient with evidently small pinhole size wound   dehiscence, exposing pump to open air, requiring pump replacement and revision   to a new location.     POSTPROCEDURAL DIAGNOSES:  The patient with evidently small pinhole size wound   dehiscence, exposing pump to open air, requiring pump replacement and   revision to a new location.     PROCEDURE PERFORMED BY:  Tavo Lozoya MD     ANESTHESIA:  General.     ANESTHESIOLOGIST:  Kyler Peralta MD     DESCRIPTION OF PROCEDURE:  The patient was brought into the preop holding   area.  Her and her son were there to talk about the procedure itself and   possible complications.  They understood and accepted the risks.  She was then   brought to the operating room.  General anesthesia was induced.  She was   placed in a right lateral decubitus position.  The abdomen and lumbar area   were sterilely prepped.  The patient was sterilely draped.  First, a total of   20 mL of local anesthetic was injected in the right lower quadrant and also in   the right flank as well.  A 10 cm incision was made in the right lower   quadrant of the abdomen.  The pump was then evacuated from the pocket.  A   24-gauge Diaz needle was placed into the access port.  A 2 mL of CSF was   aspirated.  This was followed by 1 mL of injection of radiographic contrast   material.  The pump was then disconnected from the sutureless connector at the   hub and placed on the table.  A clamp was used to stop any of the contrast   leaking out of the catheter and fluoroscopic image was used to identify the   catheter on PA and lateral fluoroscopy.  Upon identifying that the catheter   was in the area of the incision site for the new pump placement, a 10 cm   incision was made and electrocautery was used to stop the bleeding and gradual   cut down to identify the catheter was  performed.  The catheter was then   pulled back about 4 cm and ligated.  A 25-gauge 1.5 inch needle was placed in   the catheter, aspirated, confirming that the catheter was still patent.  At   that point, blunt dissection and ____ incisions were used to make a pocket for   the new pump placement in the right flank.  Irrigation with vancomycin   solution was performed.  Four 0 Ethibond sutures were placed.  The sutureless   connector was connected to the new pump and catheter aspiration again   confirmed patency of the entire system.  The pump was then implanted into the   pocket and suture tied using the four 0 Ethibond sutures, 3-0 Vicryl on the   subcuticular and staples on the skin.  At that point it was elected to go to   the abdominal wound site, which appeared clean and dry with no evidence of any   infection.  A 3-0 Vicryl was used for subcuticular closures with staples on   the skin after a 1 gram of vancomycin powder had been placed in the pocket.    The patient will be programmed at her usual rate at 0.65 mg of hydromorphone a   day.  No changes will be made.  She will be scheduled to follow up in our   office in the next few days for wound check or p.r.n. problems.        ______________________________  MD ELIAN Garcia/JIM/LY    DD:  05/04/2023 16:56  DT:  05/04/2023 18:58    Job#:  [id number]

## 2023-05-05 NOTE — PROGRESS NOTES
4 Eyes Skin Assessment Completed by Warren RN and АННА Howell.    Head WDL  Ears WDL  Nose WDL  Mouth WDL  Neck WDL  Breast/Chest WDL  Shoulder Blades WDL  Spine Incision  (R) Arm/Elbow/Hand WDL  (L) Arm/Elbow/Hand WDL  Abdomen Incision  Groin WDL  Scrotum/Coccyx/Buttocks WDL  (R) Leg WDL  (L) Leg WDL  (R) Heel/Foot/Toe WDL  (L) Heel/Foot/Toe WDL          Devices In Places Nasal Cannula      Interventions In Place NC W/Ear Foams    Possible Skin Injury No    Pictures Uploaded Into Epic N/A  Wound Consult Placed N/A  RN Wound Prevention Protocol Ordered No

## 2023-05-05 NOTE — ASSESSMENT & PLAN NOTE
- This is in the setting of chronic pain. S/p intrathecal pain pump revision/replacement. The new pump is in lower back where patient is experiencing severe pain  - pt reports dilaudid basal infusion of 0.0275mg/hr continuously  -I will continue bowel regimen to avoid constipation while patient is on high doses of narcotics  - anxiety medications were also started  Continue on PRN basis  - Patient reports much improvement at the incision site with addition of oxycodone 15mg q4hr prn  - able to move around better and agreeable to SNF

## 2023-05-06 PROBLEM — J96.11 CHRONIC HYPOXEMIC RESPIRATORY FAILURE (HCC): Status: ACTIVE | Noted: 2023-05-06

## 2023-05-06 LAB
ERYTHROCYTE [DISTWIDTH] IN BLOOD BY AUTOMATED COUNT: 50.3 FL (ref 35.9–50)
HCT VFR BLD AUTO: 36.3 % (ref 37–47)
HGB BLD-MCNC: 11.5 G/DL (ref 12–16)
MCH RBC QN AUTO: 29.6 PG (ref 27–33)
MCHC RBC AUTO-ENTMCNC: 31.7 G/DL (ref 33.6–35)
MCV RBC AUTO: 93.6 FL (ref 81.4–97.8)
PLATELET # BLD AUTO: 197 K/UL (ref 164–446)
PMV BLD AUTO: 10 FL (ref 9–12.9)
RBC # BLD AUTO: 3.88 M/UL (ref 4.2–5.4)
WBC # BLD AUTO: 10.5 K/UL (ref 4.8–10.8)

## 2023-05-06 PROCEDURE — 700111 HCHG RX REV CODE 636 W/ 250 OVERRIDE (IP): Performed by: INTERNAL MEDICINE

## 2023-05-06 PROCEDURE — 700102 HCHG RX REV CODE 250 W/ 637 OVERRIDE(OP): Performed by: INTERNAL MEDICINE

## 2023-05-06 PROCEDURE — 97535 SELF CARE MNGMENT TRAINING: CPT

## 2023-05-06 PROCEDURE — G0378 HOSPITAL OBSERVATION PER HR: HCPCS

## 2023-05-06 PROCEDURE — 96372 THER/PROPH/DIAG INJ SC/IM: CPT

## 2023-05-06 PROCEDURE — 97166 OT EVAL MOD COMPLEX 45 MIN: CPT

## 2023-05-06 PROCEDURE — 99233 SBSQ HOSP IP/OBS HIGH 50: CPT | Performed by: INTERNAL MEDICINE

## 2023-05-06 PROCEDURE — A9270 NON-COVERED ITEM OR SERVICE: HCPCS | Performed by: INTERNAL MEDICINE

## 2023-05-06 PROCEDURE — 97162 PT EVAL MOD COMPLEX 30 MIN: CPT

## 2023-05-06 PROCEDURE — 94760 N-INVAS EAR/PLS OXIMETRY 1: CPT

## 2023-05-06 PROCEDURE — 36415 COLL VENOUS BLD VENIPUNCTURE: CPT

## 2023-05-06 PROCEDURE — 85027 COMPLETE CBC AUTOMATED: CPT

## 2023-05-06 RX ORDER — CHOLECALCIFEROL (VITAMIN D3) 125 MCG
10 CAPSULE ORAL NIGHTLY
Status: DISCONTINUED | OUTPATIENT
Start: 2023-05-06 | End: 2023-05-09 | Stop reason: HOSPADM

## 2023-05-06 RX ADMIN — SENNOSIDES AND DOCUSATE SODIUM 2 TABLET: 50; 8.6 TABLET ORAL at 05:42

## 2023-05-06 RX ADMIN — SENNOSIDES AND DOCUSATE SODIUM 2 TABLET: 50; 8.6 TABLET ORAL at 17:11

## 2023-05-06 RX ADMIN — HEPARIN SODIUM 5000 UNITS: 5000 INJECTION, SOLUTION INTRAVENOUS; SUBCUTANEOUS at 12:57

## 2023-05-06 RX ADMIN — OXYCODONE HYDROCHLORIDE 10 MG: 10 TABLET ORAL at 00:20

## 2023-05-06 RX ADMIN — Medication 10 MG: at 21:40

## 2023-05-06 RX ADMIN — OXYCODONE HYDROCHLORIDE 10 MG: 10 TABLET ORAL at 10:06

## 2023-05-06 RX ADMIN — ATORVASTATIN CALCIUM 40 MG: 40 TABLET, FILM COATED ORAL at 17:12

## 2023-05-06 RX ADMIN — POLYETHYLENE GLYCOL 3350 1 PACKET: 17 POWDER, FOR SOLUTION ORAL at 05:41

## 2023-05-06 RX ADMIN — HEPARIN SODIUM 5000 UNITS: 5000 INJECTION, SOLUTION INTRAVENOUS; SUBCUTANEOUS at 21:39

## 2023-05-06 RX ADMIN — LOSARTAN POTASSIUM 25 MG: 25 TABLET, FILM COATED ORAL at 17:12

## 2023-05-06 RX ADMIN — OMEPRAZOLE 20 MG: 20 CAPSULE, DELAYED RELEASE ORAL at 05:51

## 2023-05-06 RX ADMIN — OXYCODONE HYDROCHLORIDE 15 MG: 10 TABLET ORAL at 17:12

## 2023-05-06 RX ADMIN — BISACODYL 10 MG: 10 SUPPOSITORY RECTAL at 12:50

## 2023-05-06 RX ADMIN — MAGNESIUM HYDROXIDE 30 ML: 400 SUSPENSION ORAL at 14:24

## 2023-05-06 RX ADMIN — DULOXETINE HYDROCHLORIDE 20 MG: 20 CAPSULE, DELAYED RELEASE ORAL at 17:11

## 2023-05-06 RX ADMIN — DULOXETINE HYDROCHLORIDE 20 MG: 20 CAPSULE, DELAYED RELEASE ORAL at 05:51

## 2023-05-06 RX ADMIN — ACETAMINOPHEN 1000 MG: 500 TABLET ORAL at 21:39

## 2023-05-06 RX ADMIN — HEPARIN SODIUM 5000 UNITS: 5000 INJECTION, SOLUTION INTRAVENOUS; SUBCUTANEOUS at 05:41

## 2023-05-06 ASSESSMENT — COGNITIVE AND FUNCTIONAL STATUS - GENERAL
STANDING UP FROM CHAIR USING ARMS: A LITTLE
MOVING TO AND FROM BED TO CHAIR: A LITTLE
TOILETING: A LOT
CLIMB 3 TO 5 STEPS WITH RAILING: A LITTLE
TURNING FROM BACK TO SIDE WHILE IN FLAT BAD: A LITTLE
HELP NEEDED FOR BATHING: A LOT
DRESSING REGULAR LOWER BODY CLOTHING: A LOT
WALKING IN HOSPITAL ROOM: A LITTLE
MOBILITY SCORE: 18
DAILY ACTIVITIY SCORE: 18
SUGGESTED CMS G CODE MODIFIER MOBILITY: CK
SUGGESTED CMS G CODE MODIFIER DAILY ACTIVITY: CK
MOVING FROM LYING ON BACK TO SITTING ON SIDE OF FLAT BED: A LITTLE

## 2023-05-06 ASSESSMENT — PAIN DESCRIPTION - PAIN TYPE
TYPE: CHRONIC PAIN;SURGICAL PAIN
TYPE: ACUTE PAIN;CHRONIC PAIN;SURGICAL PAIN
TYPE: SURGICAL PAIN;CHRONIC PAIN
TYPE: ACUTE PAIN
TYPE: SURGICAL PAIN;CHRONIC PAIN;ACUTE PAIN
TYPE: ACUTE PAIN
TYPE: SURGICAL PAIN;CHRONIC PAIN
TYPE: SURGICAL PAIN;CHRONIC PAIN

## 2023-05-06 ASSESSMENT — ACTIVITIES OF DAILY LIVING (ADL): TOILETING: INDEPENDENT

## 2023-05-06 ASSESSMENT — GAIT ASSESSMENTS
GAIT LEVEL OF ASSIST: CONTACT GUARD ASSIST
DISTANCE (FEET): 20
ASSISTIVE DEVICE: FRONT WHEEL WALKER

## 2023-05-06 NOTE — DISCHARGE PLANNING
Received choice form @: [employer] from [employer]  Agency/Facility name: [facility]  Sent referral per choice form @: 1439  Referral sent to [facility] as per request.

## 2023-05-06 NOTE — PROGRESS NOTES
Pt up in chair for meal time. Pt verbalizes level of comfort increased while at rest even up in the chair, but surgical site still continues to hurt.   Medications administered for stool regimen, call light within reach when needing to get up to bathroom     Note medication for Hydromorphone due at 1200 - this is not an RN order to be administered per pharmacy. This is the patients pain pump medication   Per pharmacy - This is the patient's supplied pump, there is no action on this order/ do not act on this order. There is nothing to do.

## 2023-05-06 NOTE — DISCHARGE PLANNING
Case Management Discharge Planning    Admission Date: 5/4/2023  GMLOS:    ALOS: 0    6-Clicks ADL Score: 18  6-Clicks Mobility Score: 18      Anticipated Discharge Dispo: Discharge Disposition: D/T to SNF with Medicare cert in anticipation of skilled care (03)    DME Needed: No    Action(s) Taken: Spoke to PT Renetta. Renetta recommending post-acute placement. Referrals faxed by D[facility], and [facility] per choice form.    Escalations Completed: None    Medically Clear: No    Next Steps: f/u with DPA regarding acceptance    Barriers to Discharge: Medical clearance and Pending Placement

## 2023-05-06 NOTE — PROGRESS NOTES
Received report from night shift RN. Assumed pt care 0700  Pt is A&Ox4, resting comfortably in bed. Plan of care reviewed for activities and goals this shift. Medication eduction provided.  Pt verbalizes understanding of plan of care for this shift. Pt c/o pain all over all the time, nothing helps her pain. Surgical site CDI, pt c/o pain at surgical site as well as generalized.   Pt requested that I just let her sleep this shift.   Medications for  pain administered. Pt falls asleep but easily awakened  Patients needs attended well. Pt appears to have no distress noted resting comfortably eyes closed, continuous pulse ox monitoring on. VSS.       Fall precautions in place. Bed at lowest position. Call light and personal belongings within reach.   Hourly rounding in progress.    Fall prevention protocol in place.   1. Bed in low position  2. Call light within reach  3. Belongings nearby  Pt fall prevention education provided. Pt/Family verbalize understanding.

## 2023-05-06 NOTE — THERAPY
Occupational Therapy   Initial Evaluation     Patient Name: Chelly Decker  Age:  87 y.o., Sex:  female  Medical Record #: [id number]  Today's Date: 5/6/2023     Precautions: Fall Risk    Assessment  Patient is 87 y.o. female with a diagnosis of postoperative pain. S/p pain pump revision/replacement. Lumbar spinal stenosis. Pt is A&Ox3. Lives with dtr who works; pt is alone 12-14hrs/day. Pt reports limited household mobility using fww; quite sedentary but is Indep with ADL's and cares for 13 y/o small dog. Currently limited by incision pain, impaired balance, decreased activity tolerance, generalized weakness, increased difficulty with basic ADL's. Pt is not at her baseline level, is at risk for falls; acute OT will follow while in house. At this time, pt is not safe for dc home; recommend post-acute inpt rehab upon dc.           Plan  Occupational Therapy Initial Treatment Plan   Treatment Interventions: Self Care / Activities of Daily Living, Neuro Re-Education / Balance, Therapeutic Activity  Treatment Frequency: 4 Times per Week  Duration: Until Therapy Goals Met    DC Equipment Recommendations: Unable to determine at this time  Discharge Recommendations: Recommend post-acute placement for additional occupational therapy services prior to discharge home     Subjective  Agreeable     Objective     05/06/23 1345   Prior Living Situation   Prior Services Intermittent Physical Support for ADL Per Family   Housing / Facility 1 Story House   Bathroom Set up Walk In Shower;Shower Chair   Equipment Owned Front-Wheel Walker;Tub / Shower Seat;Grab Bar(s) In Tub / Shower   Lives with - Patient's Self Care Capacity Adult Children   Comments Lives with dtr who works nights beginning at 4pm. Pt lives in apt builit on to dtr's house; pt sleeps during days and is up during nights as well. Pt has a small dog which lives with pt. Fall hx.   Prior Level of ADL Function   Self Feeding Independent   Grooming / Hygiene Independent    Bathing Independent   Dressing Independent   Toileting Independent   Prior Level of IADL Function   Medication Management Unable To Determine At This Time   Laundry Requires Assist   Kitchen Mobility Independent   Finances Unable To Determine At This Time   Home Management Independent   Shopping Requires Assist   Prior Level Of Mobility Independent With Device in Home   Driving / Transportation Relatives / Others Provide Transportation   Occupation (Pre-Hospital Vocational) Retired Due To Age   Leisure Interests Pets;Family   History of Falls   History of Falls Yes   Precautions   Precautions Fall Risk   Vitals   Pulse Oximetry 97 %   O2 (LPM) 2   O2 Delivery Device Nasal Cannula;None - Room Air   Pain 0 - 10 Group   Location Back   Location Orientation Right   Therapist Pain Assessment Post Activity Pain Same as Prior to Activity;Nurse Notified   Cognition    Cognition / Consciousness WDL   Level of Consciousness Alert   Passive ROM Upper Body   Passive ROM Upper Body WDL   Active ROM Upper Body   Active ROM Upper Body  WDL   Strength Upper Body   Upper Body Strength  X   Comments generalized weakness   Upper Body Muscle Tone   Upper Body Muscle Tone  WDL   Coordination Upper Body   Coordination WDL   Balance Assessment   Sitting Balance (Static) Fair +   Sitting Balance (Dynamic) Fair   Standing Balance (Static) Fair   Standing Balance (Dynamic) Fair -   Weight Shift Sitting Fair   Weight Shift Standing Fair   Comments fww   ADL Assessment   Eating Modified Independent   Grooming Standby Assist;Seated   Upper Body Dressing Supervision   Lower Body Dressing Moderate Assist   Toileting Minimal Assist   How much help from another person does the patient currently need...   Putting on and taking off regular lower body clothing? 2   Bathing (including washing, rinsing, and drying)? 2   Toileting, which includes using a toilet, bedpan, or urinal? 2   Putting on and taking off regular upper body clothing? 4   Taking  "care of personal grooming such as brushing teeth? 4   Eating meals? 4   6 Clicks Daily Activity Score 18   Functional Mobility   Sit to Stand Contact Guard Assist   Bed, Chair, Wheelchair Transfer Contact Guard Assist   Toilet Transfers Minimal Assist   Transfer Method Stand Step   Visual Perception   Visual Perception  WDL   Activity Tolerance   Sitting in Chair >1 hr   Sitting Edge of Bed 8   Standing 12 total   Short Term Goals   Short Term Goal # 1 Grooming at sink, tolerating6-8\" standing, with SBA within 5 days   Short Term Goal # 2 LB dressing with CGA within 5 days   Short Term Goal # 3 ADL trasnfers with Spv/Mod Indep within 5 days   Education Group   Education Provided Home Safety;Activities of Daily Living   Role of Occupational Therapist Patient Response Patient;Family;Acceptance;Verbal Demonstration   Home Safety Patient Response Patient;Family;Acceptance;Explanation;Verbal Demonstration   ADL Patient Response Patient;Family;Acceptance;Action Demonstration   Occupational Therapy Initial Treatment Plan    Treatment Interventions Self Care / Activities of Daily Living;Neuro Re-Education / Balance;Therapeutic Activity   Treatment Frequency 4 Times per Week   Duration Until Therapy Goals Met   Problem List   Problem List Decreased Active Daily Living Skills;Decreased Homemaking Skills;Decreased Functional Mobility;Decreased Activity Tolerance;Impaired Postural Control / Balance   Anticipated Discharge Equipment and Recommendations   DC Equipment Recommendations Unable to determine at this time   Discharge Recommendations Recommend post-acute placement for additional occupational therapy services prior to discharge home   Interdisciplinary Plan of Care Collaboration   IDT Collaboration with  Nursing;Family / Caregiver   Patient Position at End of Therapy Seated;Family / Friend in Room;Phone within Reach;Tray Table within Reach;Call Light within Reach   Collaboration Comments aware of visit. dc planning. son " from [place] visiting.

## 2023-05-06 NOTE — PROGRESS NOTES
"Waiting for Pain Pump Printout to be faxed from Dr. Lozoya's office. Per Dr's office, person that takes care of pain pump for pt's, is off today. Office will attempt to find Pt's settings , etc and Fax over. Per office, Pt was \"due to get pain pump filled today \"and \"now they know why she was a no show\".I updated [facility].   "

## 2023-05-06 NOTE — PROGRESS NOTES
Pt up to bathroom + void and + stool after suppository administered. Pt still c/o feeling constipated, MOM administered.   Assistance provided back to bed. Pt completed oral care while in bed.   Son at bedside for plan of care.   [facility]   Fall prevention protocol in place.   1. Bed in low position  2. Call light within reach  3. Belongings nearby  Pt fall prevention education provided. Pt/Family verbalize understanding.

## 2023-05-06 NOTE — CARE PLAN
"The patient is Stable - Low risk of patient condition declining or worsening    Shift Goals  Clinical Goals: pain controlled with current regimen, safety  Patient Goals: \"get stronger, have a bowel movement\"  Family Goals: n/a    Progress made toward(s) clinical / shift goals:  Pain controlled with PRN medications. Ambulated to bathroom x1 assist, tolerated well. No BM over night, will give scheduled bowel regimen medications this morning.      Problem: Pain - Standard  Goal: Alleviation of pain or a reduction in pain to the patient’s comfort goal  Outcome: Progressing     Problem: Fall Risk  Goal: Patient will remain free from falls  Outcome: Progressing     Problem: Knowledge Deficit - Standard  Goal: Patient and family/care givers will demonstrate understanding of plan of care, disease process/condition, diagnostic tests and medications  Outcome: Progressing     Problem: Early Mobilization - Post Surgery  Goal: Early mobilization post surgery  Outcome: Progressing     Problem: Pain - Post Surgery  Goal: Alleviation or reduction of pain post surgery  Outcome: Progressing     Problem: Wound/ / Incision Healing  Goal: Patient's wound/surgical incision will decrease in size and heals properly  Outcome: Progressing     Problem: Bowel Elimination  Goal: Establish and maintain regular bowel function  Outcome: Progressing     Problem: Venous Thromboembolism (VTE) Prevention  Goal: The patient will remain free from venous thromboembolism (VTE)  Outcome: Progressing     "

## 2023-05-06 NOTE — PROGRESS NOTES
Received report from day shift RN. Pt resting in bed, A&Ox4. Denies any needs. Reviewed plan of care for the night.

## 2023-05-06 NOTE — ASSESSMENT & PLAN NOTE
"Desiring [employer] for home,instructed to buy on [employer]  Pt sees outpatient urology, neurostimulation of pelvic floor is next she states she has struggled with this for years and has tried \"everything else under the sun.\"  "

## 2023-05-06 NOTE — PROGRESS NOTES
Pt min assist for pt to sit to edge of bed. Pt does however c/o of surgical pain in right lower back side. Pt is able to stand up to FWW self, and walk slowly to bathroom. Pt c/o pain during the entire ambulation destination.   Pt verbalizes pain medications administered do not work. Pt does c/o more pain at the surgical site than her generalized chronic pain.   Plan of care for safety in hospital room.   Pt needs encouragement to perform activities such as calling for assistance and ambulating to bathroom   Fall prevention protocol in place.   1. Bed in low position  2. Call light within reach  3. Belongings nearby  Pt fall prevention education provided. Pt/Family verbalize understanding.

## 2023-05-06 NOTE — PROGRESS NOTES
"Hospital Medicine Daily Progress Note    Date of Service  5/6/2023    Chief Complaint  Chelly Decker is a 87 y.o. female admitted 5/4/2023 with post op pain    Hospital Course  Per HPI \"87 y.o. female with CKD stage III, HFpEF, chronic low back pain due to lumbar spinal stenosis, and follows outpatient pain management (Dr. Lozoya).  She has an existing intrathecal pain pump in place, which unfortunately has dehisced, and so today Dr. Lozoya did an intrathecal pain pump revision/replacement.  Postoperatively, she had significant pain uncontrolled with multiple doses of IV analgesics, and thus pain management requested hospitalist service to admit for overnight observation and pain control.\"    Interval Problem Update  - reports ongoing pain at site of the new pain pump  -PT rec'd SNF pt agreeable  -discussed plan of care w/ son at bedside    I have discussed this patient's plan of care and discharge plan at IDT rounds today with Case Management, Nursing, Nursing leadership, and other members of the IDT team.    Consultants/Specialty  Dr. Lozoya    Code Status  Full Code    Disposition  Patient is not medically cleared for discharge.   Anticipate discharge to to home with close outpatient follow-up.  I have placed the appropriate orders for post-discharge needs.    Review of Systems  Review of Systems   All other systems reviewed and are negative.     Physical Exam  Temp:  [36.7 °C (98 °F)-37.1 °C (98.8 °F)] 36.8 °C (98.2 °F)  Pulse:  [60-94] 94  Resp:  [16-19] 18  BP: ()/(42-79) 126/79  SpO2:  [90 %-97 %] 94 %    Physical Exam  General: NAD, appears in pain  HEENT: PERRLA, EOMI  Cards: RRR, no murmur or gallops, no tenderness of chest wall, JVP nl  Pulm: normal respiratory effort, CTAB, no wheezes or rhonchi  Abdomen: soft, incision site anteriorly c/d/I, pump palpable with no TTP  MSK: back pump with dressing on, minimal saturation w/ blood, TTP  Neuro: CN II-XII grossly intact, sensation/strength intact, " AAOx3  Psych: Appropriate mood   Fluids    Intake/Output Summary (Last 24 hours) at 5/6/2023 1531  Last data filed at 5/6/2023 1047  Gross per 24 hour   Intake 880 ml   Output 1850 ml   Net -970 ml       Laboratory  Recent Labs     05/04/23  1340 05/05/23  0305 05/06/23  0237   WBC 8.9 9.1 10.5   RBC 4.47 3.78* 3.88*   HEMOGLOBIN 13.1 11.0* 11.5*   HEMATOCRIT 40.7 35.1* 36.3*   MCV 91.1 92.9 93.6   MCH 29.3 29.1 29.6   MCHC 32.2* 31.3* 31.7*   RDW 47.8 49.2 50.3*   PLATELETCT 226 205 197   MPV 10.0 10.3 10.0     Recent Labs     05/04/23  1340 05/05/23  0305   SODIUM 138 137   POTASSIUM 3.8 4.8   CHLORIDE 101 103   CO2 29 27   GLUCOSE 109* 172*   BUN 29* 24*   CREATININE 0.93 0.88   CALCIUM 10.7* 9.5     Recent Labs     05/04/23  1340   INR 0.92               Imaging  DX-PORTABLE FLUORO > 1 HOUR   Final Result      Portable fluoroscopy utilized for 10.8 seconds.         INTERPRETING LOCATION: [address] [place] [address], [id number]           Assessment/Plan  * Postoperative pain- (present on admission)  Assessment & Plan  - This is in the setting of chronic pain. S/p intrathecal pain pump revision/replacement. The new pump is in lower back where patient is experiencing severe pain  - pt reports dilaudid basal infusion of 0.75mg continuously, pharmacy to verify  -I will continue bowel regimen to avoid constipation while patient is on high doses of narcotics  - anxiety medications were also started  Continue on PRN basis  -I discussed w/ , we will attempt q4hr oxycodone. Since 10mg oxy patient isn't experiencing much relief, will try 15mg oxycodone q4hr PRN. Pt is asking to dc IV dilaudid. Per Dr. Lozoya pain should be better by now...    Chronic hypoxemic respiratory failure (HCC)  Assessment & Plan  Stable at baseline. Monitor w/ high dose narcotics.    S/P insertion of intrathecal pump  Assessment & Plan  As above, followed by Dr. Lozoya as outpatient    Other hyperlipidemia- (present on admission)  Assessment &  "Plan  - Resume home Lipitor.    Chronic kidney disease, stage 3a (HCC)- (present on admission)  Assessment & Plan  - Renal function appears to be stable at baseline.  Continue to monitor.  - avoid nephrotoxins, and continue to renally dose all medications.    (HFpEF) heart failure with preserved ejection fraction (HCC)- (present on admission)  Assessment & Plan  - Stable, not decompensated.  -Monitor for signs of volume overload.  -continue home meds.    Chronic obstructive pulmonary disease (HCC)- (present on admission)  Assessment & Plan  - Not in acute exacerbation.  Continue DuoNeb, and as needed albuterol.  She will be on RT protocol while in the hospital.    Urge incontinence of urine- (present on admission)  Assessment & Plan  Desiring [employer] for home,instructed to buy on [employer]  Pt sees outpatient urology, neurostimulation of pelvic floor is next she states she has struggled with this for years and has tried \"everything else under the sun.\"    Primary hypertension- (present on admission)  Assessment & Plan  Continue diltiazem, Lasix, losartan, and Aldactone.  Monitor blood pressure trend closely with as needed IV labetalol for significant hypertension.  Treat pain         VTE prophylaxis: heparin ppx ordered    I have performed a physical exam and reviewed and updated ROS and Plan today (5/6/2023). In review of yesterday's note (5/5/2023), there are no changes except as documented above.        "

## 2023-05-07 ENCOUNTER — APPOINTMENT (OUTPATIENT)
Dept: RADIOLOGY | Facility: MEDICAL CENTER | Age: 88
End: 2023-05-07
Attending: INTERNAL MEDICINE
Payer: MEDICARE

## 2023-05-07 PROBLEM — G45.9 TIA (TRANSIENT ISCHEMIC ATTACK): Status: ACTIVE | Noted: 2023-05-07

## 2023-05-07 LAB
ANION GAP SERPL CALC-SCNC: 7 MMOL/L (ref 7–16)
BUN SERPL-MCNC: 20 MG/DL (ref 8–22)
CALCIUM SERPL-MCNC: 9.9 MG/DL (ref 8.4–10.2)
CHLORIDE SERPL-SCNC: 99 MMOL/L (ref 96–112)
CO2 SERPL-SCNC: 29 MMOL/L (ref 20–33)
CREAT SERPL-MCNC: 0.81 MG/DL (ref 0.5–1.4)
EKG IMPRESSION: NORMAL
GFR SERPLBLD CREATININE-BSD FMLA CKD-EPI: 71 ML/MIN/1.73 M 2
GLUCOSE SERPL-MCNC: 101 MG/DL (ref 65–99)
POTASSIUM SERPL-SCNC: 4.3 MMOL/L (ref 3.6–5.5)
SODIUM SERPL-SCNC: 135 MMOL/L (ref 135–145)

## 2023-05-07 PROCEDURE — 700102 HCHG RX REV CODE 250 W/ 637 OVERRIDE(OP): Performed by: INTERNAL MEDICINE

## 2023-05-07 PROCEDURE — 93010 ELECTROCARDIOGRAM REPORT: CPT | Performed by: INTERNAL MEDICINE

## 2023-05-07 PROCEDURE — A9270 NON-COVERED ITEM OR SERVICE: HCPCS | Performed by: INTERNAL MEDICINE

## 2023-05-07 PROCEDURE — 80048 BASIC METABOLIC PNL TOTAL CA: CPT

## 2023-05-07 PROCEDURE — 36415 COLL VENOUS BLD VENIPUNCTURE: CPT

## 2023-05-07 PROCEDURE — 700111 HCHG RX REV CODE 636 W/ 250 OVERRIDE (IP): Performed by: INTERNAL MEDICINE

## 2023-05-07 PROCEDURE — 700117 HCHG RX CONTRAST REV CODE 255: Performed by: STUDENT IN AN ORGANIZED HEALTH CARE EDUCATION/TRAINING PROGRAM

## 2023-05-07 PROCEDURE — 70496 CT ANGIOGRAPHY HEAD: CPT

## 2023-05-07 PROCEDURE — 83036 HEMOGLOBIN GLYCOSYLATED A1C: CPT

## 2023-05-07 PROCEDURE — 70498 CT ANGIOGRAPHY NECK: CPT

## 2023-05-07 PROCEDURE — 93005 ELECTROCARDIOGRAM TRACING: CPT | Performed by: INTERNAL MEDICINE

## 2023-05-07 PROCEDURE — 94760 N-INVAS EAR/PLS OXIMETRY 1: CPT

## 2023-05-07 PROCEDURE — 0042T CT-CEREBRAL PERFUSION ANALYSIS: CPT

## 2023-05-07 PROCEDURE — 96372 THER/PROPH/DIAG INJ SC/IM: CPT | Mod: XU

## 2023-05-07 PROCEDURE — G0378 HOSPITAL OBSERVATION PER HR: HCPCS

## 2023-05-07 PROCEDURE — 99232 SBSQ HOSP IP/OBS MODERATE 35: CPT | Performed by: INTERNAL MEDICINE

## 2023-05-07 RX ORDER — ASPIRIN 81 MG/1
81 TABLET, CHEWABLE ORAL DAILY
Status: DISCONTINUED | OUTPATIENT
Start: 2023-05-07 | End: 2023-05-09 | Stop reason: HOSPADM

## 2023-05-07 RX ORDER — LABETALOL HYDROCHLORIDE 5 MG/ML
10 INJECTION, SOLUTION INTRAVENOUS
Status: DISCONTINUED | OUTPATIENT
Start: 2023-05-07 | End: 2023-05-09 | Stop reason: HOSPADM

## 2023-05-07 RX ORDER — HYDRALAZINE HYDROCHLORIDE 20 MG/ML
10 INJECTION INTRAMUSCULAR; INTRAVENOUS
Status: DISCONTINUED | OUTPATIENT
Start: 2023-05-07 | End: 2023-05-09 | Stop reason: HOSPADM

## 2023-05-07 RX ORDER — ASPIRIN 300 MG/1
300 SUPPOSITORY RECTAL DAILY
Status: DISCONTINUED | OUTPATIENT
Start: 2023-05-07 | End: 2023-05-08

## 2023-05-07 RX ADMIN — ASPIRIN 81 MG 81 MG: 81 TABLET ORAL at 21:09

## 2023-05-07 RX ADMIN — ATORVASTATIN CALCIUM 40 MG: 40 TABLET, FILM COATED ORAL at 17:47

## 2023-05-07 RX ADMIN — IOHEXOL 50 ML: 350 INJECTION, SOLUTION INTRAVENOUS at 20:24

## 2023-05-07 RX ADMIN — SENNOSIDES AND DOCUSATE SODIUM 2 TABLET: 50; 8.6 TABLET ORAL at 05:25

## 2023-05-07 RX ADMIN — SPIRONOLACTONE 25 MG: 25 TABLET ORAL at 05:25

## 2023-05-07 RX ADMIN — IOHEXOL 100 ML: 350 INJECTION, SOLUTION INTRAVENOUS at 20:22

## 2023-05-07 RX ADMIN — SENNOSIDES AND DOCUSATE SODIUM 2 TABLET: 50; 8.6 TABLET ORAL at 17:47

## 2023-05-07 RX ADMIN — POLYETHYLENE GLYCOL 3350 1 PACKET: 17 POWDER, FOR SOLUTION ORAL at 05:24

## 2023-05-07 RX ADMIN — ACETAMINOPHEN 1000 MG: 500 TABLET ORAL at 21:11

## 2023-05-07 RX ADMIN — DULOXETINE HYDROCHLORIDE 20 MG: 20 CAPSULE, DELAYED RELEASE ORAL at 17:47

## 2023-05-07 RX ADMIN — Medication 10 MG: at 21:11

## 2023-05-07 RX ADMIN — DILTIAZEM HYDROCHLORIDE 120 MG: 120 CAPSULE, COATED, EXTENDED RELEASE ORAL at 05:25

## 2023-05-07 RX ADMIN — OMEPRAZOLE 20 MG: 20 CAPSULE, DELAYED RELEASE ORAL at 05:25

## 2023-05-07 RX ADMIN — LOSARTAN POTASSIUM 25 MG: 25 TABLET, FILM COATED ORAL at 17:47

## 2023-05-07 RX ADMIN — HEPARIN SODIUM 5000 UNITS: 5000 INJECTION, SOLUTION INTRAVENOUS; SUBCUTANEOUS at 17:46

## 2023-05-07 RX ADMIN — FUROSEMIDE 20 MG: 20 TABLET ORAL at 05:25

## 2023-05-07 RX ADMIN — DULOXETINE HYDROCHLORIDE 20 MG: 20 CAPSULE, DELAYED RELEASE ORAL at 05:25

## 2023-05-07 RX ADMIN — OXYCODONE HYDROCHLORIDE 15 MG: 10 TABLET ORAL at 11:08

## 2023-05-07 RX ADMIN — HEPARIN SODIUM 5000 UNITS: 5000 INJECTION, SOLUTION INTRAVENOUS; SUBCUTANEOUS at 05:24

## 2023-05-07 ASSESSMENT — PAIN DESCRIPTION - PAIN TYPE: TYPE: ACUTE PAIN

## 2023-05-07 NOTE — PROGRESS NOTES
Received bedside report from day shift RN at 19:15.   Assumed pt care. Pt seen alert, O2 on 2L. No signs of distress noted. Pt requested this nurse she wants to sleep. Instructed pt to use call light, verbalized understanding. Call light kept within reach. Bed alarm on. Safety and fall precautions in place.   No other needs at this time.   Will continue to monitor.

## 2023-05-07 NOTE — THERAPY
Physical Therapy   Initial Evaluation     Patient Name: Chelly Decker  Age:  87 y.o., Sex:  female  Medical Record #: [id number]  Today's Date: 5/6/2023     Precautions  Precautions: Fall Risk    Assessment  Patient is 87 y.o. female with a diagnosis of post operative pain.  Pt had an older pain stimulator removed from her anterior lower abdominal region and a  intrathecal pain pump replacement  placed in the Posterior R hip region . She has reported pain of 10 /10  since this surgery . Pt is slightly improved today and is willing to work with PT but states she continues to have pain of 8/10. .Additional factors influencing patient status / progress include her PMH of spinal stenosis, urge incontinence, hyperlipidemia and a 60 pkg yr smoking Hx ( pt quit 8 years ago).    Pt requires min a to CGA with transfers and ambulation and VC's to use railings and for proper sequence with transfers. Pt will benefit from acute care PT to progress to her prior LOF /baseline.   Plan    Physical Therapy Initial Treatment Plan   Treatment Plan : Bed Mobility, Gait Training, Therapeutic Activities, Therapeutic Exercise, Self Care / Home Evaluation  Treatment Frequency: 4 Times per Week  Duration: Until Therapy Goals Met    DC Equipment Recommendations: None  Discharge Recommendations: Recommend post-acute placement for additional physical therapy services prior to discharge home       Subjective  Pt is willing to work with PT and she continues to have pain with activity but she states it is reduced .        Objective     05/06/23 1302   Prior Living Situation   Prior Services Intermittent Physical Support for ADL Per Family   Housing / Facility 1 Story House   Steps Into Home 0   Steps In Home 0   Bathroom Set up Walk In Shower;Shower Chair   Equipment Owned Front-Wheel Walker;Tub / Shower Seat;Grab Bar(s) In Tub / Shower   Lives with - Patient's Self Care Capacity Adult Children   Comments pt states her daughter works at night and  her son is in [place], pt likes to sleep during the day   Prior Level of Functional Mobility   Bed Mobility Unable To Determine At This Time   Gait Analysis   Gait Level Of Assist Contact Guard Assist   Assistive Device Front Wheel Walker   Distance (Feet) 20   # of Times Distance was Traveled 4   Deviation Step To;Antalgic;Shuffled Gait;Decreased Heel Strike;Decreased Toe Off;Other (Comment)  (pt needs cues to straighten up with her upper trunk while standing and walking.)   Weight Bearing Status WBAT BLE   Functional Mobility   Sit to Stand Contact Guard Assist   Bed, Chair, Wheelchair Transfer Contact Guard Assist   Toilet Transfers Minimal Assist   Transfer Method Stand Step   Mobility in room , bathroom, x 2 back to bed.   Comments pt has groaning and grimacing with transitions from sit <>std   Patient / Family Goals    Patient / Family Goal #1 go to a SNF   Short Term Goals    Short Term Goal # 1 pt will get OOB and return BTB with HOB flat and railing down for safe bed mobility at home   Short Term Goal # 2 pt will transfer with the FWW and SPV in 4 tx's for safe transfers at home   Short Term Goal # 3 pt will ambulate 100' with a FWW and SPV for safe ambulation around the home at D/C   Short Term Goal # 4 pt will work on her Ther Ex's to maintain LE and core strength for good functional mobility and activity tolerance.   Anticipated Discharge Equipment and Recommendations   DC Equipment Recommendations None   Discharge Recommendations Recommend post-acute placement for additional physical therapy services prior to discharge home

## 2023-05-07 NOTE — PROGRESS NOTES
Received report from night shift RN. Assumed pt care 0700  Pt is A&Ox4, resting comfortably in bed. Plan of care reviewed for activities and goals this shift. Medication eduction provided.  Pt verbalizes understanding of plan of care for this shift. Pt verbalizes level of  pain more tolerable and improving. Denies any nausea at this moment.  Patients needs attended well. Fall precautions in place. Bed at lowest position. Call light and personal belongings within reach.   Hourly rounding in progress.  Fall prevention protocol in place.   1. Bed in low position  2. Call light within reach  3. Belongings nearby  Pt fall prevention education provided. Pt verbalize understanding.

## 2023-05-07 NOTE — PROGRESS NOTES
Pt sleeping in bed, verbalizes being very sleepy because she didn't get much sleep over the night.   I encouraged her to sit up in chair for meal time.   Offered for pt to shower today, she verbalizes she will see how she feels later.   Fall prevention protocol in place.   1. Bed in low position  2. Call light within reach  3. Belongings nearby  Pt fall prevention education provided. Pt/Family verbalize understanding.

## 2023-05-07 NOTE — PROGRESS NOTES
"Hospital Medicine Daily Progress Note    Date of Service  5/7/2023    Chief Complaint  Chelly Decker is a 87 y.o. female admitted 5/4/2023 with post op pain    Hospital Course  Per HPI \"87 y.o. female with CKD stage III, HFpEF, chronic low back pain due to lumbar spinal stenosis, and follows outpatient pain management (Dr. Lozoya).  She has an existing intrathecal pain pump in place, which unfortunately has dehisced, and so today Dr. Lozoya did an intrathecal pain pump revision/replacement.  Postoperatively, she had significant pain uncontrolled with multiple doses of IV analgesics, and thus pain management requested hospitalist service to admit for overnight observation and pain control.\"    Interval Problem Update  - reports sig improvement in the pain at the pump site  - still no Bms , declines enema    I have discussed this patient's plan of care and discharge plan at IDT rounds today with Case Management, Nursing, Nursing leadership, and other members of the IDT team.    Consultants/Specialty  Dr. Lozoya    Code Status  Full Code    Disposition  Patient is medically cleared for discharge.   Anticipate discharge to SNF  I have placed the appropriate orders for post-discharge needs.    Review of Systems  Review of Systems   All other systems reviewed and are negative.     Physical Exam  Temp:  [36.4 °C (97.6 °F)-36.8 °C (98.2 °F)] 36.7 °C (98 °F)  Pulse:  [62-94] 62  Resp:  [15-18] 18  BP: ()/(51-79) 143/70  SpO2:  [90 %-97 %] 97 %    Physical Exam  General: NAD, appears in pain  HEENT: PERRLA, EOMI  Cards: RRR, no murmur or gallops, no tenderness of chest wall, JVP nl  Pulm: normal respiratory effort, CTAB, no wheezes or rhonchi  Abdomen: soft, incision site anteriorly c/d/I, pump palpable with no TTP  MSK: back pump with dressing on, minimal saturation w/ blood, TTP  Neuro: CN II-XII grossly intact, sensation/strength intact, AAOx3  Psych: Appropriate mood   Fluids    Intake/Output Summary (Last 24 " hours) at 5/7/2023 1240  Last data filed at 5/7/2023 0500  Gross per 24 hour   Intake 360 ml   Output 200 ml   Net 160 ml       Laboratory  Recent Labs     05/04/23  1340 05/05/23  0305 05/06/23  0237   WBC 8.9 9.1 10.5   RBC 4.47 3.78* 3.88*   HEMOGLOBIN 13.1 11.0* 11.5*   HEMATOCRIT 40.7 35.1* 36.3*   MCV 91.1 92.9 93.6   MCH 29.3 29.1 29.6   MCHC 32.2* 31.3* 31.7*   RDW 47.8 49.2 50.3*   PLATELETCT 226 205 197   MPV 10.0 10.3 10.0     Recent Labs     05/04/23  1340 05/05/23  0305 05/07/23  0256   SODIUM 138 137 135   POTASSIUM 3.8 4.8 4.3   CHLORIDE 101 103 99   CO2 29 27 29   GLUCOSE 109* 172* 101*   BUN 29* 24* 20   CREATININE 0.93 0.88 0.81   CALCIUM 10.7* 9.5 9.9     Recent Labs     05/04/23  1340   INR 0.92               Imaging  DX-PORTABLE FLUORO > 1 HOUR   Final Result      Portable fluoroscopy utilized for 10.8 seconds.         INTERPRETING LOCATION: [address] [place] [address], [id number]           Assessment/Plan  * Postoperative pain- (present on admission)  Assessment & Plan  - This is in the setting of chronic pain. S/p intrathecal pain pump revision/replacement. The new pump is in lower back where patient is experiencing severe pain  - pt reports dilaudid basal infusion of 0.0275mg/hr continuously  -I will continue bowel regimen to avoid constipation while patient is on high doses of narcotics  - anxiety medications were also started  Continue on PRN basis  - Patient reports much improvement at the incision site with addition of oxycodone 15mg q4hr prn  - able to move around better and agreeable to SNF  - I updated the son regarding the plan of care and he also encouraged pt to opt for SNF    Chronic hypoxemic respiratory failure (HCC)  Assessment & Plan  Stable at baseline. Monitor w/ high dose narcotics.    S/P insertion of intrathecal pump  Assessment & Plan  As above, followed by Dr. Lozoya as outpatient  Gets 0.0275mg/hr of dilaudid via pump    Other hyperlipidemia- (present on admission)  Assessment &  "Plan  - Resume home Lipitor.    Chronic kidney disease, stage 3a (HCC)- (present on admission)  Assessment & Plan  - Renal function appears to be stable at baseline.  Continue to monitor.  - avoid nephrotoxins, and continue to renally dose all medications.    (HFpEF) heart failure with preserved ejection fraction (HCC)- (present on admission)  Assessment & Plan  - Stable, not decompensated.  -Monitor for signs of volume overload.  -continue home meds.    Chronic obstructive pulmonary disease (HCC)- (present on admission)  Assessment & Plan  - Not in acute exacerbation.  Continue d as needed albuterol.  She will be on RT protocol while in the hospital.    Urge incontinence of urine- (present on admission)  Assessment & Plan  Desiring l[employer] for home,instructed to buy on [employer]  Pt sees outpatient urology, neurostimulation of pelvic floor is next she states she has struggled with this for years and has tried \"everything else under the sun.\"    Primary hypertension- (present on admission)  Assessment & Plan  Continue diltiazem, Lasix, losartan, and Aldactone.  Monitor blood pressure trend closely with as needed IV labetalol for significant hypertension.  Treat pain         VTE prophylaxis: heparin ppx ordered    I have performed a physical exam and reviewed and updated ROS and Plan today (5/7/2023). In review of yesterday's note (5/6/2023), there are no changes except as documented above.        "

## 2023-05-07 NOTE — PROGRESS NOTES
Upon assessment this afternoon changes of irritation, itching and redness around the proximal area of the Tegaderm dressing placed over patients surgical incision.   Upon removal under the dressing was a skin tear and redness.   Cleansed with soap and water, covered with xeroform and gauze with paper tape.   Surgical incision closed with staples. Covered with foam dressing.   Pt tolerated well. Level of comfort increased after dressings changed.

## 2023-05-07 NOTE — CARE PLAN
"The patient is Stable - Low risk of patient condition declining or worsening    Shift Goals  Clinical Goals: Pt will be able to tolerate pain during ambulation this shift  Patient Goals: Per pt, \"get some sleep\"  Family Goals: N/a    Progress made toward(s) clinical / shift goals: Pt seen asleep, with calm unlabored breathing during the night. Assisted pt with ambulation to the bathroom, pt was able to tolerate pain during ambulation and has not requested for pain medications this shift. Per pt pain is still there \"But its so much better\" \"Its there when I'm moving\" per pt.    Patient is not progressing towards the following goals: N/a      "

## 2023-05-07 NOTE — PROGRESS NOTES
Assistance provided up to bathroom as needed, pt uses alcides light as needed. Ambulating to bathroom tolerable with FWW.   Pt tolerated both breakfast and lunch meal. Medications for pain effective, level of comfort increased with patients pain. Pt able to rest intermittently.

## 2023-05-07 NOTE — PROGRESS NOTES
"Rounded on pt. Pt denies nausea at this time, per pt her pain is \"OK\", encouraged pt to use call light when in need of assistance or if needing anything for pain. Pt verbalized understanding. Bed alarm kept on.  "

## 2023-05-08 ENCOUNTER — APPOINTMENT (OUTPATIENT)
Dept: RADIOLOGY | Facility: MEDICAL CENTER | Age: 88
End: 2023-05-08
Attending: INTERNAL MEDICINE
Payer: MEDICARE

## 2023-05-08 ENCOUNTER — APPOINTMENT (OUTPATIENT)
Dept: CARDIOLOGY | Facility: MEDICAL CENTER | Age: 88
End: 2023-05-08
Attending: INTERNAL MEDICINE
Payer: MEDICARE

## 2023-05-08 PROBLEM — R73.03 PREDIABETES: Status: ACTIVE | Noted: 2023-05-08

## 2023-05-08 PROBLEM — K64.4 EXTERNAL HEMORRHOID: Status: ACTIVE | Noted: 2023-05-08

## 2023-05-08 LAB
CHOLEST SERPL-MCNC: 115 MG/DL (ref 100–199)
EST. AVERAGE GLUCOSE BLD GHB EST-MCNC: 123 MG/DL
HBA1C MFR BLD: 5.9 % (ref 4–5.6)
HDLC SERPL-MCNC: 67 MG/DL
LDLC SERPL CALC-MCNC: 34 MG/DL
LV EJECT FRACT MOD 2C 99903: 68.45
LV EJECT FRACT MOD 4C 99902: 63.12
LV EJECT FRACT MOD BP 99901: 65.43
TRIGL SERPL-MCNC: 69 MG/DL (ref 0–149)

## 2023-05-08 PROCEDURE — 93306 TTE W/DOPPLER COMPLETE: CPT

## 2023-05-08 PROCEDURE — G0378 HOSPITAL OBSERVATION PER HR: HCPCS

## 2023-05-08 PROCEDURE — 70551 MRI BRAIN STEM W/O DYE: CPT

## 2023-05-08 PROCEDURE — 700102 HCHG RX REV CODE 250 W/ 637 OVERRIDE(OP): Performed by: INTERNAL MEDICINE

## 2023-05-08 PROCEDURE — 96372 THER/PROPH/DIAG INJ SC/IM: CPT

## 2023-05-08 PROCEDURE — 97535 SELF CARE MNGMENT TRAINING: CPT

## 2023-05-08 PROCEDURE — 93306 TTE W/DOPPLER COMPLETE: CPT | Mod: 26 | Performed by: STUDENT IN AN ORGANIZED HEALTH CARE EDUCATION/TRAINING PROGRAM

## 2023-05-08 PROCEDURE — 97164 PT RE-EVAL EST PLAN CARE: CPT | Mod: XU

## 2023-05-08 PROCEDURE — 700111 HCHG RX REV CODE 636 W/ 250 OVERRIDE (IP): Performed by: INTERNAL MEDICINE

## 2023-05-08 PROCEDURE — A9270 NON-COVERED ITEM OR SERVICE: HCPCS | Performed by: INTERNAL MEDICINE

## 2023-05-08 PROCEDURE — 96375 TX/PRO/DX INJ NEW DRUG ADDON: CPT | Mod: XU

## 2023-05-08 PROCEDURE — 36415 COLL VENOUS BLD VENIPUNCTURE: CPT

## 2023-05-08 PROCEDURE — 94760 N-INVAS EAR/PLS OXIMETRY 1: CPT

## 2023-05-08 PROCEDURE — 99232 SBSQ HOSP IP/OBS MODERATE 35: CPT | Performed by: INTERNAL MEDICINE

## 2023-05-08 PROCEDURE — 80061 LIPID PANEL: CPT

## 2023-05-08 PROCEDURE — 97168 OT RE-EVAL EST PLAN CARE: CPT | Mod: XU

## 2023-05-08 RX ORDER — LORAZEPAM 2 MG/ML
0.5 INJECTION INTRAMUSCULAR ONCE
Status: COMPLETED | OUTPATIENT
Start: 2023-05-08 | End: 2023-05-08

## 2023-05-08 RX ORDER — ASPIRIN 81 MG/1
81 TABLET, CHEWABLE ORAL DAILY
Qty: 100 TABLET | Refills: 0 | Status: ON HOLD | OUTPATIENT
Start: 2023-05-09 | End: 2023-08-29

## 2023-05-08 RX ORDER — OXYCODONE HYDROCHLORIDE 15 MG/1
15 TABLET ORAL EVERY 4 HOURS PRN
Qty: 5 TABLET | Refills: 0 | Status: SHIPPED | OUTPATIENT
Start: 2023-05-08 | End: 2023-05-13

## 2023-05-08 RX ORDER — HYDROCORTISONE ACETATE 25 MG/1
25 SUPPOSITORY RECTAL EVERY 12 HOURS
Status: DISCONTINUED | OUTPATIENT
Start: 2023-05-08 | End: 2023-05-09 | Stop reason: HOSPADM

## 2023-05-08 RX ADMIN — DULOXETINE HYDROCHLORIDE 20 MG: 20 CAPSULE, DELAYED RELEASE ORAL at 04:48

## 2023-05-08 RX ADMIN — ACETAMINOPHEN 1000 MG: 500 TABLET ORAL at 09:30

## 2023-05-08 RX ADMIN — OXYCODONE HYDROCHLORIDE 15 MG: 10 TABLET ORAL at 23:34

## 2023-05-08 RX ADMIN — OMEPRAZOLE 20 MG: 20 CAPSULE, DELAYED RELEASE ORAL at 04:48

## 2023-05-08 RX ADMIN — LORAZEPAM 0.5 MG: 2 INJECTION INTRAMUSCULAR; INTRAVENOUS at 10:50

## 2023-05-08 RX ADMIN — Medication 10 MG: at 23:33

## 2023-05-08 RX ADMIN — ACETAMINOPHEN 1000 MG: 500 TABLET ORAL at 23:33

## 2023-05-08 RX ADMIN — FUROSEMIDE 20 MG: 20 TABLET ORAL at 04:48

## 2023-05-08 RX ADMIN — BISACODYL 10 MG: 10 SUPPOSITORY RECTAL at 09:23

## 2023-05-08 RX ADMIN — HEPARIN SODIUM 5000 UNITS: 5000 INJECTION, SOLUTION INTRAVENOUS; SUBCUTANEOUS at 02:37

## 2023-05-08 RX ADMIN — HEPARIN SODIUM 5000 UNITS: 5000 INJECTION, SOLUTION INTRAVENOUS; SUBCUTANEOUS at 18:49

## 2023-05-08 RX ADMIN — OXYCODONE HYDROCHLORIDE 15 MG: 10 TABLET ORAL at 18:47

## 2023-05-08 RX ADMIN — POLYETHYLENE GLYCOL 3350 1 PACKET: 17 POWDER, FOR SOLUTION ORAL at 04:48

## 2023-05-08 RX ADMIN — SENNOSIDES AND DOCUSATE SODIUM 2 TABLET: 50; 8.6 TABLET ORAL at 04:47

## 2023-05-08 RX ADMIN — SENNOSIDES AND DOCUSATE SODIUM 2 TABLET: 50; 8.6 TABLET ORAL at 18:47

## 2023-05-08 RX ADMIN — DULOXETINE HYDROCHLORIDE 20 MG: 20 CAPSULE, DELAYED RELEASE ORAL at 18:47

## 2023-05-08 RX ADMIN — HEPARIN SODIUM 5000 UNITS: 5000 INJECTION, SOLUTION INTRAVENOUS; SUBCUTANEOUS at 09:22

## 2023-05-08 RX ADMIN — MAGNESIUM HYDROXIDE 30 ML: 400 SUSPENSION ORAL at 02:39

## 2023-05-08 RX ADMIN — ATORVASTATIN CALCIUM 40 MG: 40 TABLET, FILM COATED ORAL at 18:47

## 2023-05-08 RX ADMIN — HYDROCORTISONE ACETATE 25 MG: 25 SUPPOSITORY RECTAL at 18:52

## 2023-05-08 RX ADMIN — OXYCODONE HYDROCHLORIDE 15 MG: 10 TABLET ORAL at 12:01

## 2023-05-08 ASSESSMENT — COGNITIVE AND FUNCTIONAL STATUS - GENERAL
SUGGESTED CMS G CODE MODIFIER MOBILITY: CK
DRESSING REGULAR UPPER BODY CLOTHING: A LITTLE
SUGGESTED CMS G CODE MODIFIER DAILY ACTIVITY: CK
HELP NEEDED FOR BATHING: A LOT
TURNING FROM BACK TO SIDE WHILE IN FLAT BAD: A LITTLE
MOVING TO AND FROM BED TO CHAIR: A LITTLE
CLIMB 3 TO 5 STEPS WITH RAILING: A LOT
DAILY ACTIVITIY SCORE: 19
STANDING UP FROM CHAIR USING ARMS: A LITTLE
MOVING FROM LYING ON BACK TO SITTING ON SIDE OF FLAT BED: A LITTLE
WALKING IN HOSPITAL ROOM: A LITTLE
MOBILITY SCORE: 17
TOILETING: A LITTLE
DRESSING REGULAR LOWER BODY CLOTHING: A LITTLE

## 2023-05-08 ASSESSMENT — PATIENT HEALTH QUESTIONNAIRE - PHQ9
9. THOUGHTS THAT YOU WOULD BE BETTER OFF DEAD, OR OF HURTING YOURSELF: NOT AT ALL
1. LITTLE INTEREST OR PLEASURE IN DOING THINGS: NOT AT ALL
6. FEELING BAD ABOUT YOURSELF - OR THAT YOU ARE A FAILURE OR HAVE LET YOURSELF OR YOUR FAMILY DOWN: SEVERAL DAYS
4. FEELING TIRED OR HAVING LITTLE ENERGY: NOT AT ALL
3. TROUBLE FALLING OR STAYING ASLEEP OR SLEEPING TOO MUCH: NOT AT ALL
8. MOVING OR SPEAKING SO SLOWLY THAT OTHER PEOPLE COULD HAVE NOTICED. OR THE OPPOSITE, BEING SO FIGETY OR RESTLESS THAT YOU HAVE BEEN MOVING AROUND A LOT MORE THAN USUAL: NOT AT ALL
5. POOR APPETITE OR OVEREATING: NOT AT ALL
SUM OF ALL RESPONSES TO PHQ9 QUESTIONS 1 AND 2: 0
2. FEELING DOWN, DEPRESSED, IRRITABLE, OR HOPELESS: NOT AT ALL
SUM OF ALL RESPONSES TO PHQ QUESTIONS 1-9: 1
7. TROUBLE CONCENTRATING ON THINGS, SUCH AS READING THE NEWSPAPER OR WATCHING TELEVISION: NOT AT ALL

## 2023-05-08 ASSESSMENT — GAIT ASSESSMENTS
DISTANCE (FEET): 20
DEVIATION: ANTALGIC;BRADYKINETIC;SHUFFLED GAIT
ASSISTIVE DEVICE: FRONT WHEEL WALKER
GAIT LEVEL OF ASSIST: CONTACT GUARD ASSIST
DISTANCE (FEET): 15

## 2023-05-08 ASSESSMENT — PAIN DESCRIPTION - PAIN TYPE
TYPE: CHRONIC PAIN;SURGICAL PAIN

## 2023-05-08 NOTE — DISCHARGE PLANNING
[facility] TCN chart review completed. Collaborated with JALIL Daigle. Discussed current discharge considerations, currently noted to SNF level of care. Per previous TCN note, SNF referrals sent to [facility] per patient choice 5/6/2023. As of this note, noted patient declined by [facility] with [facility] and [facility]/  [facility] referrals still pending.     TCN met with patient at bedside. Discussed current discharge considerations with patient advising her first choice as [facility]/ [facility]. Patient stated she is willing to expand choice to other [facility] with the exception of [facility] in the event [facility] declined; but would like to wait for [facility] response at this time.    TCN collaborated with CM following visit with patient at bedside regarding patient verbalizations regarding choice. TCN will continue to follow and collaborate with discharge planning team as additional post acute needs arise. Thank you.    Completed  PT/OT evals completed with recs for post acute placement on 5/8/2023 and 5/6/2023  Choice obtained: SNF, HH DME (O2 if needed)  Acceptance pending at [facility], and [facility]  [facility] referral sent; although current discharge consideration is post-acute placement

## 2023-05-08 NOTE — PROGRESS NOTES
- Rapid response called due to acute onset word finding difficulty, and blurring of vision.  I responded to the rapid response personally, and discussed care with the patient and nursing staff.  Patient is an 87-year-old female with chronic pain who was initially admitted for further pain control following intrathecal pain pump revision/replacement on 5/4/2023.  Patient has not received any as needed narcotics since yesterday, but has Dilaudid through her intrathecal pump.  -On evaluation, NIH is 0.  Word finding difficulty and blurry vision have significantly improved.  Patient felt that she is now back to baseline.  She is conversant, coherent, and answers questions appropriately.  - I performed a bedside neuro exam, which was nonfocal.  She has no facial droop, and no pronator drift.  Motor 5/5 in all 4 extremities.  No noticeable speech deficits.  Sensory grossly intact in all 4 extremities and face.  -Will obtain a stat head CT, CTA of head and neck, along with head perfusion CT to evaluate.  As NIH is 0, no indication for tPA.  Further interventions/plan of care pending CT studies.  -Start empiric baby aspirin.  Continue statin.  Check lipid profile and HbA1c.  Neurochecks every 4 hours per per stroke protocol.  Need for permissive hypertension to be determined depending on CT head perfusion.    UPDATE: 8:56 PM  -Perfusion head CT showed 22 mL of mismatch volume likely representing ischemia/penumbra, with 0 mL of cerebral blood flow less than 30%/infarct.  Head CTA showed no LVO.  Neck CTA showed no high-grade stenosis.  -Will start permissive hypertension, with goal -220 and -120. Hold all home antihypertensives.     Time spent: The patient is critically ill,, with high chance of deterioration into stroke, and eventually significant debility or death if left untreated. The care that has been undertaken is medically complex. I spent 30 minutes CRITICAL CARE TIME, including managing medical  issues, coordination of care, not including doing procedures, with no overlap in critical care time.

## 2023-05-08 NOTE — DISCHARGE PLANNING
[facility] TCN chart review completed. Collaborated with DYLAN Carrera. Current discharge considerations are SNF. Note referrals sent to [facility] 5/6/23.    PT/OT evals completed with recommendation for post acute placement.    TCN will continue to follow and collaborate with discharge planning team as additional post acute needs arise. Thank you.    Completed today:  PT/OT evals completed with recs for post acute placement  Choice obtained: SNF, HH DME (O2 if needed)  Acceptance pending at [facility], [facility], and [facility]  [facility] referral sent

## 2023-05-08 NOTE — PROGRESS NOTES
"Hospital Medicine Daily Progress Note    Date of Service  5/8/2023    Chief Complaint  Chelly Decker is a 87 y.o. female admitted 5/4/2023 with post op pain    Hospital Course  Per HPI \"87 y.o. female with CKD stage III, HFpEF, chronic low back pain due to lumbar spinal stenosis, and follows outpatient pain management (Dr. Lozoya).  She has an existing intrathecal pain pump in place, which unfortunately has dehisced, and so today Dr. Lozoya did an intrathecal pain pump revision/replacement.  Postoperatively, she had significant pain uncontrolled with multiple doses of IV analgesics, and thus pain management requested hospitalist service to admit for overnight observation and pain control.\"    Interval Problem Update  - RRT called after patient reported difficulty speaking, pt states her vision never changed. She reported worsening pain in her legs and overall feeling unwell. Head perfusion CT showed a small area of mismatch c/f ischemia and area of completed infarct. MR brain done this morning and still pending  -continuing asa that was initiated and permissive HTN - her home meds diltiazem, lasix, losartan and aldactone all have been held  -SNF approval pending per CM  -having rectal pain from hemorrhoids      I have discussed this patient's plan of care and discharge plan at IDT rounds today with Case Management, Nursing, Nursing leadership, and other members of the IDT team.    Consultants/Specialty  Dr. Lozoya    Code Status  Full Code    Disposition  Patient is medically cleared for discharge.   Anticipate discharge to SNF  I have placed the appropriate orders for post-discharge needs.    Review of Systems  Review of Systems   All other systems reviewed and are negative.     Physical Exam  Temp:  [36.7 °C (98 °F)-36.8 °C (98.2 °F)] 36.8 °C (98.2 °F)  Pulse:  [65-98] 94  Resp:  [14-20] 17  BP: (105-148)/(70-84) 142/78  SpO2:  [92 %-97 %] 95 %    Physical Exam  General: NAD, appears in pain  HEENT: ARMANDO, " EOMI  Cards: RRR, no murmur or gallops, no tenderness of chest wall, JVP nl  Pulm: normal respiratory effort, CTAB, no wheezes or rhonchi  Abdomen: soft, incision site anteriorly c/d/I, pump palpable with no TTP  MSK: back pump with dressing on, minimal saturation w/ blood, TTP  Neuro: CN II-XII grossly intact, sensation/strength intact, AAOx3  Psych: Appropriate mood   Fluids    Intake/Output Summary (Last 24 hours) at 5/8/2023 1258  Last data filed at 5/8/2023 1201  Gross per 24 hour   Intake 680 ml   Output 1000 ml   Net -320 ml       Laboratory  Recent Labs     05/06/23  0237   WBC 10.5   RBC 3.88*   HEMOGLOBIN 11.5*   HEMATOCRIT 36.3*   MCV 93.6   MCH 29.6   MCHC 31.7*   RDW 50.3*   PLATELETCT 197   MPV 10.0     Recent Labs     05/07/23  0256   SODIUM 135   POTASSIUM 4.3   CHLORIDE 99   CO2 29   GLUCOSE 101*   BUN 20   CREATININE 0.81   CALCIUM 9.9               Recent Labs     05/08/23  0141   TRIGLYCERIDE 69   HDL 67   LDL 34       Imaging  EC-ECHOCARDIOGRAM COMPLETE W/O CONT   Final Result      CT-CTA NECK WITH & W/O-POST PROCESSING   Final Result         1.  No vascular occlusion, aneurysm, dissection, or high-grade stenosis.   2.  Emphysema         CT-CTA HEAD WITH & W/O-POST PROCESS   Final Result         1.  No large vessel occlusion or aneurysm identified.      CT-CEREBRAL PERFUSION ANALYSIS   Final Result         1.  Cerebral blood flow less than 30% likely representing completed infarct = 0 mL.      2.  T Max more than 6 seconds likely representing combination of completed infarct and ischemia = 22 mL.      3.  Mismatched volume likely representing ischemic brain/penumbra = 22 mL      4.  Please note that the cerebral perfusion was performed on the limited brain tissue around the basal ganglia region. Infarct/ischemia outside the CT perfusion sections can be missed in this study.      DX-PORTABLE FLUORO > 1 HOUR   Final Result      Portable fluoroscopy utilized for 10.8 seconds.         INTERPRETING  LOCATION: [address] [place] [address] [place], [address]      MR-BRAIN-W/O    (Results Pending)        Assessment/Plan  * Postoperative pain- (present on admission)  Assessment & Plan  - This is in the setting of chronic pain. S/p intrathecal pain pump revision/replacement. The new pump is in lower back where patient is experiencing severe pain  - pt reports dilaudid basal infusion of 0.0275mg/hr continuously  -I will continue bowel regimen to avoid constipation while patient is on high doses of narcotics  - anxiety medications were also started  Continue on PRN basis  - Patient reports much improvement at the incision site with addition of oxycodone 15mg q4hr prn  - able to move around better and agreeable to SNF    External hemorrhoid  Assessment & Plan  Rectal pain 2/2 what sounds like external hemorrhoids  Anusol supp ordered    Prediabetes  Assessment & Plan  a1c 5.9%, counseled on lifestyle modifications    TIA (transient ischemic attack)  Assessment & Plan  RRT called after patient reported difficulty speaking, pt states her vision never changed. She reported worsening pain in her legs and overall feeling unwell. Head perfusion CT showed a small area of mismatch c/f ischemia and area of completed infarct. MR brain done this morning and still pending  -continue asa, statin  -prediabetic with a1c 5.9%, LDL nl    Chronic hypoxemic respiratory failure (HCC)  Assessment & Plan  Stable at baseline. Monitor w/ high dose narcotics.    S/P insertion of intrathecal pump  Assessment & Plan  As above, followed by Dr. Lozoya as outpatient  Gets 0.0275mg/hr of dilaudid via pump    Other hyperlipidemia- (present on admission)  Assessment & Plan  - Resume home Lipitor.    Chronic kidney disease, stage 3a (HCC)- (present on admission)  Assessment & Plan  - Renal function appears to be stable at baseline.  Continue to monitor.  - avoid nephrotoxins, and continue to renally dose all medications.    (HFpEF) heart failure with preserved ejection  "fraction (HCC)- (present on admission)  Assessment & Plan  - Stable, not decompensated.  -Monitor for signs of volume overload.  -home meds currently held to allow permissive HTN    Chronic obstructive pulmonary disease (HCC)- (present on admission)  Assessment & Plan  - Not in acute exacerbation.  Continue d as needed albuterol.  She will be on RT protocol while in the hospital.    Urge incontinence of urine- (present on admission)  Assessment & Plan  Desiring purewick for home,instructed to buy on [employer]  Pt sees outpatient urology, neurostimulation of pelvic floor is next she states she has struggled with this for years and has tried \"everything else under the sun.\"    Primary hypertension- (present on admission)  Assessment & Plan  Given c/f TIA/CVA, holding diltiazem, Lasix, losartan, and Aldactone.  Resume at dc         VTE prophylaxis: heparin ppx ordered    I have performed a physical exam and reviewed and updated ROS and Plan today (5/8/2023). In review of yesterday's note (5/7/2023), there are no changes except as documented above.        "

## 2023-05-08 NOTE — DISCHARGE PLANNING
1500: АННА RICHARDSON spoke with Long at [facility] by phone regarding referral. Referral re-faxed. Long indicates she will review referral and get back to RN DYLAN in approximately 30 minutes. She indicated they do have beds available and would be able to take patient as early as tomorrow, if they can accept. She asked if transportation could be set up if they were able to accept. АННА RICHARDSON notified Merline LOCKHART with [facility]. Pending phone call back with acceptance decision.

## 2023-05-08 NOTE — THERAPY
Speech Language Therapy Contact Note    Patient Name: Chelly Decker  Age:  87 y.o., Sex:  female  Medical Record #: [id number]  Today's Date: 5/8/2023    Orders received d/t concern for stroke last night. Chart reviewed. Per EMR, pt noted with acute onset of word finding difficulty and blurred vision, which resolved and returned to baseline. Brain MRI pending. Will f/u on results and evaluate if indicated. Thank you.    Addendum (0498): Brain MRI was unremarkable for an acute abnormality. Will d/c orders at this time. Thank you.

## 2023-05-08 NOTE — THERAPY
"Occupational Therapy  Re-evaluation     Patient Name: Chelly Decker  Age:  87 y.o., Sex:  female  Medical Record #: [id number]  Today's Date: 5/8/2023     Precautions  Precautions: Fall Risk    Assessment    Re-evaluation completed per orders as pt has stroke-like symptoms last night with word finding difficulties.  Currently pt demos fluid speech and no new strength or functional deficits related to last nights event.  Pt appears to be progressing with her activity tolerance for ADl's, but still reports she is not at a safe baseline where she could manage her own self cares during the day and cleaning up after her dog's messes at home.  She does not have assist with self cares at home and needs to be indep.  Pt will benefit from further inpt post acute therapy prior to home.  OT will follow while in house.      Plan    Treatment Plan Status: Continue Current Treatment Plan  Type of Treatment: Self Care / Activities of Daily Living, Neuro Re-Education / Balance, Therapeutic Activity  Treatment Frequency: 4 Times per Week  Treatment Duration: Until Therapy Goals Met    DC Equipment Recommendations:  (heavy duty commode vs purewick for home (both options pt realizes are likely not covered by insurance))  Discharge Recommendations: Recommend post-acute placement for additional occupational therapy services prior to discharge home    Subjective    \"I need to be able to reach down and  my dogs droppings at home and I know I can't do that right now.\"     Objective       05/08/23 1535   Cognition    Cognition / Consciousness WDL   Level of Consciousness Alert   Comments Per notes pt with speech and word finding difficulty overnight, today presents hyperverbal, talking consistently for >45 min during session.  Missed 1-2 words but likely due to talking so fast. Appears at baseline with mentation per notes   Strength Upper Body   Upper Body Strength  X   Gross Strength Generalized Weakness, Equal Bilaterally.    Fine " "Motor / Dexterity    Comments  intact, able to manage items on tray, don glasses, use her phone   Balance   Sitting Balance (Static) Fair +   Sitting Balance (Dynamic) Fair   Standing Balance (Static) Fair -   Standing Balance (Dynamic) Fair -   Weight Shift Sitting Fair   Weight Shift Standing Fair   Skilled Intervention Verbal Cuing;Postural Facilitation   Comments cues for safe use of FWW, lets it get too far out in front of her   Bed Mobility    Supine to Sit Standby Assist  (use of rail)   Sit to Supine Standby Assist   Activities of Daily Living   Grooming Standby Assist;Seated   Lower Body Dressing Minimal Assist   Toileting Contact Guard Assist  (able to manage panties and hygiene)   Skilled Intervention Verbal Cuing   Comments cues for safety   How much help from another person does the patient currently need...   Putting on and taking off regular lower body clothing? 3   Bathing (including washing, rinsing, and drying)? 2   Toileting, which includes using a toilet, bedpan, or urinal? 3   Putting on and taking off regular upper body clothing? 3   Taking care of personal grooming such as brushing teeth? 4   Eating meals? 4   6 Clicks Daily Activity Score 19   Functional Mobility   Sit to Stand Contact Guard Assist   Bed, Chair, Wheelchair Transfer Contact Guard Assist   Toilet Transfers Contact Guard Assist  (with over toilet frame and use of grab bar on wall)   Transfer Method Stand Step   Mobility to BR and back to bed   Distance (Feet) 15   # of Times Distance was Traveled 2   Skilled Intervention Verbal Cuing   Activity Tolerance   Sitting in Chair 10 min toilet   Sitting Edge of Bed 5 min, 2 min   Standing 2 min x2   Short Term Goals   Short Term Goal # 1 Grooming at sink, tolerating6-8\" standing, with SBA within 5 days   Goal Outcome # 1 Progressing slower than expected   Short Term Goal # 2 LB dressing with CGA within 5 days   Goal Outcome # 2 Progressing as expected   Short Term Goal # 3 ADL " transfers with Spv/Mod Indep within 5 days   Goal Outcome # 3 Progressing as expected   Education Group   Education Provided Activities of Daily Living;Adaptive Equipment   ADL Patient Response Patient;Acceptance;Explanation;Verbal Demonstration   Adaptive Equipment Patient Response Patient;Acceptance;Explanation;Handout;Verbal Demonstration   Additional Comments long discussion with pt on possible options for managing her urinary frequency that keeps her up during the night.  She reports having to get up 8-9 times per night to void and bladder doesn't fully empty.  She expressed interest in having a purewick for home, OT also suggested bedside commode.  she reports she already has a BSC at home and feels like it is too wobbly to get on and off of on her own.  OT and pt looked online at options for heavy duty commode that is wider and more stable.  Also provided pt with website for [employer] in the event she wants to pursue that option as well.

## 2023-05-08 NOTE — PROGRESS NOTES
"Received bedside report from day shift RN at 1900. Pt seen AO4, resting in bed, O2 at 2L. Pain reported at 4/10, pain meds offered per pt per pain \"is ok\". No nausea reported at that time. POC disscussed, pt communicated goal for the night whi was to \"take a shower\" before bed. Per pt she wants to rest. No signs of distress noted at this time.     19:15 CNA calls this nurse, pt reported she is feeling signs of stroke. Per pt\" My words are all messed up, I'm having trouble reading my texts\". Assessed pt for signs of stroke, no facdeficits noted. VS taken, SpO2 at 93% on 2L.  Rapid response called, MD messaged through [employer]. MD at bedside at 19:27, orders placed for labs and imaging. Pt transported to imaging.    19:51 Pts sonTigre called for update. Son made aware of situation. Son communicated to this RN that prior to being taken to the hospital the pt had a fall. Asked if pt had hit her head during this fall, per son \"No she did not hit her head\". This nurse informed son we will give him an update once pts is back in the room, son requesting for pt to call.   21:03 spoke to Hospitalist Dr. Becker, clarified diet orders. Per MD proceed with dysphagia test then change to previous diet orders if pt passed.   21:10 pt passed dysphagia screen.  21:20 Phone call conversation through pts phone, updated pt and pts son Tigre. Informed that care team will get in touch with them in the morning once CT results available and interpreted.        5/8 02:06 Pt's daughter Pricila called for updates. Updated on situation and POC. MRI to be done tomorrow.        "

## 2023-05-08 NOTE — PROGRESS NOTES
Received report from night shift RN. Assumed pt care 0700  Pt is A&Ox4.  Plan of care reviewed for activities and goals this shift. Medications administered and eduction provided.    Pt verbalizes understanding of plan of care for this shift. Pt on room air; no signs of SOB/respiratory distress. VSS   Pt denies pain and nausea at this moment.  Patients needs attended well. Fall precautions in place. Bed at lowest position. Call light and personal belongings within reach.   Hourly rounding in progress.  Fall prevention protocol in place.   1. Bed in low position  2. Call light within reach  3. Belongings nearby  Pt fall prevention education provided. Pt/Family verbalize understanding.

## 2023-05-08 NOTE — ASSESSMENT & PLAN NOTE
RRT called after patient reported difficulty speaking, pt states her vision never changed. She reported worsening pain in her legs and overall feeling unwell. Head perfusion CT showed a small area of mismatch c/f ischemia and area of completed infarct. MR brain done this morning and still pending  -continue asa, statin  -prediabetic with a1c 5.9%, LDL nl  -continue permissive HTN

## 2023-05-08 NOTE — CARE PLAN
"The patient is Watcher - Medium risk of patient condition declining or worsening    Shift Goals  Clinical Goals: Pt NIHS scale will remain < 2 throughout shift, zero falls, safety maintained throughout the night  Patient Goals: Bed bath and sleep  Family Goals: Update son with POC    Progress made toward(s) clinical / shift goals:  NIHS scale score- 0 this shift, Q4 vitals and neurochecks done, VSS remained stable. Safety and fall precautions kept in place. Bed bath and snacks provided for comfort. Pt was seen asleep with calm unlabored breathing durirng rounds. Zero falls throughout shift. Family updated of POC.    Patient is not progressing towards the following goals: Pt anxious about MRI. Requesting antianxiety med \"Versed\" . This nurse informed pt I will communicate with day team for appropriate medication prior to MRI.       "

## 2023-05-08 NOTE — THERAPY
Physical Therapy   Re-evaluation     Patient Name: Chelly Decker  Age:  87 y.o., Sex:  female  Medical Record #: [id number]  Today's Date: 5/8/2023     Precautions  Precautions: Fall Risk    Assessment    PT Re-evaluation performed, pt had stroke-like symptoms last night, primarily involved speech. Pts speech symptoms have resolved, no deficits noted. Strength, balance, mobility is at baseline when compared to initial eval, no new deficits noted. R LE is weaker than L with chronic drop foot however pt confirms this is her baseline.  Pt is approp for SNF for further therapy, continue per POC.    Plan    Treatment Plan Status: Continue Current Treatment Plan  Type of Treatment: Bed Mobility, Gait Training, Therapeutic Activities, Therapeutic Exercise, Self Care / Home Evaluation  Treatment Frequency: 4 Times per Week  Treatment Duration: Until Therapy Goals Met    DC Equipment Recommendations: None  Discharge Recommendations: Recommend post-acute placement for additional physical therapy services prior to discharge home       05/08/23 0848   Cognition    Comments Pt reports difficulty with speech last night, couldn't say the right words and hard to talk   Passive ROM Lower Body   Passive ROM Lower Body WDL   Active ROM Lower Body    Active ROM Lower Body  X   Comments R foot chronic foot drop   Strength Lower Body   Lower Body Strength  X   Comments R foot DF 1/5; R PF 4/5; quads 4/5; R hamstring 4/5, R hip flex/ext 4-/5 (pt reports chronic weakness R side and states she is at baseline). L LE grossly 4+/5 at baseline   Sensation Lower Body   Paresthesia Present    Comments c/o paresthesia B LE which pt reports has worsened since pump insertion 5/4   Lower Body Muscle Tone   Lower Body Muscle Tone  WDL   Other Treatments   Other Treatments Provided B UE strength WFL   Balance   Sitting Balance (Static) Fair +   Sitting Balance (Dynamic) Fair   Standing Balance (Static) Fair -   Standing Balance (Dynamic) Poor +    Weight Shift Sitting Fair   Weight Shift Standing Fair   Skilled Intervention Postural Facilitation;Sequencing;Tactile Cuing;Verbal Cuing   Comments stdg with FWW   Bed Mobility    Supine to Sit Contact Guard Assist  (use of bed rail, cues for log rolling however pt prefers to get OOB her way)   Gait Analysis   Gait Level Of Assist Contact Guard Assist   Assistive Device Front Wheel Walker   Distance (Feet) 20   # of Times Distance was Traveled 1   Deviation Antalgic;Bradykinetic;Shuffled Gait   Skilled Intervention Postural Facilitation   Functional Mobility   Sit to Stand Minimal Assist   Toilet Transfers Minimal Assist   Activity Tolerance   Standing 3-4 min   Short Term Goals    Short Term Goal # 1 pt will get OOB and return BTB with HOB flat and railing down for safe bed mobility at home   Goal Outcome # 1 Progressing as expected   Short Term Goal # 2 pt will transfer with the FWW and SPV in 4 tx's for safe transfers at home   Goal Outcome # 2 Progressing as expected   Short Term Goal # 3 pt will ambulate 100' with a FWW and SPV for safe ambulation around the home at D/C   Goal Outcome # 3 Progressing as expected

## 2023-05-08 NOTE — PROGRESS NOTES
MRI screening form done. Per pt she has had MRI last year. Pt reported she has severe claustrophobia and requesting medication for this. Will communicate to day team.

## 2023-05-08 NOTE — PROGRESS NOTES
"1915 Rapid response called for patient c/o \"I can't say my words\"    1921   Pt has moderate  Dysphagia. NIH 1 per this rapid response RN.                1926 MD at bedside, pt's dysphagia resolved. NIH 0. Patient states \"I was having trouble getting my words out and even having trouble thinking of words to text on my phone. I was texting my daughter and then all of a sudden I couldn't text because I couldn't think of the words. It was really scary!\"    1945 Pt to CT    2020 CT started    2055 CT resulted, see MD note          "

## 2023-05-09 VITALS
BODY MASS INDEX: 23.55 KG/M2 | RESPIRATION RATE: 18 BRPM | TEMPERATURE: 97.3 F | DIASTOLIC BLOOD PRESSURE: 73 MMHG | HEART RATE: 72 BPM | OXYGEN SATURATION: 96 % | SYSTOLIC BLOOD PRESSURE: 145 MMHG | WEIGHT: 150.02 LBS | HEIGHT: 67 IN

## 2023-05-09 LAB
ANION GAP SERPL CALC-SCNC: 6 MMOL/L (ref 7–16)
BUN SERPL-MCNC: 21 MG/DL (ref 8–22)
CALCIUM SERPL-MCNC: 10.1 MG/DL (ref 8.4–10.2)
CHLORIDE SERPL-SCNC: 100 MMOL/L (ref 96–112)
CO2 SERPL-SCNC: 30 MMOL/L (ref 20–33)
CREAT SERPL-MCNC: 0.98 MG/DL (ref 0.5–1.4)
GFR SERPLBLD CREATININE-BSD FMLA CKD-EPI: 56 ML/MIN/1.73 M 2
GLUCOSE SERPL-MCNC: 137 MG/DL (ref 65–99)
POTASSIUM SERPL-SCNC: 4.7 MMOL/L (ref 3.6–5.5)
SODIUM SERPL-SCNC: 136 MMOL/L (ref 135–145)

## 2023-05-09 PROCEDURE — 36415 COLL VENOUS BLD VENIPUNCTURE: CPT

## 2023-05-09 PROCEDURE — 96372 THER/PROPH/DIAG INJ SC/IM: CPT

## 2023-05-09 PROCEDURE — 80048 BASIC METABOLIC PNL TOTAL CA: CPT

## 2023-05-09 PROCEDURE — 99239 HOSP IP/OBS DSCHRG MGMT >30: CPT | Performed by: HOSPITALIST

## 2023-05-09 PROCEDURE — A9270 NON-COVERED ITEM OR SERVICE: HCPCS | Performed by: INTERNAL MEDICINE

## 2023-05-09 PROCEDURE — G0378 HOSPITAL OBSERVATION PER HR: HCPCS

## 2023-05-09 PROCEDURE — 700111 HCHG RX REV CODE 636 W/ 250 OVERRIDE (IP): Performed by: INTERNAL MEDICINE

## 2023-05-09 PROCEDURE — 700102 HCHG RX REV CODE 250 W/ 637 OVERRIDE(OP): Performed by: INTERNAL MEDICINE

## 2023-05-09 RX ORDER — HYDROCORTISONE ACETATE 25 MG/1
25 SUPPOSITORY RECTAL EVERY 12 HOURS
Qty: 4 SUPPOSITORY | Refills: 0 | Status: SHIPPED | OUTPATIENT
Start: 2023-05-09 | End: 2023-08-23

## 2023-05-09 RX ADMIN — HEPARIN SODIUM 5000 UNITS: 5000 INJECTION, SOLUTION INTRAVENOUS; SUBCUTANEOUS at 11:04

## 2023-05-09 RX ADMIN — HYDROCORTISONE ACETATE 25 MG: 25 SUPPOSITORY RECTAL at 06:04

## 2023-05-09 RX ADMIN — POLYETHYLENE GLYCOL 3350 1 PACKET: 17 POWDER, FOR SOLUTION ORAL at 06:09

## 2023-05-09 RX ADMIN — ACETAMINOPHEN 1000 MG: 500 TABLET ORAL at 11:04

## 2023-05-09 RX ADMIN — HEPARIN SODIUM 5000 UNITS: 5000 INJECTION, SOLUTION INTRAVENOUS; SUBCUTANEOUS at 06:11

## 2023-05-09 RX ADMIN — OMEPRAZOLE 20 MG: 20 CAPSULE, DELAYED RELEASE ORAL at 06:11

## 2023-05-09 RX ADMIN — DULOXETINE HYDROCHLORIDE 20 MG: 20 CAPSULE, DELAYED RELEASE ORAL at 06:11

## 2023-05-09 RX ADMIN — ASPIRIN 81 MG 81 MG: 81 TABLET ORAL at 06:11

## 2023-05-09 RX ADMIN — OXYCODONE HYDROCHLORIDE 15 MG: 10 TABLET ORAL at 11:51

## 2023-05-09 RX ADMIN — SENNOSIDES AND DOCUSATE SODIUM 2 TABLET: 50; 8.6 TABLET ORAL at 06:09

## 2023-05-09 ASSESSMENT — PATIENT HEALTH QUESTIONNAIRE - PHQ9
8. MOVING OR SPEAKING SO SLOWLY THAT OTHER PEOPLE COULD HAVE NOTICED. OR THE OPPOSITE, BEING SO FIGETY OR RESTLESS THAT YOU HAVE BEEN MOVING AROUND A LOT MORE THAN USUAL: NOT AT ALL
6. FEELING BAD ABOUT YOURSELF - OR THAT YOU ARE A FAILURE OR HAVE LET YOURSELF OR YOUR FAMILY DOWN: SEVERAL DAYS
1. LITTLE INTEREST OR PLEASURE IN DOING THINGS: NOT AT ALL
3. TROUBLE FALLING OR STAYING ASLEEP OR SLEEPING TOO MUCH: NOT AT ALL
SUM OF ALL RESPONSES TO PHQ9 QUESTIONS 1 AND 2: 0
4. FEELING TIRED OR HAVING LITTLE ENERGY: NOT AT ALL
9. THOUGHTS THAT YOU WOULD BE BETTER OFF DEAD, OR OF HURTING YOURSELF: NOT AT ALL
7. TROUBLE CONCENTRATING ON THINGS, SUCH AS READING THE NEWSPAPER OR WATCHING TELEVISION: NOT AT ALL
SUM OF ALL RESPONSES TO PHQ QUESTIONS 1-9: 1
2. FEELING DOWN, DEPRESSED, IRRITABLE, OR HOPELESS: NOT AT ALL
5. POOR APPETITE OR OVEREATING: NOT AT ALL

## 2023-05-09 ASSESSMENT — PAIN SCALES - WONG BAKER: WONGBAKER_NUMERICALRESPONSE: HURTS A LITTLE MORE

## 2023-05-09 ASSESSMENT — PAIN DESCRIPTION - PAIN TYPE: TYPE: CHRONIC PAIN;SURGICAL PAIN

## 2023-05-09 NOTE — PROGRESS NOTES
Received report from dayshift RN. Pt resting in bed, awake. Pt A&O x4, on 2 LPM via NC. Pt complains 4/10 surgical pain. Pt updated with POC which includes pain mgt and stroke assessment Q12h. Call light within reach, fall precautions in place, all needs met at this time.

## 2023-05-09 NOTE — DISCHARGE SUMMARY
Discharge Summary    CHIEF COMPLAINT ON ADMISSION  Back Pain    Reason for Admission  Postoperative pain     Admission Date  5/4/2023    CODE STATUS  Full Code    HPI & HOSPITAL COURSE  This is a 87 y.o. female here for elective pain pump revision.     Chelly Decker has past medical history that includes CKD stage III, HFpEF, chronic low back pain due to lumbar spinal stenosis, and follows outpatient pain management (Dr. Lozoya).   On 5/4/2023, the patient had revision of intrathecal pain pump by Dr. Lozoya.  Postoperatively, she had significant uncontrolled pain and and thus pain management requested hospitalist service to admit pain control.     The patient had an episode of difficulty speaking on 5/8/2023.  Symptoms have resolved.  CT perfusion scan was abnormal but MRI of the brain performed on 5/8/2023 does no show any evidence of ischemia.  She will continue treatment with aspirin and statin.    The patient was evaluated by physical therapy and occupational therapy during this hospital stay and she will benefit from ongoing therapy at the skilled level of care.    Therefore, she is discharged in fair and stable condition to skilled nursing facility.    Discharge Date  5/9/2023    FOLLOW UP ITEMS POST DISCHARGE  To be determined by skilled nursing facility    DISCHARGE DIAGNOSES  Principal Problem:    Postoperative pain POA: Yes  Active Problems:    Primary hypertension POA: Yes    Urge incontinence of urine POA: Yes    Chronic obstructive pulmonary disease (HCC) POA: Yes    (HFpEF) heart failure with preserved ejection fraction (HCC) POA: Yes    Chronic kidney disease, stage 3a (HCC) POA: Yes    Other hyperlipidemia POA: Yes    S/P insertion of intrathecal pump POA: Unknown    Chronic hypoxemic respiratory failure (HCC) POA: Yes    TIA (transient ischemic attack) POA: No    Prediabetes POA: Yes    External hemorrhoid POA: Unknown  Resolved Problems:    * No resolved hospital problems. *      FOLLOW UP  Future  Appointments   Date Time Provider Department Center   6/27/2023 12:50 PM [facility]     Tavo Lozoya M.D.  [address]  [place] [id number] [phone]    Schedule an appointment as soon as possible for a visit      Kassi Carrillo M.D.  [address]  [facility] [id number] [phone]    Follow up        MEDICATIONS ON DISCHARGE     Medication List        START taking these medications        Instructions   aspirin 81 MG Chew chewable tablet  Commonly known as: ASA   Chew 1 Tablet every day.  Dose: 81 mg     hydrocortisone 25 MG Supp  Commonly known as: ANUSOL-HC   Insert 1 Suppository into the rectum every 12 hours.  Dose: 25 mg     oxycodone 15 MG immediate release tablet  Commonly known as: OXY-IR   Take 1 Tablet by mouth every four hours as needed for Severe Pain for up to 5 days.  Dose: 15 mg            CONTINUE taking these medications        Instructions   albuterol 108 (90 Base) MCG/ACT Aers inhalation aerosol   2 Puffs every 6 hours as needed for Shortness of Breath.  Dose: 2 Puff     atorvastatin 40 MG Tabs  Commonly known as: LIPITOR   40 mg. Indications: High Amount of Fats in the Blood  Dose: 40 mg     CRANBERRY EXTRACT PO   Take 5,000 mcg by mouth every day.  Dose: 5,000 mcg     diclofenac sodium 1 % Gel  Commonly known as: Voltaren   Apply to affected are twice daily as needed     DILAUDID INJ   Inject  as directed.     diltiazem 120 MG SR capsule  Commonly known as: TIAZAC   Take 120 mg by mouth every day.  Dose: 120 mg     diphenhydrAMINE 25 MG capsule  Commonly known as: BENADRYL   Take  by mouth.     DULoxetine 20 MG Cpep  Commonly known as: CYMBALTA   Take 20 mg by mouth 2 times a day.  Dose: 20 mg     furosemide 20 MG Tabs  Commonly known as: LASIX   20 mg every day. Edema  Indications: Edema  Dose: 20 mg     GAS-X PO   Take  by mouth 3 times a day.     guaiFENesin  MG Tb12  Commonly known as: MUCINEX   Take 600 mg by mouth every 12  hours. Indications: Cough  Dose: 600 mg     losartan 50 MG Tabs  Commonly known as: COZAAR   Take 50 mg by mouth every day. 1/2 a tablet  Dose: 50 mg     magnesium oxide 400 MG Tabs tablet  Commonly known as: MAG-OX   1 tablet (400 mg) every morning AND 0.5 tablets (200 mg) every evening.     MAGnesium-Oxide 400 (240 Mg) MG Tabs  Generic drug: magnesium oxide   400 mg every day.  Dose: 400 mg     Melatonin 5 MG Caps      MIRALAX PO   Take  by mouth every day.     Pain Pump Milagros  Commonly known as: patient supplied   Inject  as directed continuous. Patient's Pain Pump (placed and maintained as an outpatient)    Medications/concentrations: Hydromorphone 4.0 mg/ml  Route: Intrathecal  Last changed: 3/22/2023  Refill pump before date: 7/2/2023  Continuous infusion rates (Drug/Rate): Hydromorphone 0.7491 mg/day or 0.0312 mg/hr (increased on 5/8/23)  MD office:      pantoprazole 40 MG Tbec  Commonly known as: PROTONIX   Take 40 mg by mouth every morning before breakfast.  Dose: 40 mg     PROBIOTIC ADVANCED PO   Take  by mouth every day.     spironolactone 25 MG Tabs  Commonly known as: ALDACTONE   Take 25 mg by mouth every day.  Dose: 25 mg     VITAMIN B-12 PO   Take  by mouth every day.     VITAMIN D3 PO   Take  by mouth every day.              Allergies  Allergies   Allergen Reactions    Sulfa Drugs Unspecified     Nauseated, diarrhea     Erythromycin Nausea       DIET  Orders Placed This Encounter   Procedures    Diet Order Diet: Cardiac     Standing Status:   Standing     Number of Occurrences:   1     Order Specific Question:   Diet:     Answer:   Cardiac [6]       ACTIVITY  As tolerated.  Weight bearing as tolerated    CONSULTATIONS  Hospital Medicine    PROCEDURES  Echocardiogram 5/8/2023:  CONCLUSIONS  Normal left ventricular systolic function. The left ventricular   ejection fraction is visually estimated to be 60%.  Grade I diastolic   dysfunction.   The right ventricle is normal in size and systolic  function.  Mild mitral regurgitation.  Moderate aortic valve stenosis.   No prior images on file for comparison.    MRI Brain 5/8/2023:  1.  No acute abnormality.  2.  Mild cerebral volume loss.  3.  Mild chronic microvascular ischemic disease.    CTA Head 5/7/2023:  1.  No large vessel occlusion or aneurysm identified.    CT scan of the neck 5/7/2023:  1.  No vascular occlusion, aneurysm, dissection, or high-grade stenosis.  2.  Emphysema    CT Perfusion scan 5/7/2023:  1.  Cerebral blood flow less than 30% likely representing completed infarct = 0 mL.  2.  T Max more than 6 seconds likely representing combination of completed infarct and ischemia = 22 mL.  3.  Mismatched volume likely representing ischemic brain/penumbra = 22 mL  4.  Please note that the cerebral perfusion was performed on the limited brain tissue around the basal ganglia region. Infarct/ischemia outside the CT perfusion sections can be missed in this study       LABORATORY  Lab Results   Component Value Date    SODIUM 136 05/09/2023    POTASSIUM 4.7 05/09/2023    CHLORIDE 100 05/09/2023    CO2 30 05/09/2023    GLUCOSE 137 (H) 05/09/2023    BUN 21 05/09/2023    CREATININE 0.98 05/09/2023    GLOMRATE 48 (L) 09/27/2022        Lab Results   Component Value Date    WBC 10.5 05/06/2023    HEMOGLOBIN 11.5 (L) 05/06/2023    HEMATOCRIT 36.3 (L) 05/06/2023    PLATELETCT 197 05/06/2023        Total time of the discharge process exceeds 42 minutes.

## 2023-05-09 NOTE — CARE PLAN
The patient is Stable - Low risk of patient condition declining or worsening    Shift Goals  Clinical Goals: NIHSS scale monitored Q12 and maintain >2 during shift; zero fall/injury, and pain mgt.  Patient Goals: Sleep and pain mgt.  Family Goals: NA    Progress made toward(s) clinical / shift goals:    Pt's pain was tolerable during shift after giving PRN meds once; continued to monitor stroke assessment Q 12hrs; pt tolerates ambulation to nearby chair and back to bed, FWW x 1 assist; weaned to 1 LPM via NC sating at 95 %.     Problem: Early Mobilization - Post Surgery  Goal: Early mobilization post surgery  Outcome: Progressing     Problem: Pain - Post Surgery  Goal: Alleviation or reduction of pain post surgery  Outcome: Progressing      Patient is not progressing towards the following goals: NA

## 2023-05-09 NOTE — DISCHARGE PLANNING
DC Transport Scheduled    Received request at: 5/9/2023 AT 0948    Transport Company Scheduled:  [employer]  Spoke with Jan at [facility] to schedule transport.    Scheduled Date: 5/9/2023  Scheduled Time: 1200    Destination: [email or web address] at [address] [place]     Notified care team of scheduled transport via Voalte.     If there are any changes needed to the DC transportation scheduled, please contact [facility] at ext. [phone] between the hours of 4610-6567 Mon-Fri. If outside those hours, contact the ED Case Manager at ext. [phone].

## 2023-05-09 NOTE — PROGRESS NOTES
Bedside report received from night RN. Assumed care of patient. Pt resting in bed, sleeping. Alert and oriented, wakes easily to voice. Daily plan of care discussed. No complains at this time. Call light and personal belongings within reach. Hourly rounding in place.       1135 tried calling [facility] for report, states RN will give a call back. Call back no. provided.

## 2023-05-09 NOTE — DISCHARGE PLANNING
HTH/SCP TCN chart review completed. Collaborated with JALIL Daigle. Discussed current discharge considerations noted to discharge to [facility] level of care at [facility]/ [facility]. SCP auth provided to CM.     TCN met with patient at bedside. Patient verbalized agreement and anticipation for dc to [facility] today.     As patient is an anticipated discharge today with auth present for discharge to [facility] level of care, no further acute TCN needs identified at this time. TCN remains available to further collaborate prior to anticipated dc today via voalte. Thank you.    Completed  PT/OT evals completed with recs for post acute placement on 5/8/2023 and 5/6/2023  Choice obtained: SNF,  DME (O2 if needed)  Patient accepted to [facility]/ [facility] with anticipated dc today  [facility] referral sent; although current discharge consideration is post-acute placement

## 2023-05-09 NOTE — DISCHARGE PLANNING
Case Management Discharge Planning    Admission Date: 5/4/2023  GMLOS:    ALOS: 0    6-Clicks ADL Score: 19  6-Clicks Mobility Score: 17  PT and/or OT Eval ordered: Yes  Post-acute Referrals Ordered: Yes  Post-acute Choice Obtained: Yes  Has referral(s) been sent to post-acute provider:  Yes      Anticipated Discharge Dispo: Discharge Disposition: D/T to SNF with Medicare cert in anticipation of skilled care (03)    DME Needed: No    Action(s) Taken: Updated Provider/Nurse on Discharge Plan, Choice obtained, Referral(s) sent, Transport Arranged , Authorization Received , Completed PASSR/LOC, and OTHER: RN CM made call to Admissions at [facility] to inquire on ability to accept and bed availability. No answer; therefore, a voicemail was left for a return call. COBRA started. Ride Line Request started.    RN CM received voalte message from RICHY Rick for [facility] with insurance authorization for SNF placement.  Auth # [id number]    PASRR: [id number]    0945: RN DYLAN made call to [facility] to inquire on acceptance. Long indicates they have an available bed and are able to accept today. Requesting transportation be set up by [facility] RN CM. Transportation request completed and faxed to [facility] for coordination. Pending ride acceptance and scheduling.    Escalations Completed: Provider, Ride Line, Insurance , and Bedside RN    Medically Clear: Yes    Next Steps: Continue to monitor for changes and update the discharge plan accordingly. Follow-up with the Attending and Bedside RN for any issues/concerns and assist as indicated. Follow-up with [facility] for final acceptance, bed availability, and desired transport time.    Barriers to Discharge: Pending Placement and Transportation    Is the patient up for discharge tomorrow: No, patient set to discharge today (05/09/2023) to SNF placement.

## 2023-05-09 NOTE — PROGRESS NOTES
Pt discharged with MD order. Discharge instructions, prescriptions and follow-up appointments reviewed. Pt verbalized understanding. Copies of discharge papers given, all questions answered.  Transported via wheelchair with [facility].

## 2023-05-26 ENCOUNTER — HOME HEALTH ADMISSION (OUTPATIENT)
Dept: HOME HEALTH SERVICES | Facility: HOME HEALTHCARE | Age: 88
End: 2023-05-26
Payer: MEDICARE

## 2023-06-05 ENCOUNTER — PATIENT OUTREACH (OUTPATIENT)
Dept: HEALTH INFORMATION MANAGEMENT | Facility: OTHER | Age: 88
End: 2023-06-05

## 2023-06-05 DIAGNOSIS — I10 PRIMARY HYPERTENSION: ICD-10-CM

## 2023-06-05 DIAGNOSIS — J44.0 CHRONIC OBSTRUCTIVE PULMONARY DISEASE WITH ACUTE LOWER RESPIRATORY INFECTION (HCC): ICD-10-CM

## 2023-06-05 NOTE — PROGRESS NOTES
Patient currently following with [facility] after recent discharge from [facility] facility. Nurse CM will follow-up with patient for CCM program at a later date after discharge from home care.

## 2023-06-07 PROBLEM — R53.81 DEBILITY: Status: ACTIVE | Noted: 2023-06-07

## 2023-06-27 ENCOUNTER — APPOINTMENT (OUTPATIENT)
Dept: MEDICAL GROUP | Facility: PHYSICIAN GROUP | Age: 88
End: 2023-06-27
Payer: MEDICARE

## 2023-06-29 ENCOUNTER — TELEMEDICINE (OUTPATIENT)
Dept: MEDICAL GROUP | Facility: PHYSICIAN GROUP | Age: 88
End: 2023-06-29
Payer: MEDICARE

## 2023-06-29 VITALS — OXYGEN SATURATION: 95 % | HEIGHT: 67 IN | WEIGHT: 150 LBS | BODY MASS INDEX: 23.54 KG/M2 | HEART RATE: 88 BPM

## 2023-06-29 DIAGNOSIS — M16.12 PRIMARY OSTEOARTHRITIS OF LEFT HIP: ICD-10-CM

## 2023-06-29 DIAGNOSIS — M48.061 SPINAL STENOSIS OF LUMBAR REGION WITHOUT NEUROGENIC CLAUDICATION: ICD-10-CM

## 2023-06-29 PROCEDURE — 99214 OFFICE O/P EST MOD 30 MIN: CPT | Mod: 95 | Performed by: INTERNAL MEDICINE

## 2023-06-29 ASSESSMENT — FIBROSIS 4 INDEX: FIB4 SCORE: 1.88

## 2023-07-06 ENCOUNTER — PATIENT OUTREACH (OUTPATIENT)
Dept: HEALTH INFORMATION MANAGEMENT | Facility: OTHER | Age: 88
End: 2023-07-06
Payer: MEDICARE

## 2023-07-06 DIAGNOSIS — J44.0 CHRONIC OBSTRUCTIVE PULMONARY DISEASE WITH ACUTE LOWER RESPIRATORY INFECTION (HCC): ICD-10-CM

## 2023-07-06 NOTE — PROGRESS NOTES
Virtual Visit: Established Patient   This visit was conducted via Zoom using secure and encrypted videoconferencing technology.   The patient was in their home in the [facility].    The patient's identity was confirmed and verbal consent was obtained for this virtual visit.     Subjective:   CC:   Chief Complaint   Patient presents with    Hip Pain     Left side. Has been going to [facility]. Surgery clearance.        Chelly Decker is a 87 y.o. female presenting for evaluation and management of:    1. Spinal stenosis of lumbar region without neurogenic claudication  10/13/2022:Patient is status post [employer] pump replacement on October 7 for her Dilaudid.  Followed by Dr. Alanis.  Patient has not been in for postoperative evaluation/care.  Patient is also using oxycodone 3 times daily however pain is not managed well.  Patient will also have a appointment with pain management physician, Dr. Lozoya in the near future.     12/29/2022:.  Patient was seen by Dr. Lozoya for pain management.  She would like to change from oxycodone to hydrocodone, however patient left the office without a prescription.  She was started on duloxetine 20 mg tablets and is currently taking it daily and will titrate up to twice daily.     4/4/23: Patient states that she is having increased severity of low back pain, left hip pain, left-sided radiculopathy.  Patient tried to increase Cymbalta to 30 mg twice daily however had side effects of somnolence and is now back on Cymbalta 20 mg twice daily.  Patient completed an MRI recently and will undergo an epidural in the near future.  Her Dilaudid pain pump was last changed in October.  Patient is having difficulty bending over as her pain pump is in her right lower abdomen and causing discomfort.    6/29/23: Patient underwent revision of intrathecal pain pump on May 4.  Complicated by postop pain, now resolving slightly.    2. Primary osteoarthritis of left hip  8/17/2022:Chronic.  Stable.   Ongoing.  Pelvic MRI was ordered by Dr. Escoto a colorectal surgeon.  Patient has been having rectal pain for the last 2 to 3 years.  Recent MRI showed avascular necrosis of the left femoral head with resulting mild subchondral collapse of the left femoral head.  Severe osteoarthritis of left hip joint.  Large incompletely visualized lipoma in the abductor musculature of the right upper thigh.  Patient has also complained about right leg discomfort.  Status post right hip replacement.  Patient has an appointment with her orthopedist on August 24 to discuss MRI results.  Patient also recently received a cortisone injection into the left hip.     10/13/2022: Patient was referred to orthopedics, Dr. Carrillo however missed 2 appointments.  Patient was referred back to Dr. Olsen for further evaluation and treatment.  Patient continues to have severe left hip and groin pain.     4/4/23: Patient is having increased severity of left hip and groin pain.  He will be establishing with [facility] in [place].  Having more more difficulty ambulating and is using a wheelchair/walker.    6/29/23:.  Patient would like to move ahead and undergo hip surgery with [facility] in [place].  Patient continues to have moderate amounts of pain, using four-wheel walker.    ROS   Multiple arthritic joint pain complaints to include left hip, lower lumbar spine, left knee.  At this time denies all of the cardiopulmonary, neurologic, GI symptoms.    Current medicines (including changes today)  Current Outpatient Medications   Medication Sig Dispense Refill    diclofenac sodium (VOLTAREN) 1 % Gel APPLY TO AFFECTED ARE TWICE DAILY AS NEEDED 100 g 2    Home Care Oxygen Inhale 3 L/min continuous.      Pain Pump (PATIENT SUPPLIED) Device Inject  as directed continuous. Patient's Pain Pump (placed and maintained as an outpatient)    Medications/concentrations: Hydromorphone 4.0 mg/ml  Route: Intrathecal  Last changed: 3/22/2023  Refill pump before date:  7/2/2023  Continuous infusion rates (Drug/Rate): Hydromorphone 0.7491 mg/day or 0.0312 mg/hr (increased on 5/8/23)  MD office:       DULoxetine (CYMBALTA) 20 MG Cap DR Particles Take 20 mg by mouth 2 times a day.      magnesium oxide (MAG-OX) 400 MG Tab tablet 1 tablet (400 mg) every morning AND 0.5 tablets (200 mg) every evening.      guaiFENesin ER (MUCINEX) 600 MG TABLET SR 12 HR Take 1,200 mg by mouth every 12 hours. Indications: Cough      Cyanocobalamin (VITAMIN B-12 PO) Take  by mouth every day.      Cholecalciferol (VITAMIN D3 PO) Take  by mouth every day.      diltiazem (TIAZAC) 120 MG SR capsule Take 120 mg by mouth every day.      diphenhydrAMINE (BENADRYL) 25 MG capsule Take  by mouth.      pantoprazole (PROTONIX) 40 MG Tablet Delayed Response Take 40 mg by mouth every morning before breakfast.      spironolactone (ALDACTONE) 25 MG Tab Take 25 mg by mouth every day.      atorvastatin (LIPITOR) 40 MG Tab 40 mg. Indications: High Amount of Fats in the Blood      losartan (COZAAR) 50 MG Tab Take 50 mg by mouth every day. 1/2 a tablet      furosemide (LASIX) 20 MG Tab 20 mg every day. Edema  Indications: Edema      albuterol 108 (90 Base) MCG/ACT Aero Soln inhalation aerosol 2 Puffs every 6 hours as needed for Shortness of Breath.      HYDROmorphone HCl (DILAUDID INJ) Inject  as directed.      Probiotic Product (PROBIOTIC ADVANCED PO) Take  by mouth every day.      Polyethylene Glycol 3350 (MIRALAX PO) Take  by mouth every day.      Simethicone (GAS-X PO) Take  by mouth 3 times a day.      hydrocortisone (ANUSOL-HC) 25 MG Suppos Insert 1 Suppository into the rectum every 12 hours. 4 Suppository 0    aspirin (ASA) 81 MG Chew Tab chewable tablet Chew 1 Tablet every day. 100 Tablet 0     No current facility-administered medications for this visit.       Patient Active Problem List    Diagnosis Date Noted    Debility 06/07/2023    Prediabetes 05/08/2023    External hemorrhoid 05/08/2023    TIA  "(transient ischemic attack) 05/07/2023    Chronic hypoxemic respiratory failure (HCC) 05/06/2023    S/P insertion of intrathecal pump 05/05/2023    Postoperative pain 05/04/2023    Hammer toes of both feet 12/29/2022    Dizziness 12/29/2022    Aseptic necrosis of bone of left hip ([facility]) 11/30/2022    Osteoarthritis of left hip 11/30/2022    Pain of left hip joint 11/30/2022    Trochanteric bursitis of left hip 11/30/2022    Insomnia due to medical condition 10/13/2022    Pain in joint involving multiple sites 10/13/2022    Chronic kidney disease, stage 3a ([facility]) 08/17/2022    Hypotension due to hypovolemia 08/17/2022    Avascular necrosis of bone of left hip ([facility]) 08/17/2022    Lower extremity edema 08/03/2022    Acute exacerbation of chronic obstructive airways disease ([facility]) 06/22/2022    Chronic obstructive pulmonary disease ([facility]) 06/22/2022    Solitary pulmonary nodule 06/22/2022    (HFpEF) heart failure with preserved ejection fraction ([facility]) 06/22/2022    Bilateral carotid bruits 06/06/2022    Chronic pain syndrome 06/06/2022    PVC's (premature ventricular contractions) 06/06/2022    Moderate mitral insufficiency 06/06/2022    Moderate aortic stenosis 06/06/2022    Urge incontinence of urine 05/12/2022    H/O Clostridium difficile infection 05/12/2022    Skin lesions 05/12/2022    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  Skin: No rashes in visible areas.  Eye: " Round. Conjunctiva clear, lids normal. No icterus.   ENMT: Lips pink without lesions, good dentition, moist mucous membranes. Phonation normal.  Neck: No masses, no thyromegaly. Moves freely without pain.  Respiratory: Unlabored respiratory effort, no cough or audible wheeze  Psych: Alert and oriented x3, normal affect and mood.     Assessment and Plan:   The following treatment plan was discussed:     1. Spinal stenosis of lumbar region without neurogenic claudication  Stable.  Continue pain management, continuation of her intrathecal pain pump.    2. Primary osteoarthritis of left hip  Patient with PMH of CHF, chronic respiratory failure will require medical clearance to undergo hip replacement from cardiology and pulmonology specialties.  Patient continues on supplemental oxygen at 3 L 24/7.  Labs will be ordered prior to surgery.    Follow-up: No follow-ups on file.    My total time spent caring for the patient on the day of the encounter was 32 minutes.   This does not include time spent on separately billable procedures/tests.

## 2023-07-06 NOTE — PROGRESS NOTES
Nurse CM outreach call for monthly CCM assessment. Pt answered telephone. Reports she continues to have ongoing pain in her left hip. Pt reports she will be following with [facility] for possible hip surgery.  Reports [facility] provider has been making home visits.  Pt states she follows with [facility] care. Reports physical therapist is making visits. Pt reports she had recent telemedicine visit with PCP.   Plan:  Nurse will follow-up with pt for CCM program after discharge from home care. Pt will contact nurse CM if any care management needs.

## 2023-07-20 DIAGNOSIS — Z01.818 PREOPERATIVE CLEARANCE: ICD-10-CM

## 2023-07-21 ENCOUNTER — TELEPHONE (OUTPATIENT)
Dept: HEALTH INFORMATION MANAGEMENT | Facility: OTHER | Age: 88
End: 2023-07-21
Payer: MEDICARE

## 2023-08-08 ENCOUNTER — PATIENT OUTREACH (OUTPATIENT)
Dept: HEALTH INFORMATION MANAGEMENT | Facility: OTHER | Age: 88
End: 2023-08-08
Payer: MEDICARE

## 2023-08-08 DIAGNOSIS — J44.0 CHRONIC OBSTRUCTIVE PULMONARY DISEASE WITH ACUTE LOWER RESPIRATORY INFECTION (HCC): ICD-10-CM

## 2023-08-08 PROCEDURE — 99490 CHRNC CARE MGMT STAFF 1ST 20: CPT | Performed by: INTERNAL MEDICINE

## 2023-08-08 NOTE — PROGRESS NOTES
Nurse CM outreach call for monthly CCM assessment.    Assessment    Pt reports she has been having ongoing hip pain. Reports she is working with [facility] provider to schedule hip replacement surgery. Pt reports she has limited mobility. Reports she is using a walker to assist with mobility. Reports she will discuss with provider possibly getting a wheelchair. Pt is scheduled to see PCP tomorrow. States she plans on discussing hip surgery. Pt reports [facility] is no longer making visits. Pt reports she continues to use oxygen during the day intermittently and at night. Pt did have follow-up with cardiology to get clearance for her hip surgery.  Pt reports she currently has all of her medications.    Education    Fall precautions.  Use walker at all time to assist with mobility. Follow-up with specialists.    Plan of Care and Goals    Reviewed care plan and goals.    Barriers:    Chronic pain    Progress:    Continue to work on progress    Next outreach:  One month  Nurse Care Coordinator:  Radha Martinez  [phone]

## 2023-08-09 ENCOUNTER — OFFICE VISIT (OUTPATIENT)
Dept: MEDICAL GROUP | Facility: PHYSICIAN GROUP | Age: 88
End: 2023-08-09
Payer: MEDICARE

## 2023-08-09 ENCOUNTER — TELEPHONE (OUTPATIENT)
Dept: MEDICAL GROUP | Facility: PHYSICIAN GROUP | Age: 88
End: 2023-08-09

## 2023-08-09 VITALS
OXYGEN SATURATION: 95 % | RESPIRATION RATE: 14 BRPM | DIASTOLIC BLOOD PRESSURE: 68 MMHG | HEART RATE: 88 BPM | BODY MASS INDEX: 23.39 KG/M2 | SYSTOLIC BLOOD PRESSURE: 122 MMHG | HEIGHT: 67 IN | TEMPERATURE: 97.8 F | WEIGHT: 149 LBS

## 2023-08-09 DIAGNOSIS — R53.81 PHYSICAL DEBILITY: ICD-10-CM

## 2023-08-09 DIAGNOSIS — Z01.818 PREOPERATIVE CLEARANCE: ICD-10-CM

## 2023-08-09 DIAGNOSIS — M48.061 SPINAL STENOSIS OF LUMBAR REGION WITHOUT NEUROGENIC CLAUDICATION: ICD-10-CM

## 2023-08-09 DIAGNOSIS — M87.052 ASEPTIC NECROSIS OF BONE OF LEFT HIP (HCC): ICD-10-CM

## 2023-08-09 PROCEDURE — 3078F DIAST BP <80 MM HG: CPT | Performed by: INTERNAL MEDICINE

## 2023-08-09 PROCEDURE — 99214 OFFICE O/P EST MOD 30 MIN: CPT | Performed by: INTERNAL MEDICINE

## 2023-08-09 PROCEDURE — 3074F SYST BP LT 130 MM HG: CPT | Performed by: INTERNAL MEDICINE

## 2023-08-09 ASSESSMENT — FIBROSIS 4 INDEX: FIB4 SCORE: 1.94

## 2023-08-09 NOTE — PROGRESS NOTES
CC: Preoperative clearance for left hip surgery.    HPI:  Chelly presents with the following    1. Preoperative clearance  Requesting preop clearance for upcoming left hip surgery with ANDREY.    2. 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  Worsening.  Patient with PMH of aseptic necrosis of left hip with primary osteoarthritis has been seen and evaluated by ANDREY and will proceed with left hip surgery in the upcoming weeks.  Patient requesting preoperative clearance.  Patient received preoperative clearance per cardiology and pulmonology for low risk procedure.  Labs reviewed with patient.     3. Spinal stenosis of lumbar region without neurogenic claudication  Patient is currently being followed by pain management, Dr. Lozoya.  She is status post revision of intrathecal pain pump in May.  Pain management with Dilaudid.  Patient would like to transfer her care to a pain management group closer to to her home in [place].    4. Physical debility  Patient requesting a manual wheelchair as she is unable to ambulate without difficulty or pain without a wheelchair.  Her current medical condition of severe spinal stenosis, left hip osteoarthritis and avascular necrosis prevents her from accomplishing her MR ADLs without assistance.  Due to decreased upper body strength and weakness she is unable to ambulate with a cane or walker.  Her home provides adequate access between rooms, maneuvering space and surfaces for the use of a manual wheelchair.  Manual wheelchair will significantly improve patient's ability to participate in MR ADLs.  Patient has expressed willingness to maneuver her manual wheelchair that will be provided for her.  Patient has adequate upper body strength to maneuver and propel a manual wheelchair.      Patient Active Problem List    Diagnosis Date Noted    Physical debility 06/07/2023    Prediabetes 05/08/2023    External hemorrhoid 05/08/2023    TIA (transient ischemic attack)  05/07/2023    Chronic hypoxemic respiratory failure (HCC) 05/06/2023    S/P insertion of intrathecal pump 05/05/2023    Postoperative pain 05/04/2023    Hammer toes of both feet 12/29/2022    Dizziness 12/29/2022    Aseptic necrosis of bone of left hip ([facility]) 11/30/2022    Osteoarthritis of left hip 11/30/2022    Pain of left hip joint 11/30/2022    Trochanteric bursitis of left hip 11/30/2022    Insomnia due to medical condition 10/13/2022    Pain in joint involving multiple sites 10/13/2022    Chronic kidney disease, stage 3a ([facility]) 08/17/2022    Hypotension due to hypovolemia 08/17/2022    Avascular necrosis of bone of left hip ([facility]) 08/17/2022    Lower extremity edema 08/03/2022    Acute exacerbation of chronic obstructive airways disease ([facility]) 06/22/2022    Chronic obstructive pulmonary disease ([facility]) 06/22/2022    Solitary pulmonary nodule 06/22/2022    (HFpEF) heart failure with preserved ejection fraction ([facility]) 06/22/2022    Bilateral carotid bruits 06/06/2022    Chronic pain syndrome 06/06/2022    PVC's (premature ventricular contractions) 06/06/2022    Moderate mitral insufficiency 06/06/2022    Moderate aortic stenosis 06/06/2022    Urge incontinence of urine 05/12/2022    H/O Clostridium difficile infection 05/12/2022    Skin lesions 05/12/2022    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    Primary hypertension 03/29/2022    Incomplete defecation 03/29/2022    Moderate asthma without complication 03/29/2022    Spinal stenosis of lumbar region without neurogenic claudication 03/29/2022    Pelvic floor dysfunction 03/29/2022    Hypoxia 03/29/2022    Other hyperlipidemia 01/13/2021    Primary cancer of left lower lobe of lung ([facility]) 01/15/2020       Current Outpatient Medications   Medication Sig Dispense Refill    diclofenac sodium (VOLTAREN) 1 % Gel APPLY TO AFFECTED ARE TWICE DAILY AS NEEDED 100 g 2    Home Care Oxygen Inhale 3 L/min continuous.      Pain Pump (PATIENT SUPPLIED) Device Inject  as  directed continuous. Patient's Pain Pump (placed and maintained as an outpatient)    Medications/concentrations: Hydromorphone 4.0 mg/ml  Route: Intrathecal  Last changed: 3/22/2023  Refill pump before date: 7/2/2023  Continuous infusion rates (Drug/Rate): Hydromorphone 0.7491 mg/day or 0.0312 mg/hr (increased on 5/8/23)  MD office:       DULoxetine (CYMBALTA) 20 MG Cap DR Particles Take 20 mg by mouth 2 times a day.      magnesium oxide (MAG-OX) 400 MG Tab tablet 1 tablet (400 mg) every morning AND 0.5 tablets (200 mg) every evening.      guaiFENesin ER (MUCINEX) 600 MG TABLET SR 12 HR Take 1,200 mg by mouth every 12 hours. Indications: Cough      Cyanocobalamin (VITAMIN B-12 PO) Take  by mouth every day.      Cholecalciferol (VITAMIN D3 PO) Take  by mouth every day.      diltiazem (TIAZAC) 120 MG SR capsule Take 120 mg by mouth every day.      diphenhydrAMINE (BENADRYL) 25 MG capsule Take  by mouth.      pantoprazole (PROTONIX) 40 MG Tablet Delayed Response Take 40 mg by mouth every morning before breakfast.      spironolactone (ALDACTONE) 25 MG Tab Take 25 mg by mouth every day.      atorvastatin (LIPITOR) 40 MG Tab 40 mg. Indications: High Amount of Fats in the Blood      losartan (COZAAR) 50 MG Tab Take 50 mg by mouth every day. 1/2 a tablet      furosemide (LASIX) 20 MG Tab 20 mg every day. Edema  Indications: Edema      albuterol 108 (90 Base) MCG/ACT Aero Soln inhalation aerosol 2 Puffs every 6 hours as needed for Shortness of Breath.      HYDROmorphone HCl (DILAUDID INJ) Inject  as directed.      Probiotic Product (PROBIOTIC ADVANCED PO) Take  by mouth every day.      Polyethylene Glycol 3350 (MIRALAX PO) Take  by mouth every day.      Simethicone (GAS-X PO) Take  by mouth 3 times a day.      hydrocortisone (ANUSOL-HC) 25 MG Suppos Insert 1 Suppository into the rectum every 12 hours. 4 Suppository 0    aspirin (ASA) 81 MG Chew Tab chewable tablet Chew 1 Tablet every day. 100 Tablet 0     No current  "facility-administered medications for this visit.         Allergies as of 2023 - Reviewed 2023   Allergen Reaction Noted    Sulfa drugs Unspecified 2021    Erythromycin Nausea 2019        Social History     Socioeconomic History    Marital status:      Spouse name: Not on file    Number of children: Not on file    Years of education: Not on file    Highest education level: Not on file   Occupational History    Occupation: retired book keeper   Tobacco Use    Smoking status: Former     Packs/day: 1.00     Years: 60.00     Pack years: 60.00     Types: Cigarettes, Electronic Cigarettes     Start date: 1954     Quit date: 2015     Years since quittin.6    Smokeless tobacco: Never    Tobacco comments:     Smoked as a teenager quit as a senior citizen   Vaping Use    Vaping Use: Former    Substances: Nicotine   Substance and Sexual Activity    Alcohol use: Not Currently     Comment: Was  is a social drinker until approximately     Drug use: Not Currently     Types: Inhaled     Comment: cocaine while in her 40\"s    Sexual activity: Not on file   Other Topics Concern    Not on file   Social History Narrative    Not on file     Social Determinants of Health     Financial Resource Strain: Low Risk  (2022)    Overall Financial Resource Strain (CARDIA)     Difficulty of Paying Living Expenses: Not very hard   Food Insecurity: No Food Insecurity (2022)    Hunger Vital Sign     Worried About Running Out of Food in the Last Year: Never true     Ran Out of Food in the Last Year: Never true   Transportation Needs: Unmet Transportation Needs (2022)    PRAPARE - Transportation     Lack of Transportation (Medical): Yes     Lack of Transportation (Non-Medical): No   Physical Activity: Inactive (2022)    Exercise Vital Sign     Days of Exercise per Week: 0 days     Minutes of Exercise per Session: 0 min   Stress: Not on file   Social Connections: Not on file   Intimate " "Partner Violence: Not on file   Housing Stability: Unknown (7/22/2022)    Housing Stability Vital Sign     Unable to Pay for Housing in the Last Year: No     Number of Places Lived in the Last Year: Not on file     Unstable Housing in the Last Year: No       Family History   Problem Relation Age of Onset    Heart Disease Mother         Not from congestive heart failure!       Past Surgical History:   Procedure Laterality Date    PUMP REVISION Right 5/4/2023    Procedure: INTRATHECAL PAIN PUMP REVISION;  Surgeon: Tavo Lozoya M.D.;  Location: SURGERY [facility];  Service: Pain Management    PUMP INSERT/REMOVE Right 10/07/2022    Procedure: MEDTRONIC PUMP REPLACEMENT;  Surgeon: Joel Alanis M.D.;  Location: SURGERY [facility];  Service: Pain Management    PB IMPLANT NEUROSTIM/ N/A 02/05/2021    Procedure: IMPLANT PUMP AND CATHETER;  Surgeon: Joel Alanis M.D.;  Location: SURGERY [facility];  Service: Pain Management    APPENDECTOMY      CARPAL TUNNEL ENDOSCOPIC      bilat    CHOLECYSTECTOMY      GYN SURGERY      c section    HIP ARTHROPLASTY TOTAL Right     HYSTERECTOMY LAPAROSCOPY      total    NO PERTINENT PAST SURGICAL HISTORY  Various    See sbove    OTHER      kidney stones removed    TONSILLECTOMY         ROS: Positive ROS per HPI.  Denies any Headache,Chest pain,  Shortness of breath,  Abdominal pain, Changes of bowel or bladder, Lower ext edema, Fevers, Nights sweats, Weight Changes, Focal weakness or numbness.  All other systems are negative.    /68   Pulse 88   Temp 36.6 °C (97.8 °F) (Temporal)   Resp 14   Ht 1.702 m (5' 7\")   Wt 67.6 kg (149 lb)   SpO2 95%   BMI 23.34 kg/m²      Constitutional: Alert, no distress, well-groomed.  Sitting in the wheelchair.  Skin: Warm, dry, good turgor, no rashes in visible areas.  Eye: Equal, round and reactive, conjunctiva clear, lids normal.  ENMT: Lips without lesions, good dentition, moist mucous membranes.  Neck: Trachea " midline, no masses, no thyromegaly.  Respiratory: Unlabored respiratory effort, no cough.  Abdomen: Soft, no gross masses.  MSK: Normal gait, moves all extremities.  Neuro: Grossly non-focal. No cranial nerve deficit. Strength and sensation intact.   Psych: Alert and oriented x3, normal affect and mood.        Assessment and Plan.   87 y.o. female presenting with the following.     1. Preoperative clearance  Patient is medically cleared/optimized for left hip surgery per Dr. Perrin, ANDREY.    2. 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  Ongoing.  Worsening.  Medical clearance reviewed from cardiology and pulmonology.  Patient to make her surgery date.    3. Spinal stenosis of lumbar region without neurogenic claudication  Stable.  Patient will try to transfer pain management care to [place] from Dr. Lozoya's office.    4. Physical debility  Request will be placed for manual wheelchair through [facility] services.    My total time spent caring for the patient on the day of the encounter was 34 minutes.   This does not include time spent on separately billable procedures/tests.

## 2023-08-18 ENCOUNTER — APPOINTMENT (OUTPATIENT)
Dept: ADMISSIONS | Facility: MEDICAL CENTER | Age: 88
End: 2023-08-18
Attending: ORTHOPAEDIC SURGERY
Payer: MEDICARE

## 2023-08-18 PROBLEM — M16.12 PRIMARY OSTEOARTHRITIS OF LEFT HIP: Status: ACTIVE | Noted: 2023-08-18

## 2023-08-23 ENCOUNTER — PRE-ADMISSION TESTING (OUTPATIENT)
Dept: ADMISSIONS | Facility: MEDICAL CENTER | Age: 88
End: 2023-08-23
Attending: ORTHOPAEDIC SURGERY
Payer: MEDICARE

## 2023-08-23 VITALS — BODY MASS INDEX: 23.34 KG/M2 | HEIGHT: 67 IN

## 2023-08-23 RX ORDER — ACETAMINOPHEN 500 MG
1000 TABLET ORAL EVERY 6 HOURS PRN
COMMUNITY

## 2023-08-23 RX ORDER — MAGNESIUM OXIDE TAB 400 MG (241.3 MG ELEMENTAL MG) 400 (241.3 MG) MG
TAB ORAL
COMMUNITY
Start: 2023-08-15 | End: 2023-11-29

## 2023-08-23 RX ORDER — PHENOL 1.4 %
10 AEROSOL, SPRAY (ML) MUCOUS MEMBRANE NIGHTLY
COMMUNITY

## 2023-08-23 RX ORDER — DIPHENHYDRAMINE HCL 25 MG
25 TABLET ORAL EVERY 6 HOURS PRN
COMMUNITY
End: 2023-10-31

## 2023-08-23 RX ORDER — ESTRADIOL 0.1 MG/G
CREAM VAGINAL
Status: ON HOLD | COMMUNITY
Start: 2023-07-25 | End: 2023-08-29

## 2023-08-23 RX ORDER — TEMAZEPAM 30 MG/1
CAPSULE ORAL
COMMUNITY
End: 2023-08-23

## 2023-08-23 NOTE — PREPROCEDURE INSTRUCTIONS
Pre-admit appointment started. Pt had requested call back shortly, still needs instructions and med review.    MET's=<4. With activity gets SOB. Wears O2 @ night and prn during the day. States doesn't move around much.    Case message sent to Dr Perrin's PA, PM and  to inform: Pt has EKG and blood work in [facility]. States she has no ride and barely able to get around. States unable to go to lab for nasal swab.

## 2023-08-24 ENCOUNTER — APPOINTMENT (OUTPATIENT)
Dept: ADMISSIONS | Facility: MEDICAL CENTER | Age: 88
End: 2023-08-24
Attending: ORTHOPAEDIC SURGERY
Payer: MEDICARE

## 2023-08-24 NOTE — PREPROCEDURE INSTRUCTIONS
PreAdmit Telephone Appointment: Reviewed the Preparing for your procedure handout with patient over the phone. Patient instructed per pharmacy guidelines regarding taking or holding regularly prescribed medications before surgery. Instructed to take the following medications the day of surgery with a sip of water per pharmacy medication guidelines: Tylenol if needed, Albuterol if needed, Duloxetine, Guaifenesin, Hydromorphone pain pump, Pantoprazole         . Patient instructed to notify physician of any illness prior to surgery.

## 2023-08-24 NOTE — DISCHARGE PLANNING
DISCHARGE PLANNING NOTE - TOTAL JOINT    Procedure: Procedure(s):  LEFT TOTAL HIP ARTHROPLASTY  Procedure Date: 8/28/2023  Insurance: Payor: [facility] SENIOR CARE PLUS / Plan: [facility] SELECT  / Product Type: Medicare Advantage /    Equipment currently available at home? Ice, shower chair, toilet seat riser , fww.  Steps into the home? 0  Steps within the home? 3  Toilet height? ADA  Type of shower? walk-in shower  Home Oxygen? 3.5 liters  Portable tank?  Yes and will bring.  Oxygen Provider: Preferred  Who will be with you during your recovery? daughter  Is Outpatient Physical Therapy set up after surgery? No   Did you take the Total Joint Class and where? Yes, received [facility]ON book.  Planning same day discharge?No wants to go to [facility] for rehab in [place].    This writer spoke on the phone with pt and educated to preop showers, nasal mrsa swab and potential for overnight stay. Home safety checklist sent home with pt. Pt educated to dc criteria. All questions answered and verbalizes understanding of all instructions. No dc needs identified at this time. Anticipate dc to a transitional care facility without barriers.

## 2023-08-28 ENCOUNTER — APPOINTMENT (OUTPATIENT)
Dept: RADIOLOGY | Facility: MEDICAL CENTER | Age: 88
End: 2023-08-28
Attending: PHYSICIAN ASSISTANT
Payer: MEDICARE

## 2023-08-28 ENCOUNTER — ANESTHESIA EVENT (OUTPATIENT)
Dept: SURGERY | Facility: MEDICAL CENTER | Age: 88
End: 2023-08-28
Payer: MEDICARE

## 2023-08-28 ENCOUNTER — HOSPITAL ENCOUNTER (OUTPATIENT)
Facility: MEDICAL CENTER | Age: 88
End: 2023-08-30
Attending: ORTHOPAEDIC SURGERY | Admitting: ORTHOPAEDIC SURGERY
Payer: MEDICARE

## 2023-08-28 ENCOUNTER — ANESTHESIA (OUTPATIENT)
Dept: SURGERY | Facility: MEDICAL CENTER | Age: 88
End: 2023-08-28
Payer: MEDICARE

## 2023-08-28 DIAGNOSIS — G89.18 POST-OP PAIN: ICD-10-CM

## 2023-08-28 DIAGNOSIS — J96.11 CHRONIC HYPOXEMIC RESPIRATORY FAILURE (HCC): ICD-10-CM

## 2023-08-28 DIAGNOSIS — G45.9 TIA (TRANSIENT ISCHEMIC ATTACK): ICD-10-CM

## 2023-08-28 DIAGNOSIS — R53.81 PHYSICAL DEBILITY: ICD-10-CM

## 2023-08-28 DIAGNOSIS — M16.12 PRIMARY OSTEOARTHRITIS OF LEFT HIP: ICD-10-CM

## 2023-08-28 DIAGNOSIS — M16.12 ARTHRITIS OF LEFT HIP: ICD-10-CM

## 2023-08-28 LAB
ABO + RH BLD: NORMAL
ABO GROUP BLD: ABNORMAL
BLD GP AB INVEST PLASRBC-IMP: ABNORMAL
BLD GP AB SCN SERPL QL: ABNORMAL
RH BLD: ABNORMAL

## 2023-08-28 PROCEDURE — 86900 BLOOD TYPING SEROLOGIC ABO: CPT

## 2023-08-28 PROCEDURE — 94760 N-INVAS EAR/PLS OXIMETRY 1: CPT

## 2023-08-28 PROCEDURE — 700102 HCHG RX REV CODE 250 W/ 637 OVERRIDE(OP): Performed by: ORTHOPAEDIC SURGERY

## 2023-08-28 PROCEDURE — A9270 NON-COVERED ITEM OR SERVICE: HCPCS | Performed by: PHYSICIAN ASSISTANT

## 2023-08-28 PROCEDURE — C1713 ANCHOR/SCREW BN/BN,TIS/BN: HCPCS | Performed by: ORTHOPAEDIC SURGERY

## 2023-08-28 PROCEDURE — C1776 JOINT DEVICE (IMPLANTABLE): HCPCS | Performed by: ORTHOPAEDIC SURGERY

## 2023-08-28 PROCEDURE — 86870 RBC ANTIBODY IDENTIFICATION: CPT

## 2023-08-28 PROCEDURE — 97607 NEG PRS WND THR NDME<=50SQCM: CPT | Performed by: ORTHOPAEDIC SURGERY

## 2023-08-28 PROCEDURE — 160042 HCHG SURGERY MINUTES - EA ADDL 1 MIN LEVEL 5: Performed by: ORTHOPAEDIC SURGERY

## 2023-08-28 PROCEDURE — 27495 REINFORCE THIGH: CPT | Mod: ASROC | Performed by: PHYSICIAN ASSISTANT

## 2023-08-28 PROCEDURE — 700102 HCHG RX REV CODE 250 W/ 637 OVERRIDE(OP): Performed by: PHYSICIAN ASSISTANT

## 2023-08-28 PROCEDURE — 700102 HCHG RX REV CODE 250 W/ 637 OVERRIDE(OP): Performed by: ANESTHESIOLOGY

## 2023-08-28 PROCEDURE — 36415 COLL VENOUS BLD VENIPUNCTURE: CPT

## 2023-08-28 PROCEDURE — 96376 TX/PRO/DX INJ SAME DRUG ADON: CPT

## 2023-08-28 PROCEDURE — 700111 HCHG RX REV CODE 636 W/ 250 OVERRIDE (IP): Mod: JZ | Performed by: ANESTHESIOLOGY

## 2023-08-28 PROCEDURE — 160009 HCHG ANES TIME/MIN: Performed by: ORTHOPAEDIC SURGERY

## 2023-08-28 PROCEDURE — A9270 NON-COVERED ITEM OR SERVICE: HCPCS | Performed by: ANESTHESIOLOGY

## 2023-08-28 PROCEDURE — 160035 HCHG PACU - 1ST 60 MINS PHASE I: Performed by: ORTHOPAEDIC SURGERY

## 2023-08-28 PROCEDURE — 700111 HCHG RX REV CODE 636 W/ 250 OVERRIDE (IP)

## 2023-08-28 PROCEDURE — 72170 X-RAY EXAM OF PELVIS: CPT

## 2023-08-28 PROCEDURE — 700111 HCHG RX REV CODE 636 W/ 250 OVERRIDE (IP): Performed by: ANESTHESIOLOGY

## 2023-08-28 PROCEDURE — 97607 NEG PRS WND THR NDME<=50SQCM: CPT | Mod: ASROC | Performed by: PHYSICIAN ASSISTANT

## 2023-08-28 PROCEDURE — 700101 HCHG RX REV CODE 250: Performed by: PHYSICIAN ASSISTANT

## 2023-08-28 PROCEDURE — 27130 TOTAL HIP ARTHROPLASTY: CPT | Mod: LT | Performed by: ORTHOPAEDIC SURGERY

## 2023-08-28 PROCEDURE — 96365 THER/PROPH/DIAG IV INF INIT: CPT

## 2023-08-28 PROCEDURE — 700101 HCHG RX REV CODE 250: Performed by: ORTHOPAEDIC SURGERY

## 2023-08-28 PROCEDURE — 160048 HCHG OR STATISTICAL LEVEL 1-5: Performed by: ORTHOPAEDIC SURGERY

## 2023-08-28 PROCEDURE — 96375 TX/PRO/DX INJ NEW DRUG ADDON: CPT

## 2023-08-28 PROCEDURE — 700111 HCHG RX REV CODE 636 W/ 250 OVERRIDE (IP): Performed by: ORTHOPAEDIC SURGERY

## 2023-08-28 PROCEDURE — 27130 TOTAL HIP ARTHROPLASTY: CPT | Mod: ASROC,LT | Performed by: PHYSICIAN ASSISTANT

## 2023-08-28 PROCEDURE — 27495 REINFORCE THIGH: CPT | Performed by: ORTHOPAEDIC SURGERY

## 2023-08-28 PROCEDURE — 700101 HCHG RX REV CODE 250: Performed by: ANESTHESIOLOGY

## 2023-08-28 PROCEDURE — A9270 NON-COVERED ITEM OR SERVICE: HCPCS | Performed by: ORTHOPAEDIC SURGERY

## 2023-08-28 PROCEDURE — 160031 HCHG SURGERY MINUTES - 1ST 30 MINS LEVEL 5: Performed by: ORTHOPAEDIC SURGERY

## 2023-08-28 PROCEDURE — 700105 HCHG RX REV CODE 258: Performed by: ANESTHESIOLOGY

## 2023-08-28 PROCEDURE — 700105 HCHG RX REV CODE 258: Performed by: PHYSICIAN ASSISTANT

## 2023-08-28 PROCEDURE — 700111 HCHG RX REV CODE 636 W/ 250 OVERRIDE (IP): Mod: JZ | Performed by: PHYSICIAN ASSISTANT

## 2023-08-28 PROCEDURE — G0378 HOSPITAL OBSERVATION PER HR: HCPCS

## 2023-08-28 PROCEDURE — 160002 HCHG RECOVERY MINUTES (STAT): Performed by: ORTHOPAEDIC SURGERY

## 2023-08-28 PROCEDURE — 86850 RBC ANTIBODY SCREEN: CPT

## 2023-08-28 PROCEDURE — 502000 HCHG MISC OR IMPLANTS RC 0278: Performed by: ORTHOPAEDIC SURGERY

## 2023-08-28 PROCEDURE — 86901 BLOOD TYPING SEROLOGIC RH(D): CPT

## 2023-08-28 PROCEDURE — 160036 HCHG PACU - EA ADDL 30 MINS PHASE I: Performed by: ORTHOPAEDIC SURGERY

## 2023-08-28 DEVICE — IMPLANTABLE DEVICE: Type: IMPLANTABLE DEVICE | Site: HIP | Status: FUNCTIONAL

## 2023-08-28 DEVICE — SCREW BONE 6.5 X 20MM TRIDENT LP HEX (1EA): Type: IMPLANTABLE DEVICE | Site: HIP | Status: FUNCTIONAL

## 2023-08-28 DEVICE — HIP DALL MILES SET 2.0 SLEEVE: Type: IMPLANTABLE DEVICE | Site: HIP | Status: FUNCTIONAL

## 2023-08-28 RX ORDER — HYDROMORPHONE HYDROCHLORIDE 1 MG/ML
0.2 INJECTION, SOLUTION INTRAMUSCULAR; INTRAVENOUS; SUBCUTANEOUS
Status: DISCONTINUED | OUTPATIENT
Start: 2023-08-28 | End: 2023-08-28 | Stop reason: HOSPADM

## 2023-08-28 RX ORDER — CEFAZOLIN SODIUM 1 G/3ML
2 INJECTION, POWDER, FOR SOLUTION INTRAMUSCULAR; INTRAVENOUS ONCE
Status: DISCONTINUED | OUTPATIENT
Start: 2023-08-28 | End: 2023-08-28 | Stop reason: HOSPADM

## 2023-08-28 RX ORDER — DIPHENHYDRAMINE HCL 25 MG
25 TABLET ORAL NIGHTLY PRN
Status: DISCONTINUED | OUTPATIENT
Start: 2023-08-29 | End: 2023-08-30 | Stop reason: HOSPADM

## 2023-08-28 RX ORDER — CEFAZOLIN SODIUM 1 G/3ML
INJECTION, POWDER, FOR SOLUTION INTRAMUSCULAR; INTRAVENOUS PRN
Status: DISCONTINUED | OUTPATIENT
Start: 2023-08-28 | End: 2023-08-28 | Stop reason: SURG

## 2023-08-28 RX ORDER — BISACODYL 10 MG
10 SUPPOSITORY, RECTAL RECTAL
Status: DISCONTINUED | OUTPATIENT
Start: 2023-08-28 | End: 2023-08-30 | Stop reason: HOSPADM

## 2023-08-28 RX ORDER — ACETAMINOPHEN 325 MG/1
650 TABLET ORAL EVERY 6 HOURS
Status: DISCONTINUED | OUTPATIENT
Start: 2023-08-28 | End: 2023-08-30 | Stop reason: HOSPADM

## 2023-08-28 RX ORDER — CHOLECALCIFEROL (VITAMIN D3) 125 MCG
10 CAPSULE ORAL NIGHTLY
Status: DISCONTINUED | OUTPATIENT
Start: 2023-08-28 | End: 2023-08-30 | Stop reason: HOSPADM

## 2023-08-28 RX ORDER — TRANEXAMIC ACID 100 MG/ML
INJECTION, SOLUTION INTRAVENOUS PRN
Status: DISCONTINUED | OUTPATIENT
Start: 2023-08-28 | End: 2023-08-28 | Stop reason: SURG

## 2023-08-28 RX ORDER — SPIRONOLACTONE 25 MG/1
25 TABLET ORAL EVERY MORNING
Status: DISCONTINUED | OUTPATIENT
Start: 2023-08-28 | End: 2023-08-30 | Stop reason: HOSPADM

## 2023-08-28 RX ORDER — POLYETHYLENE GLYCOL 3350 17 G/17G
1 POWDER, FOR SOLUTION ORAL 2 TIMES DAILY PRN
Status: DISCONTINUED | OUTPATIENT
Start: 2023-08-28 | End: 2023-08-30 | Stop reason: HOSPADM

## 2023-08-28 RX ORDER — ASPIRIN 81 MG/1
81 TABLET ORAL 2 TIMES DAILY
Status: DISCONTINUED | OUTPATIENT
Start: 2023-08-29 | End: 2023-08-28

## 2023-08-28 RX ORDER — AMOXICILLIN 250 MG
1 CAPSULE ORAL NIGHTLY
Status: DISCONTINUED | OUTPATIENT
Start: 2023-08-28 | End: 2023-08-30 | Stop reason: HOSPADM

## 2023-08-28 RX ORDER — ALBUTEROL SULFATE 90 UG/1
2 AEROSOL, METERED RESPIRATORY (INHALATION) EVERY 6 HOURS PRN
Status: DISCONTINUED | OUTPATIENT
Start: 2023-08-28 | End: 2023-08-30 | Stop reason: HOSPADM

## 2023-08-28 RX ORDER — BUPIVACAINE HYDROCHLORIDE AND EPINEPHRINE 2.5; 5 MG/ML; UG/ML
INJECTION, SOLUTION EPIDURAL; INFILTRATION; INTRACAUDAL; PERINEURAL
Status: DISCONTINUED | OUTPATIENT
Start: 2023-08-28 | End: 2023-08-28 | Stop reason: HOSPADM

## 2023-08-28 RX ORDER — ONDANSETRON 4 MG/1
4 TABLET, ORALLY DISINTEGRATING ORAL EVERY 6 HOURS PRN
Status: DISCONTINUED | OUTPATIENT
Start: 2023-08-28 | End: 2023-08-30 | Stop reason: HOSPADM

## 2023-08-28 RX ORDER — GUAIFENESIN 600 MG/1
1200 TABLET, EXTENDED RELEASE ORAL EVERY 12 HOURS
Status: DISCONTINUED | OUTPATIENT
Start: 2023-08-28 | End: 2023-08-30 | Stop reason: HOSPADM

## 2023-08-28 RX ORDER — DEXTROSE MONOHYDRATE, SODIUM CHLORIDE, AND POTASSIUM CHLORIDE 50; 1.49; 4.5 G/1000ML; G/1000ML; G/1000ML
INJECTION, SOLUTION INTRAVENOUS CONTINUOUS
Status: DISPENSED | OUTPATIENT
Start: 2023-08-28 | End: 2023-08-28

## 2023-08-28 RX ORDER — LIDOCAINE HYDROCHLORIDE 20 MG/ML
INJECTION, SOLUTION EPIDURAL; INFILTRATION; INTRACAUDAL; PERINEURAL PRN
Status: DISCONTINUED | OUTPATIENT
Start: 2023-08-28 | End: 2023-08-28 | Stop reason: SURG

## 2023-08-28 RX ORDER — DIPHENHYDRAMINE HYDROCHLORIDE 50 MG/ML
25 INJECTION INTRAMUSCULAR; INTRAVENOUS EVERY 6 HOURS PRN
Status: DISCONTINUED | OUTPATIENT
Start: 2023-08-28 | End: 2023-08-30 | Stop reason: HOSPADM

## 2023-08-28 RX ORDER — HYDROMORPHONE HYDROCHLORIDE 1 MG/ML
0.1 INJECTION, SOLUTION INTRAMUSCULAR; INTRAVENOUS; SUBCUTANEOUS
Status: DISCONTINUED | OUTPATIENT
Start: 2023-08-28 | End: 2023-08-28 | Stop reason: HOSPADM

## 2023-08-28 RX ORDER — OXYCODONE HCL 5 MG/5 ML
5 SOLUTION, ORAL ORAL
Status: COMPLETED | OUTPATIENT
Start: 2023-08-28 | End: 2023-08-28

## 2023-08-28 RX ORDER — HYDROMORPHONE HYDROCHLORIDE 1 MG/ML
0.4 INJECTION, SOLUTION INTRAMUSCULAR; INTRAVENOUS; SUBCUTANEOUS
Status: DISCONTINUED | OUTPATIENT
Start: 2023-08-28 | End: 2023-08-28 | Stop reason: HOSPADM

## 2023-08-28 RX ORDER — OMEPRAZOLE 20 MG/1
20 CAPSULE, DELAYED RELEASE ORAL 2 TIMES DAILY PRN
Status: DISCONTINUED | OUTPATIENT
Start: 2023-08-28 | End: 2023-08-30 | Stop reason: HOSPADM

## 2023-08-28 RX ORDER — TRAMADOL HYDROCHLORIDE 50 MG/1
50 TABLET ORAL EVERY 4 HOURS PRN
Status: DISCONTINUED | OUTPATIENT
Start: 2023-08-28 | End: 2023-08-30 | Stop reason: HOSPADM

## 2023-08-28 RX ORDER — KETOROLAC TROMETHAMINE 30 MG/ML
15 INJECTION, SOLUTION INTRAMUSCULAR; INTRAVENOUS EVERY 6 HOURS
Status: DISCONTINUED | OUTPATIENT
Start: 2023-08-28 | End: 2023-08-30 | Stop reason: HOSPADM

## 2023-08-28 RX ORDER — MIDAZOLAM HYDROCHLORIDE 1 MG/ML
INJECTION INTRAMUSCULAR; INTRAVENOUS PRN
Status: DISCONTINUED | OUTPATIENT
Start: 2023-08-28 | End: 2023-08-28 | Stop reason: SURG

## 2023-08-28 RX ORDER — PANTOPRAZOLE SODIUM 40 MG/1
40 TABLET, DELAYED RELEASE ORAL
Status: DISCONTINUED | OUTPATIENT
Start: 2023-08-28 | End: 2023-08-28

## 2023-08-28 RX ORDER — ONDANSETRON 2 MG/ML
4 INJECTION INTRAMUSCULAR; INTRAVENOUS
Status: COMPLETED | OUTPATIENT
Start: 2023-08-28 | End: 2023-08-28

## 2023-08-28 RX ORDER — LOSARTAN POTASSIUM 25 MG/1
25 TABLET ORAL EVERY EVENING
Status: DISCONTINUED | OUTPATIENT
Start: 2023-08-28 | End: 2023-08-30 | Stop reason: HOSPADM

## 2023-08-28 RX ORDER — HYDROMORPHONE HYDROCHLORIDE 1 MG/ML
1 INJECTION, SOLUTION INTRAMUSCULAR; INTRAVENOUS; SUBCUTANEOUS
Status: DISCONTINUED | OUTPATIENT
Start: 2023-08-28 | End: 2023-08-30 | Stop reason: HOSPADM

## 2023-08-28 RX ORDER — HALOPERIDOL 5 MG/ML
1 INJECTION INTRAMUSCULAR EVERY 6 HOURS PRN
Status: DISCONTINUED | OUTPATIENT
Start: 2023-08-28 | End: 2023-08-30 | Stop reason: HOSPADM

## 2023-08-28 RX ORDER — MIDAZOLAM HYDROCHLORIDE 1 MG/ML
INJECTION INTRAMUSCULAR; INTRAVENOUS
Status: COMPLETED
Start: 2023-08-28 | End: 2023-08-28

## 2023-08-28 RX ORDER — DOCUSATE SODIUM 100 MG/1
100 CAPSULE, LIQUID FILLED ORAL 2 TIMES DAILY
Status: DISCONTINUED | OUTPATIENT
Start: 2023-08-28 | End: 2023-08-30 | Stop reason: HOSPADM

## 2023-08-28 RX ORDER — HALOPERIDOL 5 MG/ML
1 INJECTION INTRAMUSCULAR
Status: DISCONTINUED | OUTPATIENT
Start: 2023-08-28 | End: 2023-08-28 | Stop reason: HOSPADM

## 2023-08-28 RX ORDER — ONDANSETRON 2 MG/ML
4 INJECTION INTRAMUSCULAR; INTRAVENOUS EVERY 4 HOURS PRN
Status: DISCONTINUED | OUTPATIENT
Start: 2023-08-28 | End: 2023-08-30 | Stop reason: HOSPADM

## 2023-08-28 RX ORDER — OXYCODONE HYDROCHLORIDE 10 MG/1
20 TABLET ORAL
Status: DISCONTINUED | OUTPATIENT
Start: 2023-08-28 | End: 2023-08-30 | Stop reason: HOSPADM

## 2023-08-28 RX ORDER — ACETAMINOPHEN 325 MG/1
650 TABLET ORAL EVERY 6 HOURS PRN
Status: DISCONTINUED | OUTPATIENT
Start: 2023-09-02 | End: 2023-08-30 | Stop reason: HOSPADM

## 2023-08-28 RX ORDER — OXYCODONE HCL 5 MG/5 ML
10 SOLUTION, ORAL ORAL
Status: COMPLETED | OUTPATIENT
Start: 2023-08-28 | End: 2023-08-28

## 2023-08-28 RX ORDER — SODIUM CHLORIDE, SODIUM LACTATE, POTASSIUM CHLORIDE, CALCIUM CHLORIDE 600; 310; 30; 20 MG/100ML; MG/100ML; MG/100ML; MG/100ML
INJECTION, SOLUTION INTRAVENOUS
Status: DISCONTINUED | OUTPATIENT
Start: 2023-08-28 | End: 2023-08-28 | Stop reason: SURG

## 2023-08-28 RX ORDER — DULOXETIN HYDROCHLORIDE 20 MG/1
20 CAPSULE, DELAYED RELEASE ORAL 2 TIMES DAILY
Status: DISCONTINUED | OUTPATIENT
Start: 2023-08-28 | End: 2023-08-30 | Stop reason: HOSPADM

## 2023-08-28 RX ORDER — AMOXICILLIN 250 MG
1 CAPSULE ORAL
Status: DISCONTINUED | OUTPATIENT
Start: 2023-08-28 | End: 2023-08-30 | Stop reason: HOSPADM

## 2023-08-28 RX ORDER — HYDROMORPHONE HYDROCHLORIDE 2 MG/ML
INJECTION, SOLUTION INTRAMUSCULAR; INTRAVENOUS; SUBCUTANEOUS PRN
Status: DISCONTINUED | OUTPATIENT
Start: 2023-08-28 | End: 2023-08-28 | Stop reason: SURG

## 2023-08-28 RX ORDER — DIPHENHYDRAMINE HCL 25 MG
25 TABLET ORAL EVERY 6 HOURS PRN
Status: DISCONTINUED | OUTPATIENT
Start: 2023-08-28 | End: 2023-08-30 | Stop reason: HOSPADM

## 2023-08-28 RX ORDER — OXYCODONE HYDROCHLORIDE 10 MG/1
10 TABLET ORAL
Status: DISCONTINUED | OUTPATIENT
Start: 2023-08-28 | End: 2023-08-30 | Stop reason: HOSPADM

## 2023-08-28 RX ORDER — MEPERIDINE HYDROCHLORIDE 25 MG/ML
12.5 INJECTION INTRAMUSCULAR; INTRAVENOUS; SUBCUTANEOUS
Status: DISCONTINUED | OUTPATIENT
Start: 2023-08-28 | End: 2023-08-28 | Stop reason: HOSPADM

## 2023-08-28 RX ORDER — SODIUM CHLORIDE, SODIUM LACTATE, POTASSIUM CHLORIDE, CALCIUM CHLORIDE 600; 310; 30; 20 MG/100ML; MG/100ML; MG/100ML; MG/100ML
INJECTION, SOLUTION INTRAVENOUS CONTINUOUS
Status: ACTIVE | OUTPATIENT
Start: 2023-08-28 | End: 2023-08-28

## 2023-08-28 RX ORDER — ATORVASTATIN CALCIUM 40 MG/1
40 TABLET, FILM COATED ORAL EVERY EVENING
Status: DISCONTINUED | OUTPATIENT
Start: 2023-08-28 | End: 2023-08-30 | Stop reason: HOSPADM

## 2023-08-28 RX ORDER — SODIUM CHLORIDE 9 MG/ML
INJECTION, SOLUTION INTRAMUSCULAR; INTRAVENOUS; SUBCUTANEOUS
Status: DISCONTINUED | OUTPATIENT
Start: 2023-08-28 | End: 2023-08-28 | Stop reason: HOSPADM

## 2023-08-28 RX ORDER — DEXAMETHASONE SODIUM PHOSPHATE 4 MG/ML
10 INJECTION, SOLUTION INTRA-ARTICULAR; INTRALESIONAL; INTRAMUSCULAR; INTRAVENOUS; SOFT TISSUE ONCE
Status: COMPLETED | OUTPATIENT
Start: 2023-08-29 | End: 2023-08-29

## 2023-08-28 RX ORDER — ENEMA 19; 7 G/133ML; G/133ML
1 ENEMA RECTAL
Status: DISCONTINUED | OUTPATIENT
Start: 2023-08-28 | End: 2023-08-30 | Stop reason: HOSPADM

## 2023-08-28 RX ORDER — SCOLOPAMINE TRANSDERMAL SYSTEM 1 MG/1
1 PATCH, EXTENDED RELEASE TRANSDERMAL
Status: DISCONTINUED | OUTPATIENT
Start: 2023-08-28 | End: 2023-08-30 | Stop reason: HOSPADM

## 2023-08-28 RX ORDER — DEXAMETHASONE SODIUM PHOSPHATE 4 MG/ML
4 INJECTION, SOLUTION INTRA-ARTICULAR; INTRALESIONAL; INTRAMUSCULAR; INTRAVENOUS; SOFT TISSUE
Status: DISCONTINUED | OUTPATIENT
Start: 2023-08-28 | End: 2023-08-30 | Stop reason: HOSPADM

## 2023-08-28 RX ORDER — SODIUM CHLORIDE, SODIUM LACTATE, POTASSIUM CHLORIDE, CALCIUM CHLORIDE 600; 310; 30; 20 MG/100ML; MG/100ML; MG/100ML; MG/100ML
INJECTION, SOLUTION INTRAVENOUS CONTINUOUS
Status: DISCONTINUED | OUTPATIENT
Start: 2023-08-28 | End: 2023-08-28 | Stop reason: HOSPADM

## 2023-08-28 RX ORDER — DILTIAZEM HYDROCHLORIDE 120 MG/1
120 CAPSULE, COATED, EXTENDED RELEASE ORAL EVERY EVENING
Status: DISCONTINUED | OUTPATIENT
Start: 2023-08-28 | End: 2023-08-30 | Stop reason: HOSPADM

## 2023-08-28 RX ORDER — ASPIRIN 81 MG/1
81 TABLET ORAL 2 TIMES DAILY
Status: DISCONTINUED | OUTPATIENT
Start: 2023-08-28 | End: 2023-08-30 | Stop reason: HOSPADM

## 2023-08-28 RX ORDER — DEXAMETHASONE SODIUM PHOSPHATE 4 MG/ML
INJECTION, SOLUTION INTRA-ARTICULAR; INTRALESIONAL; INTRAMUSCULAR; INTRAVENOUS; SOFT TISSUE PRN
Status: DISCONTINUED | OUTPATIENT
Start: 2023-08-28 | End: 2023-08-28 | Stop reason: SURG

## 2023-08-28 RX ORDER — MIDAZOLAM HYDROCHLORIDE 1 MG/ML
1 INJECTION INTRAMUSCULAR; INTRAVENOUS
Status: DISCONTINUED | OUTPATIENT
Start: 2023-08-28 | End: 2023-08-30 | Stop reason: HOSPADM

## 2023-08-28 RX ORDER — OMEPRAZOLE 20 MG/1
20 CAPSULE, DELAYED RELEASE ORAL DAILY
Status: DISCONTINUED | OUTPATIENT
Start: 2023-08-29 | End: 2023-08-30 | Stop reason: HOSPADM

## 2023-08-28 RX ORDER — IBUPROFEN 400 MG/1
800 TABLET ORAL 3 TIMES DAILY PRN
Status: DISCONTINUED | OUTPATIENT
Start: 2023-08-31 | End: 2023-08-30 | Stop reason: HOSPADM

## 2023-08-28 RX ORDER — ONDANSETRON 2 MG/ML
INJECTION INTRAMUSCULAR; INTRAVENOUS PRN
Status: DISCONTINUED | OUTPATIENT
Start: 2023-08-28 | End: 2023-08-28 | Stop reason: SURG

## 2023-08-28 RX ADMIN — HYDROMORPHONE HYDROCHLORIDE 0.4 MG: 1 INJECTION, SOLUTION INTRAMUSCULAR; INTRAVENOUS; SUBCUTANEOUS at 13:05

## 2023-08-28 RX ADMIN — FENTANYL CITRATE 50 MCG: 50 INJECTION, SOLUTION INTRAMUSCULAR; INTRAVENOUS at 12:17

## 2023-08-28 RX ADMIN — OXYCODONE HYDROCHLORIDE 20 MG: 10 TABLET ORAL at 15:27

## 2023-08-28 RX ADMIN — POTASSIUM CHLORIDE, DEXTROSE MONOHYDRATE AND SODIUM CHLORIDE: 150; 5; 450 INJECTION, SOLUTION INTRAVENOUS at 15:16

## 2023-08-28 RX ADMIN — SODIUM CHLORIDE, POTASSIUM CHLORIDE, SODIUM LACTATE AND CALCIUM CHLORIDE: 600; 310; 30; 20 INJECTION, SOLUTION INTRAVENOUS at 10:24

## 2023-08-28 RX ADMIN — TRANEXAMIC ACID 1000 MG: 100 INJECTION, SOLUTION INTRAVENOUS at 10:33

## 2023-08-28 RX ADMIN — HYDROMORPHONE HYDROCHLORIDE 0.4 MG: 1 INJECTION, SOLUTION INTRAMUSCULAR; INTRAVENOUS; SUBCUTANEOUS at 12:34

## 2023-08-28 RX ADMIN — TRANEXAMIC ACID 1000 MG: 100 INJECTION, SOLUTION INTRAVENOUS at 11:32

## 2023-08-28 RX ADMIN — DILTIAZEM HYDROCHLORIDE 120 MG: 120 CAPSULE, COATED, EXTENDED RELEASE ORAL at 18:06

## 2023-08-28 RX ADMIN — FENTANYL CITRATE 50 MCG: 50 INJECTION, SOLUTION INTRAMUSCULAR; INTRAVENOUS at 12:25

## 2023-08-28 RX ADMIN — FENTANYL CITRATE 50 MCG: 50 INJECTION, SOLUTION INTRAMUSCULAR; INTRAVENOUS at 11:55

## 2023-08-28 RX ADMIN — KETOROLAC TROMETHAMINE 15 MG: 30 INJECTION, SOLUTION INTRAMUSCULAR; INTRAVENOUS at 23:07

## 2023-08-28 RX ADMIN — ACETAMINOPHEN 650 MG: 325 TABLET ORAL at 15:17

## 2023-08-28 RX ADMIN — DULOXETINE HYDROCHLORIDE 20 MG: 20 CAPSULE, DELAYED RELEASE ORAL at 18:06

## 2023-08-28 RX ADMIN — LOSARTAN POTASSIUM 25 MG: 25 TABLET, FILM COATED ORAL at 18:06

## 2023-08-28 RX ADMIN — OXYCODONE HYDROCHLORIDE 10 MG: 10 TABLET ORAL at 22:57

## 2023-08-28 RX ADMIN — DEXAMETHASONE SODIUM PHOSPHATE 4 MG: 4 INJECTION INTRA-ARTICULAR; INTRALESIONAL; INTRAMUSCULAR; INTRAVENOUS; SOFT TISSUE at 10:33

## 2023-08-28 RX ADMIN — MIDAZOLAM 1 MG: 1 INJECTION, SOLUTION INTRAMUSCULAR; INTRAVENOUS at 10:24

## 2023-08-28 RX ADMIN — ONDANSETRON 4 MG: 2 INJECTION INTRAMUSCULAR; INTRAVENOUS at 16:55

## 2023-08-28 RX ADMIN — HYDROMORPHONE HYDROCHLORIDE 1 MG: 1 INJECTION, SOLUTION INTRAMUSCULAR; INTRAVENOUS; SUBCUTANEOUS at 16:37

## 2023-08-28 RX ADMIN — DOCUSATE SODIUM 100 MG: 100 CAPSULE, LIQUID FILLED ORAL at 18:06

## 2023-08-28 RX ADMIN — OXYCODONE HYDROCHLORIDE 10 MG: 5 SOLUTION ORAL at 12:26

## 2023-08-28 RX ADMIN — ONDANSETRON 4 MG: 2 INJECTION INTRAMUSCULAR; INTRAVENOUS at 11:33

## 2023-08-28 RX ADMIN — HYDROMORPHONE HYDROCHLORIDE 1 MG: 2 INJECTION INTRAMUSCULAR; INTRAVENOUS; SUBCUTANEOUS at 11:16

## 2023-08-28 RX ADMIN — MIDAZOLAM HYDROCHLORIDE 1 MG: 1 INJECTION INTRAMUSCULAR; INTRAVENOUS at 12:02

## 2023-08-28 RX ADMIN — MIDAZOLAM HYDROCHLORIDE 1 MG: 2 INJECTION, SOLUTION INTRAMUSCULAR; INTRAVENOUS at 12:02

## 2023-08-28 RX ADMIN — CEFAZOLIN 2 G: 2 INJECTION, POWDER, FOR SOLUTION INTRAMUSCULAR; INTRAVENOUS at 15:17

## 2023-08-28 RX ADMIN — ATORVASTATIN CALCIUM 40 MG: 40 TABLET, FILM COATED ORAL at 18:07

## 2023-08-28 RX ADMIN — FENTANYL CITRATE 50 MCG: 50 INJECTION, SOLUTION INTRAMUSCULAR; INTRAVENOUS at 11:53

## 2023-08-28 RX ADMIN — ONDANSETRON 4 MG: 2 INJECTION INTRAMUSCULAR; INTRAVENOUS at 12:40

## 2023-08-28 RX ADMIN — PROPOFOL 100 MG: 10 INJECTION, EMULSION INTRAVENOUS at 10:32

## 2023-08-28 RX ADMIN — CEFAZOLIN 2 G: 1 INJECTION, POWDER, FOR SOLUTION INTRAMUSCULAR; INTRAVENOUS at 10:33

## 2023-08-28 RX ADMIN — KETOROLAC TROMETHAMINE 15 MG: 30 INJECTION, SOLUTION INTRAMUSCULAR; INTRAVENOUS at 15:18

## 2023-08-28 RX ADMIN — GUAIFENESIN 1200 MG: 600 TABLET, EXTENDED RELEASE ORAL at 18:06

## 2023-08-28 RX ADMIN — HYDROMORPHONE HYDROCHLORIDE 1 MG: 2 INJECTION INTRAMUSCULAR; INTRAVENOUS; SUBCUTANEOUS at 11:33

## 2023-08-28 RX ADMIN — ASPIRIN 81 MG: 81 TABLET, COATED ORAL at 20:40

## 2023-08-28 RX ADMIN — VANCOMYCIN HYDROCHLORIDE 1000 MG: 1 INJECTION, POWDER, LYOPHILIZED, FOR SOLUTION INTRAVENOUS at 10:20

## 2023-08-28 RX ADMIN — ACETAMINOPHEN 650 MG: 325 TABLET ORAL at 23:06

## 2023-08-28 RX ADMIN — SENNOSIDES AND DOCUSATE SODIUM 1 TABLET: 50; 8.6 TABLET ORAL at 20:39

## 2023-08-28 RX ADMIN — LIDOCAINE HYDROCHLORIDE 100 MG: 20 INJECTION, SOLUTION EPIDURAL; INFILTRATION; INTRACAUDAL at 10:34

## 2023-08-28 RX ADMIN — HYDROMORPHONE HYDROCHLORIDE 1 MG: 1 INJECTION, SOLUTION INTRAMUSCULAR; INTRAVENOUS; SUBCUTANEOUS at 23:56

## 2023-08-28 ASSESSMENT — LIFESTYLE VARIABLES
ON A TYPICAL DAY WHEN YOU DRINK ALCOHOL HOW MANY DRINKS DO YOU HAVE: 0
ALCOHOL_USE: NO
TOTAL SCORE: 0
HAVE YOU EVER FELT YOU SHOULD CUT DOWN ON YOUR DRINKING: NO
TOTAL SCORE: 0
CONSUMPTION TOTAL: NEGATIVE
TOTAL SCORE: 0
HOW MANY TIMES IN THE PAST YEAR HAVE YOU HAD 5 OR MORE DRINKS IN A DAY: 0
HAVE PEOPLE ANNOYED YOU BY CRITICIZING YOUR DRINKING: NO
AVERAGE NUMBER OF DAYS PER WEEK YOU HAVE A DRINK CONTAINING ALCOHOL: 0
EVER HAD A DRINK FIRST THING IN THE MORNING TO STEADY YOUR NERVES TO GET RID OF A HANGOVER: NO
EVER FELT BAD OR GUILTY ABOUT YOUR DRINKING: NO

## 2023-08-28 ASSESSMENT — COGNITIVE AND FUNCTIONAL STATUS - GENERAL
TOILETING: A LOT
SUGGESTED CMS G CODE MODIFIER MOBILITY: CL
CLIMB 3 TO 5 STEPS WITH RAILING: A LOT
MOVING TO AND FROM BED TO CHAIR: A LOT
MOBILITY SCORE: 12
DRESSING REGULAR LOWER BODY CLOTHING: A LOT
TURNING FROM BACK TO SIDE WHILE IN FLAT BAD: A LOT
WALKING IN HOSPITAL ROOM: A LOT
PERSONAL GROOMING: A LOT
DAILY ACTIVITIY SCORE: 13
HELP NEEDED FOR BATHING: A LOT
SUGGESTED CMS G CODE MODIFIER DAILY ACTIVITY: CL
STANDING UP FROM CHAIR USING ARMS: A LOT
MOVING FROM LYING ON BACK TO SITTING ON SIDE OF FLAT BED: A LOT
DRESSING REGULAR UPPER BODY CLOTHING: A LOT
EATING MEALS: A LITTLE

## 2023-08-28 ASSESSMENT — PATIENT HEALTH QUESTIONNAIRE - PHQ9
SUM OF ALL RESPONSES TO PHQ9 QUESTIONS 1 AND 2: 0
1. LITTLE INTEREST OR PLEASURE IN DOING THINGS: NOT AT ALL
SUM OF ALL RESPONSES TO PHQ9 QUESTIONS 1 AND 2: 0
1. LITTLE INTEREST OR PLEASURE IN DOING THINGS: NOT AT ALL
2. FEELING DOWN, DEPRESSED, IRRITABLE, OR HOPELESS: NOT AT ALL
2. FEELING DOWN, DEPRESSED, IRRITABLE, OR HOPELESS: NOT AT ALL

## 2023-08-28 ASSESSMENT — PAIN DESCRIPTION - PAIN TYPE
TYPE: SURGICAL PAIN

## 2023-08-28 ASSESSMENT — FIBROSIS 4 INDEX
FIB4 SCORE: 1.94
FIB4 SCORE: 1.94

## 2023-08-28 NOTE — ANESTHESIA TIME REPORT
Anesthesia Start and Stop Event Times     Date Time Event    8/28/2023 0958 Ready for Procedure     1024 Anesthesia Start     1144 Anesthesia Stop        Responsible Staff  08/28/23    Name Role Begin End    Tobey Gansert, M.D. Anesth 1024 1144        Overtime Reason:  no overtime (within assigned shift)    Comments:

## 2023-08-28 NOTE — ANESTHESIA POSTPROCEDURE EVALUATION
Patient: Chelly Decker    Procedure Summary     Date: 08/28/23 Room / Location:  OR  / SURGERY [facility]    Anesthesia Start: 1024 Anesthesia Stop: 1144    Procedure: LEFT TOTAL HIP ARTHROPLASTY (Left: Hip) Diagnosis:       Primary osteoarthritis of left hip      (Primary osteoarthritis of left hip [M16.12])    Surgeons: Daryl Perrin M.D. Responsible Provider: Tobey Gansert, M.D.    Anesthesia Type: general ASA Status: 3          Final Anesthesia Type: general  Last vitals  BP   Blood Pressure : (!) 158/66    Temp   36.2 °C (97.2 °F)    Pulse   65   Resp   20    SpO2   100 %      Anesthesia Post Evaluation    Patient location during evaluation: PACU  Patient participation: complete - patient participated  Level of consciousness: awake and alert    Airway patency: patent  Anesthetic complications: no  Cardiovascular status: hemodynamically stable  Respiratory status: acceptable  Hydration status: euvolemic    PONV: none          There were no known notable events for this encounter.     Nurse Pain Score: 10 (NPRS)         Speech Therapy      Visit Type: Treatment  -  Swallow  Reason for consult: Prolonged intubation after aspiration event    Precautions     - Medical precautions:  swallowing precautions;.     - Oxygen: nasal cannula (.5L).      - Basic: O2    - Lines in chart and on patient reviewed; cautions maintained through out session    - Safety measures: chair alarm    - Cognition:         • Expression is verbal.          • Following commands intact.      - Affect/Behavior: alert and impulsive    SUBJECTIVE     Pt is a 59 year old female admitted to [facility] on 1/15/2023 for sepsis.Past medical history significant for bipolar 1 disorder, COPD, chronic hepatitis-C, PTSD, depression and anxiety. Patient was recently admitted to [facility] in December for approximately 2 weeks secondary to sepsis for medial thigh abscess and ARDS. Hospitalization complicated by cardiac arrest precipitated by aspiration of scrambled eggs, intubated 1/3 to 1/4 and Bronchoscopy done. She recovered and she left hospital AMA.       Patient presented to [facility] ER on 01/15/23 after family called for welfare check and found patient to be confused on the ground covered in stool.  She was admitted to the floor for sepsis. Speech therapy was consulted and recommended Soft-chopped for dentition diet with thin liquids and Constant Supervision with meals due to impulsivity and reduced safety.  Code blue was called the morning of 1/22/23 for as patient was found pulseless and unresponsive in her bed after eating unattended.  Patient was last noted to be eating Jell-O, contents of which were present in the oropharynx.  CPR was initiated and she was intubated by anesthesia and transferred to ICU.       Pertinent Medical History:  1/16/23: Clinical evaluation completed with recommendation for Soft-chopped/Thin liquids and Constant Supervision for safety.  1/18/23: Discharged from [facility] on Soft-Chopped/Thin liquid diet with continued recommendation for Constant  Supervision for safety.  1/22/23-Code blue called for cardiopulmonary arrest. Pt last seen eating unattended. Massive aspiration event suspected, CPR initiated, pt intubated and transferred to ICU.  1/22/23-Bronch  1/30/23- Extubated over bronch  1/30/23: RN reports emesis evening of 1/30/23 1/31/23: Clinical swallow evaluation in AM recommended VFSS    1/31/23: VFSS-recommended HTL via TSP/Dysphagia 2 solids.    2/6/23: Pt transferred to ICU for hypercapnia. NPO    2/7/23: VFSS completed, recommended resuming HTL via TSP/Dysphagia 2 solids, crushed meds, constant supervision. Physical A to feed.    2/9/23: Pt transferred to floor. Safe swallow guidelines re-issued. Afebrile, on RA.    2/10/23: Pt received upright. Afebrile, on 2L/min via NC. Voice continues to improve with regards to quality and loudness. Pt is agreeable to participate.    2/12/23: Pt had emesis while on BiPAP. Suspect aspiration. RN reports rhonchi in R lung base and mid lung.     2/14/23: Repeat VFSS. Pt continues to demonstrate aspiration with thin and NTL via TSP. Recommend continue with HTL via TSP and Dysphagia 2 solids.     2/16/23: Diet changed to Moderately Thick via TSP/Level 5 Minced and Moist per dietary transition to IDDSI. Pt received upright in chair. Impulsive during AM meal. Reports displeasure with \"pureed\" meal. Afebrile and without acute pulmonary change.Patient agreed to participate in therapy this date.   Patient agreed to participate in therapy this date.   Patient/family goals: return to previous functional status     OBJECTIVE      Swallow   Numbers listed with consistency indicate IDDSI diet level.    Moderately thick (3)   - Amount given: via TSP   - Oral phase: impaired, suspect impaired bolus control and suspect premature spillage   - Pharyngeal phase: intact    Pureed (4)   - Oral: intact   - Pharyngeal phase: intact    Soft and bite-sized (6)   - Oral: impaired, suspect impaired bolus control   - Pharyngeal phase:  intact    Oral/Pharyngeal Exercises  Focus of session on bolus driven exercise program to increase speed, coordination, and motoric output of the swallowing muscular system through use of direct swallowing therapy with food/bolus as weight and resistance.  -Bolus control x 15 with mod cues                       ASSESSMENT  Emphasis of session included dysphagia intervention in the setting of admission for sepsis which was complicated by aspiration event, cardiac arrest and prolonged intubation.    Pt is received upright in chair. Diet modified to Moderately thick via TSP/Level 5: Minced and moist per IDDSI transition. Pt reports displeasure with current diet. Emphasis of session included assessment for safest, least restrictive diet. Bolus control ex completed x 15. Pt presented with Moderately Thick liquids via TSP, Level 4: Pureed and Level 6: Soft and Bite sized consistencies. Oral phase is notable for rapid oral phase which results in impaired bolus control and suspected premature spillage to pharynx. Pharyngeal phase was remarkable for timely appearing pharyngeal swallow trigger with trials presented and tolerance of all trials without clinical indication of laryngeal pen/aspriation. Poor safety awareness persists despite frequent verbal cues.    Recommend: Diet modification to Level 6: Soft and Bite sized, continue with Moderately Thick liquids via TSP. Continue Constant Supervision, Physical A to feed and Meds crushed in puree. Swallow guidelines were modified at bedside to reflect changes. Reviewed changes with pt and staff who verbalized comprehension.    Progress: Slow progress, cognitive deficits       • Rehab Potential: good    Education:   - Present and ready to learn: patient  Education provided during session:  - dysphagia and role of SLP  - Results of above outlined education: Verbalizes understanding and Needs reinforcement    Patient at end of session:    - location: in chair    - safety measures:  alarm system in place/re-engaged    - hand off to: nurse    PLAN  Plan for next session: Dysphagia intervention    Frequency: x3-4/week, dysphagia     Discharge Recommendations  SLP Identified Barriers to Discharge: medical  Recommendation for Discharge Location: SLP [place]: Post-acute facility with therapy needs  Recommendation for Discharge Support: SLP [place]: 24 Hour assist, Assistance with medication, Assistance with IADLs     Interventions:  Assess tolerance of least restrictive oral diet, clinical swallow evaluation, video swallow, repeat video swallow, pharyngeal conditioning exercises, patient/family education, reassess swallow function as appropriate, oral conditioning exercises, diet advancement trials and dysphagia therapy    Plan/Goal Agreement:  Patient agrees with goals and individualized plan of care    RECOMMENDATIONS     Diet: Moderately Thick via TSP/Level 6: Soft and Bite Sized.    -Medication Administration:         *crushed and with puree    -Feeding Guidelines:          *constant supervision, feed patient/total feeding assistance, must be fed by nursing staff, no talking while eating, small bites/sips, sit up for 30 minutes after meal, liquids by tsp only, sit fully upright for all po intake and eat slowly only when alert    -Additional Swallow Recommendations:         *re-evaluate swallow   -Instrumental Assessments:         *Videofluoroscopic swallow study (VFSS)    -Speech Reviewed Swallow:         *with patient/family, with clinical caregivers and feeding guidelines updated in room    GOALS  Review Date: 2/23/2023  Long Term Goals:   1.   Patient will consume Ground/Minced/Dysphagia 2, Nectar-Thick Liquids diet with optimum safety and efficiency of swallow function with less than 10% signs/symptoms of aspiration. (Met, 2/9/23)     2.  Patient will tolerate advancement to baseline diet of Mechanical Soft , Thin Liquids with optimum safety and efficiency of swallow function with less than 10%  signs/symptoms of overt aspiration. (Progressing, continue goal)     3.  Patient will follow swallowing guidelines with min cues. (Progressing, continue goal)     4. Pt will tolerate therapeutic trials of Dysphagia 3 solids with >90% as a precursor to safe diet advancement.  Documented in the chart in the following areas: Prior Living. Pain. Assessment. Patient education flowsheet  Documented in the chart in the following areas: Prior Living. Pain. Assessment. Patient education flowsheet      Therapy procedure time and total treatment time can be found documented on the Time Entry flowsheet

## 2023-08-28 NOTE — PROGRESS NOTES
Received report from PACU. Pt to floor via bed at 1345. Pt is drowsy, sleepy but can be easily arouse by verbal stimuli and  crying due to pain. Shifted patient to bed via sliding board. Pt is able to dorsi flex and planter flex, moderate only. LC dressing in place to left hip, dry and intact with old small drainage. Fall precautions observe,Bed in locked and lowest position. Call light within reach.     1500- patient voided in her bed, Female wick applied

## 2023-08-28 NOTE — H&P
Surgery Orthopedic History & Physical Note    Date  8/28/2023    Primary Care Physician  Kassi Carrillo M.D.      Pre-Op Diagnosis Codes:     * Primary osteoarthritis of left hip [M16.12]    HPI  This is a 87 y.o. female who presented with severe left hip OA, symptomatic despite conservative care. Has been indicated and cleared for a left JAYLENE. R/b/A explained to her.     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    Breath shortness     uses oxygen at night, during day sometimes. 8/23/23-SOB with activity.    Cancer (HCC)     cervical cancer 1968    Cervical cancer (HCC) 1968    Ivcfwm-pfgejqwdopri-gg chemo or radiation.    Chronic constipation     takes miralax    Chronic kidney disease (CKD)     stage 3    Chronic pain     follows with Dr Lozoya    Congestive heart failure ([facility])     follows with Dr Salvador with [facility]    COPD (chronic obstructive pulmonary disease) (Formerly [facility])     Dental disorder     upper denture and permanent bridge on lower front teeth    Dizziness 12/29/2022    GERD (gastroesophageal reflux disease)     H/O Clostridium difficile infection 05/12/2022    Hammer toes of both feet 12/29/2022    Heart burn     taking pantoprazole.    Heart murmur     High cholesterol 10+ years    Controled with medication    Hypertension     Hypotension due to hypovolemia 08/17/2022    Hypoxia 03/29/2022    Incomplete defecation 03/29/2022    Infectious disease 4/25/23    I was just informed that the person who drove me to my doctor appointment and home on the 25th of this month was diagnosed with the flu on the 26th of this month. I had my flu shot and after one day I have no  symptoms will keep checking !    Insomnia due to medical condition 10/13/2022    Irregular heart rate 03/29/2022    states has had for years, why atenolol    Kidney stones     Moderate asthma without complication 03/29/2022    Oxygen dependent     uses oxygen 4 liters nasal cannula at night, sometimes during the day    Pain     Pain in joint involving multiple sites 10/13/2022    Pain in joint involving multiple sites 10/13/2022    Pelvic floor dysfunction 03/29/2022    Primary hypertension 03/29/2022    Renal insufficiency 08/03/2022    due to meds    Seasonal allergies     Skin lesions 05/12/2022    Solitary pulmonary nodule     had radiation treatment approx 2021 and does follow up scans.    Spinal stenosis     pain pump for pain control    Spinal stenosis of lumbar region without neurogenic claudication 03/29/2022    Urge incontinence of urine 05/12/2022    urinary retention    Urinary bladder disorder Past [id number]    I've been under treatment for years with no results I am seeing a new urologist on May 2       Past Surgical History:   Procedure Laterality Date    PUMP REVISION Right 05/04/2023    Procedure: INTRATHECAL PAIN PUMP REVISION;  Surgeon: Tavo Lozoya M.D.;  Location: SURGERY [facility];  Service: Pain Management    PUMP INSERT/REMOVE Right 10/07/2022    Procedure: MEDTRONIC PUMP REPLACEMENT;  Surgeon: Joel Alanis M.D.;  Location: SURGERY [facility];  Service: Pain Management    PB IMPLANT NEUROSTIM/ N/A 02/05/2021    Procedure: IMPLANT PUMP AND CATHETER;  Surgeon: Joel Alanis M.D.;  Location: SURGERY [facility];  Service: Pain Management    HIP ARTHROPLASTY TOTAL Right 1998    APPENDECTOMY      CARPAL TUNNEL ENDOSCOPIC Bilateral     bilat    CHOLECYSTECTOMY      COLONOSCOPY      HYSTERECTOMY LAPAROSCOPY      total    LITHOTRIPSY      kidney stones removed    PRIMARY C SECTION      c section    TONSILLECTOMY         No current facility-administered medications for  "this encounter.       Social History     Socioeconomic History    Marital status:      Spouse name: Not on file    Number of children: Not on file    Years of education: Not on file    Highest education level: Not on file   Occupational History    Occupation: retired book keeper   Tobacco Use    Smoking status: Former     Current packs/day: 0.00     Average packs/day: 1 pack/day for 61.0 years (61.0 ttl pk-yrs)     Types: Cigarettes, Electronic Cigarettes     Start date: 1954     Quit date: 2015     Years since quittin.6    Smokeless tobacco: Never    Tobacco comments:     Smoked as a teenager quit as a senior citizen   Vaping Use    Vaping Use: Former    Substances: Flavoring   Substance and Sexual Activity    Alcohol use: Not Currently     Comment: Was  is a social drinker until approximately     Drug use: Not Currently     Types: Inhaled     Comment: cocaine while in her 40\"s    Sexual activity: Not on file   Other Topics Concern    Not on file   Social History Narrative    Not on file     Social Determinants of Health     Financial Resource Strain: Low Risk  (2022)    Overall Financial Resource Strain (CARDIA)     Difficulty of Paying Living Expenses: Not very hard   Food Insecurity: No Food Insecurity (2022)    Hunger Vital Sign     Worried About Running Out of Food in the Last Year: Never true     Ran Out of Food in the Last Year: Never true   Transportation Needs: Unmet Transportation Needs (2022)    PRAPARE - Transportation     Lack of Transportation (Medical): Yes     Lack of Transportation (Non-Medical): No   Physical Activity: Inactive (2022)    Exercise Vital Sign     Days of Exercise per Week: 0 days     Minutes of Exercise per Session: 0 min   Stress: Not on file   Social Connections: Not on file   Intimate Partner Violence: Not on file   Housing Stability: Unknown (2022)    Housing Stability Vital Sign     Unable to Pay for Housing in the Last Year: No "     Number of Places Lived in the Last Year: Not on file     Unstable Housing in the Last Year: No       Family History   Problem Relation Age of Onset    Heart Disease Mother         Not from congestive heart failure!       Allergies  Sulfa drugs and Erythromycin    Review of Systems  Negative except for HPI    Physical Exam    Vital Signs                        Severe pain with attempted movement of left hip  Weak abduction  LLD with left side 1 cm shorter    Labs:                    Radiology:  No orders to display         Assessment/Plan:  Pre-Op Diagnosis Codes:     * Primary osteoarthritis of left hip [M16.12]  Procedure(s):  LEFT TOTAL HIP ARTHROPLASTY      The risks of surgery were discussed with the patient.   These include pain, bleeding, infection, fractures and dislocations as well as damage to surrounding structures or neurovascular compromise.  The risks include the possibility of need for further surgery, lack of healing, lack of symptom relief.  The elevated risk of DVT following a joint replacement was discussed with the patient, and a plan was made for postoperative DVT prophylaxis.  We did discuss the possibility of leg length discrepancy both apparent and actual following joint replacement.  They do express an understanding that these are man-made parts, with limited lifespans, and that we would not be turning them into a bionic human being. We did discuss the various approaches to a joint replacement, and made no guarantees about incision size or postoperative discharge plans. Certainly there could be anesthetic complications such as heart attack, stroke, respiratory failure, or even death.  All of the patient's questions were answered, they were shown models of the implants and images of their x-rays.  she expressed understanding and wished to proceed.   We have told the patient a front wheeled walker will be required in the perioperative period. We did discuss the importance of physical  therapy and the risk of permanent stiffness or muscle weakness after surgery.

## 2023-08-28 NOTE — ANESTHESIA PREPROCEDURE EVALUATION
Case: [id number] Date/Time: 08/28/23 1045    Procedure: LEFT TOTAL HIP ARTHROPLASTY    Diagnosis: Primary osteoarthritis of left hip [M16.12]    Pre-op diagnosis: Primary osteoarthritis of left hip [M16.12]    Location:  OR  / SURGERY [facility]    Surgeons: Daryl Perrin M.D.          Relevant Problems   PULMONARY   (positive) Acute exacerbation of chronic obstructive airways disease (HCC)   (positive) Chronic obstructive pulmonary disease (HCC)   (positive) Moderate asthma without complication      NEURO   (positive) TIA (transient ischemic attack)      CARDIAC   (positive) External hemorrhoid   (positive) Moderate aortic stenosis   (positive) Moderate mitral insufficiency   (positive) PVC's (premature ventricular contractions)   (positive) Primary hypertension         (positive) Chronic kidney disease, stage 3a (HCC)       Physical Exam    Airway   Mallampati: II  TM distance: >3 FB  Neck ROM: full       Cardiovascular - normal exam  Rhythm: regular  Rate: normal  (-) murmur     Dental - normal exam           Pulmonary - normal exam  Breath sounds clear to auscultation     Abdominal    Neurological - normal exam                 Anesthesia Plan    ASA 3   ASA physical status 3 criteria: moderate reduction of ejection fraction and COPD - poorly controlled    Plan - general       Airway plan will be LMA          Induction: intravenous    Postoperative Plan: Postoperative administration of opioids is intended.    Pertinent diagnostic labs and testing reviewed    Informed Consent:    Anesthetic plan and risks discussed with patient.    Use of blood products discussed with: patient whom consented to blood products.

## 2023-08-28 NOTE — PROGRESS NOTES
4 Eyes Skin Assessment Completed by , RN Gina and RN Yajaira    Head WDL  Ears WDL  Nose WDL  Mouth WDL  Neck WDL  Breast/Chest WDL  Shoulder Blades WDL  Spine WDL  (R) Arm/Elbow/Hand Bruising  (L) Arm/Elbow/Hand Bruising  Abdomen Redness and Rash  Groin WDL  Scrotum/Coccyx/Buttocks right lower back, has pain pump  (R) Leg Bruising  (L) Leg Incision with Shankar Dressing  (R) Heel/Foot/Toe WDL  (L) Heel/Foot/Toe WDL          Devices In Places Blood Pressure Cuff, Pulse Ox, and SCD's,Female wick      Interventions In Place NC W/Ear Foams    Possible Skin Injury No    Pictures Uploaded Into Epic N/A  Wound Consult Placed N/A  RN Wound Prevention Protocol Ordered No

## 2023-08-28 NOTE — OR NURSING
1144: Patient arrived to PACU from OR via [place]. Report received from anesthesia and RN. Respirations are spontaneous and unlabored. VSS on 6L. Dressing is clean, dry and intact. Cold pack applied. LLE; pedal pulse is 2+, cap refill less than 3 seconds, warm pink. OPA in place.     1145: OPA removed. Respirations even and unlabored.    1153: Patient yelling and thrashing. Medicated per MAR. Bleeding noted to dressing and marked.    1208: Unable to obtain vitals as patient continues to thrash and remove cables. Dr. Gansert contacted for further orders.    1220: Patient continues to thrash around and kick. Patient educated on hip precautions but remains thrashing.    1226: PO analgesia given. Gown changed and warm blankets placed.     1234: Bleeding continues to dressing. Dr. Figueroa made aware. Medicated for pain as well, see MAR.    1240: Pt endorsing nausea, zofran given see MAR.    1255: Son updated via phone and given inpatient room number. Patient resting with eyes closed.     1305: XR at bedside.     1311: Recovery completed at this time. Patient meets criteria for transfer to inpatient room. Report given to Glen JJ.    1330: Transport at bedside to transfer patient to [facility].

## 2023-08-28 NOTE — LETTER
August 18, 2023    Patient Name: Chelly Decker  Surgeon Name: Daryl Perrin M.D.  Surgery Facility: [facility] ([id number] [facility])  Surgery Date: 8/28/2023    The time of your surgery is not final and may change up to and until the day of your surgery. You will be contacted 24-48 hours prior to your surgery date with your check-in and surgery time.    If you will not be at one of the below numbers please call the surgery scheduler at [phone]  Preferred Phone: [phone]    BEFORE YOUR SURGERY   Pre Registration and/or Lab Work must be done within and no earlier than 28 days prior to your surgery date. Your scheduled facility will contact you regarding all required preregistration and/or lab work. If you have not been contacted within 7 days of your scheduled procedure please call [facility] at [phone] for an appointment as soon as possible.    Pre op Appointment:   Date: 8/22/2023   Time: 3:30PM   Provider: Gilberto Sesay PA-C   Location: [address] [place] [id number]  Instructions: Bring a list of all medications you are taking including the dosing and frequency.    - Please  your non-narcotic prescriptions prior to surgery at the pharmacy you provided us. DON'T START them until after your surgery.    DAY OF YOUR SURGERY  Nothing to eat or drink after midnight     Refrain from smoking any substance after midnight prior to surgery. Smoking may interfere with the anesthetic and frequently produces nausea during the recovery period.    Continue taking all lifesaving medications. Including the morning of your surgery with small sip of water.    Please do NOT take on the day of surgery:  Diuretics: examples- furosemide (Lasix), spironolactone, hydrochlorothiazide  ACE-inhibitors: examples- lisinopril, ramipril, enalapril  “ARBs”: examples- losartan, Olmesartan, valsartan    Please arrive at the hospital/surgery center at  the check-in time provided.     An adult will need to bring you and take you home after your surgery.     AFTER YOUR SURGERY  Post op Appointment:   Date: 9/5/2023   Time: 3:30PM    With: Gilberto Sesay PA-C   Location: [address] [place] [id number]    - Therapy- Your first appointment should be 1  week(s) after your surgery. For your convenience we have 4 Physical Therapy locations: [place], [facility], [place], and [facility]. Call our office ASAP to schedule an appointment at [phone] or take the enclosed Therapy Prescription to a facility of your choice.  - No dental work for 3-6 months after your surgery.  - You must have someone provide transportation post surgery and someone to monitor you for at least 24 hours post-surgery. If you don't have either of these your appointment will be canceled.       TIME OFF WORK  FMLA or Disability forms can be faxed directly to: [phone] or you may drop them off at [address] [place] [address]. Our office charges a $35.00 fee per form. Forms will be completed within 10 business days of drop off and payment received. For the status of your forms you may contact our disability office directly [phone].    MEDICATION INSTRUCTIONS **Please read section completely**    The following medications should be stopped a minimum of 10 days prior to surgery:  All over the counter, Supplements & Herbal medications    Anorectics: Phentermine (Adipex-P, Lomaira and Suprenza), Phentermine-topiramate (Qsymia), Bupropion-naltrexone (Contrave)    Opiod Partial Agonists/Opioid Antagonists: Buprenorphine (Subocone, Belbuca, Butrans, Probuphine Implant, Sublocade), Naltrexone (ReVia, Vivitrol), Naloxone    Amphetamines: Dextroamphetamine/Amphetamine (Adderall, Mydayis), Methylphenidate Hydrochloride (Concerta, Metadate, Methylin, Ritalin)    The following medications should be stopped 5 days prior to surgery:  Blood Thinners: Any Aspirin, Aspirin products,  anti-inflammatories such as ibuprofen and any blood thinners such as Coumadin and Plavix. Please consult your prescribing physician if you are on life saving blood thinners, in regards to when to stop medications prior to surgery.     The following medications should be stopped a minimum of 3 days prior to surgery:  PDE-5 inhibitors: Sildenafil (Viagra), Tadalafil (Cialis), Vardenafil (Levitra), Avanafil (Stendra)    MAO Inhibitors: Rasagiline (Azilect), Selegiline (Eldepryl, Emsam, Selapar), Isocarboxazid (Marplan), Phenelzine (Nardil)

## 2023-08-28 NOTE — CARE PLAN
The patient is Stable - Low risk of patient condition declining or worsening    Shift Goals  Clinical Goals: Patient will be report pain level 5/10 or less throughout the shift after PRN interventions  Patient Goals: pain mngt    Progress made toward(s) clinical / shift goals:  Patient is having pain, medicated as per MAR. Per family, patient has pain pump to right lower back in place.    Patient is not progressing towards the following goals:    Problem: Pain - Standard  Goal: Alleviation of pain or a reduction in pain to the patient’s comfort goal  8/28/2023 1617 by Lino Norman R.N.  Outcome: Progressing    Problem: Fall Risk  Goal: Patient will remain free from falls  8/28/2023 1617 by Lino Norman, R.N.  Outcome: Progressing     Problem: Skin Integrity  Goal: Skin integrity is maintained or improved  8/28/2023 1617 by Lino Norman, R.N.  Outcome: Progressing     Problem: Mobility  Goal: Patient's capacity to carry out activities will improve  Outcome: Progressing     Problem: Infection - Standard  Goal: Patient will remain free from infection  Outcome: Progressing     Problem: Wound/ / Incision Healing  Goal: Patient's wound/surgical incision will decrease in size and heals properly  Outcome: Progressing

## 2023-08-28 NOTE — ANESTHESIA PROCEDURE NOTES
Airway    Date/Time: 8/28/2023 10:33 AM    Performed by: Tobey Gansert, M.D.  Authorized by: Tobey Gansert, M.D.    Location:  OR  Urgency:  Elective  Indications for Airway Management:  Anesthesia      Spontaneous Ventilation: absent    Sedation Level:  Deep  Preoxygenated: Yes    Final Airway Type:  Supraglottic airway  Final Supraglottic Airway:  Standard LMA    SGA Size:  3  Number of Attempts at Approach:  1

## 2023-08-28 NOTE — OP REPORT
Date of Surgery:  8-28-23  Pre-operative Diagnosis:  left hip arthritis    Post-operative Diagnosis:  left hip arthritis    Procedure:  left total hip replacement  2. Prophylactic cerclage fixation of left femur    Implants used:  A Hayley total hip arthroplasty system with the following components  Acetabulum: 56 mm Trident II  Femur: Size 7 standard Insignia  Bearing: MDM Alpha Code F  Head: 28 mm +4 Biolox Delta inner head with F XLPE outer head  Screws: 3  Cable: One 2.0 mm Dall-Miles cable placed distal to the lesser trochanter for prophylactic cerclage fixation  Surgeon:  Daryl Perrin MD    1st Assistant:   Gilberto Sesay PA-C    Anesthesiologist:   T. Gansert, MD    EBL:  200 cc    Specimen:  none    Findings:  Severe arthritic changes, worn acetabulum    Indications for procedure:  This patient is a 87 y.o. female with a long standing history of left hip arthritis. The patient had failed conservative therapy including anti-inflammatory medications, activity modifications, injections, physical therapy and assistive devices. The patient was indicated for a left total hip replacement. The patient expressed an understanding of the risks of pain, bleeding, infection, dislocation,  need for further surgery, damage to surrounding structures, neurovascular compromise, blood clots, leg-length discrepancy both apparent and actual, anesthetic complications, and serious medical consequences including but not limited to heart attack, stroke or even death. It was explained that the implants are man-made products and thus subject to possible failure.  The patient expressed an understanding and wished to proceed. Specific risks based on the patient's medical history were addressed. A clearance was obtained by the medical physicians and the patient was taken to the operating room on the day of surgery for the above named procedure.     Description of procedure:  The patient was met in the pre-operative holding area. The  left hip was marked as the appropriate surgical site with indelible ink. They were taken to the operating room where the anesthesia department started a general anesthetic for intraoperative and post-operative anesthesia and pain control. The patient was turned with the operative hip facing up. All bony prominences were padded appropriately. The operative hip was prepped and draped in a standard sterile surgical fashion. A time out procedure was called to verify the side and site of surgery, the proposed surgical procedure, and the administration of pre-operative antibiotics. After successful completion of the time out procedure, our attention was turned to the operative hip.     A straight incision was made centered on the greater trochanter. This was carried down through the subcutaneous tissue with the assistance of the Bovie electrocautery and the self-retaining retractors. The fascia was identified and cleared with a Jeff elevator. This was incised in line with its fibers and the Charnley self-retaining retractor was placed. The hip was internally rotated and the external rotators were taken down. The piriformis was identified and tagged for later repair. The abductor were retracted anteriorly and protected. The posterior capsule was identified, cleared, and incised in an L-capsulotomy fashion. The corner of the L was tagged for later repair. The hip was able to be dislocated. We noted evidence of severe arthritic changes including cartilage loss and osteophytic overgrowth. The femoral neck was cleared and our neck cut was made in line with our previously templated images using a femoral neck cutting guide. The femoral head was removed, the femur was elevated out of the wound and we began our preparation of the femur, first with a box osteotome followed by a canal finder by hand then a lateralizing reamer on power. The femur was then broached to a size 7 at which point we noted excellent calcar fit and  rotational stability. The broach handle was removed, the broach was left in place and the femur was retracted anteriorly and our attention was turned to the acetabulum.    The acetabulum was exposed and soft tissue was removed from its rim. The straight reamer was used to obtain proper medialization and then the offset reamer was used to ream up to a size 56 at which point we noted an appropriate bony bed for implantation. The acetabulum was irrigated.  A size 56 mm acetabular component was then opened in a sterile fashion and impacted into place in the proper amount of abduction and anteversion. 3 acetabular bone screws were placed for adjunctive fixation. The MDM bearing surface was inserted and impacted into place. This was tested for proper seating and the retractors were removed and our attention was turned back to the femur.    The femur was elevated and exposed properly. We removed the broach and then reinserted it to confirm size. A trial stem was assembled, and we trialed both the +0 and the +4 heads.  With the +4 head in place we noted excellent restoration of leg length, offset and stability. The hip was stable past 90 degrees of flexion and past 60 degrees of internal rotation. It did not dislocate with extension and external rotation. Abductor tension was appropriate. At this point the hip was dislocated, the trial components were removed and the femur was irrigated. The real stem was opened in a sterile fashion on the back table and then impacted into the femur. Another trial reduction was made and again we noted excellent restoration of leg length, offset and stability. At this point the real head was opened and assembled and impacted onto the taper, which had been cleaned and dried thoroughly. Another reduction was made and again we noted excellent restoration of leg length, offset and stability.    Given the intraoperative findings, decision was made to place 1 prophylactic cerclage cable.  A 2.0 mm  Dall-Miles cable was opened, and then passed, tightened, crimped, cut in the standard fashion.  This was distal to the lesser trochanter after the final stem and head had been placed.    Now a dilute betadine solution was instilled for 3 minutes before being pulse-lavaged out. The posterior capsule was closed using the previously placed tag stitches. The piriformis was closed as a separate structure. The fascia was closed with #1 Quill. The deep subcutaneous tissue was closed with a running #1 PDS. The deep dermal layer was closed with 2-0 Vicryl in a buried interrupted fashion. The skin was closed with running 3-0 Monocryl and a sterile colleen/acticoat wound dressing was applied. The patient is currently in the operating room awaiting reversal of anesthesia. Appropriate DVT prophylaxis and perioperative antibiotics will be ordered. All sponge and instrument counts were correct prior to final wound closure.  A front wheel walker will be provided and will be required in the perioperative period to assist with balance, weakness, and pain during ambulation, and to help prevent falls.

## 2023-08-29 LAB
HCT VFR BLD AUTO: 32.5 % (ref 37–47)
HGB BLD-MCNC: 10.2 G/DL (ref 12–16)

## 2023-08-29 PROCEDURE — 97535 SELF CARE MNGMENT TRAINING: CPT

## 2023-08-29 PROCEDURE — 97162 PT EVAL MOD COMPLEX 30 MIN: CPT

## 2023-08-29 PROCEDURE — 96375 TX/PRO/DX INJ NEW DRUG ADDON: CPT

## 2023-08-29 PROCEDURE — 96376 TX/PRO/DX INJ SAME DRUG ADON: CPT

## 2023-08-29 PROCEDURE — 99024 POSTOP FOLLOW-UP VISIT: CPT | Performed by: ORTHOPAEDIC SURGERY

## 2023-08-29 PROCEDURE — 97166 OT EVAL MOD COMPLEX 45 MIN: CPT

## 2023-08-29 PROCEDURE — 36415 COLL VENOUS BLD VENIPUNCTURE: CPT

## 2023-08-29 PROCEDURE — 700102 HCHG RX REV CODE 250 W/ 637 OVERRIDE(OP): Performed by: ORTHOPAEDIC SURGERY

## 2023-08-29 PROCEDURE — 700111 HCHG RX REV CODE 636 W/ 250 OVERRIDE (IP): Performed by: PHYSICIAN ASSISTANT

## 2023-08-29 PROCEDURE — 94760 N-INVAS EAR/PLS OXIMETRY 1: CPT

## 2023-08-29 PROCEDURE — G0378 HOSPITAL OBSERVATION PER HR: HCPCS

## 2023-08-29 PROCEDURE — 85018 HEMOGLOBIN: CPT

## 2023-08-29 PROCEDURE — A9270 NON-COVERED ITEM OR SERVICE: HCPCS | Performed by: ORTHOPAEDIC SURGERY

## 2023-08-29 PROCEDURE — 700102 HCHG RX REV CODE 250 W/ 637 OVERRIDE(OP): Performed by: PHYSICIAN ASSISTANT

## 2023-08-29 PROCEDURE — A9270 NON-COVERED ITEM OR SERVICE: HCPCS | Performed by: PHYSICIAN ASSISTANT

## 2023-08-29 PROCEDURE — 85014 HEMATOCRIT: CPT

## 2023-08-29 RX ORDER — TRAMADOL HYDROCHLORIDE 50 MG/1
50 TABLET ORAL EVERY 6 HOURS PRN
Qty: 40 TABLET | Refills: 0 | Status: SHIPPED | OUTPATIENT
Start: 2023-08-29 | End: 2023-09-08

## 2023-08-29 RX ORDER — OXYCODONE HYDROCHLORIDE 10 MG/1
10 TABLET ORAL EVERY 4 HOURS PRN
Qty: 40 TABLET | Refills: 0 | Status: SHIPPED | OUTPATIENT
Start: 2023-08-29 | End: 2023-09-05

## 2023-08-29 RX ADMIN — Medication 10 MG: at 22:34

## 2023-08-29 RX ADMIN — ASPIRIN 81 MG: 81 TABLET, COATED ORAL at 17:43

## 2023-08-29 RX ADMIN — ALBUTEROL SULFATE 2 PUFF: 90 AEROSOL, METERED RESPIRATORY (INHALATION) at 17:56

## 2023-08-29 RX ADMIN — OMEPRAZOLE 20 MG: 20 CAPSULE, DELAYED RELEASE ORAL at 05:07

## 2023-08-29 RX ADMIN — ACETAMINOPHEN 650 MG: 325 TABLET ORAL at 11:33

## 2023-08-29 RX ADMIN — POLYETHYLENE GLYCOL 3350 1 PACKET: 17 POWDER, FOR SOLUTION ORAL at 08:51

## 2023-08-29 RX ADMIN — ATORVASTATIN CALCIUM 40 MG: 40 TABLET, FILM COATED ORAL at 17:44

## 2023-08-29 RX ADMIN — KETOROLAC TROMETHAMINE 15 MG: 30 INJECTION, SOLUTION INTRAMUSCULAR; INTRAVENOUS at 23:54

## 2023-08-29 RX ADMIN — GUAIFENESIN 1200 MG: 600 TABLET, EXTENDED RELEASE ORAL at 05:07

## 2023-08-29 RX ADMIN — GUAIFENESIN 1200 MG: 600 TABLET, EXTENDED RELEASE ORAL at 17:44

## 2023-08-29 RX ADMIN — KETOROLAC TROMETHAMINE 15 MG: 30 INJECTION, SOLUTION INTRAMUSCULAR; INTRAVENOUS at 11:34

## 2023-08-29 RX ADMIN — KETOROLAC TROMETHAMINE 15 MG: 30 INJECTION, SOLUTION INTRAMUSCULAR; INTRAVENOUS at 05:08

## 2023-08-29 RX ADMIN — DULOXETINE HYDROCHLORIDE 20 MG: 20 CAPSULE, DELAYED RELEASE ORAL at 17:43

## 2023-08-29 RX ADMIN — ACETAMINOPHEN 650 MG: 325 TABLET ORAL at 23:53

## 2023-08-29 RX ADMIN — DOCUSATE SODIUM 100 MG: 100 CAPSULE, LIQUID FILLED ORAL at 17:44

## 2023-08-29 RX ADMIN — DULOXETINE HYDROCHLORIDE 20 MG: 20 CAPSULE, DELAYED RELEASE ORAL at 05:07

## 2023-08-29 RX ADMIN — OXYCODONE HYDROCHLORIDE 10 MG: 10 TABLET ORAL at 05:09

## 2023-08-29 RX ADMIN — ASPIRIN 81 MG: 81 TABLET, COATED ORAL at 05:07

## 2023-08-29 RX ADMIN — KETOROLAC TROMETHAMINE 15 MG: 30 INJECTION, SOLUTION INTRAMUSCULAR; INTRAVENOUS at 17:44

## 2023-08-29 RX ADMIN — DILTIAZEM HYDROCHLORIDE 120 MG: 120 CAPSULE, COATED, EXTENDED RELEASE ORAL at 17:45

## 2023-08-29 RX ADMIN — ACETAMINOPHEN 650 MG: 325 TABLET ORAL at 05:07

## 2023-08-29 RX ADMIN — DOCUSATE SODIUM 100 MG: 100 CAPSULE, LIQUID FILLED ORAL at 05:07

## 2023-08-29 RX ADMIN — DEXAMETHASONE SODIUM PHOSPHATE 10 MG: 4 INJECTION INTRA-ARTICULAR; INTRALESIONAL; INTRAMUSCULAR; INTRAVENOUS; SOFT TISSUE at 05:08

## 2023-08-29 RX ADMIN — OXYCODONE HYDROCHLORIDE 10 MG: 10 TABLET ORAL at 08:50

## 2023-08-29 RX ADMIN — ACETAMINOPHEN 650 MG: 325 TABLET ORAL at 17:44

## 2023-08-29 ASSESSMENT — COGNITIVE AND FUNCTIONAL STATUS - GENERAL
WALKING IN HOSPITAL ROOM: A LOT
MOBILITY SCORE: 13
SUGGESTED CMS G CODE MODIFIER DAILY ACTIVITY: CK
TOILETING: A LOT
STANDING UP FROM CHAIR USING ARMS: A LITTLE
MOVING TO AND FROM BED TO CHAIR: A LOT
DAILY ACTIVITIY SCORE: 16
SUGGESTED CMS G CODE MODIFIER MOBILITY: CL
DRESSING REGULAR LOWER BODY CLOTHING: A LOT
DRESSING REGULAR UPPER BODY CLOTHING: A LITTLE
HELP NEEDED FOR BATHING: A LOT
CLIMB 3 TO 5 STEPS WITH RAILING: TOTAL
PERSONAL GROOMING: A LITTLE
TURNING FROM BACK TO SIDE WHILE IN FLAT BAD: A LOT
MOVING FROM LYING ON BACK TO SITTING ON SIDE OF FLAT BED: A LITTLE

## 2023-08-29 ASSESSMENT — PAIN DESCRIPTION - PAIN TYPE
TYPE: SURGICAL PAIN

## 2023-08-29 ASSESSMENT — GAIT ASSESSMENTS
DEVIATION: ANTALGIC;STEP TO
DISTANCE (FEET): 12
ASSISTIVE DEVICE: FRONT WHEEL WALKER
DISTANCE (FEET): 12
GAIT LEVEL OF ASSIST: MINIMAL ASSIST

## 2023-08-29 ASSESSMENT — ACTIVITIES OF DAILY LIVING (ADL): TOILETING: INDEPENDENT

## 2023-08-29 NOTE — DISCHARGE PLANNING
HTH/SCP TCN chart review completed. The most current review of medical record, knowledge of pt's PLOF and social support, LACE+ score of 55, 6 clicks scores of 13 ADL and 12 mobility were considered.      TCN  met with patient at bedside. Patient family, stated to son Tigre and daughter-in-law also present at bedside. Patient welcomed continued TCN visit with patient family present at bedside. Introduced TCN program. Provided education regarding post acute levels of care. Discussed HTH/SCP plan benefits (Meds to Beds, medical uber and [facility] transitional care). Pt verbalized understanding.     Patient endorsed she resides with her daughter and grandson. Patient endorsed anticipated discharge to SNF level of care, stating preference for [facility]/ [facility]. Choice proactively obtained for SNF with patient only willing to provide choice for [facility], faxed to [facility] and given to DYLAN. In discussion regarding SCP plan benefits, patient stated some concern regarding transportation. Patient was amendable to TCN referral to [facility] to address as appropriate.     As of this note, noted PT and OT evaluations pending and will appreciate PT and OT recommendations in patient discharge planning.     Collaborated with DYLAN Elizabeth following visit with patient and family at bedside. TCN will continue to follow and collaborate with discharge planning team as additional post acute needs arise. Thank you.     Completed today  Awaiting PT/OT  Choice obtained: SNF (patient stated choice for [facility]/ [facility] and declined to provide any additional choice)  SCP with [facility] PCP

## 2023-08-29 NOTE — PROGRESS NOTES
Received report  from day shift RN. Pt asleep but woke up easily when entered room, very drowsy. Daughter at bedside. Currently on 3L/NC at 96%. Pt resting comfortably, currently denies pain. Reviewed plan of care with patient and daughter. Pt aware the plan is to walk 50ft tonight but pt unable to even keep her head up at this time. Will continue to assess and try to get pt out of bed when appropriate.

## 2023-08-29 NOTE — CARE PLAN
The patient is Stable - Low risk of patient condition declining or worsening    Shift Goals  Clinical Goals: (P) Pain control with PO medications and walking.  Patient Goals: pain mngt    Progress made toward(s) clinical / shift goals:  Pain controlled with current regimen.     Patient is not progressing towards the following goals:Unable to ambulate 50ft but did get to commode 2x      Problem: Pain - Standard  Goal: Alleviation of pain or a reduction in pain to the patient’s comfort goal  Outcome: Progressing     Problem: Fall Risk  Goal: Patient will remain free from falls  Outcome: Progressing     Problem: Knowledge Deficit - Standard  Goal: Patient and family/care givers will demonstrate understanding of plan of care, disease process/condition, diagnostic tests and medications  Outcome: Progressing     Problem: Mobility  Goal: Patient's capacity to carry out activities will improve  Outcome: Progressing     Problem: Wound/ / Incision Healing  Goal: Patient's wound/surgical incision will decrease in size and heals properly  Outcome: Progressing     Problem: Venous Thromboembolism (VTE) Prevention  Goal: The patient will remain free from venous thromboembolism (VTE)  Outcome: Progressing

## 2023-08-29 NOTE — THERAPY
Physical Therapy   Initial Evaluation     Patient Name: Chelly Decker  Age:  87 y.o., Sex:  female  Medical Record #: [id number]  Today's Date: 8/29/2023     Precautions  Precautions: Fall Risk;Posterior Hip Precautions;Weight Bearing As Tolerated Left Lower Extremity    Assessment  Patient is 87 y.o. female s/p left total hip replacement with prophylactic cerclage fixation of left femur POD#1. Pt with history of long standing back pain- has an implanted pain pump.  Pt reports has chronic dizziness. Pt is limited by L hip pain and weakness, see below for level of assist. Pt required a lot of cueing to focus on task, frequent cueing to use FWW safely.  Pt will benefit from further therapy at SNF, is not at baseline for mobility and is unable to care for herself.      Plan    Physical Therapy Initial Treatment Plan   Treatment Plan : Bed Mobility, Gait Training, Neuro Re-Education / Balance, Therapeutic Activities, Therapeutic Exercise, Stair Training  Treatment Frequency: 5 Times per Week  Duration: Until Therapy Goals Met    DC Equipment Recommendations: Unable to determine at this time  Discharge Recommendations: Recommend post-acute placement for additional physical therapy services prior to discharge home        08/29/23 1605   Prior Living Situation   Housing / Facility 1 Story House   Equipment Owned Front-Wheel Walker;Raised Toilet Seat Without Arms;Tub / Shower Seat;Oxygen   Lives with - Patient's Self Care Capacity Adult Children   Comments Pt lives in mother in law quarters in dtrs home. Dtr works nights.   Prior Level of Functional Mobility   Bed Mobility Independent   Transfer Status Independent   Ambulation Independent   Ambulation Distance household   Assistive Devices Used Front-Wheel Walker   Cognition    Level of Consciousness Alert   Comments Pt is easily distractable and ttangential, frequent cueing to stay on task. Pt with poor safety awareness.   Passive ROM Lower Body   Passive ROM Lower Body  Not Tested   Active ROM Lower Body    Active ROM Lower Body  X   Comments L hip limited by pain   Strength Lower Body   Lower Body Strength  X   Comments L hip limited by pain   Sensation Lower Body   Lower Extremity Sensation   WDL   Balance Assessment   Sitting Balance (Static) Fair +   Sitting Balance (Dynamic) Fair   Standing Balance (Static) Fair   Standing Balance (Dynamic) Fair -   Weight Shift Sitting Fair   Weight Shift Standing Fair   Bed Mobility    Supine to Sit Minimal Assist   Gait Analysis   Gait Level Of Assist Minimal Assist   Assistive Device Front Wheel Walker   Distance (Feet) 12   # of Times Distance was Traveled 1   Deviation Antalgic;Step To   Weight Bearing Status WBAT L LE   Comments Pt required frequent verbal and manual cues to use FWW properly and safely, tends to push FWW too far in front   Functional Mobility   Sit to Stand Minimal Assist   Bed, Chair, Wheelchair Transfer Minimal Assist   Toilet Transfers Minimal Assist   Activity Tolerance   Sitting Edge of Bed 5 min   Standing 5 min   Short Term Goals    Short Term Goal # 1 Pt will be able to perform bed mobility and sup <>sit Sundar in 6 visits.   Short Term Goal # 2 Pt will be able to perform sit <>stand and transfer with FWW Sundar in 6 visits.   Short Term Goal # 3 Pt will be able to ambulate 100 ft with FWW Sundar in 6 visits.

## 2023-08-29 NOTE — DISCHARGE INSTRUCTIONS
Dr. Perrin's Discharge Instructions:  The first week after your joint replacement is a time to recover from the surgery. We expect light exercise to keep you active and mobile. Dr. Perrin requires a walker or cane for most patients in the first few days following surgery to try to prevent falls or complications.     Most patients are prescribed two medications for pain control: a narcotic such as Oxycodone or Hydrocodone and a milder medication called Tramadol. These can be alternated for pain control, and the priority is to decrease use of the stronger narcotic as soon as tolerated.   Take your pain medication as appropriate to ensure that your pain is not limiting your recovery. You'll be seen in clinic in 7-10 days (this appointment has already been made, call our office if you're unsure of the time). At that time, we'll prescribe your physical therapy to help with the recovery phase.     -Keep dressing clean and dry. Leave dressing in place until follow up. If you have an incisional vac dressing, the battery may die before your first post operative appointment, but you can leave the surgical dressing in place and it will be removed at your clinic visit. The battery of the dressing may die, and the sponge will inflate because it is no longer holding suction. You can still leave the dressing in place and it will be removed at the office. Call the office if you notice drainage from the surgical dressing.    -OK to shower, keep dressing in place. Pat dressing dry, do not rub incision    -Do not soak incision in bath, hot tub or pool    -Follow up with Dr. Perrin at regularly scheduled time    -Call [facility] office at [phone] for questions    -Weight bearing status: as tolerated with walker/cane; Patients who underwent a hip replacement should observe posterior hip precautions    -Take medication as prescribed for DVT prophylaxis

## 2023-08-29 NOTE — PROGRESS NOTES
4 Eyes Skin Assessment Completed by Chantal RN and Sissy, RN.    Head WDL  Ears WDL  Nose WDL  Mouth WDL  Neck WDL  Breast/Chest WDL  Shoulder Blades WDL  Spine WDL  (R) Arm/Elbow/Hand WDL  (L) Arm/Elbow/Hand WDL  Abdomen WDL  Groin WDL  Scrotum/Coccyx/Buttocks WDL  (R) Leg Scab  (L) Leg Incision  (R) Heel/Foot/Toe WDL  (L) Heel/Foot/Toe WDL          Devices In Places Blood Pressure Cuff, Pulse Ox, SCD's, and Nasal Cannula      Interventions In Place NC W/Ear Foams, Pillows, and Q2 Turns    Possible Skin Injury No    Pictures Uploaded Into Epic N/A  Wound Consult Placed N/A  RN Wound Prevention Protocol Ordered No

## 2023-08-29 NOTE — THERAPY
"Occupational Therapy   Initial Evaluation     Patient Name: Chelly Decker  Age:  87 y.o., Sex:  female  Medical Record #: [id number]  Today's Date: 8/29/2023     Precautions  Precautions: Fall Risk, Posterior Hip Precautions, Weight Bearing As Tolerated Left Lower Extremity    Assessment  Patient is 87 y.o. female admit for L Posterior JAYLENE.  Pt tolerated bed mobility, basic self care tasks, ADL transfers and simple mobility with FWW fair on POD#1.  Pt demos poor safety awareness with transfers and ADL's with regards to hip precautions. Pt does not have consistent assist avail at home and has been debilitated for a long time with various co-morbidities.  She is NOT safe for home at this time and will benefit from further inpt post acute therapy prior to home.  OT will follow while in house.      Treatment completed this session after initial evaluation completed:  Educated pt and family member on role of OT and Posterior Total Hip Precautions with ADL's. Will need further education as she is more prepared to learn.    Plan    Occupational Therapy Initial Treatment Plan   Treatment Interventions: Self Care / Activities of Daily Living, Adaptive Equipment, Neuro Re-Education / Balance, Therapeutic Exercises, Therapeutic Activity  Treatment Frequency: 3 Times per Week  Duration: Until Therapy Goals Met    DC Equipment Recommendations: Unable to determine at this time  Discharge Recommendations: Recommend post-acute placement for additional occupational therapy services prior to discharge home     Subjective    \" I can't pee sitting down I have to squat over the toilet.  How am I supposed to do that?\"     Objective       08/29/23 1608   Prior Living Situation   Prior Services Home-Independent   Housing / Facility [facility]   Bathroom Set up Walk In Shower;Shower Chair;Grab Bars   Equipment Owned Front-Wheel Walker;Raised Toilet Seat With Arms;Bed Side Commode;Tub / Shower Seat;Grab Bar(s) By Toilet;Grab Bar(s) In " "Tub / Shower;Oxygen   Lives with - Patient's Self Care Capacity Alone and Able to Care For Self   Comments Lives in an in-law apt at dtrs home, however dtr works during the night and sleeps during the day.  Some family avail to help intermittently but no consistently, and pt needs to be quite indep to return back home   Prior Level of ADL Function   Self Feeding Independent   Grooming / Hygiene Independent   Bathing Independent   Dressing Independent   Toileting Independent   Comments Pt has unique ways of doing self care tasks- reports she has to hover over the toilet in order to empty her bowel and bladder, and that she \"ties a rope around my torso to keep me upright in case I fall asleep while I am standing over the toilet at night.\"   Prior Level of IADL Function   Medication Management Independent   Laundry Unable To Determine At This Time   Kitchen Mobility Independent   Finances Independent   Home Management Unable To Determine At This Time   Shopping Unable To Determine At This Time   Prior Level Of Mobility Independent With Device in Home   Driving / Transportation Unable To Determine At This Time   Occupation (Pre-Hospital Vocational) Retired Due To Age   Leisure Interests Pets   Precautions   Precautions Fall Risk;Posterior Hip Precautions;Weight Bearing As Tolerated Left Lower Extremity   Vitals   O2 (LPM) 3   O2 Delivery Device Nasal Cannula   Pain 0 - 10 Group   Location Abdomen;Hip   Location Orientation Mid;Left   Description Sharp;Aching   Therapist Pain Assessment During Activity;Nurse Notified   Cognition    Cognition / Consciousness X   Level of Consciousness Alert   Comments Pt hyperverbal, demos slightly impaired safety awareness and impulsivity.  Needs frequent redirection to task for safety and cues to correct unsafe learned behaviors   Active ROM Upper Body   Active ROM Upper Body  WDL   Strength Upper Body   Upper Body Strength  X   Gross Strength Generalized Weakness, Equal Bilaterally.  "   Coordination Upper Body   Coordination WDL   Balance Assessment   Sitting Balance (Static) Fair +   Sitting Balance (Dynamic) Fair +   Standing Balance (Static) Fair -   Standing Balance (Dynamic) Poor +   Weight Shift Sitting Fair   Weight Shift Standing Poor   Comments with FWW- kypphotic, forward head   Bed Mobility    Supine to Sit Minimal Assist   Scooting Contact Guard Assist   ADL Assessment   Eating Independent   Grooming Seated;Minimal Assist   Upper Body Dressing Minimal Assist   Lower Body Dressing Maximal Assist   Toileting Moderate Assist  (BSC)   How much help from another person does the patient currently need...   Putting on and taking off regular lower body clothing? 2   Bathing (including washing, rinsing, and drying)? 2   Toileting, which includes using a toilet, bedpan, or urinal? 2   Putting on and taking off regular upper body clothing? 3   Taking care of personal grooming such as brushing teeth? 3   Eating meals? 4   6 Clicks Daily Activity Score 16   Functional Mobility   Sit to Stand Minimal Assist  (bed height elevated)   Bed, Chair, Wheelchair Transfer Minimal Assist   Transfer Method Stand Step   Mobility Sosa with FWW from 1 side of bed to other   Distance (Feet) 12   # of Times Distance was Traveled 1   Visual Perception   Visual Perception  X   Comments readers, states she can't handle glare   Edema / Skin Assessment   Comments LC intact   Activity Tolerance   Sitting Edge of Bed 5 min   Standing 5 min   Patient / Family Goals   Patient / Family Goal #1 [facility]   Short Term Goals   Short Term Goal # 1 Pt will stand 8 mins at sink to groom with SBA in 3 visits   Short Term Goal # 2 Pt will demo LB dressing with AE and hip precautions with Sosa in 4 visits   Short Term Goal # 3 Pt will toilet in BR with Sosa in 4 visits   Education Group   Education Provided Hip Precautions;Role of Occupational Therapist;Activities of Daily Living   Role of Occupational Therapist  Patient Response Patient;Acceptance;Explanation;Verbal Demonstration   Hip Precautions Patient Response Patient;Acceptance;Explanation;Verbal Demonstration   ADL Patient Response Patient;Acceptance;Explanation;Verbal Demonstration   Additional Comments educ on post prec, role of therapy in her Rehab

## 2023-08-29 NOTE — DISCHARGE SUMMARY
Chelly Decker was admitted on 8/28/2023 for Primary osteoarthritis of left hip [M16.12]  Arthritis of left hip [M16.12]  Patient was diagnosed with severe degenerative arthritis in the left hip and underwent a left total hip arthroplasty by Dr. Daryl Perrin on the date of admission. Please see dictated operative note for further information.    Hospital course: standard    The patient has done well, with no complications.  Patient denies chest pain, calf pain or shortness of breath.   Pain is well-controlled at present.  Patient is ambulating well with the use of an assistive device, and progressing in physical therapy.   See progress notes from this admission for patient's neurovascular status  Narcotic dosages were chosen by taking into account the the patient's previous history of opoid use and to ensure proper pain control after surgery    Discharge date: 8-30-23    Patient is being discharged to SNF after am physical therapy.     Allergies:  Sulfa drugs and Erythromycin       Medication List        CHANGE how you take these medications        Instructions   aspirin 81 MG EC tablet  What changed: Another medication with the same name was removed. Continue taking this medication, and follow the directions you see here.   Take 1 Tablet by mouth 2 times a day for 30 days.  Dose: 81 mg            CONTINUE taking these medications        Instructions   acetaminophen 500 MG Tabs  Commonly known as: Tylenol   Take 500-1,000 mg by mouth every 6 hours as needed.  Dose: 500-1,000 mg     albuterol 108 (90 Base) MCG/ACT Aers inhalation aerosol   2 Puffs every 6 hours as needed for Shortness of Breath.  Dose: 2 Puff     atorvastatin 40 MG Tabs  Commonly known as: Lipitor   40 mg every evening. Indications: High Amount of Fats in the Blood  Dose: 40 mg     diltiazem 120 MG SR capsule  Commonly known as: Tiazac   Take 120 mg by mouth every evening.  Dose: 120 mg     diphenhydrAMINE 25 MG Tabs  Commonly known as:  Benadryl   Take 25 mg by mouth every 6 hours as needed for Sleep.  Dose: 25 mg     docusate sodium 100 MG Caps  Commonly known as: Colace   Take 1 Capsule by mouth 3 times a day as needed for Constipation.  Dose: 100 mg     DULoxetine 20 MG Cpep  Commonly known as: Cymbalta   Take 20 mg by mouth 2 times a day.  Dose: 20 mg     furosemide 20 MG Tabs  Commonly known as: Lasix   20 mg every morning. Edema  Indications: Edema  Dose: 20 mg     GAS-X PO   Take  by mouth 3 times a day.     guaiFENesin  MG Tb12  Commonly known as: Mucinex   Take 1,200 mg by mouth every 12 hours. Indications: Cough  Dose: 1,200 mg     Home Care Oxygen   Inhale 3 L/min continuous.  Dose: 3 L/min     losartan 50 MG Tabs  Commonly known as: Cozaar   Take 25 mg by mouth every evening. 1/2 a tablet  Dose: 25 mg     MAGnesium-Oxide 400 (240 Mg) MG Tabs  Generic drug: magnesium oxide   TAKE 1 TABLET (400 MG) EVERY MORNING AND 1/2 TABLETS (200 MG) EVERY EVENING.     melatonin 5 mg Tabs   Take 10 mg by mouth every evening.  Dose: 10 mg     meloxicam 7.5 MG Tabs  Commonly known as: Mobic   Take 1 Tablet by mouth 2 times a day for 30 days.  Dose: 7.5 mg     MIRALAX PO   Take  by mouth every evening.     ondansetron 4 MG Tbdp  Commonly known as: Zofran ODT   Take 1 Tablet by mouth every 6 hours as needed for Nausea/Vomiting.  Dose: 4 mg     oxyCODONE immediate release 10 MG immediate release tablet  Commonly known as: Roxicodone   Take 1 Tablet by mouth every four hours as needed for Moderate Pain for up to 7 days.  Dose: 10 mg     Pain Pump Milagros  Commonly known as: Patient Supplied   Inject  as directed continuous. Patient's Pain Pump (placed and maintained as an outpatient)    Medications/concentrations: Hydromorphone 4.0 mg/ml  Route: Intrathecal  Last changed: 3/22/2023  Refill pump before date: 7/2/2023  Continuous infusion rates (Drug/Rate): Hydromorphone 0.7491 mg/day or 0.0312 mg/hr (increased on 5/8/23)  MD office:       pantoprazole 40 MG Tbec  Commonly known as: Protonix   Take 40 mg by mouth every morning before breakfast.  Dose: 40 mg     spironolactone 25 MG Tabs  Commonly known as: Aldactone   Take 25 mg by mouth every morning.  Dose: 25 mg     traMADol 50 MG Tabs  Commonly known as: Ultram   Take 1 Tablet by mouth every 6 hours as needed for Severe Pain (ALTERNATING WITH OXYCODONE) for up to 10 days.  Dose: 50 mg     VITAMIN B-12 PO   Take  by mouth every day.     VITAMIN D3 PO   Take  by mouth every day.            STOP taking these medications      diclofenac sodium 1 % Gel  Commonly known as: Voltaren     DILAUDID INJ     estradiol 0.1 MG/GM vaginal cream  Commonly known as: Estrace              Discharge Instructions:     Patient is instructed to ambulate and weight bear as tolerated with the use of an assistive device, and to continue physical therapy exercises given during this hospital stay. Strict posterior hip precautions are to be observed for patients who underwent a total hip replacement.   Patient is to ice and elevate the surgical leg regularly, with pillows under the ankle, nothing is to be placed under the knee.   Patient was given detailed wound care instructions, and will leave the Incisional vac or silver dressing on until first post-op visit.   ASA twice daily for DVT prophylaxis.  Patient is to follow up with Dr. Perrin's office in 1-2 weeks.

## 2023-08-29 NOTE — PROGRESS NOTES
4 Eyes Skin Assessment Completed by АННА Fuentes and Isidro Steele RN.    Head WDL  Ears WDL  Nose WDL  Mouth WDL  Neck WDL  Breast/Chest WDL  Shoulder Blades WDL  Spine WDL  (R) Arm/Elbow/Hand Bruising  (L) Arm/Elbow/Hand Bruising  Abdomen Redness and Rash  Groin WDL  Scrotum/Coccyx/Buttocks Scab  (R) Leg WDL  (L) Leg left hip incision with LC dressing  (R) Heel/Foot/Toe WDL  (L) Heel/Foot/Toe WDL          Devices In Places Blood Pressure Cuff, Pulse Ox, and SCD's, Female wick,Nasal Cannula      Interventions In Place Barrier cream, Pillows,Q2 turning,nasal cannula gray foam    Possible Skin Injury No    Pictures Uploaded Into Epic N/A  Wound Consult Placed N/A  RN Wound Prevention Protocol Ordered No

## 2023-08-29 NOTE — CARE PLAN
The patient is Stable - Low risk of patient condition declining or worsening    Shift Goals  Clinical Goals: Patient will be  seen by PT/Ot today and patient will report pain 3/10 or less throughout the shift  Patient Goals: pain mngt    Progress made toward(s) clinical / shift goals:  PT will see the patient  this afternoon, to follow up. Patient is still  complaining of pain mainly to abdomen and  mild pain to surgical site, Medicated as per MAR    Patient is not progressing towards the following goals:    Problem: Pain - Standard  Goal: Alleviation of pain or a reduction in pain to the patient’s comfort goal  Outcome: Progressing     Problem: Fall Risk  Goal: Patient will remain free from falls  Outcome: Progressing     Problem: Skin Integrity  Goal: Skin integrity is maintained or improved  Outcome: Progressing     Problem: Mobility  Goal: Patient's capacity to carry out activities will improve  Outcome: Progressing     Problem: Infection - Standard  Goal: Patient will remain free from infection  Outcome: Progressing     Problem: Wound/ / Incision Healing  Goal: Patient's wound/surgical incision will decrease in size and heals properly  Outcome: Progressing     Problem: Post-Operative Hip Replacement  Goal: Patient will demonstrate understanding of post-surgical hip precautions, weight bearing restrictions and DME usage during hospital stay and post discharge  Outcome: Progressing  Goal: Patient's neurovascular status will be maintained or improve  Outcome: Progressing  Goal: Early mobilization post surgery  Outcome: Progressing     Problem: Pain - Post Surgery  Goal: Alleviation or reduction of pain post surgery  Outcome: Progressing     Problem: Incision Care  Goal: Optimal post surgical incision care  Outcome: Progressing     Problem: Respiratory/Oxygenation Function Post-Surgical  Goal: Patient will achieve/maintain normal respiratory rate/effort  Outcome: Progressing     Problem: Bowel Elimination  Goal:  Establish and maintain regular bowel function  Outcome: Progressing

## 2023-08-29 NOTE — DISCHARGE PLANNING
Case Management Discharge Planning    Admission Date: 8/28/2023  GMLOS:    ALOS: 0    6-Clicks ADL Score: 13  6-Clicks Mobility Score: 12  PT and/or OT Eval ordered: Yes  Post-acute Referrals Ordered: Yes  Post-acute Choice Obtained: Yes  Has referral(s) been sent to post-acute provider:  Yes      Anticipated Discharge Dispo: Discharge Disposition: D/T to SNF with Medicare cert in anticipation of skilled care (03)    DME Needed: No    Action(s) Taken: Updated Provider/Nurse on Discharge Plan    Patient discussed during morning IDT rounds with team. Patient is not medically cleared for discharge at this time.  SWCM met with patient at bedside, patient identified  by self ID and wristband. SWCM introduced self, explained role and reason for visit. SWCM acknowlege SNF order and TCN has obtained choice . Patient would like to go to [facility] on [address] [place] [address]. Patient is aware that she has an outstanding balance with the facility and have been paying them monthly.   SWCM placed call to [facility] spoke with Zeina  in Admissions (p) [phone] who stated they will review her case.    Pending PT/OT evaluation.    SWCM to continue to follow up for safe disposition.  Escalations Completed: None    Medically Clear: No    Next Steps: pending medical clearance and snf placement    Barriers to Discharge: Medical clearance and Pending Placement    Is the patient up for discharge tomorrow: No

## 2023-08-29 NOTE — PROGRESS NOTES
Received patient from Night shift RN . Patient is awake and alert.On 3 liters via NC.LC Dressing to Left hip, dry and intact with old small drainage. Fall precaution in place, kept bed in lowest position, bed alarm on  and call light within reach.     0913- Called MD's office and left a message regarding patient's complaint of abdominal pain,per patient it might be a kidney stone and she is also having heartburn as well

## 2023-08-29 NOTE — RESPIRATORY CARE
"   COPD EDUCATION by COPD CLINICAL EDUCATOR  8/29/2023 at 9:17 AM by Ana Laura Luke RRT     Patient reviewed by COPD education team. Patient does have a history or diagnosis of COPD and is a non-smoker.  However, patient did not feel the COPD program was needed. Did provided a short intervention completed with this patient covering: What is COPD (how the lungs work), daily medications rescue and maintenance, breathing techniques, infection prevention and oxygen safety were covered in detail.          COPD Screen  COPD Risk Screening  Do you have a history of COPD?: Yes (Goes to Dr. Martinez in [place])  Do you have a Pulmonologist?: Yes    COPD Assessment  COPD Clinical Specialists ONLY  COPD Education Initiated: Yes--Short Intervention (Pt quit smoking in 2015 has a 63 yr hx, No PFT on File, goes to Dr [facility] in [place], takes albuterol as needed)  DME Equipment Type: oxygen  Physician Follow Up Appointment: 10/10/23  Appt Time: 1530  Physician Name: Kassi Carrillo M.D.  Pulmonologist Name: Dr. Martinez  Referrals Initiated: Yes  Pulmonary Rehab: Declined  Smoking Cessation: N/A  Hospice: N/A  Home Health Care: Yes  Mobile Urgent Care Services: N/A  [facility]iatric Specialty Group: Yes (Yes is aware)  Private In-Home Care Agency: N/A    PFT Results    No results found for: \"PFT\"    Meds to Beds  Would the patient like to opt in for Bedside Medication Delivery at Discharge?: No     MY COPD ACTION PLAN     It is recommended that patients and physicians /healthcare providers complete this action plan together. This plan should be discussed at each physician visit and updated as needed.    The green, yellow and red zones show groups of symptoms of COPD. This list of symptoms is not comprehensive, and you may experience other symptoms. In the \"Actions\" column, your healthcare provider has recommended actions for you to take based on your symptoms.    Patient Name: Chelly Decker   Date of Birth: " "1935   Last Updated on: 8/29/2023  9:17 AM   Green Zone:  I am doing well today Actions     Usual activitiy and exercise level   Take daily medications     Usual amounts of cough and phlegm/mucus   Use oxygen as prescribed     Sleep well at night   Continue regular exercise/diet plan     Appetite is good   At all times avoid cigarette smoke, inhaled irritants     Daily Medications (these medications are taken every day):   Albuterol (Accuneb, Proair, Proventil, Ventolin) 2 Puffs Every 4 hours PRN     Additional Information:  Use spacer with rescue inhaler, and always rinse mouth after    Yellow Zone:  I am having a bad day or a COPD flare Actions     More breathless than usual   Continue daily medications     I have less energy for my daily activities   Use quick relief inhaler as ordered     Increased or thicker phlegm/mucus   Use oxygen as prescribed     Using quick relief inhaler/nebulizer more often   Get plenty of rest     Swelling of ankles more than usual   Use pursed lip breathing     More coughing than usual   At all times avoid cigarette smoke, inhaled irritants     I feel like I have a \"chest cold\"     Poor sleep and my symptoms woke me up     My appetite is not good     My medicine is not helping      Call provider immediately if symptoms don’t improve     Continue daily medications, add rescue medications:   Albuterol 2 Puffs Every 4 hours       Medications to be used during a flare up, (as Discussed with Provider):           Additional Information:  If symptoms do not improve call provider immediately     Red Zone:  I need urgent medical care Actions     Severe shortness of breath even at rest   Call 911 or seek medical care immediately     Not able to do any activity because of breathing      Fever or shaking chills      Feeling confused or very drowsy       Chest pains      Coughing up blood                  "

## 2023-08-29 NOTE — PROGRESS NOTES
S:  s/p left JAYLENE  Pain controlled  No N/V  No Chest Pain/SOB  Afebrile  Exam:    NAD A& O x3    RLE: +DF/PF/EHL SILT L4/L5/S1  LLE:  +DF/PF/EHL SILT L4/L5/S1    Plan:  Continue standard plan of care  Weight bearing: as tolerated with walker and posterior hip precautions  DVT prophylaxis: SCD/Teds + ASA  Dispo: to SNF per patient request when bed available

## 2023-08-30 VITALS
BODY MASS INDEX: 24.39 KG/M2 | HEIGHT: 67 IN | OXYGEN SATURATION: 95 % | RESPIRATION RATE: 18 BRPM | TEMPERATURE: 97.1 F | DIASTOLIC BLOOD PRESSURE: 43 MMHG | HEART RATE: 73 BPM | SYSTOLIC BLOOD PRESSURE: 110 MMHG | WEIGHT: 155.42 LBS

## 2023-08-30 PROCEDURE — 94760 N-INVAS EAR/PLS OXIMETRY 1: CPT

## 2023-08-30 PROCEDURE — A9270 NON-COVERED ITEM OR SERVICE: HCPCS | Performed by: PHYSICIAN ASSISTANT

## 2023-08-30 PROCEDURE — G0378 HOSPITAL OBSERVATION PER HR: HCPCS

## 2023-08-30 PROCEDURE — A9270 NON-COVERED ITEM OR SERVICE: HCPCS | Performed by: ORTHOPAEDIC SURGERY

## 2023-08-30 PROCEDURE — 700102 HCHG RX REV CODE 250 W/ 637 OVERRIDE(OP): Performed by: ORTHOPAEDIC SURGERY

## 2023-08-30 PROCEDURE — 700102 HCHG RX REV CODE 250 W/ 637 OVERRIDE(OP): Performed by: PHYSICIAN ASSISTANT

## 2023-08-30 PROCEDURE — 700111 HCHG RX REV CODE 636 W/ 250 OVERRIDE (IP): Performed by: PHYSICIAN ASSISTANT

## 2023-08-30 PROCEDURE — 96376 TX/PRO/DX INJ SAME DRUG ADON: CPT

## 2023-08-30 RX ADMIN — OXYCODONE HYDROCHLORIDE 10 MG: 10 TABLET ORAL at 11:39

## 2023-08-30 RX ADMIN — GUAIFENESIN 1200 MG: 600 TABLET, EXTENDED RELEASE ORAL at 04:53

## 2023-08-30 RX ADMIN — KETOROLAC TROMETHAMINE 15 MG: 30 INJECTION, SOLUTION INTRAMUSCULAR; INTRAVENOUS at 04:52

## 2023-08-30 RX ADMIN — DULOXETINE HYDROCHLORIDE 20 MG: 20 CAPSULE, DELAYED RELEASE ORAL at 04:53

## 2023-08-30 RX ADMIN — DOCUSATE SODIUM 100 MG: 100 CAPSULE, LIQUID FILLED ORAL at 04:54

## 2023-08-30 RX ADMIN — ASPIRIN 81 MG: 81 TABLET, COATED ORAL at 04:53

## 2023-08-30 RX ADMIN — OMEPRAZOLE 20 MG: 20 CAPSULE, DELAYED RELEASE ORAL at 04:53

## 2023-08-30 RX ADMIN — MAGNESIUM HYDROXIDE 30 ML: 1200 LIQUID ORAL at 09:42

## 2023-08-30 RX ADMIN — ACETAMINOPHEN 650 MG: 325 TABLET ORAL at 11:39

## 2023-08-30 RX ADMIN — ACETAMINOPHEN 650 MG: 325 TABLET ORAL at 04:52

## 2023-08-30 NOTE — PROGRESS NOTES
Received report from day shift nurse, Gina JJ. Assumed pt care at 1915.   Pt is A&Ox4, sitting comfortably on bed. Pt on oxygen at 3 Lpm via nasal cannula; no signs of SOB/respiratory distress. Labs noted, VSS. Needs attended well. Fall precautions in place. Bed at lowest position. Call light and personal belongings within reach. Bed alarm on  Encouraged pt call for assistance.   Plan of care on going, no further concerns as of present.    Hourly rounding in progress.    0700 Pt sleeping comfortably on bed; even and unlabored breathing noted.

## 2023-08-30 NOTE — PROGRESS NOTES
0940: called phone for ANDREY, sent to  for MA and left message informing pt is able to go to SNF today. We are in need of discharge summary.   1015: no return phone call at this time, called ANDREY again and sent to  of MA. Left second message for call back.

## 2023-08-30 NOTE — DISCHARGE PLANNING
Case Management Discharge Planning    Admission Date: 8/28/2023  GMLOS:    ALOS: 0    6-Clicks ADL Score: 16  6-Clicks Mobility Score: 13  PT and/or OT Eval ordered: Yes  Post-acute Referrals Ordered: Yes  Post-acute Choice Obtained: Yes  Has referral(s) been sent to post-acute provider:  Yes      Anticipated Discharge Dispo: Discharge Disposition: D/T to SNF with Medicare cert in anticipation of skilled care (03)  Discharge Address: [address] [place] [address]  Discharge Contact Phone Number: [phone]    DME Needed: No    Action(s) Taken: Updated Provider/Nurse on Discharge Plan    Patient discussed during morning IDT rounds with team. Patient is  medically cleared for discharge at this time.      Patient is accepted at :[facility] re[facility]  Room number :112- B   Rn report : [phone]   [facility]: 1230 [facility]  PASRR listed on Medicaid website.  Patient and medical team is updated.    Escalations Completed: None    Medically Clear: Yes    Next Steps: pending transport via [employer] today at 1230    Barriers to Discharge: None    Is the patient up for discharge tomorrow: No

## 2023-08-30 NOTE — CARE PLAN
The patient is Stable - Low risk of patient condition declining or worsening    Shift Goals  Clinical Goals: Pt will be able to control pain and report pain less than 5/10. Pt will remain free from falls or injury throughout the shift.  Patient Goals: pain control, sleep and rest  Family Goals: n/a    Progress made toward(s) clinical / shift goals:  Pt did not require prn pain medication throughout the shift. Pt states pain is toelrable at 3/10 at this time. Scheduled medications given as per MAR. Hourly rounding in progress.     Patient is not progressing towards the following goals: n/a

## 2023-08-30 NOTE — PROGRESS NOTES
4 Eyes Skin Assessment Completed by Marcia RN and Sissy RN.    Head WDL  Ears WDL  Nose WDL  Mouth WDL  Neck WDL  Breast/Chest WDL  Shoulder Blades WDL  Spine WDL  (R) Arm/Elbow/Hand WDL  (L) Arm/Elbow/Hand WDL  Abdomen WDL  Groin WDL  Scrotum/Coccyx/Buttocks WDL  (R) Leg Scabs  (L) Leg WDL  (R) Heel/Foot/Toe WDL  (L) Heel/Foot/Toe WDL          Devices In Places Pulse Ox, SCD's, and Nasal Cannula      Interventions In Place NC W/Ear Foams, Pillows, Q2 Turns, and Barrier Cream    Possible Skin Injury No    Pictures Uploaded Into Epic N/A  Wound Consult Placed N/A  RN Wound Prevention Protocol Ordered No

## 2023-08-30 NOTE — PROGRESS NOTES
Discharging patient per MD orders. Patient states understanding of dc instructions and education. IV discontinued

## 2023-08-30 NOTE — PROGRESS NOTES
Received bedside report from NOC RN.  Assumed patient care. Patient is resting, no signs of distress. Chest rise and fall noted. Safety precautions in place.

## 2023-08-30 NOTE — DISCHARGE PLANNING
DC Transport Scheduled    Received request at: 8/30/2023 at 1000    Transport Company Scheduled:  [employer]    Scheduled Date: 8/30/2023  Scheduled Time: 1230    Destination: [facility] at [address] [place] [address] [place]     Notified care team of scheduled transport via [employer].     If there are any changes needed to the DC transportation scheduled, please contact [facility] at ext. [phone] between the hours of 8875-2846 Mon-Fri. If outside those hours, contact the ED Case Manager at ext. [phone].

## 2023-09-07 ENCOUNTER — PATIENT OUTREACH (OUTPATIENT)
Dept: HEALTH INFORMATION MANAGEMENT | Facility: OTHER | Age: 88
End: 2023-09-07
Payer: MEDICARE

## 2023-09-07 DIAGNOSIS — I10 PRIMARY HYPERTENSION: ICD-10-CM

## 2023-09-07 DIAGNOSIS — J44.0 CHRONIC OBSTRUCTIVE PULMONARY DISEASE WITH ACUTE LOWER RESPIRATORY INFECTION (HCC): ICD-10-CM

## 2023-09-07 NOTE — PROGRESS NOTES
Pt currently at [facility].  Nurse CM will follow-up with patient for CCM program at a later date and time after discharge from SNF.

## 2023-10-06 ENCOUNTER — PATIENT OUTREACH (OUTPATIENT)
Dept: HEALTH INFORMATION MANAGEMENT | Facility: OTHER | Age: 88
End: 2023-10-06
Payer: MEDICARE

## 2023-10-06 DIAGNOSIS — I10 PRIMARY HYPERTENSION: ICD-10-CM

## 2023-10-06 DIAGNOSIS — J44.0 CHRONIC OBSTRUCTIVE PULMONARY DISEASE WITH ACUTE LOWER RESPIRATORY INFECTION (HCC): ICD-10-CM

## 2023-10-06 NOTE — PROGRESS NOTES
Nurse received message from MA requesting call to pt for follow-up. Reports son had some questions for nurse.  Nurse called pt. Patient reports she is currently at [facility]. Nurse will follow-up with pt a later date and time after discharge from hospital.

## 2023-10-10 ENCOUNTER — APPOINTMENT (OUTPATIENT)
Dept: MEDICAL GROUP | Facility: PHYSICIAN GROUP | Age: 88
End: 2023-10-10
Payer: MEDICARE

## 2023-10-10 ENCOUNTER — PATIENT OUTREACH (OUTPATIENT)
Dept: HEALTH INFORMATION MANAGEMENT | Facility: OTHER | Age: 88
End: 2023-10-10

## 2023-10-10 DIAGNOSIS — J44.0 CHRONIC OBSTRUCTIVE PULMONARY DISEASE WITH ACUTE LOWER RESPIRATORY INFECTION (HCC): ICD-10-CM

## 2023-10-10 NOTE — PROGRESS NOTES
Nurse outreach call to patient for follow-up. Pt was noted on PCP schedule today for virtual appointment. Pt answered telephone. [facility] [place]. Pt reports she was transferred to Rehab facility on 10/6. Report she has been in and out of the hospital for past month. Pt has history of hip arthroplasty and was previously discharge to rehab facility. Reports she was receiving therapy at [facility] and was transferred to [facility]. Per discharge summary in Epic from [facility], patient was admitted for multiple problems including left hip pain, CHF, Atrial fibrillation, chronic anemia, pneumonia, and bilateral extremity cellulitis. Pt reports she has ongoing surgical pain. Reports she does have several providers at her Rehab facility that are managing her care. Pt will discuss pain management with her provider at rehab.  Pt would like to see PCP for hospital follow-up. Nurse explained pt can be scheduled for hospital follow-up with PCP  after discharge from her Rehab facility. Nurse encouraged pt to discuss her current concerns with provider at Rehab hospital. Pt appreciative of call. States she will schedule appt with PCP at a later date. Update to PCP.

## 2023-10-25 DIAGNOSIS — I35.0 NONRHEUMATIC AORTIC VALVE STENOSIS: ICD-10-CM

## 2023-10-25 DIAGNOSIS — I48.11 LONGSTANDING PERSISTENT ATRIAL FIBRILLATION (HCC): ICD-10-CM

## 2023-10-25 DIAGNOSIS — I50.30 DIASTOLIC CONGESTIVE HEART FAILURE, UNSPECIFIED HF CHRONICITY (HCC): ICD-10-CM

## 2023-10-26 ENCOUNTER — TELEPHONE (OUTPATIENT)
Dept: CARDIOLOGY | Facility: MEDICAL CENTER | Age: 88
End: 2023-10-26
Payer: MEDICARE

## 2023-10-26 DIAGNOSIS — I50.30 HEART FAILURE WITH PRESERVED EJECTION FRACTION, UNSPECIFIED HF CHRONICITY (HCC): ICD-10-CM

## 2023-10-26 NOTE — TELEPHONE ENCOUNTER
Referral from: Dr. Kassi Carrillo for Severe AS. Will need to obtain echo images to verify severity.     STAT request for images faxed to [facility]ee[facility]e [facility]roeder at [phone]. Receipt confirmed.

## 2023-10-26 NOTE — TELEPHONE ENCOUNTER
Called and spoke to filmroom representative regarding request. He states request was never received. Confirmed fax number used was correct.     Request refaxed to [facility]. Receipt confirmed.

## 2023-10-30 ENCOUNTER — TELEPHONE (OUTPATIENT)
Dept: MEDICAL GROUP | Facility: PHYSICIAN GROUP | Age: 88
End: 2023-10-30
Payer: MEDICARE

## 2023-10-30 ENCOUNTER — PATIENT OUTREACH (OUTPATIENT)
Dept: HEALTH INFORMATION MANAGEMENT | Facility: OTHER | Age: 88
End: 2023-10-30
Payer: MEDICARE

## 2023-10-30 DIAGNOSIS — J44.0 CHRONIC OBSTRUCTIVE PULMONARY DISEASE WITH ACUTE LOWER RESPIRATORY INFECTION (HCC): ICD-10-CM

## 2023-10-30 DIAGNOSIS — I10 PRIMARY HYPERTENSION: ICD-10-CM

## 2023-10-30 NOTE — TELEPHONE ENCOUNTER
Phone Number Called: [phone]    Call outcome: Spoke to patient regarding message below.    Message: Chelly will be seeing a cardiologist. Needs to get an echo first before going to see cardio. Chelly is getting discharged from [facility]; will be sent to [facility] for further care.

## 2023-10-30 NOTE — PROGRESS NOTES
Nurse DYLAN received message from PCP's MA that pt would be discharging from [facility] facility tomorrow on 10/31. MA scheduled pt a hospital follow-up for 10/31 with a provider at the [facility].  This nurse DYLAN spoke to pt's TCNTushar. TCN will reach out to pt's son that is listed in Epic as contact for follow-up on discharge plan.

## 2023-10-31 ENCOUNTER — HOSPITAL ENCOUNTER (OUTPATIENT)
Dept: LAB | Facility: MEDICAL CENTER | Age: 88
End: 2023-10-31
Attending: STUDENT IN AN ORGANIZED HEALTH CARE EDUCATION/TRAINING PROGRAM
Payer: MEDICARE

## 2023-10-31 ENCOUNTER — PATIENT OUTREACH (OUTPATIENT)
Dept: HEALTH INFORMATION MANAGEMENT | Facility: OTHER | Age: 88
End: 2023-10-31

## 2023-10-31 ENCOUNTER — OFFICE VISIT (OUTPATIENT)
Dept: MEDICAL GROUP | Facility: PHYSICIAN GROUP | Age: 88
End: 2023-10-31
Payer: MEDICARE

## 2023-10-31 VITALS
OXYGEN SATURATION: 95 % | BODY MASS INDEX: 24.3 KG/M2 | WEIGHT: 154.8 LBS | SYSTOLIC BLOOD PRESSURE: 122 MMHG | TEMPERATURE: 98.2 F | HEIGHT: 67 IN | HEART RATE: 107 BPM | DIASTOLIC BLOOD PRESSURE: 62 MMHG

## 2023-10-31 DIAGNOSIS — I35.0 SEVERE AORTIC STENOSIS: ICD-10-CM

## 2023-10-31 DIAGNOSIS — Z09 HOSPITAL DISCHARGE FOLLOW-UP: Primary | ICD-10-CM

## 2023-10-31 DIAGNOSIS — I48.0 PAROXYSMAL A-FIB (HCC): ICD-10-CM

## 2023-10-31 DIAGNOSIS — J18.9 COMMUNITY ACQUIRED PNEUMONIA, UNSPECIFIED LATERALITY: ICD-10-CM

## 2023-10-31 DIAGNOSIS — M25.552 PAIN OF LEFT HIP JOINT: ICD-10-CM

## 2023-10-31 DIAGNOSIS — I27.20 PULMONARY HTN (HCC): ICD-10-CM

## 2023-10-31 DIAGNOSIS — I50.30 HEART FAILURE WITH PRESERVED EJECTION FRACTION, UNSPECIFIED HF CHRONICITY (HCC): ICD-10-CM

## 2023-10-31 DIAGNOSIS — I34.0 MODERATE MITRAL INSUFFICIENCY: ICD-10-CM

## 2023-10-31 DIAGNOSIS — I10 PRIMARY HYPERTENSION: ICD-10-CM

## 2023-10-31 DIAGNOSIS — D64.9 ACUTE ON CHRONIC ANEMIA: ICD-10-CM

## 2023-10-31 DIAGNOSIS — L03.90 CELLULITIS, UNSPECIFIED CELLULITIS SITE: ICD-10-CM

## 2023-10-31 DIAGNOSIS — G89.4 CHRONIC PAIN SYNDROME: ICD-10-CM

## 2023-10-31 DIAGNOSIS — J44.0 CHRONIC OBSTRUCTIVE PULMONARY DISEASE WITH ACUTE LOWER RESPIRATORY INFECTION (HCC): ICD-10-CM

## 2023-10-31 PROBLEM — E86.1 HYPOTENSION DUE TO HYPOVOLEMIA: Status: RESOLVED | Noted: 2022-08-17 | Resolved: 2023-10-31

## 2023-10-31 PROBLEM — J44.1 ACUTE EXACERBATION OF CHRONIC OBSTRUCTIVE AIRWAYS DISEASE (HCC): Status: RESOLVED | Noted: 2022-06-22 | Resolved: 2023-10-31

## 2023-10-31 PROBLEM — I95.89 HYPOTENSION DUE TO HYPOVOLEMIA: Status: RESOLVED | Noted: 2022-08-17 | Resolved: 2023-10-31

## 2023-10-31 LAB
ANION GAP SERPL CALC-SCNC: 11 MMOL/L (ref 7–16)
BUN SERPL-MCNC: 22 MG/DL (ref 8–22)
CALCIUM SERPL-MCNC: 10.5 MG/DL (ref 8.5–10.5)
CHLORIDE SERPL-SCNC: 97 MMOL/L (ref 96–112)
CO2 SERPL-SCNC: 29 MMOL/L (ref 20–33)
CREAT SERPL-MCNC: 1.36 MG/DL (ref 0.5–1.4)
ERYTHROCYTE [DISTWIDTH] IN BLOOD BY AUTOMATED COUNT: 50.4 FL (ref 35.9–50)
GFR SERPLBLD CREATININE-BSD FMLA CKD-EPI: 38 ML/MIN/1.73 M 2
GLUCOSE SERPL-MCNC: 104 MG/DL (ref 65–99)
HCT VFR BLD AUTO: 28.9 % (ref 37–47)
HGB BLD-MCNC: 8.7 G/DL (ref 12–16)
MCH RBC QN AUTO: 28.3 PG (ref 27–33)
MCHC RBC AUTO-ENTMCNC: 30.1 G/DL (ref 32.2–35.5)
MCV RBC AUTO: 94.1 FL (ref 81.4–97.8)
PLATELET # BLD AUTO: 218 K/UL (ref 164–446)
PMV BLD AUTO: 10.7 FL (ref 9–12.9)
POTASSIUM SERPL-SCNC: 4.6 MMOL/L (ref 3.6–5.5)
RBC # BLD AUTO: 3.07 M/UL (ref 4.2–5.4)
SODIUM SERPL-SCNC: 137 MMOL/L (ref 135–145)
WBC # BLD AUTO: 9.2 K/UL (ref 4.8–10.8)

## 2023-10-31 PROCEDURE — 80048 BASIC METABOLIC PNL TOTAL CA: CPT

## 2023-10-31 PROCEDURE — 3078F DIAST BP <80 MM HG: CPT | Performed by: STUDENT IN AN ORGANIZED HEALTH CARE EDUCATION/TRAINING PROGRAM

## 2023-10-31 PROCEDURE — 99496 TRANSJ CARE MGMT HIGH F2F 7D: CPT | Performed by: STUDENT IN AN ORGANIZED HEALTH CARE EDUCATION/TRAINING PROGRAM

## 2023-10-31 PROCEDURE — 36415 COLL VENOUS BLD VENIPUNCTURE: CPT

## 2023-10-31 PROCEDURE — 3074F SYST BP LT 130 MM HG: CPT | Performed by: STUDENT IN AN ORGANIZED HEALTH CARE EDUCATION/TRAINING PROGRAM

## 2023-10-31 PROCEDURE — 85027 COMPLETE CBC AUTOMATED: CPT

## 2023-10-31 RX ORDER — BECLOMETHASONE DIPROPIONATE HFA 40 UG/1
1 AEROSOL, METERED RESPIRATORY (INHALATION) 2 TIMES DAILY
COMMUNITY
Start: 2023-10-31 | End: 2023-11-13

## 2023-10-31 RX ORDER — FUROSEMIDE 20 MG/1
1 TABLET ORAL EVERY MORNING
Qty: 60 TABLET | COMMUNITY
Start: 2023-10-31 | End: 2023-11-29

## 2023-10-31 RX ORDER — FUROSEMIDE 20 MG/1
1 TABLET ORAL EVERY EVENING
COMMUNITY
Start: 2023-10-31 | End: 2023-11-29

## 2023-10-31 RX ORDER — FUROSEMIDE 20 MG/1
40 TABLET ORAL EVERY MORNING
COMMUNITY
Start: 2023-10-31 | End: 2023-10-31 | Stop reason: SDUPTHER

## 2023-10-31 ASSESSMENT — ENCOUNTER SYMPTOMS
ORTHOPNEA: 0
EYE DISCHARGE: 0
PALPITATIONS: 0
LOSS OF CONSCIOUSNESS: 0
WEAKNESS: 0
NECK PAIN: 0
BLURRED VISION: 0
COUGH: 0
BLOOD IN STOOL: 0
VOMITING: 0
FEVER: 0
MYALGIAS: 0
HEMOPTYSIS: 0
HEADACHES: 0
CONSTIPATION: 0
HEARTBURN: 0
DIZZINESS: 0
DIARRHEA: 0
CHILLS: 0
FLANK PAIN: 0
SPEECH CHANGE: 0
CLAUDICATION: 0
FOCAL WEAKNESS: 0
NAUSEA: 0
DIAPHORESIS: 0
WHEEZING: 0
BRUISES/BLEEDS EASILY: 0
SEIZURES: 0
PND: 0
FALLS: 0
TINGLING: 0
DOUBLE VISION: 0
DEPRESSION: 0
BACK PAIN: 1
SENSORY CHANGE: 0
SPUTUM PRODUCTION: 0
WEIGHT LOSS: 0
ABDOMINAL PAIN: 0
HALLUCINATIONS: 0
POLYDIPSIA: 0
SHORTNESS OF BREATH: 1
EYE PAIN: 0

## 2023-10-31 ASSESSMENT — FIBROSIS 4 INDEX: FIB4 SCORE: 1.72

## 2023-10-31 ASSESSMENT — LIFESTYLE VARIABLES: SUBSTANCE_ABUSE: 0

## 2023-10-31 NOTE — PROGRESS NOTES
Transthoracic echo with normal EF of 60-65%, severe AS, right ventricular systolic pressure of 55-60 mm of mercury, mild to moderate mitral regurgitation.

## 2023-10-31 NOTE — PATIENT INSTRUCTIONS
-Get bloodwork done now.  -Measure standing weight every morning. If you gain 3 pounds in 1-2 days or 5 pounds in 1 week, take double dose of morning lasix   -take lasix 20 mg in the morning and in the evening  -make appointment with cardiology as soon as possible  -Cardiology [facility]  [address]  [place] [address]  Phone: [phone]  -follow up with orthopedic surgery

## 2023-10-31 NOTE — PROGRESS NOTES
Subjective:     Chelly Decker is a 87 y.o. female who presents for Hospital Follow-up.    Transitional Care Management          HPI:   Recently hospitalized for ***    Current medicines (including reconciliation performed today)  Current Outpatient Medications   Medication Sig Dispense Refill   • beclomethasone HFA (QVAR REDIHALER) 40 MCG/ACT inhaler Inhale 1 Puff 2 times a day.     • HYDROcodone/acetaminophen (NORCO)  MG Tab      • furosemide (LASIX) 20 MG Tab Take 2 Tablets by mouth every day.     • spironolactone (ALDACTONE) 25 MG Tab Take 1 Tablet by mouth every day.     • MAGNESIUM-OXIDE 400 (240 Mg) MG Tab TAKE 1 TABLET (400 MG) EVERY MORNING AND 1/2 TABLETS (200 MG) EVERY EVENING.     • melatonin 5 mg Tab Take 10 mg by mouth every evening.     • acetaminophen (TYLENOL) 500 MG Tab Take 500-1,000 mg by mouth every 6 hours as needed.     • Home Care Oxygen Inhale 3 L/min continuous.     • Pain Pump (PATIENT SUPPLIED) Device Inject  as directed continuous. Patient's Pain Pump (placed and maintained as an outpatient)    Medications/concentrations: Hydromorphone 4.0 mg/ml  Route: Intrathecal  Last changed: 3/22/2023  Refill pump before date: 7/2/2023  Continuous infusion rates (Drug/Rate): Hydromorphone 0.7491 mg/day or 0.0312 mg/hr (increased on 5/8/23)  MD office:      • DULoxetine (CYMBALTA) 20 MG Cap DR Particles Take 20 mg by mouth 2 times a day.     • guaiFENesin ER (MUCINEX) 600 MG TABLET SR 12 HR Take 1,200 mg by mouth every 12 hours. Indications: Cough     • Cyanocobalamin (VITAMIN B-12 PO) Take  by mouth every day.     • Cholecalciferol (VITAMIN D3 PO) Take  by mouth every day.     • diltiazem (TIAZAC) 120 MG SR capsule Take 120 mg by mouth every evening.     • pantoprazole (PROTONIX) 40 MG Tablet Delayed Response Take 40 mg by mouth every morning before breakfast.     • atorvastatin (LIPITOR) 40 MG Tab 40 mg every evening. Indications: High Amount of Fats in the Blood     • losartan  "(COZAAR) 50 MG Tab Take 25 mg by mouth every evening. 1/2 a tablet     • furosemide (LASIX) 20 MG Tab 20 mg every morning. Edema  Indications: Edema     • albuterol 108 (90 Base) MCG/ACT Aero Soln inhalation aerosol 2 Puffs every 6 hours as needed for Shortness of Breath.     • Polyethylene Glycol 3350 (MIRALAX PO) Take  by mouth every evening.     • Simethicone (GAS-X PO) Take  by mouth 3 times a day.       No current facility-administered medications for this visit.       Allergies:   Sulfa drugs and Erythromycin    Social History     Tobacco Use   • Smoking status: Former     Current packs/day: 0.00     Average packs/day: 1 pack/day for 61.0 years (61.0 ttl pk-yrs)     Types: Cigarettes, Electronic Cigarettes     Start date: 1954     Quit date: 2015     Years since quittin.8   • Smokeless tobacco: Never   • Tobacco comments:     Smoked as a teenager quit as a senior citizen   Vaping Use   • Vaping Use: Former   • Substances: Flavoring   Substance Use Topics   • Alcohol use: Not Currently     Comment: Was  is a social drinker until approximately    • Drug use: Not Currently     Types: Inhaled     Comment: cocaine while in her 40\"s       ROS:  ***    Objective:     There were no vitals filed for this visit.  There is no height or weight on file to calculate BMI.    Physical Exam:  ***    Assessment and Plan:   1. Pain of left hip joint    2. Hospital discharge follow-up    Other orders  - beclomethasone HFA (QVAR REDIHALER) 40 MCG/ACT inhaler; Inhale 1 Puff 2 times a day.      - Chart and discharge summary were reviewed.   - Hospitalization and results reviewed with patient.   - Medications reviewed including instructions regarding high risk medications, dosing and side effects.  - Recommended Services: {COORDINATION OF SERVICES:}  - Advance directive/POLST on file?  {Yes/No:}    Follow-up:No follow-ups on file.    Face-to-face transitional care management services with {TCM " Complexity:67390} medical decision complexity were provided.     {   Reference text will not save to note  Coding guide  63315  face-to-face within 14 days  Moderate decision complexity  35278  face-to-face within 7 days  High medical decision complexity    :31046}

## 2023-10-31 NOTE — PROGRESS NOTES
Pt is here for hospital follow-up with PCP.  Accompanied to appt with her daughter. Pt using a walker to assist with mobility. Daughter is questioning what home care agency was ordered at discharge. Nurse contacted Tushar LOCKHART.  Per RICHY [facility] was ordered at discharge. Nurse DYLAN provided pt with telephone contact number for [facility]. Nurse also provided daughter with telephone number for cardiology and scheduling for echocardiogram. Encouraged daughter to contact cardiology to schedule appt for pt. Nurse will contact pt at later date and time for follow-up on CCM program after discharged from home care.

## 2023-10-31 NOTE — PROGRESS NOTES
Subjective:     Chelly Decker is a 87 y.o. female who presents for Hospital Follow-up.    HPI:   Recently hospitalized for left hip pain, cellulitis, pneumonia, volume overload  Hospital course is listed as below according to dc summary written by hospitalist.  Presented to hospital from rehab.  . In brief, Chelly Decker is a 87 year old female presented to emergency department with complaints of left hip pain. Patient had left hip arthroplasty done at [facility] by Dr. Mcknight on 8/28. Patient came in with complaints of left hip pain, had an abdominal CT done during this admission which was concerning for left greater trochanter fluid collection nonspecific but can be a seroma. Patient imaging was discussed with Dr. Gamez, radiologist, who stated that this fluid collection can either be seroma or hematoma, they cannot comment just looking at the CT images because image quality is affected because of the hardware present. Clinical suspicion for hematoma really low given that patient H&H remained stable during hospital course. Patient care was discussed with Dr. Mueller, orthopedics, he stated nothing needs to be done for a postop seroma and patient should follow-up with orthopedics on outpatient basis. Patient was advised to follow-up with her own orthopedist. Verbalized understanding.      Patient is opioid dependent, has a pain pump, and has been there for 4-5 years. During hospital course, patient refused to take oxycodone stating that her daughter does not want her to take opioids. I extensively counseled her that patient is already getting opioids including Dilaudid through her pain pump and oxycodone is a similar pain medication but less strong. Patient verbalized understanding but stated that she still does not want to take oxycodone. Now I am not prescribing any opioids on discharge the case patient was already getting additional opioids at [facility]. This was discussed with the patient in great  detail.     Patient was also found to have new diagnosis of AFib. She self converted to normal sinus rhythm. Denies any prior history of AFib. Chadvasc was elevated to 5. Better admission she was on Cardizem for unclear reasons. For now I am discontinuing Cardizem and discharging her on metoprolol for rate control. I am not adding anticoagulation on discharge for secondary stroke prevention given that patient came in with acute on chronic anemia, and no clear-cut source of bleeding. This was extensively discussed with patient and patient's daughter in great detail. They understood that there is a risk that patient can have a stroke but at the same time will hold anticoagulation for now. Also discussed that they should discuss Watchman procedure on outpatient basis with Cardiology.      Patient was also found to have acute on chronic diastolic CHF exacerbation. Transthoracic echo with normal EF of 60-65%, severe AS, right ventricular systolic pressure of 55-60 mm of mercury, mild to moderate mitral regurgitation. Patient was diuresed to euvolemia, and we will discharge on Lasix 20 mg daily. She was already taking this dose prior to admission. I am not increasing Lasix given severe AS.      Patient will was also remarkable for severe AS. Reviewed her old echocardiogram, almost 5 months ago, patient had moderate AS. Now with severe AS. Cardiology was consulted who recommended outpatient evaluation. Patient was advised to follow up with Cardiology on outpatient basis.     Patient also came in with acute on chronic anemia. Baseline hemoglobin of around 12. Came in with acute anemia. Her H&H stayed stable during the entire hospital course. No active blood loss from anywhere. Anemia workup suggestive of iron-deficiency anemia with transferrin signs of 4.8. She was given IV iron during hospital course and will be discharged on iron supplementation. Patient denies any prior history of peptic ulcer disease. Stated she had a  colonoscopy long time ago and was normal per patient. Patient was advised that if she starts to have any blood loss in her stools or vomiting, she should let her PCP know because then she will need further evaluation including an EGD. Patient verbalized understanding. Also patient was started on aspirin 81 twice a day post hip fracture repair. She already completed around 1 month of treatment and given that she is coming in with acute anemia and no clear-cut source of bleeding, I will discontinue aspirin on discharge. Rest of anemia workup was grossly unremarkable. Vitamin B12 and folate levels were normal. Hemoglobin on the day of discharge 9.      Patient also came in with community-acquired pneumonia and bilateral lower extremity cellulitis. She was managed with antibiotics during hospital course with significant improvement in symptoms. She will be discharged on Augmentin and doxycycline to complete a course of antibiotics.     On 10/06, patient had an episode where she had trouble swallowing her pills, just 1 episode. Otherwise denies any difficulty eating or drinking her food. Speech therapy evaluation was done and swallow eval was within normal limits.       I saw her in clinic today. Reports that her shortness of breath is unchanged. Denies chest pain or palpitation. No orthopnea or paroxysmal nocturnal dyspnea.  Only requiring baseline O2 of 4L.  Currently taking lasix 40 mg in the morning and 20 mg in the evening; instructed patient to switch to stay on lasix 20 mg in the morning and 20 mg in the evening unless gains weight.   She walks well with walker and can stand with minimal assistance.  Denies melena or hematochezia    Current medicines (including reconciliation performed today)  Current Outpatient Medications   Medication Sig Dispense Refill    beclomethasone HFA (QVAR REDIHALER) 40 MCG/ACT inhaler Inhale 1 Puff 2 times a day.      furosemide (LASIX) 20 MG Tab Take 1 Tablet by mouth every evening.       furosemide (LASIX) 20 MG Tab Take 1 Tablet by mouth every morning. 60 Tablet     HYDROcodone/acetaminophen (NORCO)  MG Tab       spironolactone (ALDACTONE) 25 MG Tab Take 1 Tablet by mouth every day.      MAGNESIUM-OXIDE 400 (240 Mg) MG Tab TAKE 1 TABLET (400 MG) EVERY MORNING AND 1/2 TABLETS (200 MG) EVERY EVENING.      melatonin 5 mg Tab Take 10 mg by mouth every evening.      acetaminophen (TYLENOL) 500 MG Tab Take 500-1,000 mg by mouth every 6 hours as needed.      Home Care Oxygen Inhale 3 L/min continuous.      Pain Pump (PATIENT SUPPLIED) Device Inject  as directed continuous. Patient's Pain Pump (placed and maintained as an outpatient)    Medications/concentrations: Hydromorphone 4.0 mg/ml  Route: Intrathecal  Last changed: 3/22/2023  Refill pump before date: 7/2/2023  Continuous infusion rates (Drug/Rate): Hydromorphone 0.7491 mg/day or 0.0312 mg/hr (increased on 5/8/23)  MD office:       DULoxetine (CYMBALTA) 20 MG Cap DR Particles Take 20 mg by mouth 2 times a day.      guaiFENesin ER (MUCINEX) 600 MG TABLET SR 12 HR Take 1,200 mg by mouth every 12 hours. Indications: Cough      Cyanocobalamin (VITAMIN B-12 PO) Take  by mouth every day.      Cholecalciferol (VITAMIN D3 PO) Take  by mouth every day.      diltiazem (TIAZAC) 120 MG SR capsule Take 120 mg by mouth every evening.      pantoprazole (PROTONIX) 40 MG Tablet Delayed Response Take 40 mg by mouth every morning before breakfast.      atorvastatin (LIPITOR) 40 MG Tab 40 mg every evening. Indications: High Amount of Fats in the Blood      losartan (COZAAR) 50 MG Tab Take 25 mg by mouth every evening. 1/2 a tablet      albuterol 108 (90 Base) MCG/ACT Aero Soln inhalation aerosol 2 Puffs every 6 hours as needed for Shortness of Breath.      Polyethylene Glycol 3350 (MIRALAX PO) Take  by mouth every evening.      Simethicone (GAS-X PO) Take  by mouth 3 times a day.       No current facility-administered medications for this visit.  "      Allergies:   Sulfa drugs and Erythromycin    Social History     Tobacco Use    Smoking status: Former     Current packs/day: 0.00     Average packs/day: 1 pack/day for 61.0 years (61.0 ttl pk-yrs)     Types: Cigarettes, Electronic Cigarettes     Start date: 1954     Quit date: 2015     Years since quittin.8    Smokeless tobacco: Never    Tobacco comments:     Smoked as a teenager quit as a senior citizen   Vaping Use    Vaping Use: Former    Substances: Flavoring   Substance Use Topics    Alcohol use: Not Currently     Comment: Was  is a social drinker until approximately     Drug use: Not Currently     Types: Inhaled     Comment: cocaine while in her 40\"s       ROS:  Review of Systems   Constitutional:  Negative for chills, diaphoresis, fever and weight loss.   HENT:  Positive for hearing loss. Negative for ear discharge, ear pain and tinnitus.    Eyes:  Negative for blurred vision, double vision, pain and discharge.   Respiratory:  Positive for shortness of breath. Negative for cough, hemoptysis, sputum production and wheezing.    Cardiovascular:  Positive for leg swelling. Negative for chest pain, palpitations, orthopnea, claudication and PND.   Gastrointestinal:  Negative for abdominal pain, blood in stool, constipation, diarrhea, heartburn, melena, nausea and vomiting.   Genitourinary:  Negative for dysuria, flank pain, hematuria and urgency.   Musculoskeletal:  Positive for back pain and joint pain. Negative for falls, myalgias and neck pain.   Skin:  Negative for itching and rash.   Neurological:  Negative for dizziness, tingling, sensory change, speech change, focal weakness, seizures, loss of consciousness, weakness and headaches.   Endo/Heme/Allergies:  Negative for polydipsia. Does not bruise/bleed easily.   Psychiatric/Behavioral:  Negative for depression, hallucinations, substance abuse and suicidal ideas.         Objective:     Vitals:    10/31/23 1240   BP: 122/62   BP Location: " "Left arm   Patient Position: Sitting   Pulse: (!) 107   Temp: 36.8 °C (98.2 °F)   TempSrc: Temporal   SpO2: 95%   Weight: 70.2 kg (154 lb 12.8 oz)   Height: 1.702 m (5' 7\")     Body mass index is 24.25 kg/m².    Physical Exam:  General: No acute distress. Wearing oxygen, walks with walker.  HEENT: EOM grossly intact, no scleral icterus, no pharyngeal erythema.   Neck:  No JVD, no bruits, trachea midline  CVS: RRR. Normal S1, S2. No M/R/G. No LE edema.  2+ radial pulses, 2+ PT pulses, able to lie flat without getting short of breath  Resp: Has bibasilar crackles and wheezing. Normal respiratory effort.  Abdomen: Soft, NT, no [place] hepatomegaly.  MSK/Ext: No clubbing or cyanosis.  2+ bilateral pitting edema in lower extremities  Skin: Warm and dry, no rashes.  Neurological: CN III-XII grossly intact. No focal deficits.   Psych: A&O x 3, appropriate affect, good judgement       Assessment and Plan:   1. Hospital discharge follow-up  Recently hospitalized for left hip pain, cellulitis, pneumonia, volume overload    2. Pain of left hip joint  New problem, decompensated, Thought to be seroma as seen by ortho inpatient, no acute intervention.  Needs to follow up with orthopedic surgery outpatient; referral placed    3. Heart failure with preserved ejection fraction, unspecified HF chronicity (HCC)  Chronic, stable, likely multifactorial from AS and MR and pulm HTN.  Patient is not in acute exacerbation.  No JVD and is able to lie flat without getting short of breath  Transthoracic echo 10/4/23 with normal EF of 60-65%, severe AS, right ventricular systolic pressure of 55-60 mm of mercury, mild to moderate mitral regurgitation;left atrium is moderately dilated; R atrium is mildy dilated.  -Follow-up with cardiologist as possible  -Continue Lasix 20 mg twice daily;  -Measure standing weight every morning. If you gain 3 pounds in 1-2 days or 5 pounds in 1 week, take double dose of morning lasix   -instructed patient to wear " compression stockings and elevate legs for leg edema    4. Paroxysmal A-fib (HCC)  Chronic, stable, rate controlled,noted in the hospital, due to anemia patient is not on anticoagulation.  Leonel Vasc score 5 or 6. Dilated atria on echo  I am getting FOBT to rule out GI bleed in the setting of iron level deficiency  Follow-up with cardiologist as soon as possible for Watchman device evaluation    5. Severe aortic stenosis  Chronic, worsening/decompensated, follow up with cardiology as soon as possible for TAVR evaluation    6. Moderate mitral insufficiency  Chronic, stable noted on echocardiogram  Monitor this regularly in the future with echo per guidelines    7. Pulmonary HTN (HCC)  Chronic, stable, noted on echocardiogram, may have been due to pneumonia    8. Community acquired pneumonia, unspecified laterality  New problem, Finished abx, resolved    9. Cellulitis, unspecified cellulitis site  New problem, Finished abx, resolved    10. Chronic pain syndrome  Chronic, well controlled on Dilaudid pump and Norco , instructed to follow-up with pain medicine soon as possible    11. Normocytic anemia  Acute on chronic decompensated, concern for iron deficiency anemia. iron 14, ferritin 92, transferrin saturation 4.8%, discharged with iron pill by hospitalist. May be occult gi bleed  Continue iron pill  Will recheck iron level in about 2-3 months  FOBT ordered; if positive, will place referral to GI    - Chart and discharge summary were reviewed.   - Hospitalization and results reviewed with patient.   - Medications reviewed including instructions regarding high risk medications, dosing and side effects.  - Recommended Services: No services needed at this time  - Advance directive/POLST on file?  No  will address this next visit    Follow-up:Return in about 1 week (around 11/7/2023).    Face-to-face transitional care management services with HIGH (today's visit is within days post discharge & LACE+ score 59+)  medical decision complexity were provided.     LACE+ Historical Score Over Time (0-28: Low, 29-58: Medium, 59+: High): 56

## 2023-10-31 NOTE — TELEPHONE ENCOUNTER
Attempted to speak with [facility] filmroom representative, on hold >20 minutes. Left detailed message requesting call back to confirm request for images has been received.    Request re-faxed to [phone]. Receipt confirmed.

## 2023-11-01 ENCOUNTER — TELEPHONE (OUTPATIENT)
Dept: MEDICAL GROUP | Facility: PHYSICIAN GROUP | Age: 88
End: 2023-11-01
Payer: MEDICARE

## 2023-11-01 DIAGNOSIS — N17.9 AKI (ACUTE KIDNEY INJURY) (HCC): ICD-10-CM

## 2023-11-01 DIAGNOSIS — R94.4 KIDNEY FUNCTION TEST ABNORMAL: ICD-10-CM

## 2023-11-01 NOTE — TELEPHONE ENCOUNTER
I called patient to let her know that her blood work shows improved anemia and stable metabolic panel but has a slight damage from possibly lasix with rise in just creatinine. I asked them to repeat bloodwork next week. Also, I told her that she should go to ED if she develops nausea or vomiting. I reminded her to check my chart message.

## 2023-11-01 NOTE — TELEPHONE ENCOUNTER
Left detailed message for [facility] filmroom requesting call back to discuss STAT request.     STAT request re-faxed to [phone]. Receipt confirmed.

## 2023-11-02 ENCOUNTER — TELEPHONE (OUTPATIENT)
Dept: CARDIOLOGY | Facility: MEDICAL CENTER | Age: 88
End: 2023-11-02
Payer: MEDICARE

## 2023-11-02 ENCOUNTER — HOME HEALTH ADMISSION (OUTPATIENT)
Dept: HOME HEALTH SERVICES | Facility: HOME HEALTHCARE | Age: 88
End: 2023-11-02
Payer: MEDICARE

## 2023-11-02 ENCOUNTER — PATIENT OUTREACH (OUTPATIENT)
Dept: HEALTH INFORMATION MANAGEMENT | Facility: OTHER | Age: 88
End: 2023-11-02
Payer: MEDICARE

## 2023-11-02 DIAGNOSIS — I10 PRIMARY HYPERTENSION: ICD-10-CM

## 2023-11-02 DIAGNOSIS — J44.0 CHRONIC OBSTRUCTIVE PULMONARY DISEASE WITH ACUTE LOWER RESPIRATORY INFECTION (HCC): ICD-10-CM

## 2023-11-02 NOTE — TELEPHONE ENCOUNTER
Received call from Radha at Dr. Carrillo's office regarding referral. Update provided regarding imaging and scheduling process. Radha verbalizes understanding and will relay to patient.

## 2023-11-02 NOTE — TELEPHONE ENCOUNTER
Called and spoke to Krysten in the filmroom. She states request was processed and images were placed in mail yesterday.

## 2023-11-02 NOTE — PROGRESS NOTES
11/2/23 11:30 am: Nurse DYLAN outreach call to pt for follow-up. CARISSA left requesting a return call to nurse at [phone].    11/2/23 1:00 pm: Nurse DYLAN left message with [facility] to follow-up on referral.  Message left by home care representative, Meg, that order for home care was never received.  Nurse sent message to Tushar ELIZABETH regarding referral for Home Care from [facility]. Per message received from [place], referral was sent to home care last Thursday on 10/26 and again today on 11/2. Nurse contacted home care again and left message for Meg regarding referral. Nurse requested a call back to confirm.    1:15 pm:  Nurse spoke to patient regarding referral to cardiology. Pt reports she hasn't been able to schedule an appt. Reports her daughter has been sleeping and unable to make appt.  Nurse contacted cardiology for follow-up. Nurse spoke to RN, Prema Montgomery. Per Prema, they are waiting on echocardiogram films from [facility]. Cardiology will contact pt for follow-up regarding referral and appt after receiving imaging reports. Nurse called pt and provided this information. Pt has no additional questions.

## 2023-11-06 ENCOUNTER — TELEPHONE (OUTPATIENT)
Dept: MEDICAL GROUP | Facility: PHYSICIAN GROUP | Age: 88
End: 2023-11-06
Payer: MEDICARE

## 2023-11-06 ENCOUNTER — PATIENT OUTREACH (OUTPATIENT)
Dept: HEALTH INFORMATION MANAGEMENT | Facility: OTHER | Age: 88
End: 2023-11-06
Payer: MEDICARE

## 2023-11-06 DIAGNOSIS — J44.0 CHRONIC OBSTRUCTIVE PULMONARY DISEASE WITH ACUTE LOWER RESPIRATORY INFECTION (HCC): ICD-10-CM

## 2023-11-06 DIAGNOSIS — I10 PRIMARY HYPERTENSION: ICD-10-CM

## 2023-11-06 NOTE — PROGRESS NOTES
Nurse CM outreach call to pt for follow-up. Pt answered telephone. Pt reports she received a call from home care yesterday. States she was unable to schedule an appointment for today. Reports she was told home care would be calling back to schedule an appointment. Pt reports she has appt tomorrow with PCP so she was unable to schedule nurse visit for tomorrow. This nurse contacted home care and left VM requesting call back for follow-up on referral. Pt informed nurse CM that she has an upcoming appt with her orthopedic surgeon. Pt has home care number and reports she will also follow-up on referral. Pt aware of appt date and time to see PCP tomorrow.     11:00 am: Nurse RICHARDSON spoke with Meg at home care.  Per home care representative, pt was contacted yesterday by scheduling and pt declined appt until later in the week. Pt will be contacted by scheduling dept at home care later in week.

## 2023-11-06 NOTE — TELEPHONE ENCOUNTER
I called patient and she told me she actually drags both feet and this issue has been going on for more than weeks or months. No urinary or bowel incontinence or saddle anesthesia. This dragging foot appears to be chronic problem and not acute change. I will evaluate her tomorrow, because I asked her to come in today but she told me she cannot come in today.

## 2023-11-07 ENCOUNTER — OFFICE VISIT (OUTPATIENT)
Dept: MEDICAL GROUP | Facility: PHYSICIAN GROUP | Age: 88
End: 2023-11-07
Payer: MEDICARE

## 2023-11-07 ENCOUNTER — PATIENT MESSAGE (OUTPATIENT)
Dept: MEDICAL GROUP | Facility: PHYSICIAN GROUP | Age: 88
End: 2023-11-07

## 2023-11-07 VITALS
SYSTOLIC BLOOD PRESSURE: 110 MMHG | BODY MASS INDEX: 23.21 KG/M2 | HEIGHT: 67 IN | DIASTOLIC BLOOD PRESSURE: 60 MMHG | OXYGEN SATURATION: 91 % | HEART RATE: 95 BPM | TEMPERATURE: 98 F | WEIGHT: 147.9 LBS

## 2023-11-07 DIAGNOSIS — F41.9 ANXIETY: ICD-10-CM

## 2023-11-07 DIAGNOSIS — I63.9 CEREBROVASCULAR ACCIDENT (CVA), UNSPECIFIED MECHANISM (HCC): ICD-10-CM

## 2023-11-07 DIAGNOSIS — R26.89 IMPAIRED BALANCE AS LATE EFFECT OF CEREBROVASCULAR ACCIDENT (CVA): ICD-10-CM

## 2023-11-07 DIAGNOSIS — N18.32 CHRONIC KIDNEY DISEASE, STAGE 3B: ICD-10-CM

## 2023-11-07 DIAGNOSIS — N18.32 CHRONIC KIDNEY DISEASE (CKD) STAGE G3B/A2, MODERATELY DECREASED GLOMERULAR FILTRATION RATE (GFR) BETWEEN 30-44 ML/MIN/1.73 SQUARE METER AND ALBUMINURIA CREATININE RATIO BETWEEN 30-299 MG/G: ICD-10-CM

## 2023-11-07 DIAGNOSIS — R53.81 PHYSICAL DEBILITY: ICD-10-CM

## 2023-11-07 DIAGNOSIS — K59.00 CONSTIPATION, UNSPECIFIED CONSTIPATION TYPE: ICD-10-CM

## 2023-11-07 DIAGNOSIS — M21.371 RIGHT FOOT DROP: ICD-10-CM

## 2023-11-07 DIAGNOSIS — I69.398 IMPAIRED BALANCE AS LATE EFFECT OF CEREBROVASCULAR ACCIDENT (CVA): ICD-10-CM

## 2023-11-07 PROCEDURE — 99215 OFFICE O/P EST HI 40 MIN: CPT | Performed by: STUDENT IN AN ORGANIZED HEALTH CARE EDUCATION/TRAINING PROGRAM

## 2023-11-07 PROCEDURE — 3074F SYST BP LT 130 MM HG: CPT | Performed by: STUDENT IN AN ORGANIZED HEALTH CARE EDUCATION/TRAINING PROGRAM

## 2023-11-07 PROCEDURE — 3078F DIAST BP <80 MM HG: CPT | Performed by: STUDENT IN AN ORGANIZED HEALTH CARE EDUCATION/TRAINING PROGRAM

## 2023-11-07 RX ORDER — SENNA AND DOCUSATE SODIUM 50; 8.6 MG/1; MG/1
1 TABLET, FILM COATED ORAL
Qty: 30 TABLET | Refills: 2 | Status: SHIPPED | OUTPATIENT
Start: 2023-11-07 | End: 2024-02-14

## 2023-11-07 ASSESSMENT — FIBROSIS 4 INDEX: FIB4 SCORE: 2.09

## 2023-11-07 NOTE — TELEPHONE ENCOUNTER
Received image CD. Will have Dr. Eddy review to determine severity of valvular disease prior to scheduling.

## 2023-11-07 NOTE — PROGRESS NOTES
"CC:  Diagnoses of Right foot drop and Physical debility were pertinent to this visit.    HISTORY OF THE PRESENT ILLNESS: Patient is a 87 y.o. female. This pleasant patient is here today for follow up visit. Patient is accompanied by her daughter who will take her to emergency department. She is presenting with 1 week history of sudden inability to move her right foot. She drags the right foot when she walks now.  She previously was walking well with walker without dragging feet.  No fever, chills.  No urinary or bowel incontinence or saddle anesthesia.  States that her heart failure symptoms including shortness of breath improved.  No chest pain or palpitation.  No dysphagia or facial droop    No problems updated.      ROS:   As mentioned above  /60 (BP Location: Left arm, Patient Position: Sitting)   Pulse 95   Temp 36.7 °C (98 °F) (Temporal)   Ht 1.702 m (5' 7\")   Wt 67.1 kg (147 lb 14.4 oz)   SpO2 91%   BMI 23.16 kg/m² Body mass index is 23.16 kg/m².    Physical Exam  Vitals reviewed.   Constitutional:       General: She is not in acute distress.     Appearance: She is not ill-appearing or diaphoretic.   HENT:      Head: Normocephalic and atraumatic.      Right Ear: External ear normal.      Left Ear: External ear normal.      Nose: No rhinorrhea.      Mouth/Throat:      Pharynx: No oropharyngeal exudate.   Eyes:      General: No scleral icterus.        Right eye: No discharge.         Left eye: No discharge.      Extraocular Movements: Extraocular movements intact.   Cardiovascular:      Rate and Rhythm: Normal rate and regular rhythm.      Heart sounds: Normal heart sounds. No murmur heard.  Pulmonary:      Effort: Pulmonary effort is normal. No respiratory distress.      Breath sounds: Normal breath sounds. No stridor. No wheezing, rhonchi or rales.   Abdominal:      General: There is no distension.      Palpations: Abdomen is soft. There is no mass.      Tenderness: There is no abdominal " tenderness. There is no guarding or rebound.   Musculoskeletal:         General: No tenderness. Normal range of motion.      Cervical back: No rigidity or tenderness.      Right lower leg: No edema.      Left lower leg: No edema.   Lymphadenopathy:      Cervical: No cervical adenopathy.   Skin:     Capillary Refill: Capillary refill takes less than 2 seconds.      Findings: No bruising, erythema, lesion or rash.   Neurological:      Mental Status: She is alert and oriented to person, place, and time. Mental status is at baseline.      Cranial Nerves: No cranial nerve deficit.      Sensory: No sensory deficit.      Motor: Weakness present.      Gait: Gait abnormal.      Deep Tendon Reflexes: Reflexes normal.      Comments: On exam patient has full sensation in both upper and lower extremities.  However, patient has complete R foot drop and is unable to move the right foot at all.  Right upper extremity has full strength.  Bilateral upper extremities have full strength.   Psychiatric:         Mood and Affect: Mood normal.         Behavior: Behavior normal.         Judgment: Judgment normal.             Note: I have reviewed all pertinent labs and diagnostic tests associated with this visit with specific comments listed under the assessment and plan below    Assessment and Plan  87 y.o. female with the following -      [facility] Form    Diagnosis to address: N18.32 - Chronic kidney disease, stage 3b ([facility])  Assessment and plan: Chronic, exacerbated. Treatment and follow up: repeating metabolic panel  Last edited 11/07/23 16:02 PST by Kevin Felipe M.D.       Right foot drop  #suspected CVA (Impaired balance as late effect of CVA)  New problem decompensated, complicated, I am sending patient to the emergency department now because patient may have had a stroke.  Other differentials include osteomyelitis, epidural abscess (patient has pain pump), lumbar spinal stenosis, brain mass, spine mass,etc.  Onset was 1 week ago, so  not a tPA or IR thrombectomy candidate  Patient also requires urgent PT and OT evaluation and possible stroke rehab if she is found to have stroke.    Physical debility  New problem, decompensated secondary to right foot drop patient is having severe difficulty with walking.  Patient was previously walking well with walker without dragging feet.    Constipation  New, problem , improving states that has not had bowel movement for 3 days but just now she is having bowel movement. Normally takes miralax which helps. I instructed patient to take miralax as needed for constipation but only if miralax does not work, take senna-docusate.      Chronic kidney disease (CKD) stage G3b/A2, moderately decreased glomerular filtration rate (GFR) between 30-44 mL/min/1.73 square meter and albuminuria creatinine ratio between  mg/g ([facility])  CKD stage 3 b  - Renal Function Panel; Future  - MAGNESIUM; Future    Heart failure with preserved ejection fraction, unspecified HF chronicity (HCC)  Chronic, improving   Continue spironolactone 25 mg daily, losartan 25 mg daily, lasix 20 mg BID  Repeating metabolic panel to monitor potassium and kidney function    *Update:  After I left the room, the daughter and the patient brought me back in saying that they cannot go to ED given current family situation. I explained the seriousness of situation  including permanent debility of the R foot and potential death if patient has infection or mass; they expressed understanding. Instead, they are getting MRI brain and spine tomorrow. I told them if they cannot get these done by tomorrow, go to emergency department. Also, told them if anything neurologic changes is noted tonight, go to ED.     Return in about 5 days (around 11/12/2023).    I spent a total of 55 minutes with record review, exam, communication with the patient, communication with other providers, and documentation of this encounter.   Please note that this dictation was created  using voice recognition software. I have made every reasonable attempt to correct obvious errors, but I expect that there are errors of grammar and possibly content that I did not discover before finalizing the note.

## 2023-11-08 ENCOUNTER — HOME CARE VISIT (OUTPATIENT)
Dept: HOME HEALTH SERVICES | Facility: HOME HEALTHCARE | Age: 88
End: 2023-11-08

## 2023-11-08 RX ORDER — LORAZEPAM 1 MG/1
0.5 TABLET ORAL EVERY 4 HOURS PRN
Qty: 4 TABLET | Refills: 0 | Status: SHIPPED | OUTPATIENT
Start: 2023-11-08 | End: 2023-11-09

## 2023-11-09 ENCOUNTER — HOSPITAL ENCOUNTER (OUTPATIENT)
Dept: RADIOLOGY | Facility: MEDICAL CENTER | Age: 88
End: 2023-11-09
Payer: MEDICARE

## 2023-11-09 NOTE — TELEPHONE ENCOUNTER
Called and left message on mobile number requesting call back to discuss referral. Unable to leave message on home phone as number is no longer in service.         First attempt

## 2023-11-09 NOTE — TELEPHONE ENCOUNTER
Received call back from patient. Discussed echo results and valvular disease process. Patient unable to continue call as she needed to finish using the restroom and is in significant pain. She requests call back later this afternoon.

## 2023-11-09 NOTE — TELEPHONE ENCOUNTER
Called and left message for patient requesting call back to discuss scheduling appointments further.

## 2023-11-10 ENCOUNTER — PATIENT OUTREACH (OUTPATIENT)
Dept: HEALTH INFORMATION MANAGEMENT | Facility: OTHER | Age: 88
End: 2023-11-10
Payer: MEDICARE

## 2023-11-10 DIAGNOSIS — I10 PRIMARY HYPERTENSION: ICD-10-CM

## 2023-11-10 DIAGNOSIS — J44.0 CHRONIC OBSTRUCTIVE PULMONARY DISEASE WITH ACUTE LOWER RESPIRATORY INFECTION (HCC): ICD-10-CM

## 2023-11-10 NOTE — TELEPHONE ENCOUNTER
Called patient to discuss echo results and TAVR pathway. She is very overwhelmed with doctor visits and having a difficult/painful recover post hip replacement. She would like a cardiology appt only at this time to discuss echo and TAVR. She is willing to schedule CTS consult and TAVR CTA at the time of the visit if she is a candidate for TAVR. Provided patient my direct phone number should she have any questions or concerns prior to her visit. No further questions or concerns at this time.

## 2023-11-10 NOTE — PROGRESS NOTES
Nurse CM outreach call for follow-up. Pt answered telephone. Pt reports she is tired. States she has been busy going to appointments. Reports she completed lab work on Wednesday. Pt reports she has not scheduled MRI. Reports she has not scheduled appointment with home care. Reports home care nurse attempted to make a visit and pt reports she sent them away because she had an appointment. Pt hasn't called back to rescheduled a visit. Pt reports she spoke to cardiology office yesterday and is supposed to call back today. Pt reports her daughter is currently sleeping and will call to make the appointments later today. Nurse discussed importance of follow-up on scheduling cardiology appointment and MRI as ordered. Nurse provided pt with telephone number for home care, cardiology office and imaging.  Nurse offered to contact pt's son, Kentrell to see if he is available to help pt schedule some of her appointments. Pt declining, states she wants her daughter, Krystal to follow-up on appointment scheduling. Pt is scheduled to see PCP on Monday 11/13. Pt aware of appointment date and time.

## 2023-11-13 ENCOUNTER — PATIENT MESSAGE (OUTPATIENT)
Dept: MEDICAL GROUP | Facility: PHYSICIAN GROUP | Age: 88
End: 2023-11-13

## 2023-11-13 ENCOUNTER — APPOINTMENT (OUTPATIENT)
Dept: MEDICAL GROUP | Facility: PHYSICIAN GROUP | Age: 88
End: 2023-11-13
Payer: MEDICARE

## 2023-11-13 DIAGNOSIS — R26.89 IMPAIRED BALANCE AS LATE EFFECT OF CEREBROVASCULAR ACCIDENT (CVA): ICD-10-CM

## 2023-11-13 DIAGNOSIS — I63.9 CEREBROVASCULAR ACCIDENT (CVA), UNSPECIFIED MECHANISM (HCC): ICD-10-CM

## 2023-11-13 DIAGNOSIS — M21.371 RIGHT FOOT DROP: ICD-10-CM

## 2023-11-13 DIAGNOSIS — I69.398 IMPAIRED BALANCE AS LATE EFFECT OF CEREBROVASCULAR ACCIDENT (CVA): ICD-10-CM

## 2023-11-13 RX ORDER — LANOLIN ALCOHOL/MO/W.PET/CERES
400 CREAM (GRAM) TOPICAL 2 TIMES DAILY
Qty: 6 TABLET | Refills: 0 | Status: SHIPPED | OUTPATIENT
Start: 2023-11-13 | End: 2024-01-30

## 2023-11-15 ENCOUNTER — TELEPHONE (OUTPATIENT)
Dept: MEDICAL GROUP | Facility: PHYSICIAN GROUP | Age: 88
End: 2023-11-15

## 2023-11-15 ENCOUNTER — OFFICE VISIT (OUTPATIENT)
Dept: CARDIOLOGY | Facility: MEDICAL CENTER | Age: 88
End: 2023-11-15
Attending: INTERNAL MEDICINE
Payer: MEDICARE

## 2023-11-15 VITALS
WEIGHT: 150 LBS | SYSTOLIC BLOOD PRESSURE: 108 MMHG | HEART RATE: 67 BPM | DIASTOLIC BLOOD PRESSURE: 62 MMHG | OXYGEN SATURATION: 91 % | BODY MASS INDEX: 23.54 KG/M2 | HEIGHT: 67 IN | RESPIRATION RATE: 14 BRPM

## 2023-11-15 DIAGNOSIS — I48.0 PAROXYSMAL A-FIB (HCC): ICD-10-CM

## 2023-11-15 DIAGNOSIS — I50.30 HEART FAILURE WITH PRESERVED EJECTION FRACTION, UNSPECIFIED HF CHRONICITY (HCC): ICD-10-CM

## 2023-11-15 DIAGNOSIS — I35.0 SEVERE AORTIC STENOSIS: ICD-10-CM

## 2023-11-15 DIAGNOSIS — N18.32 CHRONIC KIDNEY DISEASE, STAGE 3B: ICD-10-CM

## 2023-11-15 PROCEDURE — 99213 OFFICE O/P EST LOW 20 MIN: CPT | Performed by: INTERNAL MEDICINE

## 2023-11-15 PROCEDURE — 3078F DIAST BP <80 MM HG: CPT | Performed by: INTERNAL MEDICINE

## 2023-11-15 PROCEDURE — 99205 OFFICE O/P NEW HI 60 MIN: CPT | Performed by: INTERNAL MEDICINE

## 2023-11-15 PROCEDURE — 3074F SYST BP LT 130 MM HG: CPT | Performed by: INTERNAL MEDICINE

## 2023-11-15 ASSESSMENT — FIBROSIS 4 INDEX: FIB4 SCORE: 2.09

## 2023-11-15 NOTE — TELEPHONE ENCOUNTER
Could you fax the MRI brain and spine orders to [facility]. [phone]  This is their phone number

## 2023-11-16 ENCOUNTER — NON-PROVIDER VISIT (OUTPATIENT)
Dept: CARDIOLOGY | Facility: MEDICAL CENTER | Age: 88
End: 2023-11-16
Attending: INTERNAL MEDICINE
Payer: MEDICARE

## 2023-11-16 ENCOUNTER — TELEPHONE (OUTPATIENT)
Dept: CARDIOLOGY | Facility: MEDICAL CENTER | Age: 88
End: 2023-11-16
Payer: MEDICARE

## 2023-11-16 DIAGNOSIS — I49.1 APC (ATRIAL PREMATURE CONTRACTIONS): ICD-10-CM

## 2023-11-16 DIAGNOSIS — I49.3 FREQUENT PVCS: ICD-10-CM

## 2023-11-16 DIAGNOSIS — I35.0 SEVERE AORTIC STENOSIS: ICD-10-CM

## 2023-11-16 DIAGNOSIS — I35.0 NONRHEUMATIC AORTIC (VALVE) STENOSIS: ICD-10-CM

## 2023-11-16 DIAGNOSIS — Z01.810 PRE-PROCEDURAL CARDIOVASCULAR EXAMINATION: ICD-10-CM

## 2023-11-16 DIAGNOSIS — I48.0 PAROXYSMAL A-FIB (HCC): ICD-10-CM

## 2023-11-16 DIAGNOSIS — I47.29 NSVT (NONSUSTAINED VENTRICULAR TACHYCARDIA) (HCC): ICD-10-CM

## 2023-11-16 PROCEDURE — 93246 EXT ECG>7D<15D RECORDING: CPT

## 2023-11-16 NOTE — TELEPHONE ENCOUNTER
RN notified that patient in office and would like to discuss scheduling appointments in person.    Met with patient and her son Kentrell. Discussed workup for TAVR along with alternative options moving forward. Additionally, reviewed patient expectations.    Discussed consultation appointments, testing needed, and plan of care.    Patient given dates and times of testing and consultations.    All questions answered.    Patient has business card with direct phone number to [facility] for any further questions or concerns.

## 2023-11-16 NOTE — PROGRESS NOTES
Patient enrolled in the 14 day Zio XT Holter monitor per Clayton Eddy MD.  In clinic hook up, monitor s/n [id number]. Pending EOS.

## 2023-11-16 NOTE — TELEPHONE ENCOUNTER
Patient seen in office yesterday by Dr. Eddy to discuss valvular disease. Per provider, patient to proceed with TAVR pathway.     Called and spoke to patient. Attempted to discuss scheduling appointments. Patient states she is unable to talk right now as she is preparing to drive to [place] for cardiac monitor placement. Advised RN would reach out later this afternoon or next week. Patient verbalizes understanding.

## 2023-11-16 NOTE — PROGRESS NOTES
CARDIOLOGY STRUCTURAL HEART CONSULTATION    PCP: Kassi Carrillo M.D.  REFERRING: [facility]    1. Severe aortic stenosis    2. Heart failure with preserved ejection fraction, unspecified HF chronicity (HCC)    3. Chronic kidney disease, stage 3b ([facility])    4. Paroxysmal A-fib ([facility])        Chelly Decker has class II symptomatic severe aortic stenosis and frailty.  Despite this, I believe aortic valve replacement is reasonable and I will plug her into our TAVR pathway.  I am concerned about the on addressed stroke risk related to atrial fibrillation noted during her recent hospital stay.  I will arrange for her to wear a Zio patch.  She will continue with the present medication regimen.  She expresses a strong desire to transition care to our center.    Follow up: 6 weeks    History: Chelly Decker is a 87 y.o. female with history of aortic stenosis, malignant lung nodule treated with radiation therapy, chronic lower extremity pain with pain pump debility and recent hip surgery presenting for consultation regarding her cardiac care.  She had been followed by [facility] and was referred to Dr. Diaz for TAVR though has rerouted herself here and wishes to establish with our cardiology practice.  She was previously cared for by Dr. Shaffer but has not seen him in a few years.     Apparently while recovering from hip surgery she developed lower extremity edema and was treated at the hospital for pneumonia and CHF and diagnosed with severe aortic stenosis.  During the stay it was reported that she had atrial fibrillation which spontaneously converted.  She was not prescribed anticoagulants.  She does remember discussing with the cardiologist afterwards having a Watchman device implanted as well as a TAVR.  She has been feeling older over this past year and finds it harder to do any activities.  She is chronically debilitated-bedbound and uses a wheelchair.  The hopes was that this hip replacement would restore to  "functionality.    ROS:   10 point review systems is otherwise negative except as per the HPI    PE:  /62 (BP Location: Left arm, Patient Position: Sitting, BP Cuff Size: Adult)   Pulse 67   Resp 14   Ht 1.702 m (5' 7\")   Wt 68 kg (150 lb)   SpO2 91%   BMI 23.49 kg/m²   GEN: NAD  CARDIAC: Regular Systolic ejection murmur Diminished S2 Diminished and delayed carotid upstrokes  RESP: Clear to auscultation bilaterally  ABD: Soft, non-tender, non-distended  EXT: Trace lower extremity edema  NEURO: No focal deficit    Past Medical History:   Diagnosis Date    Acute exacerbation of chronic obstructive airways disease ([facility]) 6/22/2022    Arrhythmia     follows with [facility]    Arthritis 5 + years    controlled by medication    Asthma     Avascular necrosis of bone of left hip ([facility]) 08/17/2022    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    Breath shortness     uses oxygen at night, during day sometimes. 8/23/23-SOB with activity.    Cancer ([facility])     cervical cancer 1968    Cervical cancer ([facility]) 1968    Awscos-[id number] chemo or radiation.    Chronic constipation     takes miralax    Chronic kidney disease (CKD)     stage 3    Chronic pain     follows with Dr Lozoya    Congestive heart failure ([facility])     follows with Dr Salvador with [facility]    COPD (chronic obstructive pulmonary disease) ([facility])     Dental disorder     upper denture and permanent bridge on lower front teeth    Dizziness 12/29/2022    GERD (gastroesophageal reflux disease)     H/O Clostridium difficile infection 05/12/2022    Hammer toes of both feet 12/29/2022    Heart burn     taking pantoprazole.    Heart murmur     High cholesterol 10+ years    Controled with medication    Hypertension     Hypotension due to hypovolemia 08/17/2022    Hypoxia 03/29/2022    Incomplete defecation 03/29/2022    Infectious " disease 4/25/23    I was just informed that the person who drove me to my doctor appointment and home on the 25th of this month was diagnosed with the flu on the 26th of this month. I had my flu shot and after one day I have no symptoms will keep checking !    Insomnia due to medical condition 10/13/2022    Irregular heart rate 03/29/2022    states has had for years, why atenolol    Kidney stones     Moderate aortic stenosis     Moderate asthma without complication 03/29/2022    Moderate mitral insufficiency     Oxygen dependent     uses oxygen 4 liters nasal cannula at night, sometimes during the day    Pain     Pain in joint involving multiple sites 10/13/2022    Pain in joint involving multiple sites 10/13/2022    Pelvic floor dysfunction 03/29/2022    Primary hypertension 03/29/2022    Renal insufficiency 08/03/2022    due to meds    Seasonal allergies     Skin lesions 05/12/2022    Solitary pulmonary nodule     had radiation treatment approx 2021 and does follow up scans.    Spinal stenosis     pain pump for pain control    Spinal stenosis of lumbar region without neurogenic claudication 03/29/2022    Urge incontinence of urine 05/12/2022    urinary retention    Urinary bladder disorder Past [id number]    I've been under treatment for years with no results I am seeing a new urologist on May 2     Past Surgical History:   Procedure Laterality Date    NM TOTAL HIP ARTHROPLASTY Left 8/28/2023    Procedure: LEFT TOTAL HIP ARTHROPLASTY;  Surgeon: Daryl Perrin M.D.;  Location: [facility];  Service: Orthopedics    PUMP REVISION Right 05/04/2023    Procedure: INTRATHECAL PAIN PUMP REVISION;  Surgeon: Tavo Lozoya M.D.;  Location: [facility];  Service: Pain Management    PUMP INSERT/REMOVE Right 10/07/2022    Procedure: MEDTRONIC PUMP REPLACEMENT;  Surgeon: Joel Alanis M.D.;  Location: [facility];  Service: Pain Management    PB IMPLANT NEUROSTIM/ N/A 02/05/2021     Procedure: IMPLANT PUMP AND CATHETER;  Surgeon: Joel Alanis M.D.;  Location: [facility];  Service: Pain Management    HIP ARTHROPLASTY TOTAL Right 1998    APPENDECTOMY      CARPAL TUNNEL ENDOSCOPIC Bilateral     bilat    CHOLECYSTECTOMY      COLONOSCOPY      HYSTERECTOMY LAPAROSCOPY      total    LITHOTRIPSY      kidney stones removed    PRIMARY C SECTION      c section    TONSILLECTOMY       Allergies   Allergen Reactions    Sulfa Drugs Unspecified     Stomach ache    Nauseated, diarrhea    Other reaction(s): Unspecified   Stomach ache    Erythromycin Nausea     Other Reaction(s): GI Upset    Other reaction(s): Not available     Outpatient Encounter Medications as of 11/15/2023   Medication Sig Dispense Refill    magnesium oxide 400 (240 Mg) MG Tab Take 1 Tablet by mouth 2 times a day for 3 days. 6 Tablet 0    atenolol (TENORMIN) 25 MG Tab Take 1 Tablet by mouth every day.      azelastine (ASTELIN) 137 MCG/SPRAY nasal spray SPRAY 2 SPRAYS INTO EACH NOSTRIL TWICE A DAY FOR 30 DAYS      cefTRIAXone (ROCEPHIN) 2 GM Recon Soln 2,000 mg.      diclofenac sodium (VOLTAREN) 1 % Gel APPLY TO AFFECTED ARE TWICE DAILY AS NEEDED      diltiazem (DILACOR XR) 180 MG XR capsule 1 cap(s) orally once a day for 30 day(s)      diphenhydrAMINE (BENADRYL ALLERGY) 25 MG capsule Take 1 Capsule by mouth 3 times a day.      Ferrous Sulfate (IRON) 325 (65 Fe) MG Tab Take 325 mg by mouth.      fluticasone (FLONASE) 50 MCG/ACT nasal spray 2 spray(s) intranasally once a day for 30 day(s)      hydrocortisone (CORTEF) 10 MG Tab Take 1 Tablet by mouth every day.      HYDROmorphone (DILAUDID) 4 MG/ML Solution 4 mg.      loperamide (IMODIUM) 2 MG Cap Take 2 mg by mouth.      LORazepam (ATIVAN) 0.5 MG Tab Take 1 Tablet by mouth 3 times a day.      Magnesium 400 MG Tab Take  by mouth.      meloxicam (MOBIC) 7.5 MG Tab Take 7.5 mg by mouth.      omeprazole (PRILOSEC) 20 MG delayed-release capsule 1 cap(s) orally once a day for 30  day(s)      oxyCODONE immediate-release (ROXICODONE) 5 MG Tab Take 1 Tablet by mouth every day.      oxyCODONE-acetaminophen (PERCOCET) 5-325 MG Tab 1  by oral route.      potassium chloride SA (KDUR) 20 MEQ Tab CR TAKE 1 TABLET BY MOUTH DAILY. TAKE 2 TABLETS WHEN YOU TAKE THE HIGHER LASIX DOSING      sodium phosphate (FLEET) 7-19 GM/118ML Enema Insert 1 Enema into the rectum.      temazepam (RESTORIL) 15 MG Cap Take 1 Capsule by mouth at bedtime.      thyroid (NATURE-THROID) 65 MG tablet Take 1 Tablet by mouth every day.      Vibegron (GEMTESA) 75 MG Tab Take 1 Tablet by mouth every day.      acetaminophen (TYLENOL) 500 MG Tab Take 500 mg by mouth.      atorvastatin (LIPITOR) 40 MG Tab Take 1 Tablet by mouth every day.      Vitamin D3 5000 Unit (125 mcg) Tab Take 1,000 Units by mouth every day.      Cyanocobalamin 5000 MCG TABLET DISPERSIBLE Take 500 mcg by mouth every day.      cyanocobalamin 250 MCG tablet Take 1 Tablet by mouth every day.      furosemide (LASIX) 20 MG Tab Take 1 Tablet by mouth every day.      losartan (COZAAR) 25 MG Tab Take 25 mg by mouth every day.      Melatonin 5 MG Cap Take 1 Capsule by mouth at bedtime.      melatonin 5 mg Tab Take 10 mg by mouth at bedtime.      pantoprazole (PROTONIX) 40 MG Tablet Delayed Response 1 tab(s) orally 30 min before meal in AM for 30 day(s)      polyethylene glycol 3350 (MIRALAX) 17 GM/SCOOP Powder Take 1 Dose by mouth every day.      sennosides-docusate sodium (SENOKOT-S) 8.6-50 MG tablet Take 1 Tablet by mouth 1 time a day as needed (CONSTIPATION). 30 Tablet 2    furosemide (LASIX) 20 MG Tab Take 1 Tablet by mouth every evening. Indications: Cardiac Failure, Edema      furosemide (LASIX) 20 MG Tab Take 1 Tablet by mouth every morning. Indications: Cardiac Failure, Edema 60 Tablet     spironolactone (ALDACTONE) 25 MG Tab Take 1 Tablet by mouth every day. Indications: Edema, CHF      MAGNESIUM-OXIDE 400 (240 Mg) MG Tab TAKE 1 TABLET (400 MG) EVERY MORNING AND  1/2 TABLETS (200 MG) EVERY EVENING.      melatonin 5 mg Tab Take 10 mg by mouth every evening. Indications: Trouble Sleeping      acetaminophen (TYLENOL) 500 MG Tab Take 500-1,000 mg by mouth every 6 hours as needed. Indications: Fever, Pain      Home Care Oxygen Inhale 3 L/min continuous. Indications: SOB      Pain Pump (PATIENT SUPPLIED) Device Inject  as directed continuous. Patient's Pain Pump (placed and maintained as an outpatient)    Medications/concentrations: Hydromorphone 4.0 mg/ml  Route: Intrathecal  Last changed: 3/22/2023  Refill pump before date: 7/2/2023  Continuous infusion rates (Drug/Rate): Hydromorphone 0.7491 mg/day or 0.0312 mg/hr (increased on 5/8/23)  MD office:   Indications: severe pain      DULoxetine (CYMBALTA) 20 MG Cap DR Particles Take 20 mg by mouth 2 times a day. Indications: Musculoskeletal Pain      guaiFENesin ER (MUCINEX) 600 MG TABLET SR 12 HR Take 1,200 mg by mouth every 12 hours. Indications: Cough      Cyanocobalamin (VITAMIN B-12 PO) Take  by mouth every day. Indications: supplement      Cholecalciferol (VITAMIN D3 PO) Take  by mouth every day. Indications: supplement      diltiazem (TIAZAC) 120 MG SR capsule Take 120 mg by mouth every evening. Indications: High Blood Pressure Disorder      pantoprazole (PROTONIX) 40 MG Tablet Delayed Response Take 40 mg by mouth every morning before breakfast. Indications: Heartburn      atorvastatin (LIPITOR) 40 MG Tab 40 mg every evening. Indications: High Amount of Fats in the Blood      losartan (COZAAR) 50 MG Tab Take 25 mg by mouth every evening. 1/2 a tablet  Indications: High Blood Pressure Disorder      albuterol 108 (90 Base) MCG/ACT Aero Soln inhalation aerosol 2 Puffs every 6 hours as needed for Shortness of Breath. Indications: Chronic Obstructive Lung Disease      Polyethylene Glycol 3350 (MIRALAX PO) Take  by mouth every evening. Indications: constipation      Simethicone (GAS-X PO) Take  by mouth 3 times a day.  "Indications: gas      [DISCONTINUED] HYDROcodone/acetaminophen (NORCO)  MG Tab Indications: Pain       No facility-administered encounter medications on file as of 11/15/2023.     Social History     Socioeconomic History    Marital status:      Spouse name: Not on file    Number of children: Not on file    Years of education: Not on file    Highest education level: Not on file   Occupational History    Occupation: retired book keeper   Tobacco Use    Smoking status: Former     Current packs/day: 0.00     Average packs/day: 1 pack/day for 61.0 years (61.0 ttl pk-yrs)     Types: Cigarettes, Electronic Cigarettes     Start date: 1954     Quit date: 2015     Years since quittin.8    Smokeless tobacco: Never    Tobacco comments:     Smoked as a teenager quit as a senior citizen   Vaping Use    Vaping Use: Former    Substances: Flavoring   Substance and Sexual Activity    Alcohol use: Not Currently     Comment: Was  is a social drinker until approximately     Drug use: Not Currently     Types: Inhaled     Comment: cocaine while in her 40\"s    Sexual activity: Not on file   Other Topics Concern    Not on file   Social History Narrative    Not on file     Social Determinants of Health     Financial Resource Strain: Low Risk  (2022)    Overall Financial Resource Strain (CARDIA)     Difficulty of Paying Living Expenses: Not very hard   Food Insecurity: No Food Insecurity (2022)    Hunger Vital Sign     Worried About Running Out of Food in the Last Year: Never true     Ran Out of Food in the Last Year: Never true   Transportation Needs: Unmet Transportation Needs (2022)    PRAPARE - Transportation     Lack of Transportation (Medical): Yes     Lack of Transportation (Non-Medical): No   Physical Activity: Inactive (2022)    Exercise Vital Sign     Days of Exercise per Week: 0 days     Minutes of Exercise per Session: 0 min   Stress: Not on file   Social Connections: Not on file "   Intimate Partner Violence: Not on file   Housing Stability: Unknown (7/22/2022)    Housing Stability Vital Sign     Unable to Pay for Housing in the Last Year: No     Number of Places Lived in the Last Year: Not on file     Unstable Housing in the Last Year: No     Family History   Problem Relation Age of Onset    Heart Disease Mother         Not from congestive heart failure!         Studies  Lab Results   Component Value Date/Time    CHOLSTRLTOT 115 05/08/2023 01:41 AM    LDL 34 05/08/2023 01:41 AM    HDL 67 05/08/2023 01:41 AM    TRIGLYCERIDE 69 05/08/2023 01:41 AM       Lab Results   Component Value Date/Time    SODIUM 137 10/31/2023 03:19 PM    POTASSIUM 4.6 10/31/2023 03:19 PM    CHLORIDE 97 10/31/2023 03:19 PM    CO2 29 10/31/2023 03:19 PM    GLUCOSE 104 (H) 10/31/2023 03:19 PM    BUN 22 10/31/2023 03:19 PM    CREATININE 1.36 10/31/2023 03:19 PM    GLOMRATE 64 10/06/2023 04:33 AM     Lab Results   Component Value Date/Time    ALKPHOSPHAT 102 10/04/2023 04:33 AM    ALKPHOSPHAT 127 (H) 05/04/2023 01:40 PM    ASTSGOT 12 (L) 10/04/2023 04:33 AM    ASTSGOT 18 05/04/2023 01:40 PM    ALTSGPT 5 (L) 10/04/2023 04:33 AM    ALTSGPT 17 05/04/2023 01:40 PM    TBILIRUBIN 0.4 10/04/2023 04:33 AM    TBILIRUBIN 0.5 05/04/2023 01:40 PM        60-74 minutes of physician total time spent on the date of the encounter ([id number])

## 2023-11-20 ENCOUNTER — TELEPHONE (OUTPATIENT)
Dept: CARDIOLOGY | Facility: MEDICAL CENTER | Age: 88
End: 2023-11-20
Payer: MEDICARE

## 2023-11-20 DIAGNOSIS — I35.0 NONRHEUMATIC AORTIC (VALVE) STENOSIS: ICD-10-CM

## 2023-11-20 DIAGNOSIS — I35.0 SEVERE AORTIC STENOSIS: ICD-10-CM

## 2023-11-20 DIAGNOSIS — Z01.810 PRE-PROCEDURAL CARDIOVASCULAR EXAMINATION: ICD-10-CM

## 2023-11-20 NOTE — TELEPHONE ENCOUNTER
Pre TAVR angio spot saved on 12-5-23 at 7:30 with Dr. Chang. Instruction sheet and Dr. Pérez emailed to Prema JJ.

## 2023-11-20 NOTE — PROGRESS NOTES
REFERRING PHYSICIAN: Clayton Eddy MD.     CONSULTING PHYSICIAN: China Nelson MD     CHIEF COMPLAINT: Fatigue    HISTORY OF PRESENT ILLNESS: The patient is a 87 y.o. female with a past medical history of severe aortic stenosis, lung cancer s/p radiation, castillo's esophagitis, hypertension, asthma, COPD, HFpEF, CKD stage III, paroxysmal atrial fibrillation, chronic lower extremity pain with pain pump who presents to the office with some ongoing fatigue after most recent surgery on her hip.  She denies chest pressure, syncope, dizziness, shortness of breath.  She underwent an echocardiogram which showed a progression to severe aortic stenosis.  She use a wheelchair for mobility.  She is accompanied for the entire consultation with her daughter and son in law.    PAST MEDICAL HISTORY:   Active Ambulatory Problems     Diagnosis Date Noted    Primary cancer of left lower lobe of lung (HCC) 01/15/2020    Castillo's esophagus without dysplasia 03/29/2022    Primary hypertension 03/29/2022    Incomplete defecation 03/29/2022    Moderate asthma without complication 03/29/2022    Spinal stenosis of lumbar region without neurogenic claudication 03/29/2022    Pelvic floor dysfunction 03/29/2022    Urge incontinence of urine 05/12/2022    H/O Clostridium difficile infection 05/12/2022    Bilateral carotid bruits 06/06/2022    Chronic pain syndrome 06/06/2022    Chronic obstructive pulmonary disease (HCC) 06/22/2022    Solitary pulmonary nodule 06/22/2022    PVC's (premature ventricular contractions) 06/06/2022    Moderate mitral insufficiency 06/06/2022    (HFpEF) heart failure with preserved ejection fraction (HCC) 06/22/2022    Lower extremity edema 08/03/2022    Avascular necrosis of bone of left hip ([facility]) 08/17/2022    Insomnia due to medical condition 10/13/2022    Hammer toes of both feet 12/29/2022    Dizziness 12/29/2022    Aseptic necrosis of bone of left hip ([facility]) 11/30/2022    Osteoarthritis of left hip  11/30/2022    Other hyperlipidemia 01/13/2021    Pain of left hip joint 11/30/2022    Trochanteric bursitis of left hip 11/30/2022    S/P insertion of intrathecal pump 05/05/2023    Chronic hypoxemic respiratory failure (HCC) 05/06/2023    TIA (transient ischemic attack) 05/07/2023    Prediabetes 05/08/2023    External hemorrhoid 05/08/2023    Physical debility 06/07/2023    Lichen sclerosus 01/18/2011    PAF (paroxysmal atrial fibrillation) ([facility]) 10/31/2023    Severe aortic stenosis 10/31/2023    Pulmonary HTN ([facility]) 10/31/2023    Stage 3b chronic kidney disease ([facility]) 11/07/2023     Resolved Ambulatory Problems     Diagnosis Date Noted    Irregular heart rate 03/29/2022    Hypoxia 03/29/2022    Skin lesions 05/12/2022    Acute exacerbation of chronic obstructive airways disease ([facility]) 06/22/2022    Renal insufficiency 08/03/2022    Chronic kidney disease, stage 3a ([facility]) 08/17/2022    Hypotension due to hypovolemia 08/17/2022    Pain in joint involving multiple sites 10/13/2022    Postoperative pain 05/04/2023     Past Medical History:   Diagnosis Date    Arrhythmia     Arthritis 5 + years    Asthma     Bilateral lower extremity edema 08/03/2022    Breath shortness     Cancer ([facility])     Cervical cancer ([facility]) 1968    Chronic constipation     Chronic kidney disease (CKD)     Chronic pain     Congestive heart failure (HCC)     COPD (chronic obstructive pulmonary disease) ([facility])     Dental disorder     GERD (gastroesophageal reflux disease)     Heart burn     Heart murmur     High cholesterol 10+ years    Hypertension     Infectious disease 4/25/23    Kidney stones     Moderate aortic stenosis     Oxygen dependent     Pain     Seasonal allergies     Spinal stenosis     Urinary bladder disorder Past [id number]       PAST SURGICAL HISTORY:   Past Surgical History:   Procedure Laterality Date    NC TOTAL HIP ARTHROPLASTY Left 8/28/2023    Procedure: LEFT TOTAL HIP ARTHROPLASTY;  Surgeon: Daryl Perrin M.D.;  Location: SURGERY  [facility];  Service: Orthopedics    PUMP REVISION Right 05/04/2023    Procedure: INTRATHECAL PAIN PUMP REVISION;  Surgeon: Tavo Lozoya M.D.;  Location: SURGERY [facility];  Service: Pain Management    PUMP INSERT/REMOVE Right 10/07/2022    Procedure: MEDTRONIC PUMP REPLACEMENT;  Surgeon: Joel Alanis M.D.;  Location: SURGERY [facility];  Service: Pain Management    PB IMPLANT NEUROSTIM/ N/A 02/05/2021    Procedure: IMPLANT PUMP AND CATHETER;  Surgeon: Joel Alanis M.D.;  Location: SURGERY [facility];  Service: Pain Management    HIP ARTHROPLASTY TOTAL Right 1998    APPENDECTOMY      CARPAL TUNNEL ENDOSCOPIC Bilateral     bilat    CHOLECYSTECTOMY      COLONOSCOPY      HYSTERECTOMY LAPAROSCOPY      total    LITHOTRIPSY      kidney stones removed    PRIMARY C SECTION      c section    TONSILLECTOMY          ALLERGIES:   Allergies   Allergen Reactions    Sulfa Drugs Unspecified     Stomach ache    Nauseated, diarrhea    Other reaction(s): Unspecified   Stomach ache    Erythromycin Nausea     Other Reaction(s): GI Upset    Other reaction(s): Not available        CURRENT MEDICATIONS:   Current Outpatient Medications:     atenolol (TENORMIN) 25 MG Tab, Take 1 Tablet by mouth every day., Disp: , Rfl:     diphenhydrAMINE (BENADRYL ALLERGY) 25 MG capsule, Take 1 Capsule by mouth 3 times a day., Disp: , Rfl:     sodium phosphate (FLEET) 7-19 GM/118ML Enema, Insert 1 Enema into the rectum., Disp: , Rfl:     atorvastatin (LIPITOR) 40 MG Tab, Take 1 Tablet by mouth every day., Disp: , Rfl:     furosemide (LASIX) 20 MG Tab, Take 1 Tablet by mouth every day. 1 in the morning half at night, Disp: , Rfl:     losartan (COZAAR) 25 MG Tab, Take 25 mg by mouth every day. (Patient not taking: Reported on 11/29/2023), Disp: , Rfl:     pantoprazole (PROTONIX) 40 MG Tablet Delayed Response, 1 tab(s) orally 30 min before meal in AM for 30 day(s), Disp: , Rfl:     polyethylene glycol 3350 (MIRALAX) 17  GM/SCOOP Powder, Take 1 Dose by mouth every day., Disp: , Rfl:     sennosides-docusate sodium (SENOKOT-S) 8.6-50 MG tablet, Take 1 Tablet by mouth 1 time a day as needed (CONSTIPATION)., Disp: 30 Tablet, Rfl: 2    spironolactone (ALDACTONE) 25 MG Tab, Take 1 Tablet by mouth every day. Indications: Edema, CHF, Disp: , Rfl:     melatonin 5 mg Tab, Take 10 mg by mouth every evening. Indications: Trouble Sleeping, Disp: , Rfl:     acetaminophen (TYLENOL) 500 MG Tab, Take 500-1,000 mg by mouth every 6 hours as needed. Indications: Fever, Pain, Disp: , Rfl:     Home Care Oxygen, Inhale 3 L/min continuous. Indications: SOB, Disp: , Rfl:     Pain Pump (PATIENT SUPPLIED) Device, Inject  as directed continuous. Patient's Pain Pump (placed and maintained as an outpatient)  Medications/concentrations: Hydromorphone 4.0 mg/ml Route: Intrathecal Last changed: 3/22/2023 Refill pump before date: 7/2/2023 Continuous infusion rates (Drug/Rate): Hydromorphone 0.7491 mg/day or 0.0312 mg/hr (increased on 5/8/23) MD office:   Indications: severe pain, Disp: , Rfl:     DULoxetine (CYMBALTA) 20 MG Cap DR Particles, Take 20 mg by mouth 2 times a day. Indications: Musculoskeletal Pain, Disp: , Rfl:     guaiFENesin ER (MUCINEX) 600 MG TABLET SR 12 HR, Take 1,200 mg by mouth every 12 hours. Indications: Cough, Disp: , Rfl:     Cyanocobalamin (VITAMIN B-12 PO), Take  by mouth every day. Indications: supplement, Disp: , Rfl:     Cholecalciferol (VITAMIN D3 PO), Take  by mouth every day. Indications: supplement, Disp: , Rfl:     diltiazem (TIAZAC) 120 MG SR capsule, Take 120 mg by mouth every evening. Indications: High Blood Pressure Disorder, Disp: , Rfl:     albuterol 108 (90 Base) MCG/ACT Aero Soln inhalation aerosol, 2 Puffs every 6 hours as needed for Shortness of Breath. Indications: Chronic Obstructive Lung Disease, Disp: , Rfl:     Simethicone (GAS-X PO), Take  by mouth 3 times a day. Indications: gas, Disp: , Rfl:     FAMILY  "HISTORY:   Family History   Problem Relation Age of Onset    Heart Disease Mother         Not from congestive heart failure!        SOCIAL HISTORY:   Social History     Socioeconomic History    Marital status:      Spouse name: Not on file    Number of children: Not on file    Years of education: Not on file    Highest education level: Not on file   Occupational History    Occupation: retired book keeper   Tobacco Use    Smoking status: Former     Current packs/day: 0.00     Average packs/day: 1 pack/day for 61.0 years (61.0 ttl pk-yrs)     Types: Cigarettes, Electronic Cigarettes     Start date: 1954     Quit date: 2015     Years since quittin.9    Smokeless tobacco: Never    Tobacco comments:     Smoked as a teenager quit as a senior citizen   Vaping Use    Vaping Use: Former    Substances: Flavoring   Substance and Sexual Activity    Alcohol use: Not Currently     Comment: Was  is a social drinker until approximately     Drug use: Not Currently     Types: Inhaled     Comment: cocaine while in her 40\"s    Sexual activity: Not on file   Other Topics Concern    Not on file   Social History Narrative    Not on file     Social Determinants of Health     Financial Resource Strain: Low Risk  (2022)    Overall Financial Resource Strain (CARDIA)     Difficulty of Paying Living Expenses: Not very hard   Food Insecurity: No Food Insecurity (2022)    Hunger Vital Sign     Worried About Running Out of Food in the Last Year: Never true     Ran Out of Food in the Last Year: Never true   Transportation Needs: Unmet Transportation Needs (2022)    PRAPARE - Transportation     Lack of Transportation (Medical): Yes     Lack of Transportation (Non-Medical): No   Physical Activity: Inactive (2022)    Exercise Vital Sign     Days of Exercise per Week: 0 days     Minutes of Exercise per Session: 0 min   Stress: Not on file   Social Connections: Not on file   Intimate Partner Violence: Not on " "file   Housing Stability: Unknown (7/22/2022)    Housing Stability Vital Sign     Unable to Pay for Housing in the Last Year: No     Number of Places Lived in the Last Year: Not on file     Unstable Housing in the Last Year: No       REVIEW OF SYSTEMS:  Review of Systems   Constitutional:  Positive for malaise/fatigue. Negative for chills, fever and weight loss.   HENT:  Negative for ear pain, nosebleeds and tinnitus.    Eyes:  Negative for double vision, photophobia and pain.   Respiratory:  Positive for shortness of breath (needs supplemental oxygen at night and during day for activities). Negative for cough and hemoptysis.    Cardiovascular:  Negative for chest pain, palpitations, orthopnea, leg swelling and PND.   Gastrointestinal:  Negative for abdominal pain, blood in stool, nausea and vomiting.   Genitourinary:  Negative for frequency, hematuria and urgency.   Musculoskeletal:  Positive for back pain and joint pain.        Pain pump, chronic pain   Skin:  Negative for rash.   Neurological:  Negative for dizziness, tremors, speech change, focal weakness, seizures and headaches.   Endo/Heme/Allergies:  Negative for polydipsia. Does not bruise/bleed easily.   Psychiatric/Behavioral:  Negative for hallucinations and memory loss.      PHYSICAL EXAMINATION:    /70 (BP Location: Right arm, Patient Position: Sitting, BP Cuff Size: Adult)   Pulse 78   Temp 36.2 °C (97.2 °F) (Temporal)   Ht 1.702 m (5' 7\")   Wt 61.7 kg (136 lb)   SpO2 96%   BMI 21.30 kg/m²    Physical Exam  Vitals reviewed.   Constitutional:       General: She is not in acute distress.     Appearance: Normal appearance.      Comments: Thin elderly woman in wheelchair for the clinic visit   HENT:      Head: Normocephalic and atraumatic.      Right Ear: External ear normal.      Left Ear: External ear normal.      Nose: Nose normal. No congestion.   Eyes:      General: No scleral icterus.     Extraocular Movements: Extraocular movements " intact.      Conjunctiva/sclera: Conjunctivae normal.   Cardiovascular:      Rate and Rhythm: Normal rate and regular rhythm.   Pulmonary:      Effort: Pulmonary effort is normal. No respiratory distress.      Comments: Nasal cannula   Abdominal:      General: Abdomen is flat. There is no distension.   Musculoskeletal:         General: Normal range of motion.      Cervical back: Normal range of motion.   Skin:     General: Skin is warm and dry.   Neurological:      Mental Status: She is alert and oriented to person, place, and time. Mental status is at baseline.      Cranial Nerves: No cranial nerve deficit.   Psychiatric:         Mood and Affect: Mood normal.         Judgment: Judgment normal.       LABS REVIEWED:  Lab Results   Component Value Date/Time    SODIUM 137 10/31/2023 03:19 PM    POTASSIUM 4.6 10/31/2023 03:19 PM    CHLORIDE 97 10/31/2023 03:19 PM    CO2 29 10/31/2023 03:19 PM    GLUCOSE 104 (H) 10/31/2023 03:19 PM    BUN 22 10/31/2023 03:19 PM    CREATININE 1.36 10/31/2023 03:19 PM    GLOMRATE 64 10/06/2023 04:33 AM      Lab Results   Component Value Date/Time    PROTHROMBTM 12.3 05/04/2023 01:40 PM    INR 0.92 05/04/2023 01:40 PM      Lab Results   Component Value Date/Time    WBC 9.2 10/31/2023 03:19 PM    RBC 3.07 (L) 10/31/2023 03:19 PM    HEMOGLOBIN 8.7 (L) 10/31/2023 03:19 PM    HEMATOCRIT 28.9 (L) 10/31/2023 03:19 PM    MCV 94.1 10/31/2023 03:19 PM    MCH 28.3 10/31/2023 03:19 PM    MCHC 30.1 (L) 10/31/2023 03:19 PM    MPV 10.7 10/31/2023 03:19 PM    NEUTSPOLYS 37.7 (L) 10/06/2023 04:33 AM    NEUTSPOLYS 76.60 (H) 05/05/2023 03:05 AM    LYMPHOCYTES 34.9 10/06/2023 04:33 AM    LYMPHOCYTES 13.30 (L) 05/05/2023 03:05 AM    MONOCYTES 16.0 (H) 10/06/2023 04:33 AM    MONOCYTES 9.70 05/05/2023 03:05 AM    EOSINOPHILS 10.0 (H) 10/06/2023 04:33 AM    EOSINOPHILS 0.00 05/05/2023 03:05 AM    BASOPHILS 1.4 (H) 10/06/2023 04:33 AM    BASOPHILS 0.10 05/05/2023 03:05 AM        IMAGING REVIEWED AND  INTERPRETED:    ECHOCARDIOGRAM  - images are not available to me; report only  10/4/23 [facility]  Interpretation Summary   There is mild concentric left ventricular hypertrophy. Left ventricular systolic function is normal.   The Ejection Fraction estimate is 60-65%     There is mild to moderate mitral regurgitation     There is severe aortic stenosis. MARJORIE 0.6cm2, mean 36mmHg.     Right ventricular systolic pressure is elevated at 55-60 mmHg     CARDIAC CATHETERIZATION Pending    CT SCAN CHEST Pending read      IMPRESSION:  89 yo woman with known conditions of steve cancer s/p radiation, castillo's esophagitis, hypertension, asthma, COPD, HFpEF, CKD stage III, paroxysmal atrial fibrillation, chronic lower extremity pain with pain pump now with severe symptomatic aortic stenosis.      PLAN:  I recommend that the patient is a reasonable TAVR candidate given fraility, comorbidities, and age and preference. We will continue to follow workup and discuss in multidisciplinary conference.    The STS mortality risk score is 12.6% and the morbidity and mortality risk score is 26.2%. The scores were discussed with patient.    Thank you for this very challenging consultation and participation in the patient’s care.  I will keep you apprised of all future developments.      Sincerely,     China Nelson MD

## 2023-11-20 NOTE — TELEPHONE ENCOUNTER
----- Message from Prema Montgomery R.N. sent at 11/20/2023 10:34 AM PST -----  Regarding: Pre TAVR Cath  Hey! Can you please hold pre TAVR cath 12/5? Thanks!

## 2023-11-27 ENCOUNTER — TELEPHONE (OUTPATIENT)
Dept: CARDIOLOGY | Facility: MEDICAL CENTER | Age: 88
End: 2023-11-27
Payer: MEDICARE

## 2023-11-27 NOTE — TELEPHONE ENCOUNTER
"Received phone call from patient with questions regarding upcoming appointments. She was concerned about her anxiety and \"MRI.\" Advised it's a CT, not an MRI, and she is very relieved. She is prescribed medication from her anxiety, and she wanted to know if ok to take prior to the exam. Advised it's fine to take as directed, but recommend someone  her to/from the appts. Patient verbalizes understanding and states her family will be taking her to/from and accompanying the appts. No further questions or concerns at this time.   "

## 2023-11-29 ENCOUNTER — OFFICE VISIT (OUTPATIENT)
Dept: CARDIOLOGY | Facility: MEDICAL CENTER | Age: 88
End: 2023-11-29
Payer: MEDICARE

## 2023-11-29 ENCOUNTER — OFFICE VISIT (OUTPATIENT)
Dept: CARDIOTHORACIC SURGERY | Facility: MEDICAL CENTER | Age: 88
End: 2023-11-29
Payer: MEDICARE

## 2023-11-29 ENCOUNTER — DOCUMENTATION (OUTPATIENT)
Dept: CARDIOLOGY | Facility: MEDICAL CENTER | Age: 88
End: 2023-11-29

## 2023-11-29 ENCOUNTER — HOSPITAL ENCOUNTER (OUTPATIENT)
Dept: RADIOLOGY | Facility: MEDICAL CENTER | Age: 88
End: 2023-11-29
Payer: MEDICARE

## 2023-11-29 ENCOUNTER — TELEPHONE (OUTPATIENT)
Dept: CARDIOLOGY | Facility: MEDICAL CENTER | Age: 88
End: 2023-11-29

## 2023-11-29 VITALS
DIASTOLIC BLOOD PRESSURE: 70 MMHG | SYSTOLIC BLOOD PRESSURE: 122 MMHG | BODY MASS INDEX: 21.35 KG/M2 | HEART RATE: 78 BPM | WEIGHT: 136 LBS | TEMPERATURE: 97.2 F | OXYGEN SATURATION: 96 % | HEIGHT: 67 IN

## 2023-11-29 VITALS
RESPIRATION RATE: 18 BRPM | HEIGHT: 67 IN | WEIGHT: 136 LBS | OXYGEN SATURATION: 98 % | SYSTOLIC BLOOD PRESSURE: 116 MMHG | BODY MASS INDEX: 21.35 KG/M2 | HEART RATE: 68 BPM | DIASTOLIC BLOOD PRESSURE: 64 MMHG

## 2023-11-29 DIAGNOSIS — I35.0 SEVERE AORTIC STENOSIS: ICD-10-CM

## 2023-11-29 DIAGNOSIS — N18.32 STAGE 3B CHRONIC KIDNEY DISEASE: ICD-10-CM

## 2023-11-29 DIAGNOSIS — Z00.6 EXAMINATION OF PARTICIPANT IN CLINICAL TRIAL: ICD-10-CM

## 2023-11-29 DIAGNOSIS — Z01.810 PRE-PROCEDURAL CARDIOVASCULAR EXAMINATION: ICD-10-CM

## 2023-11-29 DIAGNOSIS — I35.0 NONRHEUMATIC AORTIC (VALVE) STENOSIS: ICD-10-CM

## 2023-11-29 DIAGNOSIS — I50.32 CHRONIC HEART FAILURE WITH PRESERVED EJECTION FRACTION (HCC): ICD-10-CM

## 2023-11-29 DIAGNOSIS — I48.0 PAF (PAROXYSMAL ATRIAL FIBRILLATION) (HCC): ICD-10-CM

## 2023-11-29 PROBLEM — G89.18 POSTOPERATIVE PAIN: Status: RESOLVED | Noted: 2023-05-04 | Resolved: 2023-11-29

## 2023-11-29 PROBLEM — R09.02 HYPOXIA: Status: RESOLVED | Noted: 2022-03-29 | Resolved: 2023-11-29

## 2023-11-29 PROBLEM — L98.9 SKIN LESIONS: Status: RESOLVED | Noted: 2022-05-12 | Resolved: 2023-11-29

## 2023-11-29 PROBLEM — N18.31 CHRONIC KIDNEY DISEASE, STAGE 3A: Status: RESOLVED | Noted: 2022-08-17 | Resolved: 2023-11-29

## 2023-11-29 PROBLEM — M25.50 PAIN IN JOINT INVOLVING MULTIPLE SITES: Status: RESOLVED | Noted: 2022-10-13 | Resolved: 2023-11-29

## 2023-11-29 LAB — EKG IMPRESSION: NORMAL

## 2023-11-29 PROCEDURE — 700117 HCHG RX CONTRAST REV CODE 255

## 2023-11-29 PROCEDURE — 3074F SYST BP LT 130 MM HG: CPT | Performed by: THORACIC SURGERY (CARDIOTHORACIC VASCULAR SURGERY)

## 2023-11-29 PROCEDURE — 99205 OFFICE O/P NEW HI 60 MIN: CPT | Performed by: THORACIC SURGERY (CARDIOTHORACIC VASCULAR SURGERY)

## 2023-11-29 PROCEDURE — 93005 ELECTROCARDIOGRAM TRACING: CPT | Performed by: INTERNAL MEDICINE

## 2023-11-29 PROCEDURE — 99213 OFFICE O/P EST LOW 20 MIN: CPT | Mod: 25 | Performed by: INTERNAL MEDICINE

## 2023-11-29 PROCEDURE — 3074F SYST BP LT 130 MM HG: CPT | Performed by: INTERNAL MEDICINE

## 2023-11-29 PROCEDURE — 3078F DIAST BP <80 MM HG: CPT | Performed by: INTERNAL MEDICINE

## 2023-11-29 PROCEDURE — 3078F DIAST BP <80 MM HG: CPT | Performed by: THORACIC SURGERY (CARDIOTHORACIC VASCULAR SURGERY)

## 2023-11-29 PROCEDURE — 71275 CT ANGIOGRAPHY CHEST: CPT

## 2023-11-29 PROCEDURE — 99215 OFFICE O/P EST HI 40 MIN: CPT | Performed by: INTERNAL MEDICINE

## 2023-11-29 PROCEDURE — 93010 ELECTROCARDIOGRAM REPORT: CPT | Performed by: INTERNAL MEDICINE

## 2023-11-29 RX ADMIN — IOHEXOL 100 ML: 350 INJECTION, SOLUTION INTRAVENOUS at 09:15

## 2023-11-29 ASSESSMENT — ENCOUNTER SYMPTOMS
BRUISES/BLEEDS EASILY: 0
SEIZURES: 0
BACK PAIN: 1
BLOOD IN STOOL: 0
MEMORY LOSS: 0
COUGH: 0
VOMITING: 0
DIZZINESS: 0
POLYDIPSIA: 0
WEIGHT LOSS: 0
SPEECH CHANGE: 0
FOCAL WEAKNESS: 0
HALLUCINATIONS: 0
FEVER: 0
HEMOPTYSIS: 0
HEADACHES: 0
DOUBLE VISION: 0
PND: 0
PHOTOPHOBIA: 0
NAUSEA: 0
TREMORS: 0
PALPITATIONS: 0
ABDOMINAL PAIN: 0
ORTHOPNEA: 0
CHILLS: 0
SHORTNESS OF BREATH: 1
EYE PAIN: 0

## 2023-11-29 ASSESSMENT — FIBROSIS 4 INDEX
FIB4 SCORE: 2.12
FIB4 SCORE: 2.12

## 2023-11-29 ASSESSMENT — PATIENT HEALTH QUESTIONNAIRE - PHQ9: CLINICAL INTERPRETATION OF PHQ2 SCORE: 0

## 2023-11-29 NOTE — PROGRESS NOTES
"Valve Program Consultation: 11/29/2023 for tentative TAVR    Mental Status Assessment:  Appearance: pale, frail  Behavior: normal  Mood/Affect: normal  Thought process/content: normal  Cognition: normal  Functional ability: limited, walker/wheelchair use  Dental Concerns: upper dentures with lower bridge; patient states she has rotten teeth that need to be extracted; discussed scheduling urgent dental appointment for assessment, clearance letter provided to patient to bring to visit  Further mental assessment needed: no    Post-op Plan of Care:  Support systems: son Kentrell, daughter Krystal, son-in-law Miguel all present at consult today  Patient understands discharge plan: yes, with reinforcement from family  DME: wheelchair, walker, 3L nocturnal O2 (prn use during the day)  Home health warranted prior to procedure: patient had HH in the past and states she will likely request services post TAVR  Social concerns for discharge: patient may need to discharge to SNF  PCP alerted of social concerns: n/a  POLST: none  Advance directive: not on file; advised to bring copy for chart  Post-op goal: \"be more mobile and be able to join in on activities; live longer\"  Medication instructions: Hold all vitamins and supplements 7 days prior, hold Losartan 24 hours prior, hold Lasix and Spironolactone the morning of the procedure.     Concerns prior to procedure: Patient needs urgent dental appointment to assess need for extractions.     Met with patient during New TAVR consult.     All patient's questions and concerns were addressed during this visit. They understood pre-operative and post-operative plan of care.    Reviewed patient TAVR education packet explaining that information is provided regarding preparation for TAVR the night prior, what to expect during the hospital stay, average LOS, and what to look out for post TAVR discharge. Explained that patient will require SBE prophylactic antibiotic prior to any dental treatment " post TAVR. Advised patient not to receive any new vaccinations within 1 week pre- and 1 week post-procedure.     Explained that patient should not eat or drink anything past midnight the day of the procedure. Encouraged patient to wear something clean and comfortable, easy to get on/off to check in Monday morning, time TBD. Patient may have friends and family in the pre-operational area until patient is taken to the operating room, at which time family and friends will be asked to wait on floor 1 [facility] surgical waiting area. On completion of TAVR, heart team will update family. Once update is given, there is some time before family/friends may visit patient in their assigned room. Length of stay on average is one night, however patient may stay longer depending on specific needs at that time. Patient given printed instructions sheet with all above stated instructions. Patient states understanding of all material and education presented today and has no further questions at this time. Encouraged patient again, to contact me with any questions or concerns during this work-up process. Patient states understanding.

## 2023-11-29 NOTE — PROGRESS NOTES
"CARDIOLOGY STRUCTURAL HEART FOLLOWUP    PCP: Kassi Carrillo M.D.  REFERRING: [facility]    1. Severe aortic stenosis    2. Chronic heart failure with preserved ejection fraction (HCC)    3. PAF (paroxysmal atrial fibrillation) ([facility])    4. Stage 3b chronic kidney disease ([facility])        Chelly Decker has class II, symptomatic severe aortic stenosis as well as frailty. Despite the frailty I believe she is a reasonable candidate for TAVR and will move forward with usual pre-surgical evaluation. We will repeat another limited echocardiogram the day of her catheterization [facility]; particularly as her daughter believes the information may have been skewed by her mothers poor health state; despite my reassurance to the contrary.     We discussed the risks, benefits and alternatives to TAVR including but not limited to a 10% risk of pacemaker, 5% risk of bleeding/access site complication, 2% risk of stroke, risk of contrast nephropathy and 2% risk of mortality. We discussed that she has elevated risks due to frailty. The patient is in agreement with proceeding.     Follow up: 6 weeks    History: Chelly Decker is a 88 y.o.female with history of lung cancer s/p definitive radiation, chronic lowe extremity pain related to avascular hip necrosis and walker dependence for the last 10-12 years presenting for follow up of severe AS. She underwent hip replacement over the summer and her recovery course complicated by CHF/Pneumonia requiring readmission. At that time she was found to have severe AS. She had been referred to Dr. Diaz but strongly desired to come to our center for evaluation instead.    She reports feeling older, more fatigued and sleeping more. Edema has not recurred.       PE:  /64 (BP Location: Right arm, Patient Position: Sitting, BP Cuff Size: Adult)   Pulse 68   Resp 18   Ht 1.702 m (5' 7\")   Wt 61.7 kg (136 lb)   SpO2 98%   BMI 21.30 kg/m²   GEN: " NAD  CARDIAC: Regular Systolic ejection murmur late peaking Diminished S2 Diminished and delayed carotid upstrokes  RESP: Clear to auscultation bilaterally  ABD: Soft, non-tender, non-distended  EXT: No edema  NEURO: No focal deficit    Past Medical History:   Diagnosis Date    Acute exacerbation of chronic obstructive airways disease (HCC) 6/22/2022    Arrhythmia     follows with [facility]    Arthritis 5 + years    controlled by medication    Asthma     Avascular necrosis of bone of left hip (HCC) 08/17/2022    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    Breath shortness     uses oxygen at night, during day sometimes. 8/23/23-SOB with activity.    Cancer (HCC)     cervical cancer 1968    Cervical cancer (HCC) 1968    Pvqono-[id number] chemo or radiation.    Chronic constipation     takes miralax    Chronic kidney disease (CKD)     stage 3    Chronic pain     follows with Dr Lozoya    Congestive heart failure ([facility])     follows with Dr Salvador with [facility]    COPD (chronic obstructive pulmonary disease) ([facility])     Dental disorder     upper denture and permanent bridge on lower front teeth    Dizziness 12/29/2022    GERD (gastroesophageal reflux disease)     H/O Clostridium difficile infection 05/12/2022    Hammer toes of both feet 12/29/2022    Heart burn     taking pantoprazole.    Heart murmur     High cholesterol 10+ years    Controled with medication    Hypertension     Hypotension due to hypovolemia 08/17/2022    Hypoxia 03/29/2022    Incomplete defecation 03/29/2022    Infectious disease 4/25/23    I was just informed that the person who drove me to my doctor appointment and home on the 25th of this month was diagnosed with the flu on the 26th of this month. I had my flu shot and after one day I have no symptoms will keep checking !    Insomnia due to medical  condition 10/13/2022    Irregular heart rate 03/29/2022    states has had for years, why atenolol    Kidney stones     Moderate aortic stenosis     Moderate asthma without complication 03/29/2022    Moderate mitral insufficiency     Oxygen dependent     uses oxygen 4 liters nasal cannula at night, sometimes during the day    Pain     Pain in joint involving multiple sites 10/13/2022    Pain in joint involving multiple sites 10/13/2022    Pelvic floor dysfunction 03/29/2022    Primary hypertension 03/29/2022    Renal insufficiency 08/03/2022    due to meds    Seasonal allergies     Skin lesions 05/12/2022    Solitary pulmonary nodule     had radiation treatment approx 2021 and does follow up scans.    Spinal stenosis     pain pump for pain control    Spinal stenosis of lumbar region without neurogenic claudication 03/29/2022    Urge incontinence of urine 05/12/2022    urinary retention    Urinary bladder disorder Past [id number]    I've been under treatment for years with no results I am seeing a new urologist on May 2     Allergies   Allergen Reactions    Sulfa Drugs Unspecified     Stomach ache    Nauseated, diarrhea    Other reaction(s): Unspecified   Stomach ache    Erythromycin Nausea     Other Reaction(s): GI Upset    Other reaction(s): Not available     Outpatient Encounter Medications as of 11/29/2023   Medication Sig Dispense Refill    atenolol (TENORMIN) 25 MG Tab Take 1 Tablet by mouth every day.      diphenhydrAMINE (BENADRYL ALLERGY) 25 MG capsule Take 1 Capsule by mouth 3 times a day.      sodium phosphate (FLEET) 7-19 GM/118ML Enema Insert 1 Enema into the rectum.      atorvastatin (LIPITOR) 40 MG Tab Take 1 Tablet by mouth every day.      furosemide (LASIX) 20 MG Tab Take 1 Tablet by mouth every day. 1 in the morning half at night      pantoprazole (PROTONIX) 40 MG Tablet Delayed Response 1 tab(s) orally 30 min before meal in AM for 30 day(s)      polyethylene glycol 3350 (MIRALAX) 17 GM/SCOOP Powder  Take 1 Dose by mouth every day.      sennosides-docusate sodium (SENOKOT-S) 8.6-50 MG tablet Take 1 Tablet by mouth 1 time a day as needed (CONSTIPATION). 30 Tablet 2    spironolactone (ALDACTONE) 25 MG Tab Take 1 Tablet by mouth every day. Indications: Edema, CHF      melatonin 5 mg Tab Take 10 mg by mouth every evening. Indications: Trouble Sleeping      acetaminophen (TYLENOL) 500 MG Tab Take 500-1,000 mg by mouth every 6 hours as needed. Indications: Fever, Pain      Home Care Oxygen Inhale 3 L/min continuous. Indications: SOB      Pain Pump (PATIENT SUPPLIED) Device Inject  as directed continuous. Patient's Pain Pump (placed and maintained as an outpatient)    Medications/concentrations: Hydromorphone 4.0 mg/ml  Route: Intrathecal  Last changed: 3/22/2023  Refill pump before date: 7/2/2023  Continuous infusion rates (Drug/Rate): Hydromorphone 0.7491 mg/day or 0.0312 mg/hr (increased on 5/8/23)  MD office:   Indications: severe pain      DULoxetine (CYMBALTA) 20 MG Cap DR Particles Take 20 mg by mouth 2 times a day. Indications: Musculoskeletal Pain      guaiFENesin ER (MUCINEX) 600 MG TABLET SR 12 HR Take 1,200 mg by mouth every 12 hours. Indications: Cough      Cyanocobalamin (VITAMIN B-12 PO) Take  by mouth every day. Indications: supplement      Cholecalciferol (VITAMIN D3 PO) Take  by mouth every day. Indications: supplement      diltiazem (TIAZAC) 120 MG SR capsule Take 120 mg by mouth every evening. Indications: High Blood Pressure Disorder      albuterol 108 (90 Base) MCG/ACT Aero Soln inhalation aerosol 2 Puffs every 6 hours as needed for Shortness of Breath. Indications: Chronic Obstructive Lung Disease      Simethicone (GAS-X PO) Take  by mouth 3 times a day. Indications: gas      losartan (COZAAR) 25 MG Tab Take 25 mg by mouth every day. (Patient not taking: Reported on 11/29/2023)      [DISCONTINUED] azelastine (ASTELIN) 137 MCG/SPRAY nasal spray SPRAY 2 SPRAYS INTO EACH NOSTRIL TWICE A DAY  FOR 30 DAYS      [DISCONTINUED] cefTRIAXone (ROCEPHIN) 2 GM Recon Soln 2,000 mg.      [DISCONTINUED] diclofenac sodium (VOLTAREN) 1 % Gel APPLY TO AFFECTED ARE TWICE DAILY AS NEEDED      [DISCONTINUED] diltiazem (DILACOR XR) 180 MG XR capsule 1 cap(s) orally once a day for 30 day(s) (Patient not taking: Reported on 11/29/2023)      [DISCONTINUED] Ferrous Sulfate (IRON) 325 (65 Fe) MG Tab Take 325 mg by mouth. (Patient not taking: Reported on 11/29/2023)      [DISCONTINUED] fluticasone (FLONASE) 50 MCG/ACT nasal spray 2 spray(s) intranasally once a day for 30 day(s)      [DISCONTINUED] hydrocortisone (CORTEF) 10 MG Tab Take 1 Tablet by mouth every day.      [DISCONTINUED] HYDROmorphone (DILAUDID) 4 MG/ML Solution 4 mg. (Patient not taking: Reported on 11/29/2023)      [DISCONTINUED] loperamide (IMODIUM) 2 MG Cap Take 2 mg by mouth.      [DISCONTINUED] LORazepam (ATIVAN) 0.5 MG Tab Take 1 Tablet by mouth 3 times a day. (Patient not taking: Reported on 11/29/2023)      [DISCONTINUED] Magnesium 400 MG Tab Take  by mouth.      [DISCONTINUED] meloxicam (MOBIC) 7.5 MG Tab Take 7.5 mg by mouth.      [DISCONTINUED] omeprazole (PRILOSEC) 20 MG delayed-release capsule 1 cap(s) orally once a day for 30 day(s)      [DISCONTINUED] oxyCODONE immediate-release (ROXICODONE) 5 MG Tab Take 1 Tablet by mouth every day.      [DISCONTINUED] oxyCODONE-acetaminophen (PERCOCET) 5-325 MG Tab 1 tab by oral route.      [DISCONTINUED] potassium chloride SA (KDUR) 20 MEQ Tab CR TAKE 1 TABLET BY MOUTH DAILY. TAKE 2 TABLETS WHEN YOU TAKE THE HIGHER LASIX DOSING      [DISCONTINUED] temazepam (RESTORIL) 15 MG Cap Take 1 Capsule by mouth at bedtime.      [DISCONTINUED] thyroid (NATURE-THROID) 65 MG tablet Take 1 Tablet by mouth every day.      [DISCONTINUED] Vibegron (GEMTESA) 75 MG Tab Take 1 Tablet by mouth every day.      [DISCONTINUED] acetaminophen (TYLENOL) 500 MG Tab Take 500 mg by mouth.      [DISCONTINUED] Vitamin D3 5000 Unit (125 mcg) Tab  Take 1,000 Units by mouth every day.      [DISCONTINUED] Cyanocobalamin 5000 MCG TABLET DISPERSIBLE Take 500 mcg by mouth every day.      [DISCONTINUED] cyanocobalamin 250 MCG tablet Take 1 Tablet by mouth every day.      [DISCONTINUED] Melatonin 5 MG Cap Take 1 Capsule by mouth at bedtime.      [DISCONTINUED] melatonin 5 mg Tab Take 10 mg by mouth at bedtime.      [DISCONTINUED] furosemide (LASIX) 20 MG Tab Take 1 Tablet by mouth every evening. Indications: Cardiac Failure, Edema      [DISCONTINUED] furosemide (LASIX) 20 MG Tab Take 1 Tablet by mouth every morning. Indications: Cardiac Failure, Edema 60 Tablet     [DISCONTINUED] HYDROcodone/acetaminophen (NORCO)  MG Tab Indications: Pain      [DISCONTINUED] MAGNESIUM-OXIDE 400 (240 Mg) MG Tab TAKE 1 TABLET (400 MG) EVERY MORNING AND 1/2 TABLETS (200 MG) EVERY EVENING.      [DISCONTINUED] pantoprazole (PROTONIX) 40 MG Tablet Delayed Response Take 40 mg by mouth every morning before breakfast. Indications: Heartburn      [DISCONTINUED] atorvastatin (LIPITOR) 40 MG Tab 40 mg every evening. Indications: High Amount of Fats in the Blood      [DISCONTINUED] losartan (COZAAR) 50 MG Tab Take 25 mg by mouth every evening. 1/2 a tablet  Indications: High Blood Pressure Disorder      [DISCONTINUED] Polyethylene Glycol 3350 (MIRALAX PO) Take  by mouth every evening. Indications: constipation       Facility-Administered Encounter Medications as of 11/29/2023   Medication Dose Route Frequency Provider Last Rate Last Admin    iohexol (OMNIPAQUE) 350 mg/mL (IV)  100 mL Intravenous Once SUMEET BowersRPaigeNPaige         Social History     Socioeconomic History    Marital status:      Spouse name: Not on file    Number of children: Not on file    Years of education: Not on file    Highest education level: Not on file   Occupational History    Occupation: retired book keeper   Tobacco Use    Smoking status: Former     Current packs/day: 0.00     Average packs/day:  "1 pack/day for 61.0 years (61.0 ttl pk-yrs)     Types: Cigarettes, Electronic Cigarettes     Start date: 1954     Quit date: 2015     Years since quittin.9    Smokeless tobacco: Never    Tobacco comments:     Smoked as a teenager quit as a senior citizen   Vaping Use    Vaping Use: Former    Substances: Flavoring   Substance and Sexual Activity    Alcohol use: Not Currently     Comment: Was  is a social drinker until approximately     Drug use: Not Currently     Types: Inhaled     Comment: cocaine while in her 40\"s    Sexual activity: Not on file   Other Topics Concern    Not on file   Social History Narrative    Not on file     Social Determinants of Health     Financial Resource Strain: Low Risk  (2022)    Overall Financial Resource Strain (CARDIA)     Difficulty of Paying Living Expenses: Not very hard   Food Insecurity: No Food Insecurity (2022)    Hunger Vital Sign     Worried About Running Out of Food in the Last Year: Never true     Ran Out of Food in the Last Year: Never true   Transportation Needs: Unmet Transportation Needs (2022)    PRAPARE - Transportation     Lack of Transportation (Medical): Yes     Lack of Transportation (Non-Medical): No   Physical Activity: Inactive (2022)    Exercise Vital Sign     Days of Exercise per Week: 0 days     Minutes of Exercise per Session: 0 min   Stress: Not on file   Social Connections: Not on file   Intimate Partner Violence: Not on file   Housing Stability: Unknown (2022)    Housing Stability Vital Sign     Unable to Pay for Housing in the Last Year: No     Number of Places Lived in the Last Year: Not on file     Unstable Housing in the Last Year: No       Studies  Lab Results   Component Value Date/Time    CHOLSTRLTOT 115 2023 01:41 AM    LDL 34 2023 01:41 AM    HDL 67 2023 01:41 AM    TRIGLYCERIDE 69 2023 01:41 AM       Lab Results   Component Value Date/Time    SODIUM 137 10/31/2023 03:19 PM    " POTASSIUM 4.6 10/31/2023 03:19 PM    CHLORIDE 97 10/31/2023 03:19 PM    CO2 29 10/31/2023 03:19 PM    GLUCOSE 104 (H) 10/31/2023 03:19 PM    BUN 22 10/31/2023 03:19 PM    CREATININE 1.36 10/31/2023 03:19 PM    GLOMRATE 64 10/06/2023 04:33 AM     Lab Results   Component Value Date/Time    ALKPHOSPHAT 102 10/04/2023 04:33 AM    ALKPHOSPHAT 127 (H) 05/04/2023 01:40 PM    ASTSGOT 12 (L) 10/04/2023 04:33 AM    ASTSGOT 18 05/04/2023 01:40 PM    ALTSGPT 5 (L) 10/04/2023 04:33 AM    ALTSGPT 17 05/04/2023 01:40 PM    TBILIRUBIN 0.4 10/04/2023 04:33 AM    TBILIRUBIN 0.5 05/04/2023 01:40 PM      80 minutes spent during today's encounter    Chief Complaint   Patient presents with    Other     Pre-op TAVR     ROS:   10 point review systems is otherwise negative except as per the HPI

## 2023-11-29 NOTE — TELEPHONE ENCOUNTER
Patient is scheduled on 12-5-23 for Pre TAVR angio with Dr. Chang. Patient was told to hold spironolactone and lasix AM day of procedure and to check in at 6:00 for a 7:30 procedure. H&P was done on 11-29-23 by Surjit. Pre admit to call patient.

## 2023-11-29 NOTE — PROGRESS NOTES
Valve Program Functional Assessment:     [id number]   1a) Showering/bathin  1b) Walking 1 block on ground: 6  1c) Hurrying or joggin  2) Swelling: 3  3) Fatigue: 7  4) Shortness of breath: 7  5) Sleep sitting up: 5  6) Limited enjoyment of life: 5  7) Spend the rest of your life with HF: 4  8a) Hobbies, recreational activities:6  8b) Working or doing household chores:6  8c) Visiting family or friends: 6    5 meter walk test  Unable to walk     Strength   1) _12_____ kg  2) _12_____ kg  3) _12_____ kg  AVG:__12____    SARMIENTO ADLs  Patient independently preforms...   - Bathing: Yes   - Dressing: Yes   - Toileting: Yes   - Transferring: Yes   - Continence: Yes   - Feeding: Yes   Total Score: _6_/6    Living Situation  Patient lives: with adult child or relative    Mobility Aids   Patient uses: cane, walker, Push chair      FRAILTY SCORE: _3_/ 4

## 2023-11-30 ENCOUNTER — TELEPHONE (OUTPATIENT)
Dept: CARDIOLOGY | Facility: MEDICAL CENTER | Age: 88
End: 2023-11-30
Payer: MEDICARE

## 2023-11-30 ENCOUNTER — HOSPITAL ENCOUNTER (OUTPATIENT)
Facility: MEDICAL CENTER | Age: 88
End: 2023-11-30
Attending: INTERNAL MEDICINE | Admitting: INTERNAL MEDICINE
Payer: MEDICARE

## 2023-11-30 NOTE — TELEPHONE ENCOUNTER
Received call from patient's daughter Krysatl. She states she is trying to get patient scheduled for urgent dental appointment, but is having trouble. Per Krystal, she will continue to reach out.    Advised that RN would update team. Discussed postponing tentative TAVR to allow dental appointment to be completed. Krystal verbalizes understanding and will reach out with more updates as they become available.

## 2023-12-01 ENCOUNTER — APPOINTMENT (OUTPATIENT)
Dept: ADMISSIONS | Facility: MEDICAL CENTER | Age: 88
End: 2023-12-01
Attending: INTERNAL MEDICINE
Payer: MEDICARE

## 2023-12-04 ENCOUNTER — PATIENT OUTREACH (OUTPATIENT)
Dept: HEALTH INFORMATION MANAGEMENT | Facility: OTHER | Age: 88
End: 2023-12-04
Payer: MEDICARE

## 2023-12-04 ENCOUNTER — DOCUMENTATION (OUTPATIENT)
Dept: CARDIOLOGY | Facility: MEDICAL CENTER | Age: 88
End: 2023-12-04
Payer: MEDICARE

## 2023-12-04 DIAGNOSIS — I10 PRIMARY HYPERTENSION: ICD-10-CM

## 2023-12-04 DIAGNOSIS — J44.0 CHRONIC OBSTRUCTIVE PULMONARY DISEASE WITH ACUTE LOWER RESPIRATORY INFECTION (HCC): ICD-10-CM

## 2023-12-04 PROCEDURE — 99490 CHRNC CARE MGMT STAFF 1ST 20: CPT | Performed by: INTERNAL MEDICINE

## 2023-12-04 NOTE — OR NURSING
Attempted call times 1, LVM, for pt to call us back to get the pread call for both the DONNELL and the TAVR done. Dr. Chang and Surjit's office notified.

## 2023-12-04 NOTE — PROGRESS NOTES
Nurse CM outreach call for monthly CCM assessment.  Pt answered telephone.    Assessment    Pt reports she has been going to multiple appointments for upcoming TAVR procedure. Pt reports she is needing to have some dental work completed before procedure. States she has an appt in [place] this Friday 12/8 with Dr. Edmonds. Pt reports she has limited mobility related to her hip replacement and knee pain. States she is ambulating as tolerated. Pt reports she does have a pain pump that was replaced in her back. Pt following with pain management. Pt denies feeling short of breath today. Reports no recent falls. Pt reports she will follow-up with PCP at a later date after her cardiac surgery.     Education    Fall precautions. Continue to follow with specialists. Take medications as recommended. Follow heart healthy meal plan.    Plan of Care and Goals    Reviewed care plan and goals    Barriers:    Chronic health conditions    Progress:    Continue to work on progress    Next outreach:  One month  Nurse Care Coordinator:  Radha Martinez  [phone]

## 2023-12-04 NOTE — TELEPHONE ENCOUNTER
Called and spoke to dental office representative. Fax number confirmed.     Clearance faxed to [phone]. Receipt confirmed.

## 2023-12-04 NOTE — TELEPHONE ENCOUNTER
Received call from patient stating she has dental exam scheduled for Friday 12/8 at 1400. Per patient, she will likely need multiple extractions as she has been putting it off the past 8 months.     Patient will be seeing Dr. Edmonds in [place]. Advised to bring dental clearance to appointment, but patient states she has misplaced form. Informed patient RN would fax clearance to dental office.     Attempted to reach out to office ([phone]), but it is closed for lunch. Will try again later today to obtain fax number.

## 2023-12-04 NOTE — PROGRESS NOTES
[place] TAVR Review:    Consider a 23 S3UR from right femoral approach.   LVH  Annular calcium extending into the LVOT  Tortuous iliacs bilaterally  Moderate calcium in the transverse arch and the distal aorta continuing into the iliacs bilaterally  High bifurcations bilaterally  R9, Ca13

## 2023-12-05 ENCOUNTER — HOSPITAL ENCOUNTER (OUTPATIENT)
Facility: MEDICAL CENTER | Age: 88
End: 2023-12-05
Attending: INTERNAL MEDICINE | Admitting: INTERNAL MEDICINE
Payer: MEDICARE

## 2023-12-05 ENCOUNTER — APPOINTMENT (OUTPATIENT)
Dept: CARDIOLOGY | Facility: MEDICAL CENTER | Age: 88
End: 2023-12-05
Payer: MEDICARE

## 2023-12-05 ENCOUNTER — APPOINTMENT (OUTPATIENT)
Dept: CARDIOLOGY | Facility: MEDICAL CENTER | Age: 88
End: 2023-12-05
Attending: INTERNAL MEDICINE
Payer: MEDICARE

## 2023-12-05 VITALS
HEART RATE: 68 BPM | SYSTOLIC BLOOD PRESSURE: 133 MMHG | TEMPERATURE: 97.3 F | WEIGHT: 138.23 LBS | HEIGHT: 67 IN | RESPIRATION RATE: 15 BRPM | DIASTOLIC BLOOD PRESSURE: 62 MMHG | BODY MASS INDEX: 21.7 KG/M2 | OXYGEN SATURATION: 100 %

## 2023-12-05 DIAGNOSIS — I35.0 SEVERE AORTIC STENOSIS: ICD-10-CM

## 2023-12-05 DIAGNOSIS — Z01.810 PRE-PROCEDURAL CARDIOVASCULAR EXAMINATION: ICD-10-CM

## 2023-12-05 DIAGNOSIS — I35.0 NONRHEUMATIC AORTIC (VALVE) STENOSIS: ICD-10-CM

## 2023-12-05 LAB
ALBUMIN SERPL BCP-MCNC: 3.9 G/DL (ref 3.2–4.9)
ALBUMIN/GLOB SERPL: 1.3 G/DL
ALP SERPL-CCNC: 136 U/L (ref 30–99)
ALT SERPL-CCNC: 10 U/L (ref 2–50)
ANION GAP SERPL CALC-SCNC: 10 MMOL/L (ref 7–16)
APTT PPP: 26.7 SEC (ref 24.7–36)
AST SERPL-CCNC: 15 U/L (ref 12–45)
BASOPHILS # BLD AUTO: 0.6 % (ref 0–1.8)
BASOPHILS # BLD: 0.04 K/UL (ref 0–0.12)
BILIRUB SERPL-MCNC: 0.3 MG/DL (ref 0.1–1.5)
BUN SERPL-MCNC: 24 MG/DL (ref 8–22)
CALCIUM ALBUM COR SERPL-MCNC: 10.2 MG/DL (ref 8.5–10.5)
CALCIUM SERPL-MCNC: 10.1 MG/DL (ref 8.5–10.5)
CHLORIDE SERPL-SCNC: 99 MMOL/L (ref 96–112)
CO2 SERPL-SCNC: 27 MMOL/L (ref 20–33)
CREAT SERPL-MCNC: 0.77 MG/DL (ref 0.5–1.4)
EKG IMPRESSION: NORMAL
EOSINOPHIL # BLD AUTO: 0.29 K/UL (ref 0–0.51)
EOSINOPHIL NFR BLD: 4 % (ref 0–6.9)
ERYTHROCYTE [DISTWIDTH] IN BLOOD BY AUTOMATED COUNT: 47.1 FL (ref 35.9–50)
GFR SERPLBLD CREATININE-BSD FMLA CKD-EPI: 75 ML/MIN/1.73 M 2
GLOBULIN SER CALC-MCNC: 3.1 G/DL (ref 1.9–3.5)
GLUCOSE SERPL-MCNC: 106 MG/DL (ref 65–99)
HCT VFR BLD AUTO: 34 % (ref 37–47)
HGB BLD-MCNC: 10.5 G/DL (ref 12–16)
IMM GRANULOCYTES # BLD AUTO: 0.01 K/UL (ref 0–0.11)
IMM GRANULOCYTES NFR BLD AUTO: 0.1 % (ref 0–0.9)
INR PPP: 1.01 (ref 0.87–1.13)
LV EJECT FRACT  99904: 60
LYMPHOCYTES # BLD AUTO: 3.58 K/UL (ref 1–4.8)
LYMPHOCYTES NFR BLD: 49.4 % (ref 22–41)
MCH RBC QN AUTO: 27 PG (ref 27–33)
MCHC RBC AUTO-ENTMCNC: 30.9 G/DL (ref 32.2–35.5)
MCV RBC AUTO: 87.4 FL (ref 81.4–97.8)
MONOCYTES # BLD AUTO: 0.64 K/UL (ref 0–0.85)
MONOCYTES NFR BLD AUTO: 8.8 % (ref 0–13.4)
NEUTROPHILS # BLD AUTO: 2.68 K/UL (ref 1.82–7.42)
NEUTROPHILS NFR BLD: 37.1 % (ref 44–72)
NRBC # BLD AUTO: 0 K/UL
NRBC BLD-RTO: 0 /100 WBC (ref 0–0.2)
NT-PROBNP SERPL IA-MCNC: 790 PG/ML (ref 0–125)
PLATELET # BLD AUTO: 245 K/UL (ref 164–446)
PMV BLD AUTO: 10.2 FL (ref 9–12.9)
POTASSIUM SERPL-SCNC: 4.1 MMOL/L (ref 3.6–5.5)
PROT SERPL-MCNC: 7 G/DL (ref 6–8.2)
PROTHROMBIN TIME: 13.5 SEC (ref 12–14.6)
RBC # BLD AUTO: 3.89 M/UL (ref 4.2–5.4)
SODIUM SERPL-SCNC: 136 MMOL/L (ref 135–145)
WBC # BLD AUTO: 7.2 K/UL (ref 4.8–10.8)

## 2023-12-05 PROCEDURE — 93010 ELECTROCARDIOGRAM REPORT: CPT | Mod: 59 | Performed by: INTERNAL MEDICINE

## 2023-12-05 PROCEDURE — 83880 ASSAY OF NATRIURETIC PEPTIDE: CPT

## 2023-12-05 PROCEDURE — A9270 NON-COVERED ITEM OR SERVICE: HCPCS

## 2023-12-05 PROCEDURE — 99153 MOD SED SAME PHYS/QHP EA: CPT

## 2023-12-05 PROCEDURE — 160047 HCHG PACU  - EA ADDL 30 MINS PHASE II

## 2023-12-05 PROCEDURE — 160002 HCHG RECOVERY MINUTES (STAT)

## 2023-12-05 PROCEDURE — 85610 PROTHROMBIN TIME: CPT

## 2023-12-05 PROCEDURE — 85025 COMPLETE CBC W/AUTO DIFF WBC: CPT

## 2023-12-05 PROCEDURE — 93005 ELECTROCARDIOGRAM TRACING: CPT | Performed by: INTERNAL MEDICINE

## 2023-12-05 PROCEDURE — 93306 TTE W/DOPPLER COMPLETE: CPT

## 2023-12-05 PROCEDURE — 160035 HCHG PACU - 1ST 60 MINS PHASE I

## 2023-12-05 PROCEDURE — 700101 HCHG RX REV CODE 250

## 2023-12-05 PROCEDURE — 93306 TTE W/DOPPLER COMPLETE: CPT | Mod: 26 | Performed by: INTERNAL MEDICINE

## 2023-12-05 PROCEDURE — 93458 L HRT ARTERY/VENTRICLE ANGIO: CPT | Mod: 26 | Performed by: INTERNAL MEDICINE

## 2023-12-05 PROCEDURE — 160046 HCHG PACU - 1ST 60 MINS PHASE II

## 2023-12-05 PROCEDURE — 700102 HCHG RX REV CODE 250 W/ 637 OVERRIDE(OP)

## 2023-12-05 PROCEDURE — 85730 THROMBOPLASTIN TIME PARTIAL: CPT

## 2023-12-05 PROCEDURE — 700111 HCHG RX REV CODE 636 W/ 250 OVERRIDE (IP): Mod: JZ

## 2023-12-05 PROCEDURE — 99152 MOD SED SAME PHYS/QHP 5/>YRS: CPT | Performed by: INTERNAL MEDICINE

## 2023-12-05 PROCEDURE — 80053 COMPREHEN METABOLIC PANEL: CPT

## 2023-12-05 PROCEDURE — 700117 HCHG RX CONTRAST REV CODE 255: Performed by: INTERNAL MEDICINE

## 2023-12-05 PROCEDURE — G0278 ILIAC ART ANGIO,CARDIAC CATH: HCPCS | Performed by: INTERNAL MEDICINE

## 2023-12-05 PROCEDURE — 93567 NJX CAR CTH SPRVLV AORTGRPHY: CPT | Performed by: INTERNAL MEDICINE

## 2023-12-05 RX ORDER — MIDAZOLAM HYDROCHLORIDE 1 MG/ML
INJECTION INTRAMUSCULAR; INTRAVENOUS
Status: COMPLETED
Start: 2023-12-05 | End: 2023-12-05

## 2023-12-05 RX ORDER — VERAPAMIL HYDROCHLORIDE 2.5 MG/ML
INJECTION, SOLUTION INTRAVENOUS
Status: COMPLETED
Start: 2023-12-05 | End: 2023-12-05

## 2023-12-05 RX ORDER — SODIUM CHLORIDE 9 MG/ML
INJECTION, SOLUTION INTRAVENOUS CONTINUOUS
Status: ACTIVE | OUTPATIENT
Start: 2023-12-05 | End: 2023-12-05

## 2023-12-05 RX ORDER — HEPARIN SODIUM 200 [USP'U]/100ML
INJECTION, SOLUTION INTRAVENOUS
Status: COMPLETED
Start: 2023-12-05 | End: 2023-12-05

## 2023-12-05 RX ORDER — ASPIRIN 81 MG/1
TABLET, CHEWABLE ORAL
Status: COMPLETED
Start: 2023-12-05 | End: 2023-12-05

## 2023-12-05 RX ORDER — HEPARIN SODIUM 1000 [USP'U]/ML
INJECTION, SOLUTION INTRAVENOUS; SUBCUTANEOUS
Status: COMPLETED
Start: 2023-12-05 | End: 2023-12-05

## 2023-12-05 RX ORDER — SODIUM CHLORIDE 9 MG/ML
1000 INJECTION, SOLUTION INTRAVENOUS CONTINUOUS
Status: DISCONTINUED | OUTPATIENT
Start: 2023-12-05 | End: 2023-12-05

## 2023-12-05 RX ORDER — LIDOCAINE HYDROCHLORIDE 20 MG/ML
INJECTION, SOLUTION INFILTRATION; PERINEURAL
Status: COMPLETED
Start: 2023-12-05 | End: 2023-12-05

## 2023-12-05 RX ADMIN — IOHEXOL 50 ML: 350 INJECTION, SOLUTION INTRAVENOUS at 08:27

## 2023-12-05 RX ADMIN — MIDAZOLAM HYDROCHLORIDE 2 MG: 1 INJECTION, SOLUTION INTRAMUSCULAR; INTRAVENOUS at 08:26

## 2023-12-05 RX ADMIN — NITROGLYCERIN 10 ML: 20 INJECTION INTRAVENOUS at 08:15

## 2023-12-05 RX ADMIN — FENTANYL CITRATE 100 MCG: 50 INJECTION, SOLUTION INTRAMUSCULAR; INTRAVENOUS at 08:19

## 2023-12-05 RX ADMIN — ASPIRIN 325 MG: 81 TABLET, CHEWABLE ORAL at 07:52

## 2023-12-05 RX ADMIN — HEPARIN SODIUM: 1000 INJECTION, SOLUTION INTRAVENOUS; SUBCUTANEOUS at 08:15

## 2023-12-05 RX ADMIN — MIDAZOLAM HYDROCHLORIDE 2 MG: 1 INJECTION, SOLUTION INTRAMUSCULAR; INTRAVENOUS at 08:15

## 2023-12-05 RX ADMIN — FENTANYL CITRATE 100 MCG: 50 INJECTION, SOLUTION INTRAMUSCULAR; INTRAVENOUS at 08:15

## 2023-12-05 RX ADMIN — LIDOCAINE HYDROCHLORIDE: 20 INJECTION, SOLUTION INFILTRATION; PERINEURAL at 08:15

## 2023-12-05 RX ADMIN — HEPARIN SODIUM 2000 UNITS: 200 INJECTION, SOLUTION INTRAVENOUS at 08:15

## 2023-12-05 RX ADMIN — VERAPAMIL HYDROCHLORIDE 5 MG: 2.5 INJECTION, SOLUTION INTRAVENOUS at 08:15

## 2023-12-05 ASSESSMENT — PAIN DESCRIPTION - PAIN TYPE: TYPE: SURGICAL PAIN

## 2023-12-05 ASSESSMENT — FIBROSIS 4 INDEX: FIB4 SCORE: 2.12

## 2023-12-05 NOTE — OR NURSING
0839- Pt arrived to PACU, report received. Pt on 3L NC, which is her baseline.  R radial cath site with scant amount of blood present on the gauze.  Cap refill < 3 sec, fingers pink and warm.  Sensation intact.      0913- Pt taken to restroom to void. Helped pt put on pants.      0932- phase 2    0940- 1cc of air removed from TR band, 12cc remaining.    0950- 2cc air removed, 10cc remaining.    1000- 3cc air removed. 7cc remaining.     1010- small, soft hematoma observed above the level of TR band.  Pressure held, hematoma reduced. No bleeding observed from puncture site.  Circulation remains intact.  Echo at bedside.     1030- NP at bedside to assess.  1cc removed, minute soft bulge observed about 5 min later.  Pressure held.        1045- 3cc removed.  Watching site closely.      1100- TR band removed. Tegaderm dressing applied.      1107- Pt helped to edge of bed, and helped to get dressed.      1123- Pt helped to her own wheelchair.  IV dc with tip intact.  Discharge instructions discussed with daughter, including extensive directions on R arm/wrist precautions and hematoma signs and care.

## 2023-12-05 NOTE — DISCHARGE INSTRUCTIONS
Discharged from care:  Yes 2021  Duration of treatment days:  5  Visit Count:  2  Acute/Chronic: Acute   Index Rating Used:  [employer] Questionnaire  Initial functional disability percentage:  70  Discharge functional disability percentage:  0  Primary Diagnosis:  Myalgia/muscle pain  Complicating Factors:  Scoliosis  Initial pain ratin with 10 being worst.  Discharge pain ratin with 10 being worst.  Number of episodes in current year for this condition:  1     HOME CARE INSTRUCTIONS    ACTIVITY: Rest and take it easy for the first 24 hours.  A responsible adult is recommended to remain with you during that time.  It is normal to feel sleepy.  We encourage you to not do anything that requires balance, judgment or coordination.    FOR 24 HOURS DO NOT:  Drive, operate machinery or run household appliances.  Drink beer or alcoholic beverages.  Make important decisions or sign legal documents.    SPECIAL INSTRUCTIONS: See handout.Treat your Right arm like it is broken for 2 days.      DIET: To avoid nausea, slowly advance diet as tolerated, avoiding spicy or greasy foods for the first day.  Add more substantial food to your diet according to your physician's instructions.  Babies can be fed formula or breast milk as soon as they are hungry.  INCREASE FLUIDS AND FIBER TO AVOID CONSTIPATION.    SURGICAL DRESSING/BATHING: OK to remove the dressing on your wrist in 24 hours, then shower once it is removed.  Do not bathe or swim for at least a week.      MEDICATIONS: Resume taking daily medication.  Take prescribed pain medication with food.  If no medication is prescribed, you may take non-aspirin pain medication if needed.  PAIN MEDICATION CAN BE VERY CONSTIPATING.  Take a stool softener or laxative such as senokot, pericolace, or milk of magnesia if needed.    Prescription given for ______.  Last pain medication given at _______.    A follow-up appointment should be arranged with your doctor; call to schedule.    You should CALL YOUR PHYSICIAN if you develop:  Fever greater than 101 degrees F.  Pain not relieved by medication, or persistent nausea or vomiting.  Excessive bleeding (blood soaking through dressing) or unexpected drainage from the wound.  Extreme redness or swelling around the incision site, drainage of pus or foul smelling drainage.  Inability to urinate or empty your bladder within 8 hours.  Problems with breathing or chest pain.    You should call 911 if you  develop problems with breathing or chest pain.  If you are unable to contact your doctor or surgical center, you should go to the nearest emergency room or urgent care center.  Physician's telephone #:  [phone]    MILD FLU-LIKE SYMPTOMS ARE NORMAL.  YOU MAY EXPERIENCE GENERALIZED MUSCLE ACHES, THROAT IRRITATION, HEADACHE AND/OR SOME NAUSEA.    If any questions arise, call your doctor.  If your doctor is not available, please feel free to call the Surgical Center at [phone].  The Center is open Monday through Friday from 7AM to 7PM.      A registered nurse may call you a few days after your surgery to see how you are doing after your procedure.    You may also receive a survey in the mail within the next two weeks and we ask that you take a few moments to complete the survey and return it to us.  Our goal is to provide you with very good care and we value your comments.     Depression / Suicide Risk    As you are discharged from this [facility] facility, it is important to learn how to keep safe from harming yourself.    Recognize the warning signs:  Abrupt changes in personality, positive or negative- including increase in energy   Giving away possessions  Change in eating patterns- significant weight changes-  positive or negative  Change in sleeping patterns- unable to sleep or sleeping all the time   Unwillingness or inability to communicate  Depression  Unusual sadness, discouragement and loneliness  Talk of wanting to die  Neglect of personal appearance   Rebelliousness- reckless behavior  Withdrawal from people/activities they love  Confusion- inability to concentrate     If you or a loved one observes any of these behaviors or has concerns about self-harm, here's what you can do:  Talk about it- your feelings and reasons for harming yourself  Remove any means that you might use to hurt yourself (examples: pills, rope, extension cords, firearm)  Get professional help from the community  (Mental Health, Substance Abuse, psychological counseling)  Do not be alone:Call your Safe Contact- someone whom you trust who will be there for you.  Call your local CRISIS HOTLINE 499-4302 or 721-898-5713  Call your local Children's Mobile Crisis Response Team [place] (020) 867-4554 or www.[employer]  Call the toll free National Suicide Prevention Hotlines   National Suicide Prevention Lifeline 745-033-NPNQ (4390)  [facility] 800-SUICIDE (064-7018)    I acknowledge receipt and understanding of these Home Care instructions.

## 2023-12-05 NOTE — TELEPHONE ENCOUNTER
Received call from patient's daughter Krystal. Discussed plan for postponing TAVR and tentative reschedule dates. All questions answered. Krystal verbalizes understanding.

## 2023-12-05 NOTE — PROCEDURES
Cardiac Catheterization Laboratory Procedure Note    DATE: 12/5/2023    : Beltran Chang MD    PROCEDURES PERFORMED:  Left heart catheterization  Coronary angiography  Ascending aortography  Distal aortography with iliofemoral run-off  Moderate conscious sedation    INDICATIONS:  The patient is an 88-year-old woman referred for cardiac catheterization prior to possible TAVR.    CONSENT:  The complete alternatives, risks, and benefits of the procedure were explained to the patient.  Signed informed consent was obtained and placed in the chart prior to the procedure.  A timeout was performed prior to beginning the procedure.    MEDICATIONS:  Lidocaine  Fentanyl  Midazolam  Nitroglycerin  Verapamil  Heparin    MODERATE CONSCIOUS SEDATION:  I personally supervised the administration of moderate conscious sedation by the nursing staff for 27 minutes.  Sedation start time: 8:00 AM  Sedation end time: 8:27 AM    CONTRAST: Omnipaque 50 cc    ACCESS: 6-Croatian Glidesheath in the right radial artery.    ESTIMATED BLOOD LOSS: 10 cc    COMPLICATIONS: None    DESCRIPTION OF PROCEDURE:  The patient was brought to the cardiac catheterization laboratory in the fasting state.  The skin over the right wrist was prepped and draped in the usual sterile fashion.  Lidocaine infiltration was used to anesthetize the tissue over the right radial artery.  Using the micropuncture technique, a 6-Croatian Glidesheath was inserted in the right radial artery.  A 5-Croatian Fernando catheter was then advanced over a standard J-wire into the aortic root.  This catheter was then able to be advanced in a prolapsed shape into the left ventricular cavity without use of a wire where it was gently aspirated, flushed, and then withdrawn across the aortic valve with sequential pressures measured.  This catheter was then used to engage the ostium of the left main coronary artery and cineangiograms were obtained in multiple projections for complete  evaluation of the left coronary system.  This catheter was unable to engage the ostium of the right coronary artery and it was exchanged for a 6-Ghanaian JR-4 diagnostic catheter, which was then used to engage the ostium of the right coronary artery and cineangiograms were obtained in multiple projections for complete evaluation of the right coronary system.  Following completion of coronary angiography, the JR catheter was exchanged for a 4-Ghanaian pigtail catheter, which was used to perform an ascending aortogram for procedural planning purposes.  This catheter was then used to perform a distal aortogram with iliofemoral run-off for procedural planning purposes.  At the completion of the case all wires, catheters, and sheaths were removed.  A TR band was placed using the patent hemostasis technique.    HEMODYN yet from a little bit of mild plaque buildup but no significant blockages or anything like that AMICS:   Aortic pressure: 135/66 mmHg  Left ventricular p okay no problem RI take care for mild injection hardware out so most recent  Is my new blood shoes so my daughter is ressure: 165/10 mmHg  Peak-to-peak aortic gradient approximately 30 mmHg, assessment difficult due to ventricular bigeminy    CORONARY ANGIOGRAPHY:  The left main coronary artery is patent and bifurcates into the left anterior descending and left circumflex coronaries.  Catheter depends on yet sometimes they release the band previously other times is chronically disease and takes over time that he never quite know it yet that you have the bleeding stopped from the hole that I put the artery but usually it is not 2 hours  The left anterior descending coronary artery is a large, transapical vessel that supplies a moderate first diagonal branch and has mid 30% disease.  The left circumflex coronary artery is a large, nondominant vessel that supplies a large first obtuse marginal branch and has mid 30% disease.  The right coronary artery is a large,  dominant vessel with proximal 50% disease and mid 30% disease.  Distally, it bifurcates into a small posterior descending coronary and a moderate posterolateral branch with luminal irregularities.    ASCENDING AORTOGRAPHY:  Normal aortic root diameter at 2.8 cm  Normal ascending aorta diameter at 3.0 cm    DISTAL AORTOGRAPHY WITH ILIOFEMORAL RUN-OFF:  Bilateral aneurysmal common iliac disease.  Minimal right external iliac diameter approximately 7.1 mm  Minimum left external iliac diameter approximately 5.2 mm    IMPRESSION:  Mild nonobstructive coronary artery disease.  Mild nonobstructive bilateral iliofemoral arterial disease.  Normal left heart filling pressures.  Peak-to-peak aortic gradient approximately 30 mmHg, assessment difficult due to ventricular bigeminy. Fernando catheter prolapsed into the ventricular cavity without a wire.    RECOMMENDATIONS:  Recover in post procedure unit.  TR band release per protocol.  Post procedure fluids for renal protection.  Continue evaluation for Structural Heart Team.  Continue optimal cardiovascular risk factor management.    NOTIFICATION: I this is Dr. Nelson calling just let me know everything went well with your mom's procedure she did fine with that we did not need to put any stents that or anything like that so yeah I a I will send that information on to Dr. Langley to take a look at but I expect she should go home your in a couple hours our nurses watch know when she is ready to go home DAH usually about 2 hours give or take  The patient's family was notified of the results of her cardiac catheterization.

## 2023-12-06 NOTE — TELEPHONE ENCOUNTER
Discussed patient postponing TAVR until January with Dr. Eddy. He recommends patient not wait for procedure.    Spoke with patient. Update provided. All questions answered. Patient verbalizes understanding.

## 2023-12-07 ENCOUNTER — TELEPHONE (OUTPATIENT)
Dept: CARDIOLOGY | Facility: MEDICAL CENTER | Age: 88
End: 2023-12-07
Payer: MEDICARE

## 2023-12-07 NOTE — TELEPHONE ENCOUNTER
"Phone number called: [phone]    Call outcome: Spoke to patient to get some clarifications about her question regarding her surgery but patient stated that her questions is not regarding that but is about a website that stated on the bottom of her mychart \"to get ready and click the website\". Patient was offered the phone number of the CT surgery office if she have any questions regarding her upcoming surgery but pt refused the number.     "

## 2023-12-07 NOTE — TELEPHONE ENCOUNTER
----- Message from Perfecto Colvin Ass't sent at 2023  7:57 AM PST -----  Regarding: FW: After Hours Call    ----- Message -----  From: Concepcion Richter  Sent: 2023   6:16 PM PST  To: Cruz Schultz/[facility]  Subject: After Hours Call                                 PATIENT NAME: Chelly Decker  CALLER NAME: Chelly  REASON FOR CALL: Needs Dr Eddy to send a fax referral to Rito Sage at [facility] fax# [phone] and office# is [phone].Regarding examination and possible extractions, pre heart surgery scheduled for 23.Wishes to keep her 23 appointment as well. Please call for further clarification if needed.  PHONE NUMBER: [phone]  CARDIO PROVIDER: Dr Eddy  : [id number]  MRN: [id number]  ADVISED 1-2 BUSINESS DAYS FOR CALL BACK Y/N: Y

## 2023-12-07 NOTE — TELEPHONE ENCOUNTER
Called and left detailed message for patient's daughter Krystal. Advised that Dr. Eddy did not recommend postponing TAVR procedure.     Additionally, advised we would review in conference next week and take dental clearance into consideration.     Recommended she return call with any additional questions or concerns.

## 2023-12-07 NOTE — TELEPHONE ENCOUNTER
Phone number called: [phone].     Call outcome: Spoke to [facility] to fax dental clearance request in order to start dental clearance. Representative express understanding.

## 2023-12-07 NOTE — TELEPHONE ENCOUNTER
BE       Caller: Chelly Decker     Topic/issue: Patient states that she is scheduled for surgery on 12/18 and she wants to speak to someone.  I advise patient I did not see her on the schedule. She would like a callback     Callback Number: [phone]      Thank You   Yolanda ENRIQUE

## 2023-12-08 ENCOUNTER — TELEPHONE (OUTPATIENT)
Dept: CARDIOLOGY | Facility: MEDICAL CENTER | Age: 88
End: 2023-12-08
Payer: MEDICARE

## 2023-12-09 NOTE — TELEPHONE ENCOUNTER
Patient's daughter Krystal called stating the patient just completed her dental appt and the dentist told her she has to come back Monday to complete her evaluation because he wants her pre-medicated due to her recent hip replacement. Krystal states during this initial evaluation, the dentist advised that the patient needs at least two teeth extracted, and Krystal is concerned about being able to get the patient in to an oral surgeon prior to tentative TAVR 12/18. Advised Krystla that we will still plan to discuss the patient at our valve conference on 12/13 and requested she call and update me with the patient's upcoming dental appointments. Krystal verbalizes understanding.

## 2023-12-11 ENCOUNTER — PATIENT MESSAGE (OUTPATIENT)
Dept: CARDIOLOGY | Facility: MEDICAL CENTER | Age: 88
End: 2023-12-11
Payer: MEDICARE

## 2023-12-11 PROCEDURE — 93248 EXT ECG>7D<15D REV&INTERPJ: CPT | Performed by: INTERNAL MEDICINE

## 2023-12-11 NOTE — TELEPHONE ENCOUNTER
Patient's daughter Krystal called after patient's dentist appointment today. She states the patient needs 2 teeth extractions, which is scheduled for Wednesday 12/13/2023. She is tearful on the phone and very concerned about rushing this workup and the valve replacement. She states her mom is weak, fragile, and emotionally drained.     Advised we would discuss the patient at conference on Wednesday, and I will include this information during the conference. Advised Krystal that the next available procedure date isn't until 1/8. Krystal verbalizes understanding and wants what's best for her mom.

## 2023-12-13 ENCOUNTER — TELEPHONE (OUTPATIENT)
Dept: CARDIOLOGY | Facility: MEDICAL CENTER | Age: 88
End: 2023-12-13
Payer: MEDICARE

## 2023-12-13 NOTE — TELEPHONE ENCOUNTER
Chantal BUTLER left message for patient's daughter Krystal requesting call back to discuss TAVR date.

## 2023-12-13 NOTE — PROGRESS NOTES
CC: Medication management, urinary incontinence, cough, referral request    HPI:  Chelly presents with the following:  Patient lives in [place] and is here with her daughter today.    1. Urge incontinence of urine  Patient with severe urge incontinence is followed by [facility].  Her symptoms are severe and spends 9 to 10 hours in the restroom.  She states that she uses the restroom about 14-20 times per day.  It is debilitating.  Patient tried Toviaz in the past without any benefit.  Currently trying Gemtesa.  She is also undergoing PTNS treatments and is currently on her fifth treatment of 12.  She has also been referred to physical therapy for pelvic floor therapy along with biofeedback.  Patient believes that she would do well with the catheter.  Urology notes reviewed with patient and daughter.    2. Pelvic floor dysfunction  Diagnosed with neurogenic incontinence.  Patient experiences 14-20 times of urination per day.  Followed by Dr. Garcia her urologist in [place].  Patient has also been seen and evaluated at [facility].  Will have follow-up soon.    3. Rectal pain  Patient with a PMH of chronic severe rectal pain associated with incomplete defecation, fecal incontinence.  Patient is followed by GI consultants, Dr. Escoto.  Patient will need a order for a pelvic MRI with and without contrast per GI.  Symptoms associated with severe anal pain, hemorrhoids.    4. Spinal stenosis of lumbar region without neurogenic claudication  Patient is currently followed by [facility].  Her physician, Dr. Alanis will be retiring in 4 months.  Patient receives pain management at this clinic.  Pain pump implant not helping.  This will be removed.  Patient requires oxycodone 5/325 mg tablet, occasionally 6 times per day.  Narcotic last filled 4/25/2022.  Patient states that laminectomy has been recommended.     Unfortunately, Dr. Alanis will be retiring.  Patient is experiencing  Comments:     Last Office Visit (last PCP visit):   12/5/2023    Next Visit Date:  Future Appointments   Date Time Provider [address]   1/3/2024 11:00 AM Sho Segura MD [facility]   2/8/2024  2:00 PM Juan Silva MD [address]   8/5/2024  2:00 PM Juan Silva MD [address]       **If hasn't been seen in over a year OR hasn't followed up according to last diabetes/ADHD visit, make appointment for patient before sending refill to provider.     Rx requested:  Requested Prescriptions     Pending Prescriptions Disp Refills    pantoprazole (PROTONIX) 40 MG tablet [Pharmacy Med Name: PANTOPRAZOLE SOD DR 40 MG TAB] 90 tablet 1     Sig: TAKE 1 TABLET BY MOUTH EVERY DAY malfunctioning of her pain pump with Dilaudid.  She has been found multiple times admitted to the ER with withdrawal symptoms from Dilaudid.  Daughter will be requesting referral to pain management physician in [place] will be able to change/manage her Dilaudid pain pump.    5. Medication management  Patient brings in a list of medications that she would like to review.  There have been multiple medications and duplicates on her current medication list.    6. Cough  Patient recently diagnosed with possible bronchitis.  Patient just completed doxycycline, prescribed azelastine and has been using ProAir and Mucinex.  Her cough has slightly improved.    7. Skin lesions  Patient requesting referral to dermatologist, Dr. ABILIO TESFAYE in [place] for skin lesions on her forehead.    8.  Preventative  Labs reviewed.  Lipid panel, vitamin D, TSH, CBC, CMP and hemoglobin A1c within good range.    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   • Primary hypertension 03/29/2022   • Incomplete defecation 03/29/2022   • Moderate asthma without complication 03/29/2022   • Spinal stenosis of lumbar region without neurogenic claudication 03/29/2022   • Pelvic floor dysfunction 03/29/2022   • Irregular heart rate 03/29/2022   • Hypoxia 03/29/2022   • Primary cancer of left lower lobe of lung (HCC) 01/15/2020       Current Outpatient Medications   Medication Sig Dispense Refill   • diphenhydrAMINE (BENADRYL) 25 MG capsule 1 tab(s)     • Cranberry-Vit C-Lactobacillus (RA CRANBERRY SUPPLEMENTS) 450-30 MG Tab as directed     • atorvastatin (LIPITOR) 40 MG Tab TAKE 1 TABLET BY MOUTH ONCE A DAY AT NIGHT     • potassium chloride SA (KDUR) 20 MEQ Tab CR Take 1 tablet daily.  Take 2 tablets when you take the higher Lasix dosing     • omeprazole (PRILOSEC) 20 MG  delayed-release capsule      • losartan (COZAAR) 25 MG Tab 1 tab(s)     • furosemide (LASIX) 20 MG Tab Take 40 mg (2 tablets) daily for then next 7 days then take 20 mg (1 tablet) daily.  Take an extra tablet daily as needed for weight gain of 1 pound in 24 hours or 4 pounds in 1 week.     • doxycycline (VIBRAMYCIN) 100 MG Tab TAKE 1 TABLET BY MOUTH TWICE A DAY FOR 7 DAYS     • DILTIAZem CD (CARDIZEM CD) 180 MG CAPSULE SR 24 HR Take 180 mg by mouth every day.     • vitamin D3 (CHOLECALCIFEROL) 5000 Unit (125 mcg) Tab Take 5,000 Units by mouth every day.     • azelastine (ASTELIN) 137 MCG/SPRAY nasal spray 2 Sprays 2 times a day.     • Ascorbic Acid (VITAMIN C) 1000 MG Tab 1 tab(s)     • albuterol 108 (90 Base) MCG/ACT Aero Soln inhalation aerosol 1 puff(s)     • HYDROmorphone HCl (DILAUDID INJ) Inject  as directed.     • psyllium (METAMUCIL) 58.12 % Pack Take 1 Packet by mouth every day.     • Fexofenadine HCl (MUCINEX ALLERGY PO) Take  by mouth every day.     • Probiotic Product (PROBIOTIC ADVANCED PO) Take  by mouth every day.     • oxyCODONE-acetaminophen (PERCOCET) 5-325 MG Tab Take 1-2 Tabs by mouth every four hours as needed.     • Magnesium 400 MG Cap Take  by mouth.     • Polyethylene Glycol 3350 (MIRALAX PO) Take  by mouth.     • Simethicone (GAS-X PO) Take  by mouth.     • Cyanocobalamin (VITAMIN B-12) 5000 MCG TABLET DISPERSIBLE Take  by mouth.     • Cholecalciferol (VITAMIN D3) 5000 units Cap Take 1 Cap by mouth every day.     • CRANBERRY EXTRACT PO Take 500 mcg by mouth every day.     • potassium chloride SA (KDUR) 20 MEQ Tab CR TAKE 1 TABLET DAILY. TAKE 2 TABLETS WHEN YOU TAKE THE HIGHER LASIX DOSING     • MAGNESIUM-OXIDE 400 (240 Mg) MG Tab TAKE ONE TABLET IN THE AM AND 1/2 TABLET IN THE EVENING.     • losartan (COZAAR) 25 MG Tab      • ipratropium (ATROVENT) 0.06 % Solution 2 SPRAY(S) INTRANASALLY 3 TIMES A DAY     • diphenhydrAMINE (BENADRYL) 25 MG capsule Take  by mouth.     • atenolol (TENORMIN)  25 MG Tab 1 tab(s)     • Fesoterodine Fumarate 8 MG TABLET SR 24 HR Take 8 mg by mouth every day.     • Non Formulary Request Swiss maya laxative     • therapeutic multivitamin-minerals (THERAGRAN-M) Tab Take 1 Tab by mouth every day.     • ALBUTEROL INH Inhale  by mouth.     • lisinopril (PRINIVIL) 20 MG Tab Take 20 mg by mouth in the morning and 20 mg in the evening.     • CYCLOBENZAPRINE HCL PO Take  by mouth.     • Potassium (POTASSIMIN PO) Take 20 mEq by mouth every evening.       No current facility-administered medications for this visit.         Allergies as of 2022 - Reviewed 2022   Allergen Reaction Noted   • Erythromycin Nausea 2019   • Sulfa drugs Unspecified 2021        Social History     Socioeconomic History   • Marital status:      Spouse name: Not on file   • Number of children: Not on file   • Years of education: Not on file   • Highest education level: Not on file   Occupational History   • Occupation: retired book keeper   Tobacco Use   • Smoking status: Former Smoker     Packs/day: 1.00     Years: 60.00     Pack years: 60.00     Quit date:      Years since quittin.3   • Smokeless tobacco: Never Used   Vaping Use   • Vaping Use: Never used   Substance and Sexual Activity   • Alcohol use: No   • Drug use: No   • Sexual activity: Not on file   Other Topics Concern   • Not on file   Social History Narrative   • Not on file     Social Determinants of Health     Financial Resource Strain: Not on file   Food Insecurity: Not on file   Transportation Needs: Not on file   Physical Activity: Not on file   Stress: Not on file   Social Connections: Not on file   Intimate Partner Violence: Not on file   Housing Stability: Not on file       History reviewed. No pertinent family history.    Past Surgical History:   Procedure Laterality Date   • PB IMPLANT NEUROSTIM/ N/A 2021    Procedure: IMPLANT PUMP AND CATHETER;  Surgeon: Joel Alanis M.D.;   "Location: SURGERY [facility];  Service: Pain Management   • APPENDECTOMY     • CARPAL TUNNEL ENDOSCOPIC      bilat   • CHOLECYSTECTOMY     • GYN SURGERY      c section   • HIP ARTHROPLASTY TOTAL Right    • HYSTERECTOMY LAPAROSCOPY      total   • OTHER      kidney stones removed   • TONSILLECTOMY         ROS: Positive ROS per HPI.  Denies any Headache,Chest pain,  Shortness of breath,  Abdominal pain, Changes of bowel or bladder, Lower ext edema, Fevers, Nights sweats, Weight Changes, Focal weakness or numbness.  All other systems are negative.    /50 (BP Location: Left arm, Patient Position: Sitting, BP Cuff Size: Adult)   Pulse 78   Temp 36.2 °C (97.2 °F) (Temporal)   Resp 15   Ht 1.702 m (5' 7\")   Wt 70.3 kg (155 lb)   SpO2 95% Comment: w/ oxygen  BMI 24.28 kg/m²      Constitutional: Alert, no distress, well-groomed.  Skin: Warm, dry, good turgor, no rashes in visible areas.  Eye: Equal, round and reactive, conjunctiva clear, lids normal.  ENMT: Lips without lesions, good dentition, moist mucous membranes.  Neck: Trachea midline, no masses, no thyromegaly.  Respiratory: Unlabored respiratory effort, no cough.  Abdomen: Soft, no gross masses.  MSK: Normal gait, moves all extremities.  Neuro: Grossly non-focal. No cranial nerve deficit. Strength and sensation intact.   Psych: Alert and oriented x3, normal affect and mood.    Assessment and Plan.   86 y.o. female presenting with the following.     1. Urge incontinence of urine  Urology notes reviewed with patient and daughter.  Patient will continue PTNS treatment and make appointment for pelvic floor physical therapy and biofeedback.  Consideration for catheter placement.    2. Pelvic floor dysfunction    - MR-PELVIS-WITH & W/O AND SEQUENCES; Future    3. Rectal pain  Patient will complete MRI and follow-up with Dr. Escoto her gastroenterologist.  - MR-PELVIS-WITH & W/O AND SEQUENCES; Future    4. Spinal stenosis of lumbar region without neurogenic " claudication  Patient's daughter will bring in the name of pain specialist that we will manage her pain pump.  Continue current dose of oxycodone.    5. Medication management  All of patient's medications have been reviewed and updated in epic.    6. Cough  Complete doxycycline.  Continue to monitor.  Keep well-hydrated.  Continue with Mucinex.  Avoid dairy.    7. Skin lesions    - Referral to Dermatology    My total time spent caring for the patient on the day of the encounter was 40 minutes.   This does not include time spent on separately billable procedures/tests.

## 2023-12-13 NOTE — TELEPHONE ENCOUNTER
Patient discussed in valve conference this morning. It was determined patient may proceed with TAVR 12/18/23 pending dental clearance. Additionally, patient needs to discuss proceeding verses waiting until 1/8/2023 with family.    Called and spoke to patient. She states she had 2 teeth extracted this morning and was informed no infection was present. Per patient, dentist office faxed clearance following appointment.     Reviewed VC discussion with patient who is tearful throughout conversation. Per patient, she does not want to wait 3 weeks to have procedure. She states she is scared, but does not want to inconvenience her daughter and is unsure what to do.     Following lengthy discussion, patient would like her daughter contacted to discussed proceeding on 12/18/2023. Advised APRN would reach out. Patient verbalizes understanding.

## 2023-12-14 NOTE — TELEPHONE ENCOUNTER
Received call from patient and her daughter Krystal.    Patient states she has discussed decision with her daughter in detail and feels that waiting is in her best interest. Discussed postponing TAVR until 1/8/2023.     Informed patient and Krystal that RN would reach out 1/3/2023 to discuss check in details for procedure on 1/8/2023. Advised to reach out with any questions or concerns in the meantime. Patient verbalizes understanding.

## 2023-12-14 NOTE — TELEPHONE ENCOUNTER
See telephone encounter dated 11/30/2023 for details regarding postponing TAVR.    What Type Of Note Output Would You Prefer (Optional)?: Standard Output What Is The Reason For Today's Visit?: Full Body Skin Examination with No Concerns What Is The Reason For Today's Visit? (Being Monitored For X): concerning skin lesions on a periodic basis

## 2023-12-14 NOTE — TELEPHONE ENCOUNTER
Valve Conference Plan of Care: 12/13/2023 for TAVR 12/18/2023           TAVR Candidate: Yes, if patient and her daughter agree on date  Access: right femoral  Valve Size: 26mm  General Anesthesia or MAC: GA with DONNELL  Unit: Telemetry  Incidental findings to be discussed with PCP: Distal colonic diverticulosis without diverticulitis. There is emphysema. Left lower lobe airspace disease probably due to atelectasis or scarring.   Clearance needed prior to procedure: Yes, dentist to fax clearance following extractions.

## 2023-12-17 ENCOUNTER — PATIENT MESSAGE (OUTPATIENT)
Dept: CARDIOLOGY | Facility: MEDICAL CENTER | Age: 88
End: 2023-12-17
Payer: MEDICARE

## 2023-12-20 ENCOUNTER — PATIENT MESSAGE (OUTPATIENT)
Dept: CARDIOLOGY | Facility: MEDICAL CENTER | Age: 88
End: 2023-12-20
Payer: MEDICARE

## 2023-12-21 NOTE — PATIENT COMMUNICATION
To BE: Please see patient's MyChart and advise if there is any concerns with her taking the two medications. Thank you!

## 2023-12-22 NOTE — PATIENT COMMUNICATION
Clayton Eddy M.D.  You15 hours ago (5:42 PM)     Ok to take. Some caution with meloxicam - use as needed.       [facility] message sent to patient, awaiting patient response and will follow up as needed.

## 2023-12-27 DIAGNOSIS — I35.0 NONRHEUMATIC AORTIC (VALVE) STENOSIS: ICD-10-CM

## 2023-12-27 DIAGNOSIS — Z00.6 EXAMINATION OF PARTICIPANT IN CLINICAL TRIAL: ICD-10-CM

## 2023-12-27 DIAGNOSIS — I35.0 SEVERE AORTIC STENOSIS: ICD-10-CM

## 2024-01-02 ENCOUNTER — APPOINTMENT (OUTPATIENT)
Dept: ADMISSIONS | Facility: MEDICAL CENTER | Age: 89
DRG: 266 | End: 2024-01-02
Attending: INTERNAL MEDICINE
Payer: MEDICARE

## 2024-01-02 ENCOUNTER — TELEPHONE (OUTPATIENT)
Dept: CARDIOLOGY | Facility: MEDICAL CENTER | Age: 89
End: 2024-01-02
Payer: MEDICARE

## 2024-01-02 NOTE — TELEPHONE ENCOUNTER
Reached out to patient regarding MyChart message from 12/29/2023.    Advised that patient would receive call tomorrow following VC to review check in details. All questions answered. Patient verbalizes understanding.

## 2024-01-02 NOTE — TELEPHONE ENCOUNTER
Received call back from periodontics office.  states patient saw Dr. Duran on 12/13/2023 and had teeth #19 and #21 extracted. She is currently free of any infection.     states she will fax clearance to our office now.

## 2024-01-02 NOTE — TELEPHONE ENCOUNTER
Dental clearance not yet received.     Called and left message for [facility]. Requested call back to discuss.

## 2024-01-03 ENCOUNTER — TELEPHONE (OUTPATIENT)
Dept: CARDIOLOGY | Facility: MEDICAL CENTER | Age: 89
End: 2024-01-03
Payer: MEDICARE

## 2024-01-03 NOTE — TELEPHONE ENCOUNTER
Valve Conference Plan of Care: 1/3/2024 for TAVR 1/8/2024           TAVR Candidate: Yes  Access: right femoral with possible femoral cutdown  Valve Size: 23mm  General Anesthesia or MAC: GA with DONNELL  Unit: Tele  Incidental findings to be discussed with PCP: Distal colonic diverticulosis without diverticulitis. There is emphysema. Left lower lobe airspace disease probably due to atelectasis or scarring.   Clearance needed prior to procedure: Yes, dental clearance received 1/2/2024    Check in time of 0700 1/8/2024.     Pre-op appointment completed: No    NPO after midnight. Hold all vitamins and supplements 7 days prior, hold Losartan 24 hours prior, hold Lasix and Spironolactone the morning of the procedure.     Will contact patient this afternoon as requested to review check in details.

## 2024-01-04 ENCOUNTER — PATIENT MESSAGE (OUTPATIENT)
Dept: CARDIOLOGY | Facility: MEDICAL CENTER | Age: 89
End: 2024-01-04

## 2024-01-04 ENCOUNTER — TELEPHONE (OUTPATIENT)
Dept: CARDIOLOGY | Facility: MEDICAL CENTER | Age: 89
End: 2024-01-04

## 2024-01-04 ENCOUNTER — PRE-ADMISSION TESTING (OUTPATIENT)
Dept: ADMISSIONS | Facility: MEDICAL CENTER | Age: 89
DRG: 266 | End: 2024-01-04
Attending: INTERNAL MEDICINE
Payer: MEDICARE

## 2024-01-04 DIAGNOSIS — I35.0 NONRHEUMATIC AORTIC (VALVE) STENOSIS: ICD-10-CM

## 2024-01-04 RX ORDER — MELOXICAM 7.5 MG/1
7.5 TABLET ORAL 2 TIMES DAILY
COMMUNITY
End: 2024-01-26

## 2024-01-04 NOTE — OR NURSING
RN pre admit tele appointment complete.  Patient lives out of town and has no transportation prior to day of surgery, so labs will be done day of surgery.

## 2024-01-05 ENCOUNTER — PATIENT OUTREACH (OUTPATIENT)
Dept: HEALTH INFORMATION MANAGEMENT | Facility: OTHER | Age: 89
End: 2024-01-05
Payer: MEDICARE

## 2024-01-05 DIAGNOSIS — I10 PRIMARY HYPERTENSION: ICD-10-CM

## 2024-01-05 DIAGNOSIS — J44.0 CHRONIC OBSTRUCTIVE PULMONARY DISEASE WITH ACUTE LOWER RESPIRATORY INFECTION (HCC): ICD-10-CM

## 2024-01-05 NOTE — PROGRESS NOTES
Nurse CM outreach call to patient for monthly CCM assessment. Pt answered telephone.    Assessment    Pt reports she is scheduled for TAVR next week on 1/8/24. Reports she currently has all of her medications. Pt reports she is resting and would like to nurse to call back on another day. Pt reports she is going to request [facility] after discharge from hospital. Pt is agreeable to having nurse call back at a later date and time.     Education    Take medications as directed. Continue to follow with specialists. Fall precautions.    Plan of Care and Goals    Reviewed    Barriers:    Chronic health conditions    Progress:    Continue to work on progress    Next outreach: Nurse will follow-up after TAVR and discharged from hospital.

## 2024-01-07 ENCOUNTER — ANESTHESIA EVENT (OUTPATIENT)
Dept: SURGERY | Facility: MEDICAL CENTER | Age: 89
DRG: 266 | End: 2024-01-07
Payer: MEDICARE

## 2024-01-08 ENCOUNTER — ANESTHESIA (OUTPATIENT)
Dept: SURGERY | Facility: MEDICAL CENTER | Age: 89
DRG: 266 | End: 2024-01-08
Payer: MEDICARE

## 2024-01-08 ENCOUNTER — APPOINTMENT (OUTPATIENT)
Dept: CARDIOLOGY | Facility: MEDICAL CENTER | Age: 89
DRG: 266 | End: 2024-01-08
Attending: ANESTHESIOLOGY
Payer: MEDICARE

## 2024-01-08 ENCOUNTER — HOSPITAL ENCOUNTER (INPATIENT)
Facility: MEDICAL CENTER | Age: 89
LOS: 1 days | DRG: 266 | End: 2024-01-09
Attending: INTERNAL MEDICINE | Admitting: INTERNAL MEDICINE
Payer: MEDICARE

## 2024-01-08 ENCOUNTER — APPOINTMENT (OUTPATIENT)
Dept: RADIOLOGY | Facility: MEDICAL CENTER | Age: 89
DRG: 266 | End: 2024-01-08
Payer: MEDICARE

## 2024-01-08 DIAGNOSIS — R53.81 PHYSICAL DEBILITY: ICD-10-CM

## 2024-01-08 DIAGNOSIS — I48.0 PAF (PAROXYSMAL ATRIAL FIBRILLATION) (HCC): ICD-10-CM

## 2024-01-08 DIAGNOSIS — Z95.2 S/P TAVR (TRANSCATHETER AORTIC VALVE REPLACEMENT): ICD-10-CM

## 2024-01-08 PROBLEM — I50.33 ACUTE ON CHRONIC HEART FAILURE WITH PRESERVED EJECTION FRACTION (HCC): Status: ACTIVE | Noted: 2022-06-22

## 2024-01-08 LAB
ABO GROUP BLD: ABNORMAL
ACT BLD: 217 SEC (ref 74–137)
ALBUMIN SERPL BCP-MCNC: 3.4 G/DL (ref 3.2–4.9)
ALBUMIN SERPL BCP-MCNC: 4.2 G/DL (ref 3.2–4.9)
ALBUMIN/GLOB SERPL: 1.4 G/DL
ALBUMIN/GLOB SERPL: 1.5 G/DL
ALP SERPL-CCNC: 104 U/L (ref 30–99)
ALP SERPL-CCNC: 126 U/L (ref 30–99)
ALT SERPL-CCNC: 11 U/L (ref 2–50)
ALT SERPL-CCNC: 15 U/L (ref 2–50)
ANION GAP SERPL CALC-SCNC: 9 MMOL/L (ref 7–16)
ANION GAP SERPL CALC-SCNC: 9 MMOL/L (ref 7–16)
APTT PPP: 26.3 SEC (ref 24.7–36)
AST SERPL-CCNC: 15 U/L (ref 12–45)
AST SERPL-CCNC: 22 U/L (ref 12–45)
BARCODED ABORH UBTYP: 9500
BARCODED PRD CODE UBPRD: ABNORMAL
BARCODED UNIT NUM UBUNT: ABNORMAL
BILIRUB SERPL-MCNC: 0.3 MG/DL (ref 0.1–1.5)
BILIRUB SERPL-MCNC: 0.3 MG/DL (ref 0.1–1.5)
BLD GP AB INVEST PLASRBC-IMP: ABNORMAL
BLD GP AB SCN SERPL QL: ABNORMAL
BUN SERPL-MCNC: 28 MG/DL (ref 8–22)
BUN SERPL-MCNC: 34 MG/DL (ref 8–22)
CALCIUM ALBUM COR SERPL-MCNC: 9.5 MG/DL (ref 8.5–10.5)
CALCIUM ALBUM COR SERPL-MCNC: 9.9 MG/DL (ref 8.5–10.5)
CALCIUM SERPL-MCNC: 10.1 MG/DL (ref 8.5–10.5)
CALCIUM SERPL-MCNC: 9 MG/DL (ref 8.5–10.5)
CHLORIDE SERPL-SCNC: 103 MMOL/L (ref 96–112)
CHLORIDE SERPL-SCNC: 103 MMOL/L (ref 96–112)
CO2 SERPL-SCNC: 25 MMOL/L (ref 20–33)
CO2 SERPL-SCNC: 26 MMOL/L (ref 20–33)
COMPONENT R 8504R: ABNORMAL
CREAT SERPL-MCNC: 0.69 MG/DL (ref 0.5–1.4)
CREAT SERPL-MCNC: 0.82 MG/DL (ref 0.5–1.4)
EKG IMPRESSION: NORMAL
EKG IMPRESSION: NORMAL
ERYTHROCYTE [DISTWIDTH] IN BLOOD BY AUTOMATED COUNT: 47.1 FL (ref 35.9–50)
ERYTHROCYTE [DISTWIDTH] IN BLOOD BY AUTOMATED COUNT: 47.9 FL (ref 35.9–50)
GFR SERPLBLD CREATININE-BSD FMLA CKD-EPI: 70 ML/MIN/1.73 M 2
GFR SERPLBLD CREATININE-BSD FMLA CKD-EPI: 84 ML/MIN/1.73 M 2
GLOBULIN SER CALC-MCNC: 2.3 G/DL (ref 1.9–3.5)
GLOBULIN SER CALC-MCNC: 3 G/DL (ref 1.9–3.5)
GLUCOSE SERPL-MCNC: 105 MG/DL (ref 65–99)
GLUCOSE SERPL-MCNC: 108 MG/DL (ref 65–99)
HCT VFR BLD AUTO: 29.9 % (ref 37–47)
HCT VFR BLD AUTO: 35.3 % (ref 37–47)
HGB BLD-MCNC: 11.1 G/DL (ref 12–16)
HGB BLD-MCNC: 9.6 G/DL (ref 12–16)
INR PPP: 1.04 (ref 0.87–1.13)
MCH RBC QN AUTO: 27.1 PG (ref 27–33)
MCH RBC QN AUTO: 27.4 PG (ref 27–33)
MCHC RBC AUTO-ENTMCNC: 31.4 G/DL (ref 32.2–35.5)
MCHC RBC AUTO-ENTMCNC: 32.1 G/DL (ref 32.2–35.5)
MCV RBC AUTO: 85.4 FL (ref 81.4–97.8)
MCV RBC AUTO: 86.3 FL (ref 81.4–97.8)
NT-PROBNP SERPL IA-MCNC: 1134 PG/ML (ref 0–125)
PLATELET # BLD AUTO: 138 K/UL (ref 164–446)
PLATELET # BLD AUTO: 186 K/UL (ref 164–446)
PMV BLD AUTO: 10 FL (ref 9–12.9)
PMV BLD AUTO: 10.3 FL (ref 9–12.9)
POTASSIUM SERPL-SCNC: 3.9 MMOL/L (ref 3.6–5.5)
POTASSIUM SERPL-SCNC: 4.7 MMOL/L (ref 3.6–5.5)
PRODUCT TYPE UPROD: ABNORMAL
PROT SERPL-MCNC: 5.7 G/DL (ref 6–8.2)
PROT SERPL-MCNC: 7.2 G/DL (ref 6–8.2)
PROTHROMBIN TIME: 13.7 SEC (ref 12–14.6)
RBC # BLD AUTO: 3.5 M/UL (ref 4.2–5.4)
RBC # BLD AUTO: 4.09 M/UL (ref 4.2–5.4)
RH BLD: ABNORMAL
SODIUM SERPL-SCNC: 137 MMOL/L (ref 135–145)
SODIUM SERPL-SCNC: 138 MMOL/L (ref 135–145)
UNIT STATUS USTAT: ABNORMAL
WBC # BLD AUTO: 4.7 K/UL (ref 4.8–10.8)
WBC # BLD AUTO: 8.3 K/UL (ref 4.8–10.8)

## 2024-01-08 PROCEDURE — 160048 HCHG OR STATISTICAL LEVEL 1-5: Performed by: INTERNAL MEDICINE

## 2024-01-08 PROCEDURE — 770020 HCHG ROOM/CARE - TELE (206)

## 2024-01-08 PROCEDURE — 93010 ELECTROCARDIOGRAM REPORT: CPT | Performed by: INTERNAL MEDICINE

## 2024-01-08 PROCEDURE — 700111 HCHG RX REV CODE 636 W/ 250 OVERRIDE (IP): Performed by: ANESTHESIOLOGY

## 2024-01-08 PROCEDURE — 85610 PROTHROMBIN TIME: CPT

## 2024-01-08 PROCEDURE — 700111 HCHG RX REV CODE 636 W/ 250 OVERRIDE (IP): Performed by: INTERNAL MEDICINE

## 2024-01-08 PROCEDURE — 85027 COMPLETE CBC AUTOMATED: CPT

## 2024-01-08 PROCEDURE — 700102 HCHG RX REV CODE 250 W/ 637 OVERRIDE(OP): Performed by: ANESTHESIOLOGY

## 2024-01-08 PROCEDURE — 80053 COMPREHEN METABOLIC PANEL: CPT

## 2024-01-08 PROCEDURE — 160009 HCHG ANES TIME/MIN: Performed by: INTERNAL MEDICINE

## 2024-01-08 PROCEDURE — 700105 HCHG RX REV CODE 258: Performed by: ANESTHESIOLOGY

## 2024-01-08 PROCEDURE — 700105 HCHG RX REV CODE 258: Performed by: INTERNAL MEDICINE

## 2024-01-08 PROCEDURE — 700102 HCHG RX REV CODE 250 W/ 637 OVERRIDE(OP)

## 2024-01-08 PROCEDURE — C1894 INTRO/SHEATH, NON-LASER: HCPCS | Performed by: INTERNAL MEDICINE

## 2024-01-08 PROCEDURE — 110372 HCHG SHELL REV 278: Performed by: INTERNAL MEDICINE

## 2024-01-08 PROCEDURE — 160002 HCHG RECOVERY MINUTES (STAT): Performed by: INTERNAL MEDICINE

## 2024-01-08 PROCEDURE — 160042 HCHG SURGERY MINUTES - EA ADDL 1 MIN LEVEL 5: Performed by: INTERNAL MEDICINE

## 2024-01-08 PROCEDURE — 700105 HCHG RX REV CODE 258

## 2024-01-08 PROCEDURE — 700111 HCHG RX REV CODE 636 W/ 250 OVERRIDE (IP): Mod: JZ

## 2024-01-08 PROCEDURE — 93005 ELECTROCARDIOGRAM TRACING: CPT | Performed by: INTERNAL MEDICINE

## 2024-01-08 PROCEDURE — 86850 RBC ANTIBODY SCREEN: CPT

## 2024-01-08 PROCEDURE — 83880 ASSAY OF NATRIURETIC PEPTIDE: CPT

## 2024-01-08 PROCEDURE — 86901 BLOOD TYPING SEROLOGIC RH(D): CPT

## 2024-01-08 PROCEDURE — 85730 THROMBOPLASTIN TIME PARTIAL: CPT

## 2024-01-08 PROCEDURE — C1887 CATHETER, GUIDING: HCPCS | Performed by: INTERNAL MEDICINE

## 2024-01-08 PROCEDURE — 71045 X-RAY EXAM CHEST 1 VIEW: CPT

## 2024-01-08 PROCEDURE — 86900 BLOOD TYPING SEROLOGIC ABO: CPT

## 2024-01-08 PROCEDURE — 700111 HCHG RX REV CODE 636 W/ 250 OVERRIDE (IP): Mod: JZ | Performed by: ANESTHESIOLOGY

## 2024-01-08 PROCEDURE — C1760 CLOSURE DEV, VASC: HCPCS | Performed by: INTERNAL MEDICINE

## 2024-01-08 PROCEDURE — 93005 ELECTROCARDIOGRAM TRACING: CPT

## 2024-01-08 PROCEDURE — 503001 HCHG PERFUSION: Performed by: INTERNAL MEDICINE

## 2024-01-08 PROCEDURE — 36415 COLL VENOUS BLD VENIPUNCTURE: CPT

## 2024-01-08 PROCEDURE — A9270 NON-COVERED ITEM OR SERVICE: HCPCS

## 2024-01-08 PROCEDURE — 93355 ECHO TRANSESOPHAGEAL (TEE): CPT

## 2024-01-08 PROCEDURE — 86922 COMPATIBILITY TEST ANTIGLOB: CPT

## 2024-01-08 PROCEDURE — 160031 HCHG SURGERY MINUTES - 1ST 30 MINS LEVEL 5: Performed by: INTERNAL MEDICINE

## 2024-01-08 PROCEDURE — C1751 CATH, INF, PER/CENT/MIDLINE: HCPCS | Performed by: INTERNAL MEDICINE

## 2024-01-08 PROCEDURE — 700101 HCHG RX REV CODE 250: Performed by: ANESTHESIOLOGY

## 2024-01-08 PROCEDURE — B24BZZ4 ULTRASONOGRAPHY OF HEART WITH AORTA, TRANSESOPHAGEAL: ICD-10-PCS | Performed by: INTERNAL MEDICINE

## 2024-01-08 PROCEDURE — C1883 ADAPT/EXT, PACING/NEURO LEAD: HCPCS | Performed by: INTERNAL MEDICINE

## 2024-01-08 PROCEDURE — 86870 RBC ANTIBODY IDENTIFICATION: CPT

## 2024-01-08 PROCEDURE — A9270 NON-COVERED ITEM OR SERVICE: HCPCS | Performed by: ANESTHESIOLOGY

## 2024-01-08 PROCEDURE — 33361 REPLACE AORTIC VALVE PERQ: CPT | Mod: 62,Q0 | Performed by: THORACIC SURGERY (CARDIOTHORACIC VASCULAR SURGERY)

## 2024-01-08 PROCEDURE — 160035 HCHG PACU - 1ST 60 MINS PHASE I: Performed by: INTERNAL MEDICINE

## 2024-01-08 PROCEDURE — 85347 COAGULATION TIME ACTIVATED: CPT

## 2024-01-08 PROCEDURE — C1769 GUIDE WIRE: HCPCS | Performed by: INTERNAL MEDICINE

## 2024-01-08 PROCEDURE — 33361 REPLACE AORTIC VALVE PERQ: CPT | Mod: 62,Q0 | Performed by: INTERNAL MEDICINE

## 2024-01-08 PROCEDURE — 02RF38Z REPLACEMENT OF AORTIC VALVE WITH ZOOPLASTIC TISSUE, PERCUTANEOUS APPROACH: ICD-10-PCS | Performed by: INTERNAL MEDICINE

## 2024-01-08 DEVICE — IMPLANTABLE DEVICE: Type: IMPLANTABLE DEVICE | Site: HEART | Status: FUNCTIONAL

## 2024-01-08 RX ORDER — HYDROMORPHONE HYDROCHLORIDE 1 MG/ML
0.2 INJECTION, SOLUTION INTRAMUSCULAR; INTRAVENOUS; SUBCUTANEOUS
Status: DISCONTINUED | OUTPATIENT
Start: 2024-01-08 | End: 2024-01-08 | Stop reason: HOSPADM

## 2024-01-08 RX ORDER — TRAMADOL HYDROCHLORIDE 50 MG/1
50 TABLET ORAL 2 TIMES DAILY
Status: DISCONTINUED | OUTPATIENT
Start: 2024-01-08 | End: 2024-01-09 | Stop reason: HOSPADM

## 2024-01-08 RX ORDER — DIPHENHYDRAMINE HYDROCHLORIDE 50 MG/ML
12.5 INJECTION INTRAMUSCULAR; INTRAVENOUS
Status: DISCONTINUED | OUTPATIENT
Start: 2024-01-08 | End: 2024-01-08 | Stop reason: HOSPADM

## 2024-01-08 RX ORDER — OXYCODONE HCL 5 MG/5 ML
5 SOLUTION, ORAL ORAL
Status: COMPLETED | OUTPATIENT
Start: 2024-01-08 | End: 2024-01-08

## 2024-01-08 RX ORDER — SODIUM CHLORIDE 9 MG/ML
INJECTION, SOLUTION INTRAVENOUS CONTINUOUS
Status: ACTIVE | OUTPATIENT
Start: 2024-01-08 | End: 2024-01-08

## 2024-01-08 RX ORDER — HYDRALAZINE HYDROCHLORIDE 20 MG/ML
10 INJECTION INTRAMUSCULAR; INTRAVENOUS
Status: DISCONTINUED | OUTPATIENT
Start: 2024-01-08 | End: 2024-01-09 | Stop reason: HOSPADM

## 2024-01-08 RX ORDER — DULOXETIN HYDROCHLORIDE 20 MG/1
20 CAPSULE, DELAYED RELEASE ORAL 2 TIMES DAILY
Status: DISCONTINUED | OUTPATIENT
Start: 2024-01-08 | End: 2024-01-09 | Stop reason: HOSPADM

## 2024-01-08 RX ORDER — ALBUTEROL SULFATE 90 UG/1
2 AEROSOL, METERED RESPIRATORY (INHALATION) EVERY 6 HOURS PRN
Status: DISCONTINUED | OUTPATIENT
Start: 2024-01-08 | End: 2024-01-09 | Stop reason: HOSPADM

## 2024-01-08 RX ORDER — LOSARTAN POTASSIUM 25 MG/1
12.5 TABLET ORAL EVERY EVENING
Status: DISCONTINUED | OUTPATIENT
Start: 2024-01-08 | End: 2024-01-09 | Stop reason: HOSPADM

## 2024-01-08 RX ORDER — ONDANSETRON 2 MG/ML
INJECTION INTRAMUSCULAR; INTRAVENOUS PRN
Status: DISCONTINUED | OUTPATIENT
Start: 2024-01-08 | End: 2024-01-09 | Stop reason: SURG

## 2024-01-08 RX ORDER — DILTIAZEM HYDROCHLORIDE 120 MG/1
120 CAPSULE, COATED, EXTENDED RELEASE ORAL EVERY EVENING
Status: DISCONTINUED | OUTPATIENT
Start: 2024-01-08 | End: 2024-01-09 | Stop reason: HOSPADM

## 2024-01-08 RX ORDER — MEPERIDINE HYDROCHLORIDE 25 MG/ML
12.5 INJECTION INTRAMUSCULAR; INTRAVENOUS; SUBCUTANEOUS
Status: DISCONTINUED | OUTPATIENT
Start: 2024-01-08 | End: 2024-01-08 | Stop reason: HOSPADM

## 2024-01-08 RX ORDER — LANOLIN ALCOHOL/MO/W.PET/CERES
200 CREAM (GRAM) TOPICAL EVERY EVENING
Status: DISCONTINUED | OUTPATIENT
Start: 2024-01-08 | End: 2024-01-09 | Stop reason: HOSPADM

## 2024-01-08 RX ORDER — HYDROMORPHONE HYDROCHLORIDE 1 MG/ML
0.4 INJECTION, SOLUTION INTRAMUSCULAR; INTRAVENOUS; SUBCUTANEOUS
Status: DISCONTINUED | OUTPATIENT
Start: 2024-01-08 | End: 2024-01-08 | Stop reason: HOSPADM

## 2024-01-08 RX ORDER — ONDANSETRON 2 MG/ML
4 INJECTION INTRAMUSCULAR; INTRAVENOUS EVERY 4 HOURS PRN
Status: DISCONTINUED | OUTPATIENT
Start: 2024-01-08 | End: 2024-01-09 | Stop reason: HOSPADM

## 2024-01-08 RX ORDER — POTASSIUM CHLORIDE 20 MEQ/1
20 TABLET, EXTENDED RELEASE ORAL DAILY
Status: DISCONTINUED | OUTPATIENT
Start: 2024-01-09 | End: 2024-01-09 | Stop reason: HOSPADM

## 2024-01-08 RX ORDER — ACETAMINOPHEN 325 MG/1
650 TABLET ORAL EVERY 6 HOURS PRN
Status: DISCONTINUED | OUTPATIENT
Start: 2024-01-08 | End: 2024-01-09 | Stop reason: HOSPADM

## 2024-01-08 RX ORDER — HYDROMORPHONE HYDROCHLORIDE 1 MG/ML
0.1 INJECTION, SOLUTION INTRAMUSCULAR; INTRAVENOUS; SUBCUTANEOUS
Status: DISCONTINUED | OUTPATIENT
Start: 2024-01-08 | End: 2024-01-08 | Stop reason: HOSPADM

## 2024-01-08 RX ORDER — CEFAZOLIN SODIUM 1 G/3ML
INJECTION, POWDER, FOR SOLUTION INTRAMUSCULAR; INTRAVENOUS PRN
Status: DISCONTINUED | OUTPATIENT
Start: 2024-01-08 | End: 2024-01-09 | Stop reason: SURG

## 2024-01-08 RX ORDER — POLYETHYLENE GLYCOL 3350 17 G/17G
1 POWDER, FOR SOLUTION ORAL
Status: DISCONTINUED | OUTPATIENT
Start: 2024-01-08 | End: 2024-01-09 | Stop reason: HOSPADM

## 2024-01-08 RX ORDER — DIPHENHYDRAMINE HYDROCHLORIDE 50 MG/ML
25 INJECTION INTRAMUSCULAR; INTRAVENOUS EVERY 6 HOURS PRN
Status: DISCONTINUED | OUTPATIENT
Start: 2024-01-08 | End: 2024-01-09 | Stop reason: HOSPADM

## 2024-01-08 RX ORDER — DIPHENHYDRAMINE HCL 25 MG
25 TABLET ORAL NIGHTLY PRN
Status: DISCONTINUED | OUTPATIENT
Start: 2024-01-08 | End: 2024-01-09 | Stop reason: HOSPADM

## 2024-01-08 RX ORDER — TRAMADOL HYDROCHLORIDE 50 MG/1
50 TABLET ORAL ONCE
Status: COMPLETED | OUTPATIENT
Start: 2024-01-08 | End: 2024-01-08

## 2024-01-08 RX ORDER — OXYCODONE HCL 5 MG/5 ML
10 SOLUTION, ORAL ORAL
Status: COMPLETED | OUTPATIENT
Start: 2024-01-08 | End: 2024-01-08

## 2024-01-08 RX ORDER — BISACODYL 10 MG
10 SUPPOSITORY, RECTAL RECTAL
Status: DISCONTINUED | OUTPATIENT
Start: 2024-01-08 | End: 2024-01-09 | Stop reason: HOSPADM

## 2024-01-08 RX ORDER — LANOLIN ALCOHOL/MO/W.PET/CERES
400 CREAM (GRAM) TOPICAL DAILY
Status: DISCONTINUED | OUTPATIENT
Start: 2024-01-09 | End: 2024-01-09 | Stop reason: HOSPADM

## 2024-01-08 RX ORDER — SPIRONOLACTONE 25 MG/1
25 TABLET ORAL DAILY
Status: DISCONTINUED | OUTPATIENT
Start: 2024-01-08 | End: 2024-01-09 | Stop reason: HOSPADM

## 2024-01-08 RX ORDER — OMEPRAZOLE 20 MG/1
20 CAPSULE, DELAYED RELEASE ORAL DAILY
Status: DISCONTINUED | OUTPATIENT
Start: 2024-01-09 | End: 2024-01-09 | Stop reason: HOSPADM

## 2024-01-08 RX ORDER — FUROSEMIDE 10 MG/ML
40 INJECTION INTRAMUSCULAR; INTRAVENOUS
Status: DISCONTINUED | OUTPATIENT
Start: 2024-01-08 | End: 2024-01-09 | Stop reason: HOSPADM

## 2024-01-08 RX ORDER — ATORVASTATIN CALCIUM 40 MG/1
40 TABLET, FILM COATED ORAL EVERY EVENING
Status: DISCONTINUED | OUTPATIENT
Start: 2024-01-08 | End: 2024-01-09 | Stop reason: HOSPADM

## 2024-01-08 RX ORDER — AMOXICILLIN 250 MG
2 CAPSULE ORAL 2 TIMES DAILY
Status: DISCONTINUED | OUTPATIENT
Start: 2024-01-08 | End: 2024-01-09 | Stop reason: HOSPADM

## 2024-01-08 RX ORDER — ASPIRIN 81 MG/1
81 TABLET ORAL DAILY
Status: DISCONTINUED | OUTPATIENT
Start: 2024-01-09 | End: 2024-01-09 | Stop reason: HOSPADM

## 2024-01-08 RX ORDER — SODIUM CHLORIDE, SODIUM LACTATE, POTASSIUM CHLORIDE, CALCIUM CHLORIDE 600; 310; 30; 20 MG/100ML; MG/100ML; MG/100ML; MG/100ML
INJECTION, SOLUTION INTRAVENOUS
Status: DISCONTINUED | OUTPATIENT
Start: 2024-01-08 | End: 2024-01-09 | Stop reason: SURG

## 2024-01-08 RX ORDER — MAGNESIUM OXIDE 400 MG/1
200-400 TABLET ORAL 2 TIMES DAILY
COMMUNITY
End: 2024-02-07 | Stop reason: SDUPTHER

## 2024-01-08 RX ORDER — HEPARIN SODIUM,PORCINE 1000/ML
VIAL (ML) INJECTION PRN
Status: DISCONTINUED | OUTPATIENT
Start: 2024-01-08 | End: 2024-01-09 | Stop reason: SURG

## 2024-01-08 RX ORDER — SODIUM CHLORIDE, SODIUM LACTATE, POTASSIUM CHLORIDE, CALCIUM CHLORIDE 600; 310; 30; 20 MG/100ML; MG/100ML; MG/100ML; MG/100ML
INJECTION, SOLUTION INTRAVENOUS CONTINUOUS
Status: ACTIVE | OUTPATIENT
Start: 2024-01-08 | End: 2024-01-08

## 2024-01-08 RX ORDER — GUAIFENESIN 600 MG/1
600 TABLET, EXTENDED RELEASE ORAL EVERY 12 HOURS
Status: DISCONTINUED | OUTPATIENT
Start: 2024-01-08 | End: 2024-01-09 | Stop reason: HOSPADM

## 2024-01-08 RX ORDER — MIDAZOLAM HYDROCHLORIDE 1 MG/ML
INJECTION INTRAMUSCULAR; INTRAVENOUS PRN
Status: DISCONTINUED | OUTPATIENT
Start: 2024-01-08 | End: 2024-01-09 | Stop reason: SURG

## 2024-01-08 RX ORDER — DEXAMETHASONE SODIUM PHOSPHATE 4 MG/ML
INJECTION, SOLUTION INTRA-ARTICULAR; INTRALESIONAL; INTRAMUSCULAR; INTRAVENOUS; SOFT TISSUE PRN
Status: DISCONTINUED | OUTPATIENT
Start: 2024-01-08 | End: 2024-01-09 | Stop reason: SURG

## 2024-01-08 RX ORDER — MAGNESIUM OXIDE 400 MG/1
200-400 TABLET ORAL 2 TIMES DAILY
Status: DISCONTINUED | OUTPATIENT
Start: 2024-01-08 | End: 2024-01-08

## 2024-01-08 RX ORDER — PROTAMINE SULFATE 10 MG/ML
INJECTION, SOLUTION INTRAVENOUS PRN
Status: DISCONTINUED | OUTPATIENT
Start: 2024-01-08 | End: 2024-01-09 | Stop reason: SURG

## 2024-01-08 RX ORDER — ONDANSETRON 2 MG/ML
4 INJECTION INTRAMUSCULAR; INTRAVENOUS
Status: DISCONTINUED | OUTPATIENT
Start: 2024-01-08 | End: 2024-01-08 | Stop reason: HOSPADM

## 2024-01-08 RX ORDER — PANTOPRAZOLE SODIUM 40 MG/1
40 TABLET, DELAYED RELEASE ORAL EVERY MORNING
Status: DISCONTINUED | OUTPATIENT
Start: 2024-01-08 | End: 2024-01-08

## 2024-01-08 RX ORDER — HALOPERIDOL 5 MG/ML
1 INJECTION INTRAMUSCULAR
Status: DISCONTINUED | OUTPATIENT
Start: 2024-01-08 | End: 2024-01-08 | Stop reason: HOSPADM

## 2024-01-08 RX ADMIN — DEXAMETHASONE SODIUM PHOSPHATE 4 MG: 4 INJECTION INTRA-ARTICULAR; INTRALESIONAL; INTRAMUSCULAR; INTRAVENOUS; SOFT TISSUE at 14:07

## 2024-01-08 RX ADMIN — FUROSEMIDE 40 MG: 10 INJECTION, SOLUTION INTRAMUSCULAR; INTRAVENOUS at 18:02

## 2024-01-08 RX ADMIN — LOSARTAN POTASSIUM 12.5 MG: 25 TABLET, FILM COATED ORAL at 17:59

## 2024-01-08 RX ADMIN — TRAMADOL HYDROCHLORIDE 50 MG: 50 TABLET ORAL at 11:56

## 2024-01-08 RX ADMIN — FENTANYL CITRATE 50 MCG: 50 INJECTION, SOLUTION INTRAMUSCULAR; INTRAVENOUS at 15:35

## 2024-01-08 RX ADMIN — HYDROMORPHONE HYDROCHLORIDE 0.4 MG: 1 INJECTION, SOLUTION INTRAMUSCULAR; INTRAVENOUS; SUBCUTANEOUS at 15:55

## 2024-01-08 RX ADMIN — HEPARIN SODIUM 200 UNITS: 1000 INJECTION, SOLUTION INTRAVENOUS; SUBCUTANEOUS at 14:46

## 2024-01-08 RX ADMIN — MIDAZOLAM HYDROCHLORIDE 2 MG: 1 INJECTION, SOLUTION INTRAMUSCULAR; INTRAVENOUS at 14:06

## 2024-01-08 RX ADMIN — DULOXETINE HYDROCHLORIDE 20 MG: 20 CAPSULE, DELAYED RELEASE ORAL at 17:59

## 2024-01-08 RX ADMIN — PROPOFOL 150 MG: 10 INJECTION, EMULSION INTRAVENOUS at 14:07

## 2024-01-08 RX ADMIN — HYDROMORPHONE HYDROCHLORIDE 0.2 MG: 1 INJECTION, SOLUTION INTRAMUSCULAR; INTRAVENOUS; SUBCUTANEOUS at 16:05

## 2024-01-08 RX ADMIN — FENTANYL CITRATE 25 MCG: 50 INJECTION, SOLUTION INTRAMUSCULAR; INTRAVENOUS at 18:35

## 2024-01-08 RX ADMIN — ROCURONIUM BROMIDE 80 MG: 10 INJECTION, SOLUTION INTRAVENOUS at 14:07

## 2024-01-08 RX ADMIN — ATORVASTATIN CALCIUM 40 MG: 40 TABLET, FILM COATED ORAL at 17:58

## 2024-01-08 RX ADMIN — HYDROMORPHONE HYDROCHLORIDE 0.2 MG: 1 INJECTION, SOLUTION INTRAMUSCULAR; INTRAVENOUS; SUBCUTANEOUS at 16:25

## 2024-01-08 RX ADMIN — SUGAMMADEX 200 MG: 100 INJECTION, SOLUTION INTRAVENOUS at 15:07

## 2024-01-08 RX ADMIN — GUAIFENESIN 600 MG: 600 TABLET, EXTENDED RELEASE ORAL at 18:01

## 2024-01-08 RX ADMIN — SODIUM CHLORIDE: 9 INJECTION, SOLUTION INTRAVENOUS at 18:00

## 2024-01-08 RX ADMIN — ONDANSETRON 4 MG: 2 INJECTION INTRAMUSCULAR; INTRAVENOUS at 14:07

## 2024-01-08 RX ADMIN — CEFAZOLIN 2 G: 1 INJECTION, POWDER, FOR SOLUTION INTRAMUSCULAR; INTRAVENOUS at 14:04

## 2024-01-08 RX ADMIN — DILTIAZEM HYDROCHLORIDE 120 MG: 120 CAPSULE, COATED, EXTENDED RELEASE ORAL at 18:00

## 2024-01-08 RX ADMIN — OXYCODONE HYDROCHLORIDE 10 MG: 5 SOLUTION ORAL at 15:30

## 2024-01-08 RX ADMIN — SODIUM CHLORIDE, POTASSIUM CHLORIDE, SODIUM LACTATE AND CALCIUM CHLORIDE: 600; 310; 30; 20 INJECTION, SOLUTION INTRAVENOUS at 13:57

## 2024-01-08 RX ADMIN — FENTANYL CITRATE 50 MCG: 50 INJECTION, SOLUTION INTRAMUSCULAR; INTRAVENOUS at 15:31

## 2024-01-08 RX ADMIN — HEPARIN SODIUM 7000 UNITS: 1000 INJECTION, SOLUTION INTRAVENOUS; SUBCUTANEOUS at 14:39

## 2024-01-08 RX ADMIN — DOCUSATE SODIUM 50 MG AND SENNOSIDES 8.6 MG 2 TABLET: 8.6; 5 TABLET, FILM COATED ORAL at 17:59

## 2024-01-08 RX ADMIN — TRAMADOL HYDROCHLORIDE 50 MG: 50 TABLET ORAL at 17:59

## 2024-01-08 RX ADMIN — PROTAMINE SULFATE 50 MG: 10 INJECTION, SOLUTION INTRAVENOUS at 14:58

## 2024-01-08 RX ADMIN — HYDROMORPHONE HYDROCHLORIDE 0.2 MG: 1 INJECTION, SOLUTION INTRAMUSCULAR; INTRAVENOUS; SUBCUTANEOUS at 16:30

## 2024-01-08 RX ADMIN — Medication 200 MG: at 17:59

## 2024-01-08 RX ADMIN — FENTANYL CITRATE 25 MCG: 50 INJECTION, SOLUTION INTRAMUSCULAR; INTRAVENOUS at 22:44

## 2024-01-08 RX ADMIN — SPIRONOLACTONE 25 MG: 25 TABLET ORAL at 17:59

## 2024-01-08 RX ADMIN — HYDROMORPHONE HYDROCHLORIDE 0.4 MG: 1 INJECTION, SOLUTION INTRAMUSCULAR; INTRAVENOUS; SUBCUTANEOUS at 15:50

## 2024-01-08 ASSESSMENT — COGNITIVE AND FUNCTIONAL STATUS - GENERAL
SUGGESTED CMS G CODE MODIFIER DAILY ACTIVITY: CK
HELP NEEDED FOR BATHING: A LOT
TURNING FROM BACK TO SIDE WHILE IN FLAT BAD: A LOT
STANDING UP FROM CHAIR USING ARMS: A LOT
DRESSING REGULAR UPPER BODY CLOTHING: A LOT
WALKING IN HOSPITAL ROOM: A LOT
TOILETING: A LOT
EATING MEALS: A LITTLE
SUGGESTED CMS G CODE MODIFIER MOBILITY: CL
CLIMB 3 TO 5 STEPS WITH RAILING: A LOT
PERSONAL GROOMING: A LITTLE
MOVING TO AND FROM BED TO CHAIR: A LOT
MOVING FROM LYING ON BACK TO SITTING ON SIDE OF FLAT BED: A LOT
DAILY ACTIVITIY SCORE: 14
MOBILITY SCORE: 12
DRESSING REGULAR LOWER BODY CLOTHING: A LOT

## 2024-01-08 ASSESSMENT — LIFESTYLE VARIABLES
ALCOHOL_USE: NO
DOES PATIENT WANT TO STOP DRINKING: CANNOT ASSESS
HAVE YOU EVER FELT YOU SHOULD CUT DOWN ON YOUR DRINKING: NO
HOW MANY TIMES IN THE PAST YEAR HAVE YOU HAD 5 OR MORE DRINKS IN A DAY: 0
HAVE PEOPLE ANNOYED YOU BY CRITICIZING YOUR DRINKING: NO
CONSUMPTION TOTAL: NEGATIVE
EVER HAD A DRINK FIRST THING IN THE MORNING TO STEADY YOUR NERVES TO GET RID OF A HANGOVER: NO
TOTAL SCORE: 0
AVERAGE NUMBER OF DAYS PER WEEK YOU HAVE A DRINK CONTAINING ALCOHOL: 0
TOTAL SCORE: 0
ON A TYPICAL DAY WHEN YOU DRINK ALCOHOL HOW MANY DRINKS DO YOU HAVE: 0
TOTAL SCORE: 0
EVER FELT BAD OR GUILTY ABOUT YOUR DRINKING: NO

## 2024-01-08 ASSESSMENT — ENCOUNTER SYMPTOMS
NEUROLOGICAL NEGATIVE: 1
ORTHOPNEA: 0
BRUISES/BLEEDS EASILY: 0
GASTROINTESTINAL NEGATIVE: 1
PALPITATIONS: 0
DEPRESSION: 0
MUSCULOSKELETAL NEGATIVE: 1
NERVOUS/ANXIOUS: 0
SHORTNESS OF BREATH: 1
PND: 0
EYES NEGATIVE: 1
PSYCHIATRIC NEGATIVE: 1
CONSTITUTIONAL NEGATIVE: 1

## 2024-01-08 ASSESSMENT — PATIENT HEALTH QUESTIONNAIRE - PHQ9
SUM OF ALL RESPONSES TO PHQ9 QUESTIONS 1 AND 2: 0
2. FEELING DOWN, DEPRESSED, IRRITABLE, OR HOPELESS: NOT AT ALL
1. LITTLE INTEREST OR PLEASURE IN DOING THINGS: NOT AT ALL

## 2024-01-08 ASSESSMENT — PAIN DESCRIPTION - PAIN TYPE: TYPE: SURGICAL PAIN

## 2024-01-08 ASSESSMENT — FIBROSIS 4 INDEX: FIB4 SCORE: 1.67

## 2024-01-08 NOTE — PROGRESS NOTES
Med Rec PARTIALLY complete per pt  Allergies Reviewed    Pt reports not taking anticoagulant in the last 14 days    Pain pump settings not yet verified  Waiting for fax from Dr Guillen office

## 2024-01-08 NOTE — OP REPORT
"TRANSCATHETER AORTIC VALVE REPLACEMENT REPORT    Referring Provider:  Herself    INTERVENTIONAL CARDIOLOGIST: Clayton Eddy MD  CARDIAC SURGEON: Cleve Davenport DO  ANESTHESIOLOGIST: Harjinder Monique MD    ASSISTANT: None    IMPRESSIONS:  1. Successful implantation of a 26 Waldron Kamla S3 Ultra Resilia deployed at nominal volume -1 cc via the transfemoral approach  2. Acute decompensated diastolic heart failure with LVEDP of 18 mmHg    Recommendations:  4 hour bedrest    Pre-procedure diagnosis: TAVR recommended by heart valve team. Severe aortic stenosis Stage D1 (symptomatic severe AS)  Post-procedure diagnosis: Same    Procedure performed  Pigtail placement/ascending aortography  Transvenous pacemaker  26 Waldron S3 Ultra Resilia  Perclose closure  Angioseal closure    Procedure Description  1. Access:   A) Valve Sheath: Right femoral, 14F Waldron eSheath. Micropuncture technique was utilized following local anesthesia with lidocaine.  Fluoroscopic guidance was utilized for femoral access Dynamic ultrasound was utilized to gain access.  B) Temporary pacemaker: 6 Peruvian Left femoral vein. Fluoroscopic guidance was utilized for femoral access Dynamic ultrasound was utilized to gain access.  C) Pigtail catheter: 5/6 Peruvian right radial artery Micropuncture technique was utilized following local anesthesia with lidocaine.  A radial cocktail containing verapamil and saline was administered in the radial artery sheath    2. Procedure Description:  A TVP and pigtail catheter were placed and appropriate pacer function and implantation angle confirmed. The preclose was deployed followed by dilation of the tract with an 8F sheath. A standard 0.035\" J wire was used to deliver an catheter to the ascending aorta and then exchange for a 0.035\" Lunderquist wire. Over this wire the Waldron E sheath advanced into the descending aorta. An AL1 was placed in the ascending aorta and the valve crossed with a 0.035\" strait wire. " "The catheter was advanced into the LV apex and wire exchanged for a 0.035\" Amplatz Super Stiff wire.   Next a 26 mm Waldron Kamla S3 Ultra Resilia was deployed under rapid pacing with nominal volume-1 and 6.5 win.  . Successful valve implantation confirmed by DONNELL. A pigtail catheter was used to perform left heart catheterization and LVEDP measured at 18 mmHg.  Protamine was administered and the Waldron sheath was removed from the body after reinsertion of the dilator. The perclose sutures were tightened hemostasis was achieved. The pigtail catheter was removed. The TVP was removed.    3. Hemostasis:   A) TAVR Sheath: Perclose by Preclose technique and Angioseal  B) TVP: Manual  C) Pigtail access: TR band    Technical Factors  1. Complications: None  2. Estimated Blood Loss: 50 cc  3. Specimens: None  4. Contrast Volume: 70 ml  5. Sedation: General Anesthesia  6. Echo: DONNELL    "

## 2024-01-08 NOTE — H&P
Physician H&P    Patient ID:  Chelly Decker  [id number]  88 y.o. female  1935    History:  Primary Diagnosis: Severe aortic stenosis    HPI:  Chelly Decker is an 88-year-old female with history of lung cancer status post radiation, chronic lower extremity edema, pain related to avascular hip necrosis, walker and wheelchair dependent.  She was found to have severe AS at admission over the summer with CHF and pneumonia.  She presented in the clinic with class II symptoms.  She presents today for scheduled TAVR procedure.    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), Bilateral lower extremity edema (08/03/2022), Breath shortness (12/04/2023), Cancer ([facility]), Cancer (HCC) (12/04/2023), Cervical cancer ([facility]) (1968), Chronic constipation, Chronic kidney disease (CKD), Chronic obstructive pulmonary disease (HCC) (06/22/2022), Chronic pain, Congestive heart failure (HCC), COPD (chronic obstructive pulmonary disease) (HCC), Dental disorder (12/04/2023), Dizziness (12/29/2022), Foot drop, right foot (12/04/2023), GERD (gastroesophageal reflux disease), H/O Clostridium difficile infection (05/12/2022), Hammer toes of both feet (12/29/2022), Heart burn (12/04/2023), Heart murmur, High cholesterol (12/04/2023), Hypertension (12/04/2023), Hypotension due to hypovolemia (08/17/2022), Hypoxia (03/29/2022), Incomplete defecation (03/29/2022), Infectious disease (04/25/2023), Insomnia due to medical condition (10/13/2022), Irregular heart rate (03/29/2022), Kidney stones, Moderate aortic stenosis, Moderate mitral insufficiency, Oxygen dependent, Pain (12/04/2023), Pain in joint involving multiple sites (10/13/2022), Pain in joint involving  multiple sites (10/13/2022), Pelvic floor dysfunction (03/29/2022), Primary hypertension (03/29/2022), Renal insufficiency (08/03/2022), Seasonal allergies, Skin lesions (05/12/2022), Solitary pulmonary nodule, Spinal stenosis, Spinal stenosis of lumbar region without neurogenic claudication (03/29/2022), Urge incontinence of urine (12/04/2023), and Urinary bladder disorder (Past [id number]).    She has no past medical history of Anesthesia, Anginal syndrome ([facility]), Blood clotting disorder ([facility]), Bowel habit changes, Bronchitis, Carcinoma in situ of respiratory system, Cataract, Continuous ambulatory peritoneal dialysis status (HCC), Coughing blood, Diabetes ([facility]), Dialysis patient ([facility]), Disorder of thyroid, Glaucoma, Hemorrhagic disorder ([facility]), Hiatus hernia syndrome, Indigestion, Jaundice, Malignant hyperthermia, Myocardial infarct ([facility]), Pacemaker, Pneumonia, PONV (postoperative nausea and vomiting), Pregnant, Psychiatric problem, Rheumatic fever, Seizure ([facility]), Sleep apnea, Snoring, Stroke ([facility]), or Tuberculosis.  Past Surgical History:  has a past surgical history that includes cholecystectomy; hip arthroplasty total (Right, 1998); carpal tunnel endoscopic (Bilateral); tonsillectomy; appendectomy; lithotripsy; hysterectomy laparoscopy; primary c section; pr ins/rplc spi npg/rcvr pocket (N/A, 02/05/2021); pump insert/remove (Right, 10/07/2022); pump revision (Right, 05/04/2023); colonoscopy; pr total hip arthroplasty (Left, 08/28/2023); and other (Bilateral, 12/04/2023).  Past Social History:  reports that she quit smoking about 9 years ago. Her smoking use included cigarettes and electronic cigarettes. She started smoking about 70 years ago. She has a 61.0 pack-year smoking history. She has never used smokeless tobacco. She reports that she does not currently use alcohol. She reports that she does not currently use drugs after having used the following drugs: Inhaled.  Past Family History:   Family History    Problem Relation Age of Onset    Heart Disease Mother         Not from congestive heart failure!     Allergies: Sulfa drugs and Erythromycin    Current Medications:  Prior to Admission medications    Medication Sig Start Date End Date Taking? Authorizing Provider   magnesium oxide (MAG-OX) 400 MG Tab tablet Take 200-400 mg by mouth 2 times a day. 400 mg every morning  200 mg every afternoon   Yes Physician Outpatient   meloxicam (MOBIC) 7.5 MG Tab Take 7.5 mg by mouth 2 times a day.    Physician Outpatient   CRANBERRY PO Take 1 Tablet by mouth every day.    Physician Outpatient   Probiotic Product (PROBIOTIC DAILY PO) Take 1 Tablet by mouth every day.    Physician Outpatient   traMADol (ULTRAM) 50 MG Tab Take 50 mg by mouth 2 times a day.    Physician Outpatient   atorvastatin (LIPITOR) 40 MG Tab Take 40 mg by mouth every day.    Physician Outpatient   furosemide (LASIX) 20 MG Tab Take 20 mg by mouth every morning.    Physician Outpatient   losartan (COZAAR) 25 MG Tab Take 12.5 mg by mouth every day. 0.5 tablet (12.5 mg) 9/18/23   Physician Outpatient   pantoprazole (PROTONIX) 40 MG Tablet Delayed Response Take 40 mg by mouth every morning.    Physician Outpatient   polyethylene glycol 3350 (MIRALAX) 17 GM/SCOOP Powder Take 1 Dose by mouth every day.    Physician Outpatient   sennosides-docusate sodium (SENOKOT-S) 8.6-50 MG tablet Take 1 Tablet by mouth 1 time a day as needed (CONSTIPATION). 11/7/23   Kevin Felipe M.D.   spironolactone (ALDACTONE) 25 MG Tab Take 25 mg by mouth every day. Indications: Edema, CHF 8/2/23   Physician Outpatient   HYDROcodone/acetaminophen (NORCO)  MG Tab Indications: Pain    Physician Outpatient   Melatonin 10 MG Tab Take 10 mg by mouth every evening. Indications: Trouble Sleeping    Physician Outpatient   acetaminophen (TYLENOL) 500 MG Tab Take 1,000 mg by mouth every 6 hours as needed. Indications: Fever, Pain    Physician Outpatient   Pain Pump (PATIENT SUPPLIED) Device  Inject  as directed continuous. Patient's Pain Pump (placed and maintained as an outpatient)    Medications/concentrations: Hydromorphone 4.0 mg/ml  Route: Intrathecal  Last changed: 3/22/2023  Refill pump before date: 7/2/2023  Continuous infusion rates (Drug/Rate): Hydromorphone 0.7491 mg/day or 0.0312 mg/hr (increased on 5/8/23)  MD office:   Indications: severe pain    Physician Outpatient   DULoxetine (CYMBALTA) 20 MG Cap DR Particles Take 20 mg by mouth 2 times a day. Indications: Musculoskeletal Pain 12/12/22   Physician Outpatient   guaiFENesin ER (MUCINEX) 600 MG TABLET SR 12 HR Take 600 mg by mouth every 12 hours. Indications: Cough    Physician Outpatient   Cyanocobalamin (VITAMIN B-12 PO) Take 1 Dose by mouth every day. Indications: supplement    Physician Outpatient   Cholecalciferol (VITAMIN D3 PO) Take 1 Tablet by mouth every day. Indications: supplement    Physician Outpatient   diltiazem (TIAZAC) 120 MG SR capsule Take 120 mg by mouth every evening. Indications: High Blood Pressure Disorder 9/13/22   Physician Outpatient   albuterol 108 (90 Base) MCG/ACT Aero Soln inhalation aerosol Inhale 2 Puffs every 6 hours as needed for Shortness of Breath. Indications: Chronic Obstructive Lung Disease    Physician Outpatient   Simethicone (GAS-X PO) Take 1 Tablet by mouth 3 times a day. Indications: gas    Physician Outpatient       Review of Systems:  Review of Systems   Constitutional: Negative.    HENT: Negative.     Eyes: Negative.    Respiratory:  Positive for shortness of breath.    Cardiovascular:  Positive for leg swelling. Negative for chest pain, palpitations, orthopnea and PND.   Gastrointestinal: Negative.    Genitourinary: Negative.    Musculoskeletal: Negative.    Skin: Negative.    Neurological: Negative.    Endo/Heme/Allergies: Negative.  Does not bruise/bleed easily.   Psychiatric/Behavioral: Negative.  Negative for depression. The patient is not nervous/anxious.      Pulse 63   Temp  "36.5 °C (97.7 °F) (Temporal)   Resp 20   Ht 1.702 m (5' 7\")   Wt 64.5 kg (142 lb 3.2 oz)   SpO2 96%     Physical Examination:  Physical Exam  Constitutional:       Appearance: Normal appearance.   HENT:      Head: Normocephalic and atraumatic.      Mouth/Throat:      Pharynx: Oropharynx is clear.   Eyes:      Extraocular Movements: Extraocular movements intact.      Pupils: Pupils are equal, round, and reactive to light.   Cardiovascular:      Rate and Rhythm: Normal rate and regular rhythm.      Pulses: Normal pulses.      Heart sounds: Murmur heard.      Systolic murmur is present with a grade of 3/6.      No friction rub.   Pulmonary:      Effort: Pulmonary effort is normal.      Breath sounds: Normal breath sounds.   Abdominal:      General: Abdomen is flat. Bowel sounds are normal.      Palpations: Abdomen is soft.   Musculoskeletal:         General: Normal range of motion.      Cervical back: Neck supple. No tenderness.   Skin:     General: Skin is warm and dry.      Capillary Refill: Capillary refill takes 2 to 3 seconds.   Neurological:      General: No focal deficit present.      Mental Status: She is alert and oriented to person, place, and time.   Psychiatric:         Mood and Affect: Mood normal.         Behavior: Behavior normal.         Impression:  88-year-old patient with a class II severe symptomatic aortic stenosis and frailty.  Discussed that of risks, benefits, and alternatives to TAVR were discussed and patient would like to proceed with scheduled TAVR procedure.    Plan:  Proceed with scheduled TAVR procedure.  Plan for admission overnight in the telemetry unit          Pre Procedure update:   No changes.    Nikki Lares A.P.R.NPaige  1/8/2024  "

## 2024-01-08 NOTE — OP REPORT
Operative Report    PreOp Diagnosis: Severe symptomatic aortic stenosis, HFpEF, PAF, Lung CA, COPD      PostOp Diagnosis: Same as above      Procedure(s):  TRANSCATHETER AORTIC VALVE REPLACEMENT WITH A 26 MM BERNICE S3- Wound Class: Clean  ECHOCARDIOGRAM, TRANSESOPHAGEAL, INTRAOPERATIVE - Wound Class: Clean Contaminated    Surgeon(s):  ANDRES Ramey D.O.    Anesthesiologist/Type of Anesthesia:  Anesthesiologist: Harjinder Monique M.D./General    Surgical Staff:  Circulator: Negar Laureano R.N.; Etienne Faria R.N.  Perfusionist: Dora Self  Scrub Person: Vannessa Garner; Maribel Maciel  Radiology Technologist: Magalie Car; Asim Orona    Specimens removed if any:  * No specimens in log *    Estimated Blood Loss: MINIMAL    Findings: no PVL    Complications: none immediate    Disposition: Stable to PACU    Procedure:    After informed consent was obtained, the patient was brought to the operating room and placed in the supine position on the operating table afterwards, general endotracheal anesthesia was induced by the anesthesia team.  Lines, catheters etc. were placed by the nursing and anesthesia team in the usual fashion.  Bony prominences were padded, joints placed in neutral position, and the patient was prepped and draped in the usual sterile fashion.  A timeout was performed.    We began the procedure by obtaining primary and secondary arterial access.  Primary arterial access was in the right common femoral artery, and secondary arterial access was in the right radial artery.  Access for temporary venous pacing wire was placed via the left common femoral vein.  The femoral vessels were accessed using a  Seldinger's technique with ultrasound guidance.  After obtaining access, 6 Algerian sheaths were placed here.  The right radial artery was accessed by my cardiology colleague in the usual fashion.  Right femoral angiogram was performed to confirm good positioning of  our sheath.  Following that, the temporary pacing lead was floated from the left common femoral vein to the right ventricular apex.  Testing confirmed excellent capture.  Following that, a J-wire was advanced via the right radial catheter with an overriding pigtail catheter into the aortic root.  Angiography was performed there and are coplanar view was determined.  Heparinization was performed    Via the right femoral artery, access obtained with a J-wire.  The 6 Belarusian sheath was removed and 2 Perclose sutures were placed in the usual fashion.  Following those, an 8 Belarusian sheath was placed.  We again obtained access with a J-wire and pigtail catheter.  The J-wire was exchanged for a Lunderquist wire.  This allowed us to upsize our primary arterial access site to a 14 Belarusian sheath.  Once the sheath was in place, it was secured with an Ethibond stitch.  A J-wire with an overriding AL-1 catheter was advanced to the aortic root.  The J-wire was exchanged for a 0.035 straight wire which was used to cross the aortic valve.  The AL-1 catheter was then advanced into the left ventricle.  The straight wire was then exchanged for extra-stiff Amplatz deployment wire.  The AL-1 catheter was removed.      The 26 mm valve was prepped on the back table in the usual fashion and brought up for advancement into the aorta.  Prior to placement, the configuration of the valve was confirmed to be correct.  It was advanced into the abdominal aorta under fluoroscopic guidance.  The valve and balloon were docked in the usual fashion in the abdominal aorta.  It was then advanced across the aortic arch and down into the aortic root and across the aortic valve.  The valve was then prepped for deployment.  Following that, respirations were held, rapid ventricular pacing performed, and root angiogram shot.  The valve was then deployed us at a pressure of 6.5 win, with 22 cc of contrast win.  The balloon was deflated at that point in the  delivery system removed.  Post deployment echo revealed no PVL .    We proceeded with removal of all her wires, catheters etc.  The right femoral artery was secured using the 2 previously placed Perclose sutures.  Angioseal device was placed added hemostasis.  Protamine was administered to reverse heparinization.  The right radial artery was treated with a TR band and pressure was held over the left common femoral venous site.  The patient tolerated procedure well, was taken to the CCU in stable condition.          1/8/2024 3:23 PM Cleve Davenport D.O.

## 2024-01-08 NOTE — ANESTHESIA PROCEDURE NOTES
Airway    Date/Time: 1/8/2024 2:07 PM    Performed by: Harjinder Monique M.D.  Authorized by: Harjinder Monique M.D.    Location:  OR  Urgency:  Elective  Indications for Airway Management:  Anesthesia      Spontaneous Ventilation: absent    Sedation Level:  Deep  Preoxygenated: Yes    Patient Position:  Sniffing  Final Airway Type:  Endotracheal airway  Final Endotracheal Airway:  ETT  Cuffed: Yes    Technique Used for Successful ETT Placement:  Direct laryngoscopy    Insertion Site:  Oral  Blade Type:  Judi  Laryngoscope Blade/Videolaryngoscope Blade Size:  3  ETT Size (mm):  7.5  Measured from:  Teeth  ETT to Teeth (cm):  20  Placement Verified by: auscultation and capnometry    Cormack-Lehane Classification:  Grade I - full view of glottis  Number of Attempts at Approach:  1

## 2024-01-08 NOTE — ANESTHESIA PROCEDURE NOTES
DONNELL    Date/Time: 1/8/2024 2:09 PM    Performed by: Harjinder Monique M.D.  Authorized by: Harjinder Monique M.D.    Start Time:1/8/2024 2:09 PM  Preanesthetic Checklist: patient identified, IV checked, site marked, risks and benefits discussed, surgical consent, monitors and equipment checked, pre-op evaluation and timeout performed    Indication for DONNELL: diagnostic     Intubated: Yes  Bite Block: Yes  Heart Visualized: Yes  Insertion: atraumatic    **See FULL DONNELL report in patient's chart via CV Synapse**

## 2024-01-08 NOTE — ANESTHESIA PROCEDURE NOTES
Arterial Line    Performed by: Harjinder Monique M.D.  Authorized by: Harjinder Monique M.D.    Start Time:  1/8/2024 2:12 PM  Localization: surface landmarks    Patient Location:  OR  Indication: continuous blood pressure monitoring        Catheter Size:  20 G  Seldinger Technique?: Yes    Laterality:  Left  Site:  Radial artery  Line Secured:  Antimicrobial disc, tape and transparent dressing  Events: patient tolerated procedure well with no complications

## 2024-01-08 NOTE — ANESTHESIA PREPROCEDURE EVALUATION
Case: [id number] Date/Time: 01/08/24 0845    Procedures:       TRANSCATHETER AORTIC VALVE REPLACEMENT      ECHOCARDIOGRAM, TRANSESOPHAGEAL, INTRAOPERATIVE      CUTDOWN, VASCULAR, FEMORAL, POSSIBLE    Pre-op diagnosis: SEVERE AORTIC STENOSIS    [facility]    Surgeons: Clayton Eddy M.D.            Relevant Problems   PULMONARY   (positive) Chronic obstructive pulmonary disease (HCC)   (positive) Moderate asthma without complication      NEURO   (positive) TIA (transient ischemic attack)      CARDIAC   (positive) External hemorrhoid   (positive) Moderate mitral insufficiency   (positive) PAF (paroxysmal atrial fibrillation) (HCC)   (positive) PVC's (premature ventricular contractions)   (positive) Primary hypertension   (positive) Pulmonary HTN (HCC)   (positive) Severe aortic stenosis         (positive) Stage 3b chronic kidney disease (HCC)       Physical Exam    Airway   Mallampati: II  TM distance: >3 FB  Neck ROM: full       Cardiovascular - normal exam  Rhythm: regular  Rate: normal  (-) murmur     Dental - normal exam           Pulmonary - normal exam  Breath sounds clear to auscultation     Abdominal    Neurological - normal exam                   Anesthesia Plan    ASA 4       Plan - general       Airway plan will be ETT  DONNELL Planned        Induction: intravenous    Postoperative Plan: Postoperative administration of opioids is intended.    Pertinent diagnostic labs and testing reviewed    Informed Consent:    Anesthetic plan and risks discussed with patient.    Use of blood products discussed with: patient whom consented to blood products.

## 2024-01-09 ENCOUNTER — APPOINTMENT (OUTPATIENT)
Dept: RADIOLOGY | Facility: MEDICAL CENTER | Age: 89
DRG: 266 | End: 2024-01-09
Payer: MEDICARE

## 2024-01-09 ENCOUNTER — HOME HEALTH ADMISSION (OUTPATIENT)
Dept: HOME HEALTH SERVICES | Facility: HOME HEALTHCARE | Age: 89
End: 2024-01-09
Payer: MEDICARE

## 2024-01-09 VITALS
BODY MASS INDEX: 23.91 KG/M2 | OXYGEN SATURATION: 96 % | WEIGHT: 152.34 LBS | DIASTOLIC BLOOD PRESSURE: 54 MMHG | SYSTOLIC BLOOD PRESSURE: 118 MMHG | HEIGHT: 67 IN | TEMPERATURE: 98.4 F | RESPIRATION RATE: 17 BRPM | HEART RATE: 89 BPM

## 2024-01-09 PROBLEM — I35.0 SEVERE AORTIC STENOSIS: Status: RESOLVED | Noted: 2023-10-31 | Resolved: 2024-01-09

## 2024-01-09 LAB
ALBUMIN SERPL BCP-MCNC: 3.7 G/DL (ref 3.2–4.9)
ALBUMIN/GLOB SERPL: 1.4 G/DL
ALP SERPL-CCNC: 126 U/L (ref 30–99)
ALT SERPL-CCNC: 12 U/L (ref 2–50)
ANION GAP SERPL CALC-SCNC: 9 MMOL/L (ref 7–16)
AST SERPL-CCNC: 23 U/L (ref 12–45)
BILIRUB SERPL-MCNC: 0.2 MG/DL (ref 0.1–1.5)
BUN SERPL-MCNC: 27 MG/DL (ref 8–22)
CALCIUM ALBUM COR SERPL-MCNC: 9.6 MG/DL (ref 8.5–10.5)
CALCIUM SERPL-MCNC: 9.4 MG/DL (ref 8.5–10.5)
CHLORIDE SERPL-SCNC: 99 MMOL/L (ref 96–112)
CO2 SERPL-SCNC: 29 MMOL/L (ref 20–33)
CREAT SERPL-MCNC: 0.95 MG/DL (ref 0.5–1.4)
EKG IMPRESSION: NORMAL
ERYTHROCYTE [DISTWIDTH] IN BLOOD BY AUTOMATED COUNT: 47.1 FL (ref 35.9–50)
GFR SERPLBLD CREATININE-BSD FMLA CKD-EPI: 58 ML/MIN/1.73 M 2
GLOBULIN SER CALC-MCNC: 2.7 G/DL (ref 1.9–3.5)
GLUCOSE SERPL-MCNC: 191 MG/DL (ref 65–99)
HCT VFR BLD AUTO: 34.6 % (ref 37–47)
HGB BLD-MCNC: 10.6 G/DL (ref 12–16)
MCH RBC QN AUTO: 26.2 PG (ref 27–33)
MCHC RBC AUTO-ENTMCNC: 30.6 G/DL (ref 32.2–35.5)
MCV RBC AUTO: 85.6 FL (ref 81.4–97.8)
NT-PROBNP SERPL IA-MCNC: 2256 PG/ML (ref 0–125)
PLATELET # BLD AUTO: 168 K/UL (ref 164–446)
PMV BLD AUTO: 11.1 FL (ref 9–12.9)
POTASSIUM SERPL-SCNC: 4.4 MMOL/L (ref 3.6–5.5)
PROT SERPL-MCNC: 6.4 G/DL (ref 6–8.2)
RBC # BLD AUTO: 4.04 M/UL (ref 4.2–5.4)
SODIUM SERPL-SCNC: 137 MMOL/L (ref 135–145)
WBC # BLD AUTO: 5.8 K/UL (ref 4.8–10.8)

## 2024-01-09 PROCEDURE — 99239 HOSP IP/OBS DSCHRG MGMT >30: CPT

## 2024-01-09 PROCEDURE — 85027 COMPLETE CBC AUTOMATED: CPT

## 2024-01-09 PROCEDURE — 93005 ELECTROCARDIOGRAM TRACING: CPT

## 2024-01-09 PROCEDURE — 700102 HCHG RX REV CODE 250 W/ 637 OVERRIDE(OP)

## 2024-01-09 PROCEDURE — 71045 X-RAY EXAM CHEST 1 VIEW: CPT

## 2024-01-09 PROCEDURE — 97162 PT EVAL MOD COMPLEX 30 MIN: CPT

## 2024-01-09 PROCEDURE — 36415 COLL VENOUS BLD VENIPUNCTURE: CPT

## 2024-01-09 PROCEDURE — 83880 ASSAY OF NATRIURETIC PEPTIDE: CPT

## 2024-01-09 PROCEDURE — A9270 NON-COVERED ITEM OR SERVICE: HCPCS

## 2024-01-09 PROCEDURE — 93010 ELECTROCARDIOGRAM REPORT: CPT | Performed by: INTERNAL MEDICINE

## 2024-01-09 PROCEDURE — 97535 SELF CARE MNGMENT TRAINING: CPT

## 2024-01-09 PROCEDURE — 80053 COMPREHEN METABOLIC PANEL: CPT

## 2024-01-09 PROCEDURE — 700111 HCHG RX REV CODE 636 W/ 250 OVERRIDE (IP): Mod: JZ

## 2024-01-09 RX ORDER — ASPIRIN 81 MG/1
81 TABLET ORAL DAILY
Qty: 100 TABLET | Refills: 3 | Status: SHIPPED | OUTPATIENT
Start: 2024-01-10

## 2024-01-09 RX ADMIN — FENTANYL CITRATE 25 MCG: 50 INJECTION, SOLUTION INTRAMUSCULAR; INTRAVENOUS at 03:23

## 2024-01-09 RX ADMIN — Medication 400 MG: at 06:08

## 2024-01-09 RX ADMIN — DULOXETINE HYDROCHLORIDE 20 MG: 20 CAPSULE, DELAYED RELEASE ORAL at 06:07

## 2024-01-09 RX ADMIN — TRAMADOL HYDROCHLORIDE 50 MG: 50 TABLET ORAL at 12:44

## 2024-01-09 RX ADMIN — POTASSIUM CHLORIDE 20 MEQ: 1500 TABLET, EXTENDED RELEASE ORAL at 06:07

## 2024-01-09 RX ADMIN — SPIRONOLACTONE 25 MG: 25 TABLET ORAL at 06:06

## 2024-01-09 RX ADMIN — FUROSEMIDE 40 MG: 10 INJECTION, SOLUTION INTRAMUSCULAR; INTRAVENOUS at 06:04

## 2024-01-09 RX ADMIN — DOCUSATE SODIUM 50 MG AND SENNOSIDES 8.6 MG 2 TABLET: 8.6; 5 TABLET, FILM COATED ORAL at 06:06

## 2024-01-09 RX ADMIN — GUAIFENESIN 600 MG: 600 TABLET, EXTENDED RELEASE ORAL at 06:05

## 2024-01-09 RX ADMIN — POLYETHYLENE GLYCOL 3350 1 PACKET: 17 POWDER, FOR SOLUTION ORAL at 06:03

## 2024-01-09 RX ADMIN — OMEPRAZOLE 20 MG: 20 CAPSULE, DELAYED RELEASE ORAL at 06:07

## 2024-01-09 RX ADMIN — ASPIRIN 81 MG: 81 TABLET, COATED ORAL at 06:07

## 2024-01-09 ASSESSMENT — COGNITIVE AND FUNCTIONAL STATUS - GENERAL
WALKING IN HOSPITAL ROOM: A LITTLE
SUGGESTED CMS G CODE MODIFIER MOBILITY: CK
MOBILITY SCORE: 17
MOVING TO AND FROM BED TO CHAIR: A LITTLE
STANDING UP FROM CHAIR USING ARMS: A LITTLE
MOVING FROM LYING ON BACK TO SITTING ON SIDE OF FLAT BED: A LITTLE
CLIMB 3 TO 5 STEPS WITH RAILING: A LOT
TURNING FROM BACK TO SIDE WHILE IN FLAT BAD: A LITTLE

## 2024-01-09 ASSESSMENT — GAIT ASSESSMENTS
DISTANCE (FEET): 20
ASSISTIVE DEVICE: FRONT WHEEL WALKER
GAIT LEVEL OF ASSIST: SUPERVISED

## 2024-01-09 ASSESSMENT — PAIN SCALES - GENERAL: PAIN_LEVEL: 4

## 2024-01-09 ASSESSMENT — PAIN DESCRIPTION - PAIN TYPE
TYPE: SURGICAL PAIN
TYPE: ACUTE PAIN

## 2024-01-09 ASSESSMENT — FIBROSIS 4 INDEX: FIB4 SCORE: 3.4

## 2024-01-09 NOTE — ANESTHESIA POSTPROCEDURE EVALUATION
Patient: Chelly Decker    Procedure Summary       Date: 01/08/24 Room / Location: [place] [id number] / SURGERY [facility]    Anesthesia Start: 1357 Anesthesia Stop: 1527    Procedures:       TRANSCATHETER AORTIC VALVE REPLACEMENT (Groin)      ECHOCARDIOGRAM, TRANSESOPHAGEAL, INTRAOPERATIVE (Esophagus) Diagnosis: (SEVERE AORTIC STENOSIS)    Surgeons: Clayton Eddy M.D. Responsible Provider: Harjinder Monique M.D.    Anesthesia Type: general ASA Status: 4            Final Anesthesia Type: general  Last vitals  BP   Blood Pressure : 118/54    Temp   36.9 °C (98.4 °F)    Pulse   89   Resp   17    SpO2   96 %      Anesthesia Post Evaluation    Patient location during evaluation: PACU  Patient participation: complete - patient participated  Level of consciousness: awake and alert  Pain score: 4    Airway patency: patent  Anesthetic complications: no  Cardiovascular status: hemodynamically stable  Respiratory status: acceptable  Hydration status: euvolemic    PONV: none          No notable events documented.     Nurse Pain Score: 8 (NPRS)

## 2024-01-09 NOTE — THERAPY
Physical Therapy   Initial Evaluation     Patient Name: Chelly Decker  Age:  88 y.o., Sex:  female  Medical Record #: [id number]  Today's Date: 1/9/2024     Precautions  Precautions: Fall Risk    Assessment  Patient is 88 y.o. female admitted for TAVR.  PMH extensive, includes lung cancer s/p radiation, avascular hip necrosis, CHF, pneumonia, R drop foot, and R hammer toes.  Pt was seen for PT evaluation.  Educated on post TAVR handout including talk test, RPE scale, and home walking program.  Pt hyperverbose & tangential however receptive to education.  Pt demo'd functional mobility in her room with FWW and SPV, appears to be at functional baseline.  Answered pt & family's questions as able.  No further acute PT needs.    Plan    Physical Therapy Initial Treatment Plan   Duration: Evaluation only    DC Equipment Recommendations: None  Discharge Recommendations: Pt has home health PT lined up already, would benefit to progress higher level mobility.     Objective     01/09/24 1014   Precautions   Precautions Fall Risk   Pain 0 - 10 Group   Therapist Pain Assessment Post Activity Pain Same as Prior to Activity;Nurse Notified (Not quantified)   Prior Living Situation   Prior Services Skilled Home Health Services   Housing / Facility [address]   Steps Into Home (threshold)   Equipment Owned Front-Wheel Walker   Lives with - Patient's Self Care Capacity Adult Children   Comments Pt lives in the in law unit next to her dtr's house   Prior Level of Functional Mobility   Bed Mobility Independent   Transfer Status Independent   Ambulation Independent   Ambulation Distance ~20-30 ft   Assistive Devices Used Front-Wheel Walker   Stairs Required Assist (Family present for going in/out of house)   Comments Pt & dtr reported pt primarily spends time in bed or sitting EOB, only walks short distances in her home.   Cognition    Cognition / Consciousness X   Level of Consciousness Alert   Comments Pleasant & receptive,  hyperverbose & tangential   Active ROM Lower Body    Active ROM Lower Body  X   Comments WFL except R ankle DF   Strength Lower Body   Lower Body Strength  X   Comments WFL except R ankle DF, pt wears an ACE wrap wrapped around leg to provide DF   Sensation Lower Body   Comments Endorsed neuropathy BLE   Balance Assessment   Sitting Balance (Static) Fair   Sitting Balance (Dynamic) Fair   Standing Balance (Static) Fair   Standing Balance (Dynamic) Fair   Weight Shift Sitting Fair   Weight Shift Standing Fair   Bed Mobility    Supine to Sit Supervised   Sit to Supine Supervised   Scooting Supervised   Comments HOB flat   Gait Analysis   Gait Level Of Assist Supervised   Assistive Device Front Wheel Walker   Distance (Feet) 20   # of Times Distance was Traveled 1   Deviation (decreased R foot clearance, external rotation to compensate)   Weight Bearing Status No restrictions   Functional Mobility   Sit to Stand Supervised   Bed, Chair, Wheelchair Transfer Supervised   Transfer Method Stand Step   Mobility bed mobility, short gait in room   Activity Tolerance   Sitting Edge of Bed 5 min   Standing 5 min   Education Group   Education Provided Role of Physical Therapist;Cardiac Precautions   Cardiac Precautions Patient Response Patient;Acceptance;Explanation;Verbal Demonstration;Handout   Role of Physical Therapist Patient Response Patient;Acceptance;Explanation;Verbal Demonstration;Handout   Additional Comments Post TAVR handout provided   Physical Therapy Initial Treatment Plan    Duration Evaluation only

## 2024-01-09 NOTE — PROGRESS NOTES
Patient in PACU in stable condition. VSS. Surgical dressing clean dry intact. Will continue to monitor and medicate appropriately.

## 2024-01-09 NOTE — PROGRESS NOTES
Bedside report received from night shift RN. Assumed care. Pt is a&o X 4 , Pt is in bed resting. Pt denies pain at this time. Pt was updated on plan of care. Pt has call light, personal belongings, and bedside table within reach. Bed is in the lowest position and bed alarm is on. Will continue to monitor.

## 2024-01-09 NOTE — DISCHARGE SUMMARY
PRIMARY DISCHARGE DIAGNOSIS: Status post transcatheter aortic valve replacement.    DISCHARGE DIAGNOSIS:  S/P TAVR    PROCEDURES/TESTIN. Successful transcatheter aortic valve replacement (TAVR) with #26 Waldron Kamla S3 Ultra Resilia deployed at nominal volume -1 cc via the transfemoral approach on 2024 under general anesthesia  2. Intraoperative transesophageal echocardiogram showing results pending  3. CXR on 2024 showing   1.  Mild interstitial pulmonary edema  2.  Persistent LEFT lower lobe scar  4. EKG on 2024  Sinus rhythm, short sinus pauses, borderline prolonged NV interval  Rate 76, 0.215/0.078/0.453      Labs   Lab Results   Component Value Date/Time    SODIUM 137 2024 02:13 AM    POTASSIUM 4.4 2024 02:13 AM    CHLORIDE 99 2024 02:13 AM    CO2 29 2024 02:13 AM    GLUCOSE 191 (H) 2024 02:13 AM    BUN 27 (H) 2024 02:13 AM    CREATININE 0.95 2024 02:13 AM    GLOMRATE 64 10/06/2023 04:33 AM       Lab Results   Component Value Date/Time    PROTHROMBTM 13.7 2024 08:35 AM    INR 1.04 2024 08:35 AM       Lab Results   Component Value Date/Time    WBC 5.8 2024 02:13 AM    RBC 4.04 (L) 2024 02:13 AM    HEMOGLOBIN 10.6 (L) 2024 02:13 AM    HEMATOCRIT 34.6 (L) 2024 02:13 AM    MCV 85.6 2024 02:13 AM    MCH 26.2 (L) 2024 02:13 AM    MCHC 30.6 (L) 2024 02:13 AM    MPV 11.1 2024 02:13 AM    NEUTSPOLYS 37.10 (L) 2023 06:40 AM    LYMPHOCYTES 49.40 (H) 2023 06:40 AM    MONOCYTES 8.80 2023 06:40 AM    EOSINOPHILS 4.00 2023 06:40 AM    BASOPHILS 0.60 2023 06:40 AM         HOSPITAL COURSE: The patient is a pleasant 88 year old female with severe symptomatic aortic stenosis, lung cancer status post radiation, chronic lower extremity edema, 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 Due to the patient's symptoms, the  patient underwent successful TAVR described as above. Post-procedure, the patient did well. They did require IV diuresis during their stay and will continue on oral diuretics post-operatively. Acute on chronic diastolic heart failure exacerbation as evidenced by: signs and symptoms  shortness of breath, YOU, weakness/fatigue; Echocardiogram showing dilated left atrium and severe aortic stenosis; corroborated by elevated BNP (2256), pulmonary edema on chest x-ray, or PA catheter pressure elevations.   They are at their baseline physical function and on their baseline oxygen level. PT evaluated them and recommended home health. No further events were noted during their stay.     DISCHARGE MEDICATIONS:      Medication List        START taking these medications        Instructions   aspirin 81 MG EC tablet  Start taking on: January 10, 2024   Take 1 Tablet by mouth every day.  Dose: 81 mg            CONTINUE taking these medications        Instructions   acetaminophen 500 MG Tabs  Commonly known as: Tylenol   Take 1,000 mg by mouth every 6 hours as needed. Indications: Fever, Pain  Dose: 1,000 mg     albuterol 108 (90 Base) MCG/ACT Aers inhalation aerosol   Inhale 2 Puffs every 6 hours as needed for Shortness of Breath. Indications: Chronic Obstructive Lung Disease  Dose: 2 Puff     atorvastatin 40 MG Tabs  Commonly known as: Lipitor   Take 40 mg by mouth every day.  Dose: 40 mg     CRANBERRY PO   Take 1 Tablet by mouth every day.  Dose: 1 Tablet     diltiazem 120 MG SR capsule  Commonly known as: Tiazac   Take 120 mg by mouth every evening. Indications: High Blood Pressure Disorder  Dose: 120 mg     DULoxetine 20 MG Cpep  Commonly known as: Cymbalta   Take 20 mg by mouth 2 times a day. Indications: Musculoskeletal Pain  Dose: 20 mg     furosemide 20 MG Tabs  Commonly known as: Lasix   Take 20 mg by mouth every morning.  Dose: 20 mg     GAS-X PO   Take 1 Tablet by mouth 3 times a day. Indications: gas  Dose: 1 Tablet      guaiFENesin  MG Tb12  Commonly known as: Mucinex   Take 600 mg by mouth every 12 hours. Indications: Cough  Dose: 600 mg     losartan 25 MG Tabs  Commonly known as: Cozaar   Take 12.5 mg by mouth every day. 0.5 tablet (12.5 mg)  Dose: 12.5 mg     magnesium oxide 400 MG Tabs tablet  Commonly known as: Mag-Ox   Take 200-400 mg by mouth 2 times a day. 400 mg every morning  200 mg every afternoon  Dose: 200-400 mg     Melatonin 10 MG Tabs   Take 10 mg by mouth every evening. Indications: Trouble Sleeping  Dose: 10 mg     meloxicam 7.5 MG Tabs  Commonly known as: Mobic   Take 7.5 mg by mouth 2 times a day.  Dose: 7.5 mg     pantoprazole 40 MG Tbec  Commonly known as: Protonix   Take 40 mg by mouth every morning.  Dose: 40 mg     polyethylene glycol 3350 17 GM/SCOOP Powd  Commonly known as: Miralax   Take 1 Dose by mouth every day.  Dose: 1 Dose     PROBIOTIC DAILY PO   Take 1 Tablet by mouth every day.  Dose: 1 Tablet     sennosides-docusate sodium 8.6-50 MG tablet  Commonly known as: Senokot-S   Take 1 Tablet by mouth 1 time a day as needed (CONSTIPATION).  Dose: 1 Tablet     spironolactone 25 MG Tabs  Commonly known as: Aldactone   Take 25 mg by mouth every day. Indications: Edema, CHF  Dose: 25 mg     traMADol 50 MG Tabs  Commonly known as: Ultram   Take 50 mg by mouth 2 times a day.  Dose: 50 mg     VITAMIN B-12 PO   Take 1 Dose by mouth every day. Indications: supplement  Dose: 1 Dose     VITAMIN D3 PO   Take 1 Tablet by mouth every day. Indications: supplement  Dose: 1 Tablet            ASK your doctor about these medications        Instructions   Pain Pump Milagros  Commonly known as: Patient Supplied   Inject  as directed continuous. Patient's Pain Pump (placed and maintained as an outpatient)    Medications/concentrations: Hydromorphone 4000 mcg/ml (749.1 mcg/day)  Route: Intrathecal  Last changed: 11/29/2023 @ 1558  Refill pump before date: 2/27/24  Continuous infusion rates (Drug/Rate): Hydromorphone  0.7491 mg/day or 0.0312 mg/hr   MD office:   Indications: severe pain              DISCHARGE INSTRUCTIONS: They are given discharge instructions on potential post-operative complications and symptoms to watch out for. Their groin sites were checked and were clean, dry, and intact. Patient or family to notify us for any complications noted on the discharge instructions.  Based on a possible history of PAF I have sent her home with a cardiac event monitor for 14 days.  They will follow up with myself, Chantal BUTLER, on Tuesday in our cardiology office. They will not need labs before their follow up appointment. They will then follow up with Dr. Eddy with a repeat echocardiogram in one month for post TAVR assessment.      Future Appointments   Date Time Provider Department Center   1/16/2024  1:45 PM FARRAH Bowers None   2/6/2024  2:15 PM [facility] EX[facility] 9 [facility]   2/7/2024  3:50 PM ANDRES Ortega   2/14/2024  2:40 PM ANDRES Ramey None   12/16/2024  3:15 PM [facility] EXAM 10 ECHO [facility]   12/19/2024  1:15 PM FARRAH Bowers None       Discharge time spent with patient was 32 minutes.

## 2024-01-09 NOTE — PROGRESS NOTES
Monitor Summary:  Rhythm: SB-SR Rate: 54-97  Ectopies: touched down to 40, PVC  Measurement: .20/.08/.44

## 2024-01-09 NOTE — ANESTHESIA TIME REPORT
Anesthesia Start and Stop Event Times       Date Time Event    1/8/2024 1314 Ready for Procedure     1357 Anesthesia Start     1527 Anesthesia Stop          Responsible Staff  01/08/24      Name Role Begin End    Harjinder Monique M.D. Anesth 1357 1527          Overtime Reason:  overtime    Comments:

## 2024-01-09 NOTE — FACE TO FACE
Face to Face Supporting Documentation - Home Health    The encounter with this patient was in whole or in part the primary reason for home health admission.    Date of encounter:   Patient:                    MRN:                       YOB: 2022  Chelly Decker  [id number]  1935     Home health to see patient for:  Physical Therapy evaluation and treatment and Occupational therapy evaluation and treatment    Skilled need for:  Exacerbation of Chronic Disease State chronic debility    Skilled nursing interventions to include:  Comment: assistance with ADLs    Homebound status evidenced by:  Need the aid of supportive devices such as crutches, canes, wheelchairs or walkers or Needs the assistance of another person in order to leave the home. Leaving home requires a considerable and taxing effort. There is a normal inability to leave the home.    Community Physician to provide follow up care: Kassi Carrillo M.D.     Optional Interventions? No      I certify the face to face encounter for this home health care referral meets the CMS requirements and the encounter/clinical assessment with the patient was, in whole, or in part, for the medical condition(s) listed above, which is the primary reason for home health care. Based on my clinical findings: the service(s) are medically necessary, support the need for home health care, and the homebound criteria are met.  I certify that this patient has had a face to face encounter by myself.  TIMOTEO Bowers. - NPI: [id number]

## 2024-01-09 NOTE — CARE PLAN
The patient is Watcher - Medium risk of patient condition declining or worsening    Shift Goals  Clinical Goals: completing post op vitals, monitor groin sites and wrist site, control pain  Patient Goals: rest, pain control    Progress made toward(s) clinical / shift goals:    Problem: Pain - Standard  Goal: Alleviation of pain or a reduction in pain to the patient’s comfort goal  Outcome: Progressing  Note: Patient's pain is treated per MAR     Problem: Knowledge Deficit - Standard  Goal: Patient and family/care givers will demonstrate understanding of plan of care, disease process/condition, diagnostic tests and medications  Outcome: Progressing     Problem: Skin Integrity  Goal: Skin integrity is maintained or improved  Outcome: Progressing     Problem: Fall Risk  Goal: Patient will remain free from falls  Outcome: Progressing  Note: Bed is locked and in lowest position. Bed alarm is on. Patient's daughter spends the night with her. Call light and personal belonging within reach.     Problem: Post Op Day 1 CABG/Heart Valve Replacement  Goal: Optimal care of the post op CABG/heart valve replacement Post Op Day 1  Outcome: Progressing       Patient is not progressing towards the following goals:

## 2024-01-09 NOTE — PROGRESS NOTES
4 Eyes Skin Assessment Completed by АННА Herrera and АННА Cameron.    Head WDL  Ears WDL  Nose WDL  Mouth WDL  Neck WDL  Breast/Chest Redness  Shoulder Blades WDL  Spine Redness  (R) Arm/Elbow/Hand Redness and Blanching TR band removal site  (L) Arm/Elbow/Hand Redness and Blanching  Abdomen WDL  Groin Redness and Blanching  Scrotum/Coccyx/Buttocks Redness  (R) Leg Bruising and Incision Groin site  (L) Leg Bruising and Incision Groin site  (R) Heel/Foot/Toe Redness and Blanching  (L) Heel/Foot/Toe Redness and Blanching          Devices In Places Tele Box and Pulse Ox      Interventions In Place Gray Ear Foams, Sacral Mepilex, and Q2 Turns    Possible Skin Injury Yes    Pictures Uploaded Into Epic Yes  Wound Consult Placed Yes  RN Wound Prevention Protocol Ordered Yes

## 2024-01-09 NOTE — DISCHARGE PLANNING
Received Choice Form @: 4817  Agency/Facility Name: [facility]  Referral Sent Per Choice Form @: 8585

## 2024-01-09 NOTE — PROGRESS NOTES
Received report from АННА [place] (Pacu). Pt is A&O x 4, drowsy. Pt transported via [facility] with ACLS RN and zoll monitor. Pt arrived to unit, tele box on patient, confirmed with monitor room. Pt slid from rney to bed via slide board. Pt on bed rest until 1915. Pt has Bilateral groin sites and R radial TR band in place. Pt oriented to unit and call light, verbalized understanding. Fall precautions in place. Call light and bedside table within reach, bed locked in lowest position with controls on/bed alarm on. Assessment completed. Will continue to monitor.

## 2024-01-09 NOTE — DISCHARGE PLANNING
Care Transition Team Assessment  LMSW spoke with pt and daughter at bedside to conduct assessment and verify demographics. Pt lives with her daughter in  2 story house with 2 steps to enter. Pt stated that she has her own space downstairs so she does not have to go up stairs. Pt was independent with her ADLS and uses her walker to ambulate and at times uses her cane. Pt is on home O2 with [employer]. Baseline is 3-4 liters.  Pt requested [facility] due to having [facility] the past. Upon DC daughter will be able to transport pt.     Information Source  Orientation Level: Oriented X4  Information Given By: Patient  Who is responsible for making decisions for patient? : Patient    Readmission Evaluation  Is this a readmission?: No    Elopement Risk  Legal Hold: No  Ambulatory or Self Mobile in Wheelchair: No-Not an Elopement Risk  Elopement Risk: Not at Risk for Elopement    Interdisciplinary Discharge Planning  Primary Care Physician: JUSTIN TREVINO M.D.  Lives with - Patient's Self Care Capacity: Adult Children  Patient or legal guardian wants to designate a caregiver: No  Support Systems: Children  Housing / Facility: [address] [facility]  Prior Services: Skilled Home Health Services  Durable Medical Equipment: Home Oxygen    Discharge Preparedness  What is your plan after discharge?: Home health care  What are your discharge supports?: Child, Other (comment)  Prior Functional Level: Independent with Activities of Daily Living, Uses Walker  Difficulity with ADLs: Walking  Difficulity with IADLs: Driving    Functional Assesment  Prior Functional Level: Independent with Activities of Daily Living, Uses Walker    Finances  Financial Barriers to Discharge: No  Prescription Coverage: Yes    Vision / Hearing Impairment  Vision Impairment : Yes  Right Eye Vision: Impaired, Wears Glasses  Left Eye Vision: Impaired, Wears Glasses  Hearing Impairment : No         Advance Directive  Advance Directive?: None    Domestic Abuse  Have you  ever been the victim of abuse or violence?: No  Physical Abuse or Sexual Abuse: No  Verbal Abuse or Emotional Abuse: No  Possible Abuse/Neglect Reported to:: Not Applicable    Psychological Assessment  History of Substance Abuse: None  History of Psychiatric Problems: No  Non-compliant with Treatment: No  Newly Diagnosed Illness: No    Discharge Risks or Barriers  Discharge risks or barriers?: No    Anticipated Discharge Information  Discharge Disposition: D/T to SNF with Medicare cert in anticipation of skilled care (03)  Discharge Address: [facility] [place] [facility] [address]  Discharge Contact Phone Number: [phone]

## 2024-01-09 NOTE — DISCHARGE PLANNING
ATTN: Case Management  RE: Referral for Home Health    As of 1/9/24, we have accepted the Home Health referral for the patient listed above.    A [facility] clinician will be out to see the patient within 48 hours. If you have any questions or concerns regarding the patient's transition to Home Health, please do not hesitate to contact us at [phone].      We look forward to collaborating with you,  [facility] Home Health Team

## 2024-01-09 NOTE — DISCHARGE PLANNING
LMSW received choice for HH pt wanted [facility] due to working with [facility] prior and second choice as [facility].    Metronidazole Pregnancy And Lactation Text: This medication is Pregnancy Category B and considered safe during pregnancy.  It is also excreted in breast milk.

## 2024-01-10 NOTE — DISCHARGE PLANNING
HTH/SCP TCN chart review completed. Collaborated with DYLAN Choe prior to meeting with the pt. The most current review of medical record, knowledge of pt's PLOF and social support, LACE+ score of 54, 6 clicks scores of 14 ADL's and 12 mobility were considered.      Pt seen at bedside. Introduced TCN program. Provided education regarding post acute levels of care. Discussed HTH/SCP plan benefits. Pt verbalizes understanding.     Patient states she lives with her daughter in a 2 level home, patient stays in the in-law-quarters on the ground floor and was independent with ADL's (supervised bathing) and mobility (no AD).  Daughter completes all IADL's and provides transportation as needed.  She states she is not at her baseline level of function and is agreeable to HH at discharge.      Choice proactively obtained for HH ([facility], [facility]), faxed to [facility] and given to DYLAN.  TCN will continue to follow and collaborate with discharge planning team as additional post acute needs arise. Thank you.     Completed today:  PT with recommendations for HH.   Choice obtained: MAYRA ([facility], [facility]UN[facility]) on 1/9/24.    SCP with [facility].   Patient states she will schedule her own Follow up appointment.     Addendum:  4147-  Per chart review, [facility] has accepted.

## 2024-01-11 ENCOUNTER — PATIENT OUTREACH (OUTPATIENT)
Dept: HEALTH INFORMATION MANAGEMENT | Facility: OTHER | Age: 89
End: 2024-01-11
Payer: MEDICARE

## 2024-01-11 DIAGNOSIS — I10 PRIMARY HYPERTENSION: ICD-10-CM

## 2024-01-11 DIAGNOSIS — J44.0 CHRONIC OBSTRUCTIVE PULMONARY DISEASE WITH ACUTE LOWER RESPIRATORY INFECTION (HCC): ICD-10-CM

## 2024-01-11 LAB — LV EJECT FRACT  99904: 55

## 2024-01-11 NOTE — PROGRESS NOTES
Nurse DYLAN outreach call for follow-up. Pt recently discharged from hospital on 1/9/24 after TAVR procedure. Pt reports she has been tired since discharge. Reports she spoke to [facility] and will be having visits from nurse. Pt reports her family is assisting with her care. States her son is staying with her and also her grandson is helping with care. Pt reports she has all of her medications. Reports she is using walker to assist with mobility. Pt is scheduled to see cardiology on 1/16/24. Pt aware of appt date and time. Pt requesting appt be canceled with [facility] provider on 1/17. Pt reports she will follow-up with Dr. Carrillo at February appt. Pt reports she can't make so many trips to [place] as she lives in [place]. Pt reports no other current problems. States she will contact nurse DYLAN if any concerns.

## 2024-01-12 ENCOUNTER — PATIENT OUTREACH (OUTPATIENT)
Dept: HEALTH INFORMATION MANAGEMENT | Facility: OTHER | Age: 89
End: 2024-01-12
Payer: MEDICARE

## 2024-01-12 DIAGNOSIS — I10 PRIMARY HYPERTENSION: ICD-10-CM

## 2024-01-12 DIAGNOSIS — J44.0 CHRONIC OBSTRUCTIVE PULMONARY DISEASE WITH ACUTE LOWER RESPIRATORY INFECTION (HCC): ICD-10-CM

## 2024-01-12 NOTE — PROGRESS NOTES
Nurse outreach call to pt for follow-up on message from MA that pt requested a return call from nurse. Pt answered telephone. States she needs to review all upcoming appts with nurse. Nurse reviewed all appts with pt. Pt reports she has put appts in her calendar. Pt has nurse CM contact number if any questions.

## 2024-01-16 ENCOUNTER — DOCUMENTATION (OUTPATIENT)
Dept: CARDIOLOGY | Facility: MEDICAL CENTER | Age: 89
End: 2024-01-16

## 2024-01-16 ENCOUNTER — OFFICE VISIT (OUTPATIENT)
Dept: CARDIOLOGY | Facility: MEDICAL CENTER | Age: 89
End: 2024-01-16
Payer: MEDICARE

## 2024-01-16 VITALS
SYSTOLIC BLOOD PRESSURE: 112 MMHG | RESPIRATION RATE: 14 BRPM | BODY MASS INDEX: 22.6 KG/M2 | WEIGHT: 144 LBS | HEART RATE: 109 BPM | DIASTOLIC BLOOD PRESSURE: 54 MMHG | OXYGEN SATURATION: 93 % | HEIGHT: 67 IN

## 2024-01-16 DIAGNOSIS — I48.0 PAF (PAROXYSMAL ATRIAL FIBRILLATION) (HCC): ICD-10-CM

## 2024-01-16 DIAGNOSIS — Z95.2 S/P TAVR (TRANSCATHETER AORTIC VALVE REPLACEMENT): ICD-10-CM

## 2024-01-16 DIAGNOSIS — I10 PRIMARY HYPERTENSION: ICD-10-CM

## 2024-01-16 DIAGNOSIS — E78.49 OTHER HYPERLIPIDEMIA: ICD-10-CM

## 2024-01-16 DIAGNOSIS — I50.33 ACUTE ON CHRONIC HEART FAILURE WITH PRESERVED EJECTION FRACTION (HCC): ICD-10-CM

## 2024-01-16 LAB — EKG IMPRESSION: NORMAL

## 2024-01-16 PROCEDURE — 3078F DIAST BP <80 MM HG: CPT

## 2024-01-16 PROCEDURE — 99214 OFFICE O/P EST MOD 30 MIN: CPT

## 2024-01-16 PROCEDURE — 3074F SYST BP LT 130 MM HG: CPT

## 2024-01-16 PROCEDURE — 93005 ELECTROCARDIOGRAM TRACING: CPT

## 2024-01-16 PROCEDURE — 93010 ELECTROCARDIOGRAM REPORT: CPT | Performed by: INTERNAL MEDICINE

## 2024-01-16 ASSESSMENT — ENCOUNTER SYMPTOMS
PALPITATIONS: 0
SHORTNESS OF BREATH: 0
GASTROINTESTINAL NEGATIVE: 1
NEUROLOGICAL NEGATIVE: 1
BACK PAIN: 1
NERVOUS/ANXIOUS: 0
PND: 0
DEPRESSION: 0
EYES NEGATIVE: 1
ORTHOPNEA: 0

## 2024-01-16 ASSESSMENT — FIBROSIS 4 INDEX: FIB4 SCORE: 3.4

## 2024-01-16 NOTE — PROGRESS NOTES
Chief Complaint   Patient presents with    Other     1 week post TAVR       Subjective     Chelly Decker is a 88 y.o. female who presents today  for their 1 week post TAVR visit.  They have a history of severe symptomatic aortic stenosis, lung cancer status post radiation, chronic lower extremity edema, pain related to avascular hip necrosis, walker and wheelchair dependent, on chronic home O2 3 to 4 L     They are status post TAVR with #26 Waldron Kamla S3 Ultra Resilia deployed at nominal volume -1 cc via the transfemoral approach on 1/8/2024 under general anesthesia by Dr. Eddy    They are accompanied today by her daughter Krystal    Since their discharge they have been feeling well from cardiology perspective. She has been having some shoulder and hip pain. Some daytime fatigue. Has not noticed a big change since her TAVR. NYHA class III symptoms.        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    Breath shortness 12/04/2023    uses oxygen at night, during day sometimes. 8/23/23-SOB with activity.    Cancer (HCC)     cervical cancer 1968    Cancer (HCC) 12/04/2023    lower left lobe cancer    Cervical cancer (HCC) 1968    [id number] chemo or radiation.    Chronic constipation     takes miralax    Chronic kidney disease (CKD)     stage 3    Chronic obstructive pulmonary disease (HCC) 06/22/2022    Chronic pain     follows with Dr Lozoya    Congestive heart failure (HCC)     follows with Dr Salvador with [facility]    COPD (chronic obstructive pulmonary disease) ([facility])     Dental disorder 12/04/2023    upper denture and permanent bridge on lower front teeth    Dizziness 12/29/2022    Foot drop, right foot 12/04/2023    GERD (gastroesophageal reflux disease)     H/O Clostridium difficile infection 05/12/2022    Hammer toes of both feet 12/29/2022    Heart burn 12/04/2023    taking pantoprazole.    Heart murmur     High cholesterol 12/04/2023    Controled with medication    Hypertension 12/04/2023    medicated    Hypotension due to hypovolemia 08/17/2022    Hypoxia 03/29/2022    Incomplete defecation 03/29/2022    Infectious disease 04/25/2023    I was just informed that the person who drove me to my doctor appointment and home on the 25th of this month was diagnosed with the flu on the 26th of this month. I had my flu shot and after one day I have no symptoms will keep checking !    Insomnia due to medical condition 10/13/2022    Irregular heart rate 03/29/2022    states has had for years, why atenolol    Kidney stones     Moderate aortic stenosis     Moderate mitral insufficiency     Oxygen dependent     uses oxygen 4 liters nasal cannula at night, sometimes during the day    Pain 12/04/2023    pain everywhere, fibromyalgia    Pain in joint involving multiple sites 10/13/2022    Pain in joint involving multiple sites 10/13/2022    Pelvic floor dysfunction 03/29/2022    Primary hypertension 03/29/2022    Renal insufficiency 08/03/2022    due to meds    Seasonal allergies     Skin lesions 05/12/2022    Solitary pulmonary nodule     had radiation treatment approx 2021 and does follow up scans.    Spinal stenosis     pain pump for pain control    Spinal stenosis of lumbar region without neurogenic claudication 03/29/2022    Urge incontinence of urine 12/04/2023    urinary retention, diapers    Urinary bladder disorder Past [id number]    I've been under treatment for years with no results I am seeing a new urologist on May  2     Past Surgical History:   Procedure Laterality Date    TRANSCATHETER AORTIC VALVE REPLACEMENT  1/8/2024    Procedure: TRANSCATHETER AORTIC VALVE REPLACEMENT;  Surgeon: Clayton Eddy M.D.;  Location: SURGERY [facility];  Service: Cardiac    ECHOCARDIOGRAM, TRANSESOPHAGEAL, INTRAOPERATIVE  1/8/2024    Procedure: ECHOCARDIOGRAM, TRANSESOPHAGEAL, INTRAOPERATIVE;  Surgeon: Clayton Eddy M.D.;  Location: SURGERY [facility];  Service: Cardiac    OTHER Bilateral 12/04/2023    IOLOU    [place] TOTAL HIP ARTHROPLASTY Left 08/28/2023    Procedure: LEFT TOTAL HIP ARTHROPLASTY;  Surgeon: Daryl Perrin M.D.;  Location: SURGERY [facility];  Service: Orthopedics    PUMP REVISION Right 05/04/2023    Procedure: INTRATHECAL PAIN PUMP REVISION;  Surgeon: Tavo Lozoya M.D.;  Location: SURGERY [facility];  Service: Pain Management    PUMP INSERT/REMOVE Right 10/07/2022    Procedure: MEDTRONIC PUMP REPLACEMENT;  Surgeon: Joel Alanis M.D.;  Location: SURGERY [facility];  Service: Pain Management    [place] INS/RPLC SPI NPG/RCVR POCKET N/A 02/05/2021    Procedure: IMPLANT PUMP AND CATHETER;  Surgeon: Joel Alanis M.D.;  Location: SURGERY [facility];  Service: Pain Management    HIP ARTHROPLASTY TOTAL Right 1998    APPENDECTOMY      CARPAL TUNNEL ENDOSCOPIC Bilateral     bilat    CHOLECYSTECTOMY      COLONOSCOPY      HYSTERECTOMY LAPAROSCOPY      total    LITHOTRIPSY      kidney stones removed    PRIMARY C SECTION      c section    TONSILLECTOMY       Family History   Problem Relation Age of Onset    Heart Disease Mother         Not from congestive heart failure!     Social History     Socioeconomic History    Marital status:      Spouse name: Not on file    Number of children: Not on file    Years of education: Not on file    Highest education level: Not on file   Occupational History    Occupation: retired book keeper   Tobacco Use    Smoking status: Former     Current packs/day: 0.00     Average  "packs/day: 1 pack/day for 61.0 years (61.0 ttl pk-yrs)     Types: Cigarettes, Electronic Cigarettes     Start date: 1954     Quit date: 2015     Years since quittin.0    Smokeless tobacco: Never    Tobacco comments:     Smoked as a teenager quit as a senior citizen   Vaping Use    Vaping Use: Former    Substances: Flavoring   Substance and Sexual Activity    Alcohol use: Not Currently     Comment: Was  is a social drinker until approximately     Drug use: Not Currently     Types: Inhaled     Comment: cocaine while in her 40\"s    Sexual activity: Not on file   Other Topics Concern    Not on file   Social History Narrative    Not on file     Social Determinants of Health     Financial Resource Strain: Low Risk  (2022)    Overall Financial Resource Strain (CARDIA)     Difficulty of Paying Living Expenses: Not very hard   Food Insecurity: No Food Insecurity (2022)    Hunger Vital Sign     Worried About Running Out of Food in the Last Year: Never true     Ran Out of Food in the Last Year: Never true   Transportation Needs: Unmet Transportation Needs (2022)    PRAPARE - Transportation     Lack of Transportation (Medical): Yes     Lack of Transportation (Non-Medical): No   Physical Activity: Inactive (2022)    Exercise Vital Sign     Days of Exercise per Week: 0 days     Minutes of Exercise per Session: 0 min   Stress: Not on file   Social Connections: Not on file   Intimate Partner Violence: Not on file   Housing Stability: Unknown (2022)    Housing Stability Vital Sign     Unable to Pay for Housing in the Last Year: No     Number of Places Lived in the Last Year: Not on file     Unstable Housing in the Last Year: No     Allergies   Allergen Reactions    Sulfa Drugs Unspecified     Stomach ache    Nauseated, diarrhea    Other reaction(s): Unspecified   Stomach ache    Erythromycin Nausea     Other Reaction(s): GI Upset    Other reaction(s): Not available     Outpatient Encounter " Medications as of 1/16/2024   Medication Sig Dispense Refill    aspirin 81 MG EC tablet Take 1 Tablet by mouth every day. 100 Tablet 3    magnesium oxide (MAG-OX) 400 MG Tab tablet Take 200-400 mg by mouth 2 times a day. 400 mg every morning  200 mg every afternoon      meloxicam (MOBIC) 7.5 MG Tab Take 7.5 mg by mouth 2 times a day.      CRANBERRY PO Take 1 Tablet by mouth every day.      Probiotic Product (PROBIOTIC DAILY PO) Take 1 Tablet by mouth every day.      traMADol (ULTRAM) 50 MG Tab Take 50 mg by mouth 2 times a day.      atorvastatin (LIPITOR) 40 MG Tab Take 40 mg by mouth every day.      furosemide (LASIX) 20 MG Tab Take 20 mg by mouth every morning.      losartan (COZAAR) 25 MG Tab Take 12.5 mg by mouth every day. 0.5 tablet (12.5 mg)      pantoprazole (PROTONIX) 40 MG Tablet Delayed Response Take 40 mg by mouth every morning.      polyethylene glycol 3350 (MIRALAX) 17 GM/SCOOP Powder Take 1 Dose by mouth every day.      sennosides-docusate sodium (SENOKOT-S) 8.6-50 MG tablet Take 1 Tablet by mouth 1 time a day as needed (CONSTIPATION). 30 Tablet 2    spironolactone (ALDACTONE) 25 MG Tab Take 25 mg by mouth every day. Indications: Edema, CHF      Melatonin 10 MG Tab Take 10 mg by mouth every evening. Indications: Trouble Sleeping      acetaminophen (TYLENOL) 500 MG Tab Take 1,000 mg by mouth every 6 hours as needed. Indications: Fever, Pain      Pain Pump (PATIENT SUPPLIED) Device Inject  as directed continuous. Patient's Pain Pump (placed and maintained as an outpatient)    Medications/concentrations: Hydromorphone 4000 mcg/ml (749.1 mcg/day)  Route: Intrathecal  Last changed: 11/29/2023 @ 1558  Refill pump before date: 2/27/24  Continuous infusion rates (Drug/Rate): Hydromorphone 0.7491 mg/day or 0.0312 mg/hr   MD office:   Indications: severe pain      DULoxetine (CYMBALTA) 20 MG Cap DR Particles Take 20 mg by mouth 2 times a day. Indications: Musculoskeletal Pain      guaiFENesin ER  "(MUCINEX) 600 MG TABLET SR 12 HR Take 600 mg by mouth every 12 hours. Indications: Cough      Cyanocobalamin (VITAMIN B-12 PO) Take 1 Dose by mouth every day. Indications: supplement      Cholecalciferol (VITAMIN D3 PO) Take 1 Tablet by mouth every day. Indications: supplement      diltiazem (TIAZAC) 120 MG SR capsule Take 120 mg by mouth every evening. Indications: High Blood Pressure Disorder      albuterol 108 (90 Base) MCG/ACT Aero Soln inhalation aerosol Inhale 2 Puffs every 6 hours as needed for Shortness of Breath. Indications: Chronic Obstructive Lung Disease      Simethicone (GAS-X PO) Take 1 Tablet by mouth 3 times a day. Indications: gas      [DISCONTINUED] HYDROcodone/acetaminophen (NORCO)  MG Tab Indications: Pain       No facility-administered encounter medications on file as of 1/16/2024.     Review of Systems   Constitutional:  Positive for malaise/fatigue.   HENT: Negative.     Eyes: Negative.    Respiratory:  Negative for shortness of breath.    Cardiovascular:  Negative for chest pain, palpitations, orthopnea, leg swelling and PND.   Gastrointestinal: Negative.    Genitourinary: Negative.    Musculoskeletal:  Positive for back pain and joint pain.   Skin: Negative.    Neurological: Negative.    Endo/Heme/Allergies: Negative.    Psychiatric/Behavioral:  Negative for depression. The patient is not nervous/anxious.               Objective     /54 (BP Location: Left arm, Patient Position: Sitting, BP Cuff Size: Adult)   Pulse (!) 109   Resp 14   Ht 1.702 m (5' 7\")   Wt 65.3 kg (144 lb)   SpO2 93%   BMI 22.55 kg/m²     Physical Exam  Constitutional:       Appearance: Normal appearance.   HENT:      Head: Normocephalic and atraumatic.   Neck:      Vascular: No JVD.   Cardiovascular:      Rate and Rhythm: Normal rate and regular rhythm.      Pulses: Normal pulses.      Heart sounds: Normal heart sounds. No murmur heard.     No friction rub.      Comments: Groin sites CDI with mild " amount of bruising and small nodule  Pulmonary:      Effort: Pulmonary effort is normal. No respiratory distress.      Breath sounds: Normal breath sounds.   Abdominal:      General: There is no distension.      Palpations: Abdomen is soft.   Musculoskeletal:      Right lower leg: No edema.      Left lower leg: No edema.   Skin:     General: Skin is warm and dry.   Neurological:      General: No focal deficit present.      Mental Status: She is alert and oriented to person, place, and time.   Psychiatric:         Mood and Affect: Mood normal.         Behavior: Behavior normal.          Lab Results   Component Value Date/Time    CHOLSTRLTOT 115 05/08/2023 01:41 AM    LDL 34 05/08/2023 01:41 AM    HDL 67 05/08/2023 01:41 AM    TRIGLYCERIDE 69 05/08/2023 01:41 AM       Lab Results   Component Value Date/Time    SODIUM 137 01/09/2024 02:13 AM    POTASSIUM 4.4 01/09/2024 02:13 AM    CHLORIDE 99 01/09/2024 02:13 AM    CO2 29 01/09/2024 02:13 AM    GLUCOSE 191 (H) 01/09/2024 02:13 AM    BUN 27 (H) 01/09/2024 02:13 AM    CREATININE 0.95 01/09/2024 02:13 AM    GLOMRATE 64 10/06/2023 04:33 AM     Lab Results   Component Value Date/Time    ALKPHOSPHAT 126 (H) 01/09/2024 02:13 AM    ASTSGOT 23 01/09/2024 02:13 AM    ALTSGPT 12 01/09/2024 02:13 AM    TBILIRUBIN 0.2 01/09/2024 02:13 AM      Intraoperative Echo 1/8/2024  CONCLUSIONS  1)  Severe AS for TAVR.  2)  26 mm -1 ml Kamla 3.  Mean Gradient:  3 mmHg.    EOA:  2.54 cm^2.    No significant PVL  3)  Normal EF.    CXR on 1/8/2024 showing   1.  Mild interstitial pulmonary edema  2.  Persistent LEFT lower lobe scar     EKG on 1/9/2024  Sinus rhythm, short sinus pauses, borderline prolonged IN interval  Rate 76, 0.215/0.078/0.453    EKG on 1/16/2024   Sinus rhythm, PVCs, prolonged IN interval  85, 0.222/0.083/0.420    Assessment & Plan     1. PAF (paroxysmal atrial fibrillation) (HCC)        2. S/P TAVR (transcatheter aortic valve replacement)  EKG      3. Acute on chronic heart  failure with preserved ejection fraction (HCC)        4. Other hyperlipidemia        5. Primary hypertension            Medical Decision Making: Today's Assessment/Status/Plan:        HCC Gap Form    Diagnosis to address: J44.0 - Chronic obstructive pulmonary disease with acute lower respiratory infection (HCC)  Assessment and plan: Chronic, stable. Continue with current defined treatment plan: Managed by PCP, has albuterol as needed. Follow-up at least annually.  Diagnosis: N18.32 - Stage 3b chronic kidney disease (HCC)  Assessment and plan: Chronic, stable. Continue with current defined treatment plan: Renally dose medications, avoid nephrotoxic agents. Follow-up at least annually.  Diagnosis: J96.11 - Chronic hypoxemic respiratory failure (HCC)  Assessment and plan: Chronic, stable. Continue with current defined treatment plan: Managed by PCP, on home O2. Follow-up at least annually.  Diagnosis: I27.20 - Pulmonary HTN (HCC)  Assessment and plan: Chronic, stable. Continue with current defined treatment plan: Likely due to underlying COPD, continue management per PCP. Follow-up at least annually.  Diagnosis: I48.0 - PAF (paroxysmal atrial fibrillation) (HCC)  Assessment and plan: Chronic, stable. Continue with current defined treatment plan: Unclear whether patient has PAF, repeat cardiac event monitor to evaluate. Follow-up at least annually.  Last edited 01/16/24 14:49 PST by Nikki Lares A.P.R.NPaige           S/P TAVR  -doing well post-op  -cont asa lifelong  -groins CDI with mild bruising and small nodule   -SBE prophylaxis understands  -echo 1 month with structural heart follow up  -reviewed hospital imaging, labs, and EKG  -cardiac rehab referral placed, patient reports she will be unlikely to be able to attend  -EKG shows sinus rhythm    PAF   -Questionable history of PAF  -I had ordered a heart monitor for her in the hospital, she wore it for 3 days before removing it, we will repeat cardiac event  monitor, ideally for 14 days    Heart Failure with preserved Ejection Fraction  -EF 55%  -Euvolemic on exam  - Continue Lasix 20 mg daily  -Spironolactone 25 mg daily    Hypertension  - good control  - continue current regimen  - goal < 130/80     Hyperlipidemia  -Most recent LDL 34  -Continue atorvastatin   -Goal of less than 70  -Check lipid panel annually     Follow up with echocardiogram on 2/6/2024 Dr. Eddy on 2/14/2024    This note was dictated using Dragon speech recognition software.

## 2024-01-16 NOTE — PROGRESS NOTES
Chief Complaint   Patient presents with    Other     1 week post TAVR       Subjective     Chelly Decker is a 88 y.o. 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    Breath shortness 12/04/2023    uses oxygen at night, during day sometimes. 8/23/23-SOB with activity.    Cancer (HCC)     cervical cancer 1968    Cancer (HCC) 12/04/2023    lower left lobe cancer    Cervical cancer (HCC) 1968    [id number] chemo or radiation.    Chronic constipation     takes miralax    Chronic kidney disease (CKD)     stage 3    Chronic obstructive pulmonary disease (Formerly [facility]) 06/22/2022    Chronic pain     follows with Dr Lozoya    Congestive heart failure (Formerly [facility])     follows with Dr Salvador with [facility]    COPD (chronic obstructive pulmonary disease) (Formerly [facility])     Dental disorder 12/04/2023    upper denture and permanent bridge on lower front teeth    Dizziness 12/29/2022    Foot drop, right foot 12/04/2023    GERD (gastroesophageal reflux disease)     H/O Clostridium difficile infection 05/12/2022    Hammer toes of both feet 12/29/2022    Heart burn 12/04/2023    taking pantoprazole.    Heart murmur     High cholesterol 12/04/2023    Controled with medication    Hypertension 12/04/2023    medicated    Hypotension due to hypovolemia 08/17/2022    Hypoxia 03/29/2022    Incomplete defecation 03/29/2022    Infectious disease 04/25/2023    I was just informed that the person who drove me to my doctor appointment and  home on the 25th of this month was diagnosed with the flu on the 26th of this month. I had my flu shot and after one day I have no symptoms will keep checking !    Insomnia due to medical condition 10/13/2022    Irregular heart rate 03/29/2022    states has had for years, why atenolol    Kidney stones     Moderate aortic stenosis     Moderate mitral insufficiency     Oxygen dependent     uses oxygen 4 liters nasal cannula at night, sometimes during the day    Pain 12/04/2023    pain everywhere, fibromyalgia    Pain in joint involving multiple sites 10/13/2022    Pain in joint involving multiple sites 10/13/2022    Pelvic floor dysfunction 03/29/2022    Primary hypertension 03/29/2022    Renal insufficiency 08/03/2022    due to meds    Seasonal allergies     Skin lesions 05/12/2022    Solitary pulmonary nodule     had radiation treatment approx 2021 and does follow up scans.    Spinal stenosis     pain pump for pain control    Spinal stenosis of lumbar region without neurogenic claudication 03/29/2022    Urge incontinence of urine 12/04/2023    urinary retention, diapers    Urinary bladder disorder Past [id number]    I've been under treatment for years with no results I am seeing a new urologist on May 2     Past Surgical History:   Procedure Laterality Date    TRANSCATHETER AORTIC VALVE REPLACEMENT  1/8/2024    Procedure: TRANSCATHETER AORTIC VALVE REPLACEMENT;  Surgeon: Clayton Eddy M.D.;  Location: SURGERY [facility];  Service: Cardiac    ECHOCARDIOGRAM, TRANSESOPHAGEAL, INTRAOPERATIVE  1/8/2024    Procedure: ECHOCARDIOGRAM, TRANSESOPHAGEAL, INTRAOPERATIVE;  Surgeon: Clayton Eddy M.D.;  Location: SURGERY [facility];  Service: Cardiac    OTHER Bilateral 12/04/2023    IOLOU    IN TOTAL HIP ARTHROPLASTY Left 08/28/2023    Procedure: LEFT TOTAL HIP ARTHROPLASTY;  Surgeon: Daryl Perrin M.D.;  Location: SURGERY [facility];  Service: Orthopedics    PUMP REVISION Right 05/04/2023    Procedure: INTRATHECAL PAIN  "PUMP REVISION;  Surgeon: Tavo Lozoya M.D.;  Location: SURGERY [facility];  Service: Pain Management    PUMP INSERT/REMOVE Right 10/07/2022    Procedure: MEDTRONIC PUMP REPLACEMENT;  Surgeon: Joel Alanis M.D.;  Location: SURGERY [facility];  Service: Pain Management    TX INS/RPLC SPI NPG/RCVR POCKET N/A 2021    Procedure: IMPLANT PUMP AND CATHETER;  Surgeon: Joel Alanis M.D.;  Location: SURGERY [facility];  Service: Pain Management    HIP ARTHROPLASTY TOTAL Right     APPENDECTOMY      CARPAL TUNNEL ENDOSCOPIC Bilateral     bilat    CHOLECYSTECTOMY      COLONOSCOPY      HYSTERECTOMY LAPAROSCOPY      total    LITHOTRIPSY      kidney stones removed    PRIMARY C SECTION      c section    TONSILLECTOMY       Family History   Problem Relation Age of Onset    Heart Disease Mother         Not from congestive heart failure!     Social History     Socioeconomic History    Marital status:      Spouse name: Not on file    Number of children: Not on file    Years of education: Not on file    Highest education level: Not on file   Occupational History    Occupation: retired book keeper   Tobacco Use    Smoking status: Former     Current packs/day: 0.00     Average packs/day: 1 pack/day for 61.0 years (61.0 ttl pk-yrs)     Types: Cigarettes, Electronic Cigarettes     Start date: 1954     Quit date: 2015     Years since quittin.0    Smokeless tobacco: Never    Tobacco comments:     Smoked as a teenager quit as a senior citizen   Vaping Use    Vaping Use: Former    Substances: Flavoring   Substance and Sexual Activity    Alcohol use: Not Currently     Comment: Was  is a social drinker until approximately     Drug use: Not Currently     Types: Inhaled     Comment: cocaine while in her 40\"s    Sexual activity: Not on file   Other Topics Concern    Not on file   Social History Narrative    Not on file     Social Determinants of Health     Financial Resource Strain: Low Risk  " (7/22/2022)    Overall Financial Resource Strain (CARDIA)     Difficulty of Paying Living Expenses: Not very hard   Food Insecurity: No Food Insecurity (7/22/2022)    Hunger Vital Sign     Worried About Running Out of Food in the Last Year: Never true     Ran Out of Food in the Last Year: Never true   Transportation Needs: Unmet Transportation Needs (7/22/2022)    PRAPARE - Transportation     Lack of Transportation (Medical): Yes     Lack of Transportation (Non-Medical): No   Physical Activity: Inactive (7/22/2022)    Exercise Vital Sign     Days of Exercise per Week: 0 days     Minutes of Exercise per Session: 0 min   Stress: Not on file   Social Connections: Not on file   Intimate Partner Violence: Not on file   Housing Stability: Unknown (7/22/2022)    Housing Stability Vital Sign     Unable to Pay for Housing in the Last Year: No     Number of Places Lived in the Last Year: Not on file     Unstable Housing in the Last Year: No     Allergies   Allergen Reactions    Sulfa Drugs Unspecified     Stomach ache    Nauseated, diarrhea    Other reaction(s): Unspecified   Stomach ache    Erythromycin Nausea     Other Reaction(s): GI Upset    Other reaction(s): Not available     Outpatient Encounter Medications as of 1/16/2024   Medication Sig Dispense Refill    aspirin 81 MG EC tablet Take 1 Tablet by mouth every day. 100 Tablet 3    magnesium oxide (MAG-OX) 400 MG Tab tablet Take 200-400 mg by mouth 2 times a day. 400 mg every morning  200 mg every afternoon      meloxicam (MOBIC) 7.5 MG Tab Take 7.5 mg by mouth 2 times a day.      CRANBERRY PO Take 1 Tablet by mouth every day.      Probiotic Product (PROBIOTIC DAILY PO) Take 1 Tablet by mouth every day.      traMADol (ULTRAM) 50 MG Tab Take 50 mg by mouth 2 times a day.      atorvastatin (LIPITOR) 40 MG Tab Take 40 mg by mouth every day.      furosemide (LASIX) 20 MG Tab Take 20 mg by mouth every morning.      losartan (COZAAR) 25 MG Tab Take 12.5 mg by mouth every day.  0.5 tablet (12.5 mg)      pantoprazole (PROTONIX) 40 MG Tablet Delayed Response Take 40 mg by mouth every morning.      polyethylene glycol 3350 (MIRALAX) 17 GM/SCOOP Powder Take 1 Dose by mouth every day.      sennosides-docusate sodium (SENOKOT-S) 8.6-50 MG tablet Take 1 Tablet by mouth 1 time a day as needed (CONSTIPATION). 30 Tablet 2    spironolactone (ALDACTONE) 25 MG Tab Take 25 mg by mouth every day. Indications: Edema, CHF      Melatonin 10 MG Tab Take 10 mg by mouth every evening. Indications: Trouble Sleeping      acetaminophen (TYLENOL) 500 MG Tab Take 1,000 mg by mouth every 6 hours as needed. Indications: Fever, Pain      Pain Pump (PATIENT SUPPLIED) Device Inject  as directed continuous. Patient's Pain Pump (placed and maintained as an outpatient)    Medications/concentrations: Hydromorphone 4000 mcg/ml (749.1 mcg/day)  Route: Intrathecal  Last changed: 11/29/2023 @ 1558  Refill pump before date: 2/27/24  Continuous infusion rates (Drug/Rate): Hydromorphone 0.7491 mg/day or 0.0312 mg/hr   MD office:   Indications: severe pain      DULoxetine (CYMBALTA) 20 MG Cap DR Particles Take 20 mg by mouth 2 times a day. Indications: Musculoskeletal Pain      guaiFENesin ER (MUCINEX) 600 MG TABLET SR 12 HR Take 600 mg by mouth every 12 hours. Indications: Cough      Cyanocobalamin (VITAMIN B-12 PO) Take 1 Dose by mouth every day. Indications: supplement      Cholecalciferol (VITAMIN D3 PO) Take 1 Tablet by mouth every day. Indications: supplement      diltiazem (TIAZAC) 120 MG SR capsule Take 120 mg by mouth every evening. Indications: High Blood Pressure Disorder      albuterol 108 (90 Base) MCG/ACT Aero Soln inhalation aerosol Inhale 2 Puffs every 6 hours as needed for Shortness of Breath. Indications: Chronic Obstructive Lung Disease      Simethicone (GAS-X PO) Take 1 Tablet by mouth 3 times a day. Indications: gas      [DISCONTINUED] HYDROcodone/acetaminophen (NORCO)  MG Tab Indications: Pain    "    No facility-administered encounter medications on file as of 1/16/2024.     ROS           Objective     /54 (BP Location: Left arm, Patient Position: Sitting, BP Cuff Size: Adult)   Pulse (!) 109   Resp 14   Ht 1.702 m (5' 7\")   Wt 65.3 kg (144 lb)   SpO2 93%   BMI 22.55 kg/m²     Physical Exam           Assessment & Plan     1. PAF (paroxysmal atrial fibrillation) (Formerly [facility])  Holter Monitor Study      2. S/P TAVR (transcatheter aortic valve replacement)  EKG      3. Acute on chronic heart failure with preserved ejection fraction (HCC)        4. Other hyperlipidemia        5. Primary hypertension            Medical Decision Making: Today's Assessment/Status/Plan:                        "

## 2024-01-16 NOTE — LETTER
[facility] - Operated by [facility]   [address]  [place] [address]  Phone: [phone]  Fax: [phone]              Patient:            Chelly Decker  YOB: 1935      Dear Dr. Carrillo,      On behalf of [facility], we would like to thank you for allowing us to participate in the care of your patient, Ms. Decker.     She underwent a successful transcatheter aortic valve replacement (TAVR) on Monday, January 8, 2024.     Your patient is scheduled to follow up with our Structural Heart Program at one month and one year post procedure.     Again, thank you for allowing us to participate in the care of your patient. If you have any questions, please do not hesitate to contact our Structural Heart team.     Sincerely,    [facility]    SAJAN Dial, RN, Structural Heart Program Nurse Coordinator ([phone])  SAJAN Mercado, RN, Structural Heart Program Nurse Coordinator ([phone])                                     Encounter Date: 1/16/2024    TIMOTEO Bowers.          PROGRESS NOTE:  Chief Complaint   Patient presents with    Other     1 week post TAVR       Subjective     Chelly Decker is a 88 y.o. female who presents today  for their 1 week post TAVR visit.  They have a history of severe symptomatic aortic stenosis, lung cancer status post radiation, chronic lower extremity edema, pain related to avascular hip necrosis, walker and wheelchair dependent, on chronic home O2 3 to 4 L     They are status post TAVR with #26 Waldron Kamla S3 Ultra Resilia deployed at nominal volume -1 cc via the transfemoral approach on 1/8/2024 under general anesthesia by Dr. Eddy    They are accompanied today by her daughter Krystal    Since their discharge they have been feeling well from cardiology perspective. She has been having some shoulder and hip pain. Some  daytime fatigue. Has not noticed a big change since her TAVR. NYHA class III symptoms.        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    Breath shortness 12/04/2023    uses oxygen at night, during day sometimes. 8/23/23-SOB with activity.    Cancer ([facility])     cervical cancer 1968    Cancer ([facility]) 12/04/2023    lower left lobe cancer    Cervical cancer ([facility]) 1968    [id number] chemo or radiation.    Chronic constipation     takes miralax    Chronic kidney disease (CKD)     stage 3    Chronic obstructive pulmonary disease ([facility]) 06/22/2022    Chronic pain     follows with Dr Lozoya    Congestive heart failure ([facility])     follows with Dr Salvador with [facility]    COPD (chronic obstructive pulmonary disease) ([facility])     Dental disorder 12/04/2023    upper denture and permanent bridge on lower front teeth    Dizziness 12/29/2022    Foot drop, right foot 12/04/2023    GERD (gastroesophageal reflux disease)     H/O Clostridium difficile infection 05/12/2022    Hammer toes of both feet 12/29/2022    Heart burn 12/04/2023    taking pantoprazole.    Heart murmur     High cholesterol 12/04/2023    Controled with medication    Hypertension 12/04/2023    medicated    Hypotension due to hypovolemia 08/17/2022    Hypoxia 03/29/2022    Incomplete defecation 03/29/2022    Infectious disease 04/25/2023    I was just informed that the person who drove me to my doctor appointment and home on the 25th of this month was diagnosed with the flu  on the 26th of this month. I had my flu shot and after one day I have no symptoms will keep checking !    Insomnia due to medical condition 10/13/2022    Irregular heart rate 03/29/2022    states has had for years, why atenolol    Kidney stones     Moderate aortic stenosis     Moderate mitral insufficiency     Oxygen dependent     uses oxygen 4 liters nasal cannula at night, sometimes during the day    Pain 12/04/2023    pain everywhere, fibromyalgia    Pain in joint involving multiple sites 10/13/2022    Pain in joint involving multiple sites 10/13/2022    Pelvic floor dysfunction 03/29/2022    Primary hypertension 03/29/2022    Renal insufficiency 08/03/2022    due to meds    Seasonal allergies     Skin lesions 05/12/2022    Solitary pulmonary nodule     had radiation treatment approx 2021 and does follow up scans.    Spinal stenosis     pain pump for pain control    Spinal stenosis of lumbar region without neurogenic claudication 03/29/2022    Urge incontinence of urine 12/04/2023    urinary retention, diapers    Urinary bladder disorder Past [id number]    I've been under treatment for years with no results I am seeing a new urologist on May 2     Past Surgical History:   Procedure Laterality Date    TRANSCATHETER AORTIC VALVE REPLACEMENT  1/8/2024    Procedure: TRANSCATHETER AORTIC VALVE REPLACEMENT;  Surgeon: Clayton Eddy M.D.;  Location: SURGERY [facility];  Service: Cardiac    ECHOCARDIOGRAM, TRANSESOPHAGEAL, INTRAOPERATIVE  1/8/2024    Procedure: ECHOCARDIOGRAM, TRANSESOPHAGEAL, INTRAOPERATIVE;  Surgeon: Clayton Eddy M.D.;  Location: [facility];  Service: Cardiac    OTHER Bilateral 12/04/2023    IOLOU    MA TOTAL HIP ARTHROPLASTY Left 08/28/2023    Procedure: LEFT TOTAL HIP ARTHROPLASTY;  Surgeon: Daryl Perrin M.D.;  Location: SURGERY [facility];  Service: Orthopedics    PUMP REVISION Right 05/04/2023    Procedure: INTRATHECAL PAIN PUMP REVISION;  Surgeon: Tavo Lozoya M.D.;   "Location: SURGERY [facility];  Service: Pain Management    PUMP INSERT/REMOVE Right 10/07/2022    Procedure: MEDTRONIC PUMP REPLACEMENT;  Surgeon: Joel Alanis M.D.;  Location: SURGERY [facility];  Service: Pain Management    IL INS/RPLC SPI NPG/RCVR POCKET N/A 2021    Procedure: IMPLANT PUMP AND CATHETER;  Surgeon: Joel Alanis M.D.;  Location: SURGERY [facility];  Service: Pain Management    HIP ARTHROPLASTY TOTAL Right     APPENDECTOMY      CARPAL TUNNEL ENDOSCOPIC Bilateral     bilat    CHOLECYSTECTOMY      COLONOSCOPY      HYSTERECTOMY LAPAROSCOPY      total    LITHOTRIPSY      kidney stones removed    PRIMARY C SECTION      c section    TONSILLECTOMY       Family History   Problem Relation Age of Onset    Heart Disease Mother         Not from congestive heart failure!     Social History     Socioeconomic History    Marital status:      Spouse name: Not on file    Number of children: Not on file    Years of education: Not on file    Highest education level: Not on file   Occupational History    Occupation: retired book keeper   Tobacco Use    Smoking status: Former     Current packs/day: 0.00     Average packs/day: 1 pack/day for 61.0 years (61.0 ttl pk-yrs)     Types: Cigarettes, Electronic Cigarettes     Start date: 1954     Quit date: 2015     Years since quittin.0    Smokeless tobacco: Never    Tobacco comments:     Smoked as a teenager quit as a senior citizen   Vaping Use    Vaping Use: Former    Substances: Flavoring   Substance and Sexual Activity    Alcohol use: Not Currently     Comment: Was  is a social drinker until approximately 2015    Drug use: Not Currently     Types: Inhaled     Comment: cocaine while in her 40\"s    Sexual activity: Not on file   Other Topics Concern    Not on file   Social History Narrative    Not on file     Social Determinants of Health     Financial Resource Strain: Low Risk  (2022)    Overall Financial Resource Strain " (CARDIA)     Difficulty of Paying Living Expenses: Not very hard   Food Insecurity: No Food Insecurity (7/22/2022)    Hunger Vital Sign     Worried About Running Out of Food in the Last Year: Never true     Ran Out of Food in the Last Year: Never true   Transportation Needs: Unmet Transportation Needs (7/22/2022)    PRAPARE - Transportation     Lack of Transportation (Medical): Yes     Lack of Transportation (Non-Medical): No   Physical Activity: Inactive (7/22/2022)    Exercise Vital Sign     Days of Exercise per Week: 0 days     Minutes of Exercise per Session: 0 min   Stress: Not on file   Social Connections: Not on file   Intimate Partner Violence: Not on file   Housing Stability: Unknown (7/22/2022)    Housing Stability Vital Sign     Unable to Pay for Housing in the Last Year: No     Number of Places Lived in the Last Year: Not on file     Unstable Housing in the Last Year: No     Allergies   Allergen Reactions    Sulfa Drugs Unspecified     Stomach ache    Nauseated, diarrhea    Other reaction(s): Unspecified   Stomach ache    Erythromycin Nausea     Other Reaction(s): GI Upset    Other reaction(s): Not available     Outpatient Encounter Medications as of 1/16/2024   Medication Sig Dispense Refill    aspirin 81 MG EC tablet Take 1 Tablet by mouth every day. 100 Tablet 3    magnesium oxide (MAG-OX) 400 MG Tab tablet Take 200-400 mg by mouth 2 times a day. 400 mg every morning  200 mg every afternoon      meloxicam (MOBIC) 7.5 MG Tab Take 7.5 mg by mouth 2 times a day.      CRANBERRY PO Take 1 Tablet by mouth every day.      Probiotic Product (PROBIOTIC DAILY PO) Take 1 Tablet by mouth every day.      traMADol (ULTRAM) 50 MG Tab Take 50 mg by mouth 2 times a day.      atorvastatin (LIPITOR) 40 MG Tab Take 40 mg by mouth every day.      furosemide (LASIX) 20 MG Tab Take 20 mg by mouth every morning.      losartan (COZAAR) 25 MG Tab Take 12.5 mg by mouth every day. 0.5 tablet (12.5 mg)      pantoprazole  (PROTONIX) 40 MG Tablet Delayed Response Take 40 mg by mouth every morning.      polyethylene glycol 3350 (MIRALAX) 17 GM/SCOOP Powder Take 1 Dose by mouth every day.      sennosides-docusate sodium (SENOKOT-S) 8.6-50 MG tablet Take 1 Tablet by mouth 1 time a day as needed (CONSTIPATION). 30 Tablet 2    spironolactone (ALDACTONE) 25 MG Tab Take 25 mg by mouth every day. Indications: Edema, CHF      Melatonin 10 MG Tab Take 10 mg by mouth every evening. Indications: Trouble Sleeping      acetaminophen (TYLENOL) 500 MG Tab Take 1,000 mg by mouth every 6 hours as needed. Indications: Fever, Pain      Pain Pump (PATIENT SUPPLIED) Device Inject  as directed continuous. Patient's Pain Pump (placed and maintained as an outpatient)    Medications/concentrations: Hydromorphone 4000 mcg/ml (749.1 mcg/day)  Route: Intrathecal  Last changed: 11/29/2023 @ 1558  Refill pump before date: 2/27/24  Continuous infusion rates (Drug/Rate): Hydromorphone 0.7491 mg/day or 0.0312 mg/hr   MD office:   Indications: severe pain      DULoxetine (CYMBALTA) 20 MG Cap DR Particles Take 20 mg by mouth 2 times a day. Indications: Musculoskeletal Pain      guaiFENesin ER (MUCINEX) 600 MG TABLET SR 12 HR Take 600 mg by mouth every 12 hours. Indications: Cough      Cyanocobalamin (VITAMIN B-12 PO) Take 1 Dose by mouth every day. Indications: supplement      Cholecalciferol (VITAMIN D3 PO) Take 1 Tablet by mouth every day. Indications: supplement      diltiazem (TIAZAC) 120 MG SR capsule Take 120 mg by mouth every evening. Indications: High Blood Pressure Disorder      albuterol 108 (90 Base) MCG/ACT Aero Soln inhalation aerosol Inhale 2 Puffs every 6 hours as needed for Shortness of Breath. Indications: Chronic Obstructive Lung Disease      Simethicone (GAS-X PO) Take 1 Tablet by mouth 3 times a day. Indications: gas      [DISCONTINUED] HYDROcodone/acetaminophen (NORCO)  MG Tab Indications: Pain       No facility-administered encounter  "medications on file as of 1/16/2024.     Review of Systems   Constitutional:  Positive for malaise/fatigue.   HENT: Negative.     Eyes: Negative.    Respiratory:  Negative for shortness of breath.    Cardiovascular:  Negative for chest pain, palpitations, orthopnea, leg swelling and PND.   Gastrointestinal: Negative.    Genitourinary: Negative.    Musculoskeletal:  Positive for back pain and joint pain.   Skin: Negative.    Neurological: Negative.    Endo/Heme/Allergies: Negative.    Psychiatric/Behavioral:  Negative for depression. The patient is not nervous/anxious.               Objective     /54 (BP Location: Left arm, Patient Position: Sitting, BP Cuff Size: Adult)   Pulse (!) 109   Resp 14   Ht 1.702 m (5' 7\")   Wt 65.3 kg (144 lb)   SpO2 93%   BMI 22.55 kg/m²     Physical Exam  Constitutional:       Appearance: Normal appearance.   HENT:      Head: Normocephalic and atraumatic.   Neck:      Vascular: No JVD.   Cardiovascular:      Rate and Rhythm: Normal rate and regular rhythm.      Pulses: Normal pulses.      Heart sounds: Normal heart sounds. No murmur heard.     No friction rub.      Comments: Groin sites CDI with mild amount of bruising and small nodule  Pulmonary:      Effort: Pulmonary effort is normal. No respiratory distress.      Breath sounds: Normal breath sounds.   Abdominal:      General: There is no distension.      Palpations: Abdomen is soft.   Musculoskeletal:      Right lower leg: No edema.      Left lower leg: No edema.   Skin:     General: Skin is warm and dry.   Neurological:      General: No focal deficit present.      Mental Status: She is alert and oriented to person, place, and time.   Psychiatric:         Mood and Affect: Mood normal.         Behavior: Behavior normal.          Lab Results   Component Value Date/Time    CHOLSTRLTOT 115 05/08/2023 01:41 AM    LDL 34 05/08/2023 01:41 AM    HDL 67 05/08/2023 01:41 AM    TRIGLYCERIDE 69 05/08/2023 01:41 AM       Lab Results "   Component Value Date/Time    SODIUM 137 01/09/2024 02:13 AM    POTASSIUM 4.4 01/09/2024 02:13 AM    CHLORIDE 99 01/09/2024 02:13 AM    CO2 29 01/09/2024 02:13 AM    GLUCOSE 191 (H) 01/09/2024 02:13 AM    BUN 27 (H) 01/09/2024 02:13 AM    CREATININE 0.95 01/09/2024 02:13 AM    GLOMRATE 64 10/06/2023 04:33 AM     Lab Results   Component Value Date/Time    ALKPHOSPHAT 126 (H) 01/09/2024 02:13 AM    ASTSGOT 23 01/09/2024 02:13 AM    ALTSGPT 12 01/09/2024 02:13 AM    TBILIRUBIN 0.2 01/09/2024 02:13 AM      Intraoperative Echo 1/8/2024  CONCLUSIONS  1)  Severe AS for TAVR.  2)  26 mm -1 ml Kamla 3.  Mean Gradient:  3 mmHg.    EOA:  2.54 cm^2.    No significant PVL  3)  Normal EF.    CXR on 1/8/2024 showing   1.  Mild interstitial pulmonary edema  2.  Persistent LEFT lower lobe scar     EKG on 1/9/2024  Sinus rhythm, short sinus pauses, borderline prolonged MT interval  Rate 76, 0.215/0.078/0.453    EKG on 1/16/2024   Sinus rhythm, PVCs, prolonged MT interval  85, 0.222/0.083/0.420    Assessment & Plan     1. PAF (paroxysmal atrial fibrillation) (HCC)        2. S/P TAVR (transcatheter aortic valve replacement)  EKG      3. Acute on chronic heart failure with preserved ejection fraction (HCC)        4. Other hyperlipidemia        5. Primary hypertension            Medical Decision Making: Today's Assessment/Status/Plan:        HCC Gap Form    Diagnosis to address: J44.0 - Chronic obstructive pulmonary disease with acute lower respiratory infection (HCC)  Assessment and plan: Chronic, stable. Continue with current defined treatment plan: Managed by PCP, has albuterol as needed. Follow-up at least annually.  Diagnosis: N18.32 - Stage 3b chronic kidney disease (HCC)  Assessment and plan: Chronic, stable. Continue with current defined treatment plan: Renally dose medications, avoid nephrotoxic agents. Follow-up at least annually.  Diagnosis: J96.11 - Chronic hypoxemic respiratory failure (HCC)  Assessment and plan: Chronic,  stable. Continue with current defined treatment plan: Managed by PCP, on home O2. Follow-up at least annually.  Diagnosis: I27.20 - Pulmonary HTN (HCC)  Assessment and plan: Chronic, stable. Continue with current defined treatment plan: Likely due to underlying COPD, continue management per PCP. Follow-up at least annually.  Diagnosis: I48.0 - PAF (paroxysmal atrial fibrillation) (HCC)  Assessment and plan: Chronic, stable. Continue with current defined treatment plan: Unclear whether patient has PAF, repeat cardiac event monitor to evaluate. Follow-up at least annually.  Last edited 01/16/24 14:49 PST by Nikki Lares A.P.R.N.           S/P TAVR  -doing well post-op  -cont asa lifelong  -groins CDI with mild bruising and small nodule   -SBE prophylaxis understands  -echo 1 month with structural heart follow up  -reviewed hospital imaging, labs, and EKG  -cardiac rehab referral placed, patient reports she will be unlikely to be able to attend  -EKG shows sinus rhythm    PAF   -Questionable history of PAF  -I had ordered a heart monitor for her in the hospital, she wore it for 3 days before removing it, we will repeat cardiac event monitor, ideally for 14 days    Heart Failure with preserved Ejection Fraction  -EF 55%  -Euvolemic on exam  - Continue Lasix 20 mg daily  -Spironolactone 25 mg daily    Hypertension  - good control  - continue current regimen  - goal < 130/80     Hyperlipidemia  -Most recent LDL 34  -Continue atorvastatin   -Goal of less than 70  -Check lipid panel annually     Follow up with echocardiogram on 2/6/2024 Dr. Eddy on 2/14/2024    This note was dictated using Dragon speech recognition software.                      Kassi Carrillo M.D.  [address] [place] [address]  Via In Basket

## 2024-01-17 ENCOUNTER — APPOINTMENT (OUTPATIENT)
Dept: MEDICAL GROUP | Facility: PHYSICIAN GROUP | Age: 89
End: 2024-01-17
Payer: MEDICARE

## 2024-01-17 ENCOUNTER — HOME CARE VISIT (OUTPATIENT)
Dept: HOME HEALTH SERVICES | Facility: HOME HEALTHCARE | Age: 89
End: 2024-01-17

## 2024-01-17 ENCOUNTER — NON-PROVIDER VISIT (OUTPATIENT)
Dept: CARDIOLOGY | Facility: MEDICAL CENTER | Age: 89
End: 2024-01-17
Payer: MEDICARE

## 2024-01-17 DIAGNOSIS — D13.91 APC (ADENOMATOUS POLYPOSIS COLI): ICD-10-CM

## 2024-01-17 DIAGNOSIS — I48.0 PAF (PAROXYSMAL ATRIAL FIBRILLATION) (HCC): ICD-10-CM

## 2024-01-17 DIAGNOSIS — I49.3 PVC'S (PREMATURE VENTRICULAR CONTRACTIONS): ICD-10-CM

## 2024-01-17 DIAGNOSIS — I47.10 SVT (SUPRAVENTRICULAR TACHYCARDIA) (HCC): ICD-10-CM

## 2024-01-17 NOTE — PROGRESS NOTES
Home enrollment completed In the 14 day Zio XT Holter monitor per LUKE Jung.  Monitor will be shipped to patient via [facility]. Pending EOS. This is not a duplicate order, pt wore previous monitor placed on 0109/24 for only 3 days per АННА Ballesteros, pt is aware of the new monitor.

## 2024-01-23 ENCOUNTER — PATIENT OUTREACH (OUTPATIENT)
Dept: HEALTH INFORMATION MANAGEMENT | Facility: OTHER | Age: 89
End: 2024-01-23
Payer: MEDICARE

## 2024-01-23 DIAGNOSIS — I50.33 ACUTE ON CHRONIC HEART FAILURE WITH PRESERVED EJECTION FRACTION (HCC): ICD-10-CM

## 2024-01-23 DIAGNOSIS — J44.0 CHRONIC OBSTRUCTIVE PULMONARY DISEASE WITH ACUTE LOWER RESPIRATORY INFECTION (HCC): ICD-10-CM

## 2024-01-23 NOTE — PROGRESS NOTES
Nurse CM outreach call to pt.  MA notified nurse DYLAN that pt left a VM on MA phone yesterday requesting a call back from nurse.  Nurse was notified of message today. Nurse attempted to reach pt. VM left for pt requesting return call to nurse at [phone].    1/24/24 2:55 pm: Nurse outreach call to pt for follow-up. Pt answered telephone.     Assessment    Pt reports she had follow-up appt with cardiology after her recent TAVR procedure. Pt reports she is doing better. States no edema in her legs today. Pt reports she is taking medications as directed. Reports [facility] made a recent visit. Pt reports she still has some hip pain. Reports she is using her walker to assist with mobility.     Education    Fall precautions Follow-up with specialists as scheduled. Monitor extremities for any edema. Monitor daily weights. Follow-up with cardiology if weight gain of 3 lbs in a day or 5 lbs in a week.     Plan of Care and Goals    Reviewed care plan     Barriers:    Chronic health conditions    Progress:    Continue to work on progress    Next outreach:  One month  Nurse Care Coordinator:  Radha Martinez  [phone]

## 2024-01-30 RX ORDER — MAGNESIUM OXIDE TAB 400 MG (241.3 MG ELEMENTAL MG) 400 (241.3 MG) MG
TAB ORAL
Qty: 6 TABLET | Refills: 0 | Status: SHIPPED | OUTPATIENT
Start: 2024-01-30 | End: 2024-02-14

## 2024-02-05 ENCOUNTER — PATIENT MESSAGE (OUTPATIENT)
Dept: CARDIOLOGY | Facility: MEDICAL CENTER | Age: 89
End: 2024-02-05

## 2024-02-06 ENCOUNTER — PATIENT MESSAGE (OUTPATIENT)
Dept: CARDIOLOGY | Facility: MEDICAL CENTER | Age: 89
End: 2024-02-06

## 2024-02-06 ENCOUNTER — TELEMEDICINE (OUTPATIENT)
Dept: MEDICAL GROUP | Facility: PHYSICIAN GROUP | Age: 89
End: 2024-02-06
Payer: MEDICARE

## 2024-02-06 ENCOUNTER — APPOINTMENT (OUTPATIENT)
Dept: CARDIOLOGY | Facility: MEDICAL CENTER | Age: 89
End: 2024-02-06
Attending: INTERNAL MEDICINE
Payer: MEDICARE

## 2024-02-06 VITALS
TEMPERATURE: 98.7 F | DIASTOLIC BLOOD PRESSURE: 68 MMHG | BODY MASS INDEX: 23.23 KG/M2 | SYSTOLIC BLOOD PRESSURE: 130 MMHG | HEART RATE: 72 BPM | OXYGEN SATURATION: 92 % | WEIGHT: 148 LBS | HEIGHT: 67 IN

## 2024-02-06 DIAGNOSIS — I50.33 ACUTE ON CHRONIC HEART FAILURE WITH PRESERVED EJECTION FRACTION (HCC): ICD-10-CM

## 2024-02-06 DIAGNOSIS — J44.9 CHRONIC OBSTRUCTIVE PULMONARY DISEASE, UNSPECIFIED COPD TYPE (HCC): ICD-10-CM

## 2024-02-06 DIAGNOSIS — M48.061 SPINAL STENOSIS OF LUMBAR REGION WITHOUT NEUROGENIC CLAUDICATION: ICD-10-CM

## 2024-02-06 DIAGNOSIS — Z79.899 MEDICATION MANAGEMENT: ICD-10-CM

## 2024-02-06 DIAGNOSIS — M87.052 AVASCULAR NECROSIS OF BONE OF LEFT HIP (HCC): ICD-10-CM

## 2024-02-06 DIAGNOSIS — C34.32 PRIMARY CANCER OF LEFT LOWER LOBE OF LUNG (HCC): ICD-10-CM

## 2024-02-06 DIAGNOSIS — Z95.2 S/P TAVR (TRANSCATHETER AORTIC VALVE REPLACEMENT): ICD-10-CM

## 2024-02-06 DIAGNOSIS — M21.371 RIGHT FOOT DROP: ICD-10-CM

## 2024-02-06 DIAGNOSIS — I48.0 PAF (PAROXYSMAL ATRIAL FIBRILLATION) (HCC): ICD-10-CM

## 2024-02-06 PROCEDURE — 99215 OFFICE O/P EST HI 40 MIN: CPT | Mod: 95 | Performed by: INTERNAL MEDICINE

## 2024-02-06 ASSESSMENT — FIBROSIS 4 INDEX: FIB4 SCORE: 3.4

## 2024-02-06 NOTE — PATIENT COMMUNICATION
Clayton Eddy M.D.  Chandni Mcqueen R.N.14 hours ago (5:57 PM)     Meds are ok with TAVR. Can do echo when it can be arranged.    Thx  BE

## 2024-02-07 ENCOUNTER — APPOINTMENT (OUTPATIENT)
Dept: MEDICAL GROUP | Facility: PHYSICIAN GROUP | Age: 89
End: 2024-02-07
Payer: MEDICARE

## 2024-02-07 RX ORDER — MAGNESIUM OXIDE 400 MG/1
400 TABLET ORAL 2 TIMES DAILY
Qty: 200 TABLET | Refills: 2 | Status: SHIPPED | OUTPATIENT
Start: 2024-02-07 | End: 2024-02-28

## 2024-02-07 NOTE — PROGRESS NOTES
Virtual Visit: Established Patient   This visit was conducted via Zoom using secure and encrypted videoconferencing technology.   The patient was in their home in the [facility].    The patient's identity was confirmed and verbal consent was obtained for this virtual visit.     Subjective:   CC:   Chief Complaint   Patient presents with    Follow-Up     Medication questions (tramadol and meloxicam); magnesium       Chelly Decker is a 88 y.o. female presenting for evaluation and management of:  Follow-up visit from .    1. Avascular necrosis of bone of left hip (HCC)  Chronic.  Stable.  Status post left hip replacement with Dr. Perrin in August 2023.  Patient suffered postop seroma after which patient required acute inpatient rehab for recovery for 2 months..  Physical therapy weekly.  Patient denies recent falls or injuries.  Adequate ambulation.  Using assistive device.    2. Chronic obstructive pulmonary disease, unspecified COPD type (HCC)  Chronic.  Stable.  Patient with PMH of COPD, solitary pulmonary nodule, history of lung cancer followed by [facility] in [place].  Chronic home O2 3 to 4 L nasal cannula 24/7.    3. PAF (paroxysmal atrial fibrillation) (HCC)  Chronic.  Stable.  Remote history of PAF.  Monitored by cardiology.  Consideration for cardiac event monitoring.    4. Primary cancer of left lower lobe of lung (HCC)  Chronic.  Stable.  Followed by pulmonologist in [place].    5. Acute on chronic heart failure with preserved ejection fraction (HCC)  Chronic.  Stable.  Followed by cardiology.  Medications include losartan, aspirin, magnesium, spironolactone, diltiazem.    6. S/P TAVR (transcatheter aortic valve replacement)  Patient is status post TAVR for severe AS in early January.  Patient has show 1 week follow-up with on January 16.  Patient is scheduled for follow-up echocardiogram in 1 month.  Cardiac rehab referral has been placed.  Patient to  continue with Lasix 20 mg daily, spironolactone 25 mg daily.  Blood pressure well-controlled.    7. Spinal stenosis of lumbar region without neurogenic claudication  Patient is followed by Dr. Lozoya her pain management physician.  She continues with Dilaudid pain pump.  She is also requiring Ultram 50 mg twice daily, meloxicam 7.5 mg daily along with hydrocodone 5/325 mg for breakthrough pain or prior to physical therapy.  Patient is due for refill on her Dilaudid pain pump on February 14.  Status post recent cortisone injection for sciatica    8. Medication management  Patient would like to review concurrent use of Ultram, hydrocodone and meloxicam.    9. Right foot drop  Patient was seen by  in November for complaints of a sudden onset of right foot drop.  Symptoms were present for 1 week.  Patient was stable at that time neurologically however was sent to ER for further evaluation for possible stroke.  At that time, patient and daughter declined to go to ER due to family situation.  At this time, MRI of the brain and lumbar spine were ordered.  This was never completed.  Patient and daughter very concerned and would like a referral to neurology.      ROS   Positive ROS per HPI.  Denies all other cardiopulmonary, GI, neurologic symptoms.    Current medicines (including changes today)  Current Outpatient Medications   Medication Sig Dispense Refill    meloxicam (MOBIC) 7.5 MG Tab TAKE 1 TABLET BY MOUTH 2 TIMES A DAY 30 Tablet 1    aspirin 81 MG EC tablet Take 1 Tablet by mouth every day. 100 Tablet 3    magnesium oxide (MAG-OX) 400 MG Tab tablet Take 200-400 mg by mouth 2 times a day. 400 mg every morning  200 mg every afternoon      CRANBERRY PO Take 1 Tablet by mouth every day.      Probiotic Product (PROBIOTIC DAILY PO) Take 1 Tablet by mouth every day.      traMADol (ULTRAM) 50 MG Tab Take 50 mg by mouth 2 times a day.      atorvastatin (LIPITOR) 40 MG Tab Take 40 mg by mouth every day.      furosemide  (LASIX) 20 MG Tab Take 20 mg by mouth every morning.      losartan (COZAAR) 25 MG Tab Take 12.5 mg by mouth every day. 0.5 tablet (12.5 mg)      pantoprazole (PROTONIX) 40 MG Tablet Delayed Response Take 40 mg by mouth every morning.      polyethylene glycol 3350 (MIRALAX) 17 GM/SCOOP Powder Take 1 Dose by mouth every day.      spironolactone (ALDACTONE) 25 MG Tab Take 25 mg by mouth every day. Indications: Edema, CHF      Melatonin 10 MG Tab Take 10 mg by mouth every evening. Indications: Trouble Sleeping      acetaminophen (TYLENOL) 500 MG Tab Take 1,000 mg by mouth every 6 hours as needed. Indications: Fever, Pain      Pain Pump (PATIENT SUPPLIED) Device Inject  as directed continuous. Patient's Pain Pump (placed and maintained as an outpatient)    Medications/concentrations: Hydromorphone 4000 mcg/ml (749.1 mcg/day)  Route: Intrathecal  Last changed: 11/29/2023 @ 1558  Refill pump before date: 2/27/24  Continuous infusion rates (Drug/Rate): Hydromorphone 0.7491 mg/day or 0.0312 mg/hr   MD office:   Indications: severe pain      DULoxetine (CYMBALTA) 20 MG Cap DR Particles Take 20 mg by mouth 2 times a day. Indications: Musculoskeletal Pain      guaiFENesin ER (MUCINEX) 600 MG TABLET SR 12 HR Take 600 mg by mouth every 12 hours. Indications: Cough      Cyanocobalamin (VITAMIN B-12 PO) Take 1 Dose by mouth every day. Indications: supplement      Cholecalciferol (VITAMIN D3 PO) Take 1 Tablet by mouth every day. Indications: supplement      diltiazem (TIAZAC) 120 MG SR capsule Take 120 mg by mouth every evening. Indications: High Blood Pressure Disorder      albuterol 108 (90 Base) MCG/ACT Aero Soln inhalation aerosol Inhale 2 Puffs every 6 hours as needed for Shortness of Breath. Indications: Chronic Obstructive Lung Disease      Simethicone (GAS-X PO) Take 1 Tablet by mouth 3 times a day. Indications: gas      MAGNESIUM-OXIDE 400 (240 Mg) MG Tab TAKE 1 TABLET BY MOUTH TWICE A DAY FOR 3 DAYS 6 Tablet 0     sennosides-docusate sodium (SENOKOT-S) 8.6-50 MG tablet Take 1 Tablet by mouth 1 time a day as needed (CONSTIPATION). 30 Tablet 2     No current facility-administered medications for this visit.       Patient Active Problem List    Diagnosis Date Noted    S/P TAVR (transcatheter aortic valve replacement) 01/08/2024    Stage 3b chronic kidney disease ([facility]) 11/07/2023    PAF (paroxysmal atrial fibrillation) ([facility]) 10/31/2023    Pulmonary HTN ([facility]) 10/31/2023    Physical debility 06/07/2023    Prediabetes 05/08/2023    External hemorrhoid 05/08/2023    TIA (transient ischemic attack) 05/07/2023    Chronic hypoxemic respiratory failure ([facility]) 05/06/2023    S/P insertion of intrathecal pump 05/05/2023    Hammer toes of both feet 12/29/2022    Dizziness 12/29/2022    Aseptic necrosis of bone of left hip ([facility]) 11/30/2022    Osteoarthritis of left hip 11/30/2022    Pain of left hip joint 11/30/2022    Trochanteric bursitis of left hip 11/30/2022    Insomnia due to medical condition 10/13/2022    Avascular necrosis of bone of left hip ([facility]) 08/17/2022    Lower extremity edema 08/03/2022    Chronic obstructive pulmonary disease ([facility]) 06/22/2022    Solitary pulmonary nodule 06/22/2022    Acute on chronic heart failure with preserved ejection fraction ([facility]) 06/22/2022    Bilateral carotid bruits 06/06/2022    Chronic pain syndrome 06/06/2022    PVC's (premature ventricular contractions) 06/06/2022    Moderate mitral insufficiency 06/06/2022    Urge incontinence of urine 05/12/2022    H/O Clostridium difficile infection 05/12/2022    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    Primary hypertension 03/29/2022    Incomplete defecation 03/29/2022    Moderate asthma without complication 03/29/2022    Spinal stenosis of lumbar region without neurogenic claudication 03/29/2022    Pelvic floor dysfunction 03/29/2022    Other hyperlipidemia 01/13/2021    Primary cancer of left lower lobe of lung ([facility]) 01/15/2020    Lichen  "sclerosus 01/18/2011        Objective:   /68   Pulse 72   Temp 37.1 °C (98.7 °F)   Ht 1.702 m (5' 7\")   Wt 67.1 kg (148 lb)   SpO2 92%   BMI 23.18 kg/m²     Physical Exam: Via virtual visit  Constitutional: Alert, no distress, well-groomed.  Skin: No rashes in visible areas.  Eye: Round. Conjunctiva clear, lids normal. No icterus.   ENMT: Lips pink without lesions, good dentition, moist mucous membranes. Phonation normal.  Neck: No masses, no thyromegaly. Moves freely without pain.  Respiratory: Unlabored respiratory effort, no cough or audible wheeze  Psych: Alert and oriented x3, normal affect and mood.     Assessment and Plan:   The following treatment plan was discussed:     1. Avascular necrosis of bone of left hip (HCC)  Chronic.  Stable.  Continue follow-up care per orthopedics and continue physical therapy through home health.    2. Chronic obstructive pulmonary disease, unspecified COPD type (HCC)  Chronic.  Stable.  Follow-up with pulmonology as scheduled.    3. PAF (paroxysmal atrial fibrillation) (HCC)  Chronic.  Stable.  Monitored by cardiology.  Not anticoagulated.    4. Primary cancer of left lower lobe of lung (HCC)  Chronic.  Stable.  Followed by pulmonology.    5. Acute on chronic heart failure with preserved ejection fraction (HCC)  Chronic.  Stable.  Patient has an echocardiogram scheduled.    6. S/P TAVR (transcatheter aortic valve replacement)  Stable.  Follow-up with cardiologist, Dr. Eddy next week.    7. 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  Patient at 1 point would like to transfer care to [place].    8. Medication management  All medications and interactions reviewed with patient.    9. Right foot drop  Referral to neurology.    Follow-up: No follow-ups on file.    My total time spent caring for the patient on the day of the encounter was 48 minutes.   This does not include time spent on separately billable " procedures/tests.

## 2024-02-12 ENCOUNTER — TELEPHONE (OUTPATIENT)
Dept: CARDIOLOGY | Facility: MEDICAL CENTER | Age: 89
End: 2024-02-12
Payer: MEDICARE

## 2024-02-12 PROCEDURE — 93248 EXT ECG>7D<15D REV&INTERPJ: CPT | Performed by: INTERNAL MEDICINE

## 2024-02-13 RX ORDER — CEPHALEXIN 500 MG/1
500 CAPSULE ORAL 4 TIMES DAILY
Qty: 40 CAPSULE | Refills: 0 | Status: SHIPPED | OUTPATIENT
Start: 2024-02-13 | End: 2024-02-28

## 2024-02-14 ENCOUNTER — OFFICE VISIT (OUTPATIENT)
Dept: CARDIOLOGY | Facility: MEDICAL CENTER | Age: 89
End: 2024-02-14
Attending: INTERNAL MEDICINE
Payer: MEDICARE

## 2024-02-14 VITALS
HEIGHT: 67 IN | SYSTOLIC BLOOD PRESSURE: 120 MMHG | BODY MASS INDEX: 22.76 KG/M2 | DIASTOLIC BLOOD PRESSURE: 78 MMHG | RESPIRATION RATE: 16 BRPM | OXYGEN SATURATION: 92 % | WEIGHT: 145 LBS | HEART RATE: 78 BPM

## 2024-02-14 DIAGNOSIS — G45.9 TIA (TRANSIENT ISCHEMIC ATTACK): ICD-10-CM

## 2024-02-14 DIAGNOSIS — Z95.2 S/P TAVR (TRANSCATHETER AORTIC VALVE REPLACEMENT): ICD-10-CM

## 2024-02-14 DIAGNOSIS — N18.32 STAGE 3B CHRONIC KIDNEY DISEASE: ICD-10-CM

## 2024-02-14 DIAGNOSIS — I27.20 PULMONARY HTN (HCC): ICD-10-CM

## 2024-02-14 DIAGNOSIS — J44.0 CHRONIC OBSTRUCTIVE PULMONARY DISEASE WITH ACUTE LOWER RESPIRATORY INFECTION (HCC): ICD-10-CM

## 2024-02-14 DIAGNOSIS — I50.33 ACUTE ON CHRONIC HEART FAILURE WITH PRESERVED EJECTION FRACTION (HCC): ICD-10-CM

## 2024-02-14 DIAGNOSIS — I48.0 PAF (PAROXYSMAL ATRIAL FIBRILLATION) (HCC): ICD-10-CM

## 2024-02-14 DIAGNOSIS — M87.052 AVASCULAR NECROSIS OF BONE OF LEFT HIP (HCC): ICD-10-CM

## 2024-02-14 DIAGNOSIS — I49.3 PVC'S (PREMATURE VENTRICULAR CONTRACTIONS): ICD-10-CM

## 2024-02-14 PROCEDURE — 99213 OFFICE O/P EST LOW 20 MIN: CPT | Performed by: INTERNAL MEDICINE

## 2024-02-14 PROCEDURE — 3074F SYST BP LT 130 MM HG: CPT | Performed by: INTERNAL MEDICINE

## 2024-02-14 PROCEDURE — 3078F DIAST BP <80 MM HG: CPT | Performed by: INTERNAL MEDICINE

## 2024-02-14 PROCEDURE — 99214 OFFICE O/P EST MOD 30 MIN: CPT | Performed by: INTERNAL MEDICINE

## 2024-02-14 RX ORDER — BISOPROLOL FUMARATE 5 MG/1
5 TABLET, FILM COATED ORAL DAILY
Qty: 100 TABLET | Refills: 3 | Status: SHIPPED | OUTPATIENT
Start: 2024-02-14

## 2024-02-14 ASSESSMENT — FIBROSIS 4 INDEX: FIB4 SCORE: 3.4

## 2024-02-14 NOTE — PROGRESS NOTES
"CARDIOLOGY STRUCTURAL HEART FOLLOWUP    PCP: Kassi Carrillo M.D.  REFERRING: [facility]    1. S/P TAVR (transcatheter aortic valve replacement)    2. Acute on chronic heart failure with preserved ejection fraction ([facility])    3. PAF (paroxysmal atrial fibrillation) ([facility])    4. Pulmonary HTN ([facility])    5. PVC's (premature ventricular contractions)    6. Stage 3b chronic kidney disease ([facility])    7. Chronic obstructive pulmonary disease with acute lower respiratory infection ([facility])    8. Avascular necrosis of bone of left hip (HCC)    9. TIA (transient ischemic attack)        Chelly Decker is doing well 1 month following transcatheter aortic valve replacement.  She will complete the echocardiogram in early March.  Blood pressure may be poorly controlled and I think she is having lower extremity edema related to diltiazem.  I therefore replaced this with bisoprolol.  I will check in on her blood pressure in 1 week    Follow up: 6 months    History: Chelly Decker is a 88 y.o. female history of lung cancer s/p definitive radiation, chronic lowe extremity pain related to avascular hip necrosis and walker dependence for the last 10-12 years presenting for follow up of TAVR with #26 S3UR placed 1/9/24. Preoperative angiogram showed minimal coronary arteriosclerosis.     PE:  /78 (BP Location: Left arm, Patient Position: Sitting, BP Cuff Size: Adult)   Pulse 78   Resp 16   Ht 1.702 m (5' 7\")   Wt 65.8 kg (145 lb)   SpO2 92%   BMI 22.71 kg/m²   GEN: NAD  CARDIAC: Regular Systolic ejection murmur  RESP: Clear to auscultation bilaterally  ABD: Soft, non-tender, non-distended  EXT: 1+ lower extremity edema Stasis dermatitis bilaterally  NEURO: No focal deficit         Past Medical History:   Diagnosis Date    Acute exacerbation of chronic obstructive airways disease (HCC) 06/22/2022    Acute nasopharyngitis 01/04/2024    mucousy cough    Arrhythmia     follows with [facility]    Arthritis 5 + " years    controlled by medication    Asthma 12/04/2023    medicated    Avascular necrosis of bone of left hip (HCC) 08/17/2022    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    Breath shortness 12/04/2023    uses oxygen at night, during day sometimes. 8/23/23-SOB with activity.    Cancer (HCC)     cervical cancer 1968    Cancer ([facility]) 12/04/2023    lower left lobe cancer    Cervical cancer ([facility]) 1968    [id number] chemo or radiation.    Chronic constipation     takes miralax    Chronic kidney disease (CKD)     stage 3    Chronic obstructive pulmonary disease ([facility]) 06/22/2022    Chronic pain     follows with Dr Lozoya    Congestive heart failure ([facility])     follows with Dr Salvador with [facility]    COPD (chronic obstructive pulmonary disease) ([facility])     Dental disorder 12/04/2023    upper denture and permanent bridge on lower front teeth    Dizziness 12/29/2022    Foot drop, right foot 12/04/2023    GERD (gastroesophageal reflux disease)     H/O Clostridium difficile infection 05/12/2022    Hammer toes of both feet 12/29/2022    Heart burn 12/04/2023    taking pantoprazole.    Heart murmur     High cholesterol 12/04/2023    Controled with medication    Hypertension 12/04/2023    medicated    Hypotension due to hypovolemia 08/17/2022    Hypoxia 03/29/2022    Incomplete defecation 03/29/2022    Infectious disease 04/25/2023    I was just informed that the person who drove me to my doctor appointment and home on the 25th of this month was diagnosed with the flu on the 26th of this month. I had my flu shot and after one day I have no symptoms will keep checking !    Insomnia due to medical condition 10/13/2022    Irregular heart rate 03/29/2022    states has had for years, why atenolol    Kidney stones     Moderate aortic stenosis     Moderate mitral insufficiency     Oxygen dependent      uses oxygen 4 liters nasal cannula at night, sometimes during the day    Pain 12/04/2023    pain everywhere, fibromyalgia    Pain in joint involving multiple sites 10/13/2022    Pain in joint involving multiple sites 10/13/2022    Pelvic floor dysfunction 03/29/2022    Primary hypertension 03/29/2022    Renal insufficiency 08/03/2022    due to meds    Seasonal allergies     Skin lesions 05/12/2022    Solitary pulmonary nodule     had radiation treatment approx 2021 and does follow up scans.    Spinal stenosis     pain pump for pain control    Spinal stenosis of lumbar region without neurogenic claudication 03/29/2022    Urge incontinence of urine 12/04/2023    urinary retention, diapers    Urinary bladder disorder Past [id number]    I've been under treatment for years with no results I am seeing a new urologist on May 2     Allergies   Allergen Reactions    Sulfa Drugs Unspecified     Stomach ache    Nauseated, diarrhea    Other reaction(s): Unspecified   Stomach ache    Erythromycin Nausea     Other Reaction(s): GI Upset    Other reaction(s): Not available     Outpatient Encounter Medications as of 2/14/2024   Medication Sig Dispense Refill    cephALEXin (KEFLEX) 500 MG Cap Take 1 Capsule by mouth 4 times a day. 40 Capsule 0    magnesium oxide (MAG-OX) 400 MG Tab tablet Take 1 Tablet by mouth 2 times a day. 200 Tablet 2    MAGNESIUM-OXIDE 400 (240 Mg) MG Tab TAKE 1 TABLET BY MOUTH TWICE A DAY FOR 3 DAYS 6 Tablet 0    meloxicam (MOBIC) 7.5 MG Tab TAKE 1 TABLET BY MOUTH 2 TIMES A DAY 30 Tablet 1    aspirin 81 MG EC tablet Take 1 Tablet by mouth every day. 100 Tablet 3    CRANBERRY PO Take 1 Tablet by mouth every day.      Probiotic Product (PROBIOTIC DAILY PO) Take 1 Tablet by mouth every day.      traMADol (ULTRAM) 50 MG Tab Take 50 mg by mouth 2 times a day.      atorvastatin (LIPITOR) 40 MG Tab Take 40 mg by mouth every day.      furosemide (LASIX) 20 MG Tab Take 20 mg by mouth every morning.      losartan  (COZAAR) 25 MG Tab Take 12.5 mg by mouth every day. 0.5 tablet (12.5 mg)      pantoprazole (PROTONIX) 40 MG Tablet Delayed Response Take 40 mg by mouth every morning.      polyethylene glycol 3350 (MIRALAX) 17 GM/SCOOP Powder Take 1 Dose by mouth every day.      sennosides-docusate sodium (SENOKOT-S) 8.6-50 MG tablet Take 1 Tablet by mouth 1 time a day as needed (CONSTIPATION). 30 Tablet 2    spironolactone (ALDACTONE) 25 MG Tab Take 25 mg by mouth every day. Indications: Edema, CHF      [DISCONTINUED] HYDROcodone/acetaminophen (NORCO)  MG Tab Indications: Pain      Melatonin 10 MG Tab Take 10 mg by mouth every evening. Indications: Trouble Sleeping      acetaminophen (TYLENOL) 500 MG Tab Take 1,000 mg by mouth every 6 hours as needed. Indications: Fever, Pain      Pain Pump (PATIENT SUPPLIED) Device Inject  as directed continuous. Patient's Pain Pump (placed and maintained as an outpatient)    Medications/concentrations: Hydromorphone 4000 mcg/ml (749.1 mcg/day)  Route: Intrathecal  Last changed: 11/29/2023 @ 1558  Refill pump before date: 2/27/24  Continuous infusion rates (Drug/Rate): Hydromorphone 0.7491 mg/day or 0.0312 mg/hr   MD office:   Indications: severe pain      DULoxetine (CYMBALTA) 20 MG Cap DR Particles Take 20 mg by mouth 2 times a day. Indications: Musculoskeletal Pain      guaiFENesin ER (MUCINEX) 600 MG TABLET SR 12 HR Take 600 mg by mouth every 12 hours. Indications: Cough      Cyanocobalamin (VITAMIN B-12 PO) Take 1 Dose by mouth every day. Indications: supplement      Cholecalciferol (VITAMIN D3 PO) Take 1 Tablet by mouth every day. Indications: supplement      diltiazem (TIAZAC) 120 MG SR capsule Take 120 mg by mouth every evening. Indications: High Blood Pressure Disorder      albuterol 108 (90 Base) MCG/ACT Aero Soln inhalation aerosol Inhale 2 Puffs every 6 hours as needed for Shortness of Breath. Indications: Chronic Obstructive Lung Disease      Simethicone (GAS-X PO) Take  "1 Tablet by mouth 3 times a day. Indications: gas       No facility-administered encounter medications on file as of 2024.     Social History     Socioeconomic History    Marital status:      Spouse name: Not on file    Number of children: Not on file    Years of education: Not on file    Highest education level: Not on file   Occupational History    Occupation: retired book keeper   Tobacco Use    Smoking status: Former     Current packs/day: 0.00     Average packs/day: 1 pack/day for 61.0 years (61.0 ttl pk-yrs)     Types: Cigarettes, Electronic Cigarettes     Start date: 1954     Quit date: 2015     Years since quittin.1    Smokeless tobacco: Never    Tobacco comments:     Smoked as a teenager quit as a senior citizen   Vaping Use    Vaping Use: Former    Substances: Flavoring   Substance and Sexual Activity    Alcohol use: Not Currently     Comment: Was  is a social drinker until approximately     Drug use: Not Currently     Types: Inhaled     Comment: cocaine while in her 40\"s    Sexual activity: Not on file   Other Topics Concern    Not on file   Social History Narrative    Not on file     Social Determinants of Health     Financial Resource Strain: Low Risk  (2022)    Overall Financial Resource Strain (CARDIA)     Difficulty of Paying Living Expenses: Not very hard   Food Insecurity: No Food Insecurity (2022)    Hunger Vital Sign     Worried About Running Out of Food in the Last Year: Never true     Ran Out of Food in the Last Year: Never true   Transportation Needs: Unmet Transportation Needs (2022)    PRAPARE - Transportation     Lack of Transportation (Medical): Yes     Lack of Transportation (Non-Medical): No   Physical Activity: Inactive (2022)    Exercise Vital Sign     Days of Exercise per Week: 0 days     Minutes of Exercise per Session: 0 min   Stress: Not on file   Social Connections: Not on file   Intimate Partner Violence: Not on file   Housing " Stability: Unknown (7/22/2022)    Housing Stability Vital Sign     Unable to Pay for Housing in the Last Year: No     Number of Places Lived in the Last Year: Not on file     Unstable Housing in the Last Year: No       Studies  Lab Results   Component Value Date/Time    CHOLSTRLTOT 115 05/08/2023 01:41 AM    LDL 34 05/08/2023 01:41 AM    HDL 67 05/08/2023 01:41 AM    TRIGLYCERIDE 69 05/08/2023 01:41 AM       Lab Results   Component Value Date/Time    SODIUM 137 01/09/2024 02:13 AM    POTASSIUM 4.4 01/09/2024 02:13 AM    CHLORIDE 99 01/09/2024 02:13 AM    CO2 29 01/09/2024 02:13 AM    GLUCOSE 191 (H) 01/09/2024 02:13 AM    BUN 27 (H) 01/09/2024 02:13 AM    CREATININE 0.95 01/09/2024 02:13 AM    GLOMRATE 64 10/06/2023 04:33 AM     Lab Results   Component Value Date/Time    ALKPHOSPHAT 126 (H) 01/09/2024 02:13 AM    ASTSGOT 23 01/09/2024 02:13 AM    ALTSGPT 12 01/09/2024 02:13 AM    TBILIRUBIN 0.2 01/09/2024 02:13 AM      45 minutes spent during today's encounter    No chief complaint on file.      ROS:   10 point review systems is otherwise negative except as per the HPI   23.9

## 2024-02-21 ENCOUNTER — PATIENT MESSAGE (OUTPATIENT)
Dept: CARDIOLOGY | Facility: MEDICAL CENTER | Age: 89
End: 2024-02-21
Payer: MEDICARE

## 2024-02-21 ENCOUNTER — TELEPHONE (OUTPATIENT)
Dept: CARDIOLOGY | Facility: MEDICAL CENTER | Age: 89
End: 2024-02-21
Payer: MEDICARE

## 2024-02-21 VITALS — SYSTOLIC BLOOD PRESSURE: 121 MMHG | DIASTOLIC BLOOD PRESSURE: 61 MMHG

## 2024-02-23 ENCOUNTER — DOCUMENTATION (OUTPATIENT)
Dept: CARDIOLOGY | Facility: MEDICAL CENTER | Age: 89
End: 2024-02-23
Payer: MEDICARE

## 2024-02-23 RX ORDER — AMOXICILLIN AND CLAVULANATE POTASSIUM 875; 125 MG/1; MG/1
1 TABLET, FILM COATED ORAL 2 TIMES DAILY
Qty: 20 TABLET | Refills: 0 | Status: ON HOLD | OUTPATIENT
Start: 2024-02-23 | End: 2024-02-29

## 2024-02-23 NOTE — PROGRESS NOTES
Valve Program Functional Assessment:     KCCQ12   1a) Showering/bathin  1b) Walking 1 block on ground: 1  1c) Hurrying or joggin  2) Swellin  3) Fatigue: 2  4) Shortness of breath: 6  5) Sleep sitting up: 5  6) Limited enjoyment of life: 2  7) Spend the rest of your life with HF: 4  8a) Hobbies, recreational activities:1  8b) Working or doing household chores:2  8c) Visiting family or friends: 2

## 2024-02-27 ENCOUNTER — PATIENT OUTREACH (OUTPATIENT)
Dept: HEALTH INFORMATION MANAGEMENT | Facility: OTHER | Age: 89
End: 2024-02-27
Payer: MEDICARE

## 2024-02-27 ENCOUNTER — HOSPITAL ENCOUNTER (INPATIENT)
Facility: MEDICAL CENTER | Age: 89
LOS: 2 days | DRG: 602 | End: 2024-02-29
Attending: EMERGENCY MEDICINE | Admitting: INTERNAL MEDICINE
Payer: MEDICARE

## 2024-02-27 ENCOUNTER — TELEPHONE (OUTPATIENT)
Dept: CARDIOLOGY | Facility: MEDICAL CENTER | Age: 89
End: 2024-02-27
Payer: MEDICARE

## 2024-02-27 ENCOUNTER — APPOINTMENT (OUTPATIENT)
Dept: RADIOLOGY | Facility: MEDICAL CENTER | Age: 89
DRG: 602 | End: 2024-02-27
Attending: EMERGENCY MEDICINE
Payer: MEDICARE

## 2024-02-27 ENCOUNTER — OFFICE VISIT (OUTPATIENT)
Dept: URGENT CARE | Facility: CLINIC | Age: 89
End: 2024-02-27
Payer: MEDICARE

## 2024-02-27 VITALS
HEIGHT: 67 IN | RESPIRATION RATE: 16 BRPM | HEART RATE: 61 BPM | TEMPERATURE: 96.6 F | WEIGHT: 145 LBS | DIASTOLIC BLOOD PRESSURE: 58 MMHG | BODY MASS INDEX: 22.76 KG/M2 | OXYGEN SATURATION: 96 % | SYSTOLIC BLOOD PRESSURE: 110 MMHG

## 2024-02-27 DIAGNOSIS — R09.02 HYPOXIA: ICD-10-CM

## 2024-02-27 DIAGNOSIS — L03.115 CELLULITIS AND ABSCESS OF RIGHT LEG: ICD-10-CM

## 2024-02-27 DIAGNOSIS — Z78.9 FAILURE OF OUTPATIENT TREATMENT: ICD-10-CM

## 2024-02-27 DIAGNOSIS — Z95.2 S/P TAVR (TRANSCATHETER AORTIC VALVE REPLACEMENT): ICD-10-CM

## 2024-02-27 DIAGNOSIS — J96.11 CHRONIC HYPOXEMIC RESPIRATORY FAILURE (HCC): ICD-10-CM

## 2024-02-27 DIAGNOSIS — L03.115 CELLULITIS OF RIGHT LOWER EXTREMITY: ICD-10-CM

## 2024-02-27 DIAGNOSIS — I50.33 ACUTE ON CHRONIC HEART FAILURE WITH PRESERVED EJECTION FRACTION (HCC): ICD-10-CM

## 2024-02-27 DIAGNOSIS — R60.0 LOWER EXTREMITY EDEMA: ICD-10-CM

## 2024-02-27 DIAGNOSIS — M79.661 PAIN AND SWELLING OF RIGHT LOWER LEG: ICD-10-CM

## 2024-02-27 DIAGNOSIS — J44.0 CHRONIC OBSTRUCTIVE PULMONARY DISEASE WITH ACUTE LOWER RESPIRATORY INFECTION (HCC): ICD-10-CM

## 2024-02-27 DIAGNOSIS — L02.415 CELLULITIS AND ABSCESS OF RIGHT LEG: ICD-10-CM

## 2024-02-27 DIAGNOSIS — L03.119 CELLULITIS OF LOWER EXTREMITY, UNSPECIFIED LATERALITY: ICD-10-CM

## 2024-02-27 DIAGNOSIS — J96.01 ACUTE RESPIRATORY FAILURE WITH HYPOXIA (HCC): ICD-10-CM

## 2024-02-27 DIAGNOSIS — M79.89 PAIN AND SWELLING OF RIGHT LOWER LEG: ICD-10-CM

## 2024-02-27 DIAGNOSIS — I27.20 PULMONARY HTN (HCC): ICD-10-CM

## 2024-02-27 DIAGNOSIS — I50.9 ACUTE CONGESTIVE HEART FAILURE, UNSPECIFIED HEART FAILURE TYPE (HCC): ICD-10-CM

## 2024-02-27 LAB
ALBUMIN SERPL BCP-MCNC: 4.3 G/DL (ref 3.2–4.9)
ALBUMIN/GLOB SERPL: 1.4 G/DL
ALP SERPL-CCNC: 129 U/L (ref 30–99)
ALT SERPL-CCNC: 12 U/L (ref 2–50)
ANION GAP SERPL CALC-SCNC: 10 MMOL/L (ref 7–16)
APTT PPP: 30.4 SEC (ref 24.7–36)
AST SERPL-CCNC: 20 U/L (ref 12–45)
BASOPHILS # BLD AUTO: 0.5 % (ref 0–1.8)
BASOPHILS # BLD: 0.04 K/UL (ref 0–0.12)
BILIRUB SERPL-MCNC: 0.4 MG/DL (ref 0.1–1.5)
BUN SERPL-MCNC: 34 MG/DL (ref 8–22)
CALCIUM ALBUM COR SERPL-MCNC: 10 MG/DL (ref 8.5–10.5)
CALCIUM SERPL-MCNC: 10.2 MG/DL (ref 8.4–10.2)
CHLORIDE SERPL-SCNC: 98 MMOL/L (ref 96–112)
CO2 SERPL-SCNC: 28 MMOL/L (ref 20–33)
CREAT SERPL-MCNC: 0.98 MG/DL (ref 0.5–1.4)
EOSINOPHIL # BLD AUTO: 0.44 K/UL (ref 0–0.51)
EOSINOPHIL NFR BLD: 5.7 % (ref 0–6.9)
ERYTHROCYTE [DISTWIDTH] IN BLOOD BY AUTOMATED COUNT: 50.9 FL (ref 35.9–50)
GFR SERPLBLD CREATININE-BSD FMLA CKD-EPI: 56 ML/MIN/1.73 M 2
GLOBULIN SER CALC-MCNC: 3 G/DL (ref 1.9–3.5)
GLUCOSE SERPL-MCNC: 92 MG/DL (ref 65–99)
HCT VFR BLD AUTO: 36.3 % (ref 37–47)
HGB BLD-MCNC: 11.5 G/DL (ref 12–16)
IMM GRANULOCYTES # BLD AUTO: 0.02 K/UL (ref 0–0.11)
IMM GRANULOCYTES NFR BLD AUTO: 0.3 % (ref 0–0.9)
INR PPP: 0.98 (ref 0.87–1.13)
LACTATE SERPL-SCNC: 1 MMOL/L (ref 0.5–2)
LYMPHOCYTES # BLD AUTO: 2.71 K/UL (ref 1–4.8)
LYMPHOCYTES NFR BLD: 35.2 % (ref 22–41)
MCH RBC QN AUTO: 27.3 PG (ref 27–33)
MCHC RBC AUTO-ENTMCNC: 31.7 G/DL (ref 32.2–35.5)
MCV RBC AUTO: 86.2 FL (ref 81.4–97.8)
MONOCYTES # BLD AUTO: 0.78 K/UL (ref 0–0.85)
MONOCYTES NFR BLD AUTO: 10.1 % (ref 0–13.4)
NEUTROPHILS # BLD AUTO: 3.7 K/UL (ref 1.82–7.42)
NEUTROPHILS NFR BLD: 48.2 % (ref 44–72)
NRBC # BLD AUTO: 0 K/UL
NRBC BLD-RTO: 0 /100 WBC (ref 0–0.2)
NT-PROBNP SERPL IA-MCNC: 1948 PG/ML (ref 0–125)
PLATELET # BLD AUTO: 164 K/UL (ref 164–446)
PMV BLD AUTO: 10.1 FL (ref 9–12.9)
POTASSIUM SERPL-SCNC: 4.4 MMOL/L (ref 3.6–5.5)
PROT SERPL-MCNC: 7.3 G/DL (ref 6–8.2)
PROTHROMBIN TIME: 13.5 SEC (ref 12–14.6)
RBC # BLD AUTO: 4.21 M/UL (ref 4.2–5.4)
SODIUM SERPL-SCNC: 136 MMOL/L (ref 135–145)
TROPONIN T SERPL-MCNC: 47 NG/L (ref 6–19)
WBC # BLD AUTO: 7.7 K/UL (ref 4.8–10.8)

## 2024-02-27 PROCEDURE — 83605 ASSAY OF LACTIC ACID: CPT

## 2024-02-27 PROCEDURE — 99215 OFFICE O/P EST HI 40 MIN: CPT | Performed by: PHYSICIAN ASSISTANT

## 2024-02-27 PROCEDURE — 84484 ASSAY OF TROPONIN QUANT: CPT

## 2024-02-27 PROCEDURE — 83880 ASSAY OF NATRIURETIC PEPTIDE: CPT

## 2024-02-27 PROCEDURE — 99223 1ST HOSP IP/OBS HIGH 75: CPT | Mod: AI | Performed by: INTERNAL MEDICINE

## 2024-02-27 PROCEDURE — 700105 HCHG RX REV CODE 258: Performed by: EMERGENCY MEDICINE

## 2024-02-27 PROCEDURE — 96375 TX/PRO/DX INJ NEW DRUG ADDON: CPT

## 2024-02-27 PROCEDURE — 85730 THROMBOPLASTIN TIME PARTIAL: CPT

## 2024-02-27 PROCEDURE — 700101 HCHG RX REV CODE 250: Performed by: EMERGENCY MEDICINE

## 2024-02-27 PROCEDURE — 85610 PROTHROMBIN TIME: CPT

## 2024-02-27 PROCEDURE — 99285 EMERGENCY DEPT VISIT HI MDM: CPT

## 2024-02-27 PROCEDURE — 93005 ELECTROCARDIOGRAM TRACING: CPT | Performed by: STUDENT IN AN ORGANIZED HEALTH CARE EDUCATION/TRAINING PROGRAM

## 2024-02-27 PROCEDURE — 3074F SYST BP LT 130 MM HG: CPT | Performed by: PHYSICIAN ASSISTANT

## 2024-02-27 PROCEDURE — 770020 HCHG ROOM/CARE - TELE (206)

## 2024-02-27 PROCEDURE — 80053 COMPREHEN METABOLIC PANEL: CPT

## 2024-02-27 PROCEDURE — 93970 EXTREMITY STUDY: CPT

## 2024-02-27 PROCEDURE — 36415 COLL VENOUS BLD VENIPUNCTURE: CPT

## 2024-02-27 PROCEDURE — 700111 HCHG RX REV CODE 636 W/ 250 OVERRIDE (IP): Performed by: EMERGENCY MEDICINE

## 2024-02-27 PROCEDURE — 71045 X-RAY EXAM CHEST 1 VIEW: CPT

## 2024-02-27 PROCEDURE — 3078F DIAST BP <80 MM HG: CPT | Performed by: PHYSICIAN ASSISTANT

## 2024-02-27 PROCEDURE — 87040 BLOOD CULTURE FOR BACTERIA: CPT | Mod: 91

## 2024-02-27 PROCEDURE — 96365 THER/PROPH/DIAG IV INF INIT: CPT

## 2024-02-27 PROCEDURE — 93005 ELECTROCARDIOGRAM TRACING: CPT | Performed by: EMERGENCY MEDICINE

## 2024-02-27 PROCEDURE — 85025 COMPLETE CBC W/AUTO DIFF WBC: CPT

## 2024-02-27 RX ORDER — CEFAZOLIN 2 G/1
2 INJECTION, POWDER, FOR SOLUTION INTRAMUSCULAR; INTRAVENOUS ONCE
Status: COMPLETED | OUTPATIENT
Start: 2024-02-27 | End: 2024-02-27

## 2024-02-27 RX ADMIN — DOXYCYCLINE 100 MG: 100 INJECTION, POWDER, LYOPHILIZED, FOR SOLUTION INTRAVENOUS at 23:45

## 2024-02-27 RX ADMIN — CEFAZOLIN 2 G: 2 INJECTION, POWDER, FOR SOLUTION INTRAMUSCULAR; INTRAVENOUS at 22:44

## 2024-02-27 ASSESSMENT — ENCOUNTER SYMPTOMS
CHILLS: 0
ABDOMINAL PAIN: 0
FATIGUE: 0
NAUSEA: 0
FEVER: 0
SORE THROAT: 0
SPUTUM PRODUCTION: 0
VOMITING: 0
LEG PAIN: 1
SHORTNESS OF BREATH: 0
HEADACHES: 0
COUGH: 0
WHEEZING: 0
PALPITATIONS: 0
JOINT SWELLING: 0

## 2024-02-27 ASSESSMENT — FIBROSIS 4 INDEX
FIB4 SCORE: 3.4
FIB4 SCORE: 3.4

## 2024-02-27 NOTE — PROGRESS NOTES
Nurse CM outreach call for monthly CCM assessment. Pt answered telephone.    Assessment    Pt reports she hasn't been doing well. States she believes she has cellulitis on her lower extremities. Reports she has been taking antibiotics as ordered by her PCP. Reports she is still taking Amoxicillin. Reports she doesn't feel like her legs are improving. States right leg is worse than left. Reports no drainage from legs. Reports just red and swollen. Reports she also feels like there is edema around her knee. Nurse reviewed message from PCP and recommended pt go to urgent care today for evaluation. Pt states she will have her daughter take her to urgent care. States she doesn't know if she has a fever. Reports her oxygen level has been in the 90's. Pt reports she is going to reschedule her injection by pain management for pain her her sciatic nerve. Pt reports home physical therapy is still making visits. Report no nursing visits.     Education    Go to urgent care today for evaluation. Follow-up with specialists as scheduled. Stay well hydrated.     Plan of Care and Goals    Reviewed care plan and goals    Barriers:    Chronic health conditions    Progress:    Continue to work on progress    Next outreach:  One month  Nurse Care Coordinator:  Radha Martinez  [phone]

## 2024-02-27 NOTE — TELEPHONE ENCOUNTER
Author: FARRAH Bowers Service: Cardiology Author Type: Nurse Practitioner   Filed: 2/27/2024  3:16 PM Encounter Date: 1/17/2024 Status: Signed   : FARRAH Bowers (Nurse Practitioner)     Please let patient know that there was no afib detected on her heart monitor. No changes to her medication regimen needed at this time        Patient notified via [facility].

## 2024-02-27 NOTE — PROGRESS NOTES
Please let patient know that there was no afib detected on her heart monitor. No changes to her medication regimen needed at this time

## 2024-02-28 ENCOUNTER — APPOINTMENT (OUTPATIENT)
Dept: RADIOLOGY | Facility: MEDICAL CENTER | Age: 89
DRG: 602 | End: 2024-02-28
Attending: INTERNAL MEDICINE
Payer: MEDICARE

## 2024-02-28 ENCOUNTER — TELEPHONE (OUTPATIENT)
Dept: CARDIOLOGY | Facility: MEDICAL CENTER | Age: 89
End: 2024-02-28
Payer: MEDICARE

## 2024-02-28 ENCOUNTER — APPOINTMENT (OUTPATIENT)
Dept: CARDIOLOGY | Facility: MEDICAL CENTER | Age: 89
DRG: 602 | End: 2024-02-28
Attending: INTERNAL MEDICINE
Payer: MEDICARE

## 2024-02-28 PROBLEM — J96.01 ACUTE RESPIRATORY FAILURE WITH HYPOXIA (HCC): Status: RESOLVED | Noted: 2024-02-27 | Resolved: 2024-02-28

## 2024-02-28 PROBLEM — L02.415 CELLULITIS AND ABSCESS OF RIGHT LEG: Status: ACTIVE | Noted: 2024-02-28

## 2024-02-28 PROBLEM — E78.5 DYSLIPIDEMIA: Status: ACTIVE | Noted: 2024-02-28

## 2024-02-28 PROBLEM — D64.9 NORMOCYTIC ANEMIA: Status: ACTIVE | Noted: 2024-02-28

## 2024-02-28 PROBLEM — L03.115 CELLULITIS AND ABSCESS OF RIGHT LEG: Status: ACTIVE | Noted: 2024-02-28

## 2024-02-28 PROBLEM — R79.89 ELEVATED TROPONIN: Status: ACTIVE | Noted: 2024-02-28

## 2024-02-28 PROBLEM — L03.115 CELLULITIS OF RIGHT LOWER EXTREMITY: Status: ACTIVE | Noted: 2024-02-28

## 2024-02-28 PROBLEM — R10.32 LEFT LOWER QUADRANT ABDOMINAL PAIN: Status: ACTIVE | Noted: 2024-02-28

## 2024-02-28 LAB
ANION GAP SERPL CALC-SCNC: 10 MMOL/L (ref 7–16)
APPEARANCE UR: CLEAR
BILIRUB UR QL STRIP.AUTO: NEGATIVE
BUN SERPL-MCNC: 31 MG/DL (ref 8–22)
CALCIUM SERPL-MCNC: 10.4 MG/DL (ref 8.4–10.2)
CHLORIDE SERPL-SCNC: 100 MMOL/L (ref 96–112)
CO2 SERPL-SCNC: 31 MMOL/L (ref 20–33)
COLOR UR: YELLOW
CREAT SERPL-MCNC: 0.99 MG/DL (ref 0.5–1.4)
EKG IMPRESSION: NORMAL
EKG IMPRESSION: NORMAL
GFR SERPLBLD CREATININE-BSD FMLA CKD-EPI: 55 ML/MIN/1.73 M 2
GLUCOSE SERPL-MCNC: 113 MG/DL (ref 65–99)
GLUCOSE UR STRIP.AUTO-MCNC: NEGATIVE MG/DL
KETONES UR STRIP.AUTO-MCNC: NEGATIVE MG/DL
LEUKOCYTE ESTERASE UR QL STRIP.AUTO: NEGATIVE
LV EJECT FRACT MOD 2C 99903: 66.11
LV EJECT FRACT MOD 4C 99902: 74.72
LV EJECT FRACT MOD BP 99901: 69.52
MICRO URNS: NORMAL
NITRITE UR QL STRIP.AUTO: NEGATIVE
PH UR STRIP.AUTO: 7.5 [PH] (ref 5–8)
POTASSIUM SERPL-SCNC: 3.8 MMOL/L (ref 3.6–5.5)
PROCALCITONIN SERPL-MCNC: 0.06 NG/ML
PROT UR QL STRIP: NEGATIVE MG/DL
RBC UR QL AUTO: NEGATIVE
SODIUM SERPL-SCNC: 141 MMOL/L (ref 135–145)
SP GR UR STRIP.AUTO: 1.01
TROPONIN T SERPL-MCNC: 44 NG/L (ref 6–19)
TROPONIN T SERPL-MCNC: 50 NG/L (ref 6–19)

## 2024-02-28 PROCEDURE — 303105 HCHG CATHETER EXTRA

## 2024-02-28 PROCEDURE — 51702 INSERT TEMP BLADDER CATH: CPT

## 2024-02-28 PROCEDURE — 80048 BASIC METABOLIC PNL TOTAL CA: CPT

## 2024-02-28 PROCEDURE — 93306 TTE W/DOPPLER COMPLETE: CPT | Mod: 26 | Performed by: INTERNAL MEDICINE

## 2024-02-28 PROCEDURE — 99233 SBSQ HOSP IP/OBS HIGH 50: CPT | Performed by: INTERNAL MEDICINE

## 2024-02-28 PROCEDURE — 96375 TX/PRO/DX INJ NEW DRUG ADDON: CPT

## 2024-02-28 PROCEDURE — 96367 TX/PROPH/DG ADDL SEQ IV INF: CPT

## 2024-02-28 PROCEDURE — 94760 N-INVAS EAR/PLS OXIMETRY 1: CPT

## 2024-02-28 PROCEDURE — 87086 URINE CULTURE/COLONY COUNT: CPT

## 2024-02-28 PROCEDURE — 36415 COLL VENOUS BLD VENIPUNCTURE: CPT

## 2024-02-28 PROCEDURE — 96376 TX/PRO/DX INJ SAME DRUG ADON: CPT

## 2024-02-28 PROCEDURE — 84145 PROCALCITONIN (PCT): CPT

## 2024-02-28 PROCEDURE — 93005 ELECTROCARDIOGRAM TRACING: CPT | Performed by: INTERNAL MEDICINE

## 2024-02-28 PROCEDURE — A9270 NON-COVERED ITEM OR SERVICE: HCPCS | Performed by: INTERNAL MEDICINE

## 2024-02-28 PROCEDURE — 81003 URINALYSIS AUTO W/O SCOPE: CPT

## 2024-02-28 PROCEDURE — 700102 HCHG RX REV CODE 250 W/ 637 OVERRIDE(OP): Performed by: INTERNAL MEDICINE

## 2024-02-28 PROCEDURE — 770020 HCHG ROOM/CARE - TELE (206)

## 2024-02-28 PROCEDURE — 700111 HCHG RX REV CODE 636 W/ 250 OVERRIDE (IP): Performed by: INTERNAL MEDICINE

## 2024-02-28 PROCEDURE — 84484 ASSAY OF TROPONIN QUANT: CPT | Mod: 91

## 2024-02-28 PROCEDURE — 93010 ELECTROCARDIOGRAM REPORT: CPT | Performed by: INTERNAL MEDICINE

## 2024-02-28 PROCEDURE — 93306 TTE W/DOPPLER COMPLETE: CPT

## 2024-02-28 PROCEDURE — 700105 HCHG RX REV CODE 258: Performed by: INTERNAL MEDICINE

## 2024-02-28 PROCEDURE — 700111 HCHG RX REV CODE 636 W/ 250 OVERRIDE (IP): Mod: JZ | Performed by: INTERNAL MEDICINE

## 2024-02-28 RX ORDER — MELOXICAM 7.5 MG/1
7.5 TABLET ORAL 2 TIMES DAILY
Status: DISCONTINUED | OUTPATIENT
Start: 2024-02-28 | End: 2024-02-29 | Stop reason: HOSPADM

## 2024-02-28 RX ORDER — POLYETHYLENE GLYCOL 3350 17 G/17G
1 POWDER, FOR SOLUTION ORAL DAILY
Status: DISCONTINUED | OUTPATIENT
Start: 2024-02-28 | End: 2024-02-29 | Stop reason: HOSPADM

## 2024-02-28 RX ORDER — ATORVASTATIN CALCIUM 40 MG/1
40 TABLET, FILM COATED ORAL DAILY
Status: DISCONTINUED | OUTPATIENT
Start: 2024-02-28 | End: 2024-02-29 | Stop reason: HOSPADM

## 2024-02-28 RX ORDER — LABETALOL HYDROCHLORIDE 5 MG/ML
10 INJECTION, SOLUTION INTRAVENOUS EVERY 4 HOURS PRN
Status: DISCONTINUED | OUTPATIENT
Start: 2024-02-28 | End: 2024-02-29 | Stop reason: HOSPADM

## 2024-02-28 RX ORDER — POTASSIUM CHLORIDE 20 MEQ/1
20 TABLET, EXTENDED RELEASE ORAL 2 TIMES DAILY
Status: DISCONTINUED | OUTPATIENT
Start: 2024-02-28 | End: 2024-02-29 | Stop reason: HOSPADM

## 2024-02-28 RX ORDER — DULOXETIN HYDROCHLORIDE 20 MG/1
20 CAPSULE, DELAYED RELEASE ORAL 2 TIMES DAILY
Status: DISCONTINUED | OUTPATIENT
Start: 2024-02-28 | End: 2024-02-29 | Stop reason: HOSPADM

## 2024-02-28 RX ORDER — SPIRONOLACTONE 25 MG/1
25 TABLET ORAL DAILY
Status: DISCONTINUED | OUTPATIENT
Start: 2024-02-28 | End: 2024-02-29 | Stop reason: HOSPADM

## 2024-02-28 RX ORDER — FUROSEMIDE 10 MG/ML
40 INJECTION INTRAMUSCULAR; INTRAVENOUS
Status: DISCONTINUED | OUTPATIENT
Start: 2024-02-28 | End: 2024-02-29 | Stop reason: HOSPADM

## 2024-02-28 RX ORDER — ENOXAPARIN SODIUM 100 MG/ML
40 INJECTION SUBCUTANEOUS DAILY
Status: DISCONTINUED | OUTPATIENT
Start: 2024-02-28 | End: 2024-02-29 | Stop reason: HOSPADM

## 2024-02-28 RX ORDER — OMEPRAZOLE 20 MG/1
20 CAPSULE, DELAYED RELEASE ORAL DAILY
Status: DISCONTINUED | OUTPATIENT
Start: 2024-02-28 | End: 2024-02-29 | Stop reason: HOSPADM

## 2024-02-28 RX ORDER — HYDROCODONE BITARTRATE AND ACETAMINOPHEN 5; 325 MG/1; MG/1
1 TABLET ORAL EVERY 6 HOURS PRN
COMMUNITY

## 2024-02-28 RX ORDER — MAGNESIUM OXIDE 400 MG/1
400 TABLET ORAL DAILY
COMMUNITY

## 2024-02-28 RX ORDER — PANTOPRAZOLE SODIUM 40 MG/1
40 TABLET, DELAYED RELEASE ORAL EVERY MORNING
Status: DISCONTINUED | OUTPATIENT
Start: 2024-02-28 | End: 2024-02-28

## 2024-02-28 RX ORDER — ONDANSETRON 2 MG/ML
4 INJECTION INTRAMUSCULAR; INTRAVENOUS EVERY 4 HOURS PRN
Status: DISCONTINUED | OUTPATIENT
Start: 2024-02-28 | End: 2024-02-29 | Stop reason: HOSPADM

## 2024-02-28 RX ORDER — ACETAMINOPHEN 500 MG
1000 TABLET ORAL EVERY 6 HOURS PRN
Status: DISCONTINUED | OUTPATIENT
Start: 2024-02-28 | End: 2024-02-29 | Stop reason: HOSPADM

## 2024-02-28 RX ORDER — LOSARTAN POTASSIUM 25 MG/1
12.5 TABLET ORAL DAILY
Status: DISCONTINUED | OUTPATIENT
Start: 2024-02-28 | End: 2024-02-29 | Stop reason: HOSPADM

## 2024-02-28 RX ORDER — CHOLECALCIFEROL (VITAMIN D3) 125 MCG
10 CAPSULE ORAL NIGHTLY
Status: DISCONTINUED | OUTPATIENT
Start: 2024-02-28 | End: 2024-02-29 | Stop reason: HOSPADM

## 2024-02-28 RX ORDER — TRAMADOL HYDROCHLORIDE 50 MG/1
50 TABLET ORAL EVERY 6 HOURS PRN
Status: DISCONTINUED | OUTPATIENT
Start: 2024-02-28 | End: 2024-02-29 | Stop reason: HOSPADM

## 2024-02-28 RX ORDER — BISOPROLOL FUMARATE 5 MG/1
5 TABLET, FILM COATED ORAL DAILY
Status: DISCONTINUED | OUTPATIENT
Start: 2024-02-28 | End: 2024-02-29 | Stop reason: HOSPADM

## 2024-02-28 RX ORDER — ASPIRIN 81 MG/1
81 TABLET ORAL DAILY
Status: DISCONTINUED | OUTPATIENT
Start: 2024-02-28 | End: 2024-02-29 | Stop reason: HOSPADM

## 2024-02-28 RX ORDER — ONDANSETRON 4 MG/1
4 TABLET, ORALLY DISINTEGRATING ORAL EVERY 4 HOURS PRN
Status: DISCONTINUED | OUTPATIENT
Start: 2024-02-28 | End: 2024-02-29 | Stop reason: HOSPADM

## 2024-02-28 RX ORDER — DOCUSATE SODIUM 100 MG/1
100 CAPSULE, LIQUID FILLED ORAL 2 TIMES DAILY
Status: DISCONTINUED | OUTPATIENT
Start: 2024-02-28 | End: 2024-02-29 | Stop reason: HOSPADM

## 2024-02-28 RX ORDER — DILTIAZEM HYDROCHLORIDE 120 MG/1
120 CAPSULE, EXTENDED RELEASE ORAL DAILY
COMMUNITY
End: 2024-03-19

## 2024-02-28 RX ORDER — LANOLIN ALCOHOL/MO/W.PET/CERES
400 CREAM (GRAM) TOPICAL 2 TIMES DAILY
Status: DISCONTINUED | OUTPATIENT
Start: 2024-02-28 | End: 2024-02-29 | Stop reason: HOSPADM

## 2024-02-28 RX ORDER — SIMETHICONE 125 MG
125 TABLET,CHEWABLE ORAL 3 TIMES DAILY PRN
Status: DISCONTINUED | OUTPATIENT
Start: 2024-02-28 | End: 2024-02-29 | Stop reason: HOSPADM

## 2024-02-28 RX ADMIN — DULOXETINE HYDROCHLORIDE 20 MG: 20 CAPSULE, DELAYED RELEASE ORAL at 17:48

## 2024-02-28 RX ADMIN — LOSARTAN POTASSIUM 12.5 MG: 25 TABLET, FILM COATED ORAL at 05:34

## 2024-02-28 RX ADMIN — POTASSIUM CHLORIDE 20 MEQ: 1500 TABLET, EXTENDED RELEASE ORAL at 05:34

## 2024-02-28 RX ADMIN — CEFAZOLIN 2 G: 2 INJECTION, POWDER, FOR SOLUTION INTRAMUSCULAR; INTRAVENOUS at 14:21

## 2024-02-28 RX ADMIN — ENOXAPARIN SODIUM 40 MG: 100 INJECTION SUBCUTANEOUS at 17:48

## 2024-02-28 RX ADMIN — CEFAZOLIN 2 G: 2 INJECTION, POWDER, FOR SOLUTION INTRAMUSCULAR; INTRAVENOUS at 21:34

## 2024-02-28 RX ADMIN — ACETAMINOPHEN 1000 MG: 500 TABLET ORAL at 23:13

## 2024-02-28 RX ADMIN — MELOXICAM 7.5 MG: 7.5 TABLET ORAL at 05:32

## 2024-02-28 RX ADMIN — CEFAZOLIN 2 G: 2 INJECTION, POWDER, FOR SOLUTION INTRAMUSCULAR; INTRAVENOUS at 07:51

## 2024-02-28 RX ADMIN — Medication 10 MG: at 21:30

## 2024-02-28 RX ADMIN — TRAMADOL HYDROCHLORIDE 50 MG: 50 TABLET, COATED ORAL at 21:37

## 2024-02-28 RX ADMIN — TRAMADOL HYDROCHLORIDE 50 MG: 50 TABLET, COATED ORAL at 02:02

## 2024-02-28 RX ADMIN — POTASSIUM CHLORIDE 20 MEQ: 1500 TABLET, EXTENDED RELEASE ORAL at 17:47

## 2024-02-28 RX ADMIN — MELOXICAM 7.5 MG: 7.5 TABLET ORAL at 17:48

## 2024-02-28 RX ADMIN — SPIRONOLACTONE 25 MG: 25 TABLET ORAL at 05:33

## 2024-02-28 RX ADMIN — ATORVASTATIN CALCIUM 40 MG: 40 TABLET, FILM COATED ORAL at 05:33

## 2024-02-28 RX ADMIN — FUROSEMIDE 40 MG: 10 INJECTION, SOLUTION INTRAMUSCULAR; INTRAVENOUS at 15:50

## 2024-02-28 RX ADMIN — FUROSEMIDE 40 MG: 10 INJECTION, SOLUTION INTRAMUSCULAR; INTRAVENOUS at 05:34

## 2024-02-28 RX ADMIN — ASPIRIN 81 MG: 81 TABLET, COATED ORAL at 05:34

## 2024-02-28 RX ADMIN — OMEPRAZOLE 20 MG: 20 CAPSULE, DELAYED RELEASE ORAL at 05:33

## 2024-02-28 RX ADMIN — POLYETHYLENE GLYCOL 3350 1 PACKET: 17 POWDER, FOR SOLUTION ORAL at 12:55

## 2024-02-28 RX ADMIN — FUROSEMIDE 40 MG: 10 INJECTION, SOLUTION INTRAMUSCULAR; INTRAVENOUS at 00:43

## 2024-02-28 RX ADMIN — DULOXETINE HYDROCHLORIDE 20 MG: 20 CAPSULE, DELAYED RELEASE ORAL at 05:33

## 2024-02-28 ASSESSMENT — ENCOUNTER SYMPTOMS
HEADACHES: 0
CONSTIPATION: 0
DIZZINESS: 0
COUGH: 0
STRIDOR: 0
FEVER: 0
ABDOMINAL PAIN: 0
PSYCHIATRIC NEGATIVE: 1
MUSCULOSKELETAL NEGATIVE: 1
VOMITING: 0
NEUROLOGICAL NEGATIVE: 1
SHORTNESS OF BREATH: 0
DEPRESSION: 0
EYES NEGATIVE: 1
SPUTUM PRODUCTION: 0
MYALGIAS: 0
PALPITATIONS: 0
CARDIOVASCULAR NEGATIVE: 1
WEAKNESS: 0
RESPIRATORY NEGATIVE: 1
TINGLING: 0
NAUSEA: 0
CHILLS: 0
FALLS: 0
GASTROINTESTINAL NEGATIVE: 1
DIARRHEA: 0
LOSS OF CONSCIOUSNESS: 0

## 2024-02-28 ASSESSMENT — COGNITIVE AND FUNCTIONAL STATUS - GENERAL
TOILETING: A LOT
CLIMB 3 TO 5 STEPS WITH RAILING: A LOT
MOVING FROM LYING ON BACK TO SITTING ON SIDE OF FLAT BED: A LITTLE
TURNING FROM BACK TO SIDE WHILE IN FLAT BAD: A LITTLE
TOILETING: A LOT
MOVING TO AND FROM BED TO CHAIR: A LOT
DRESSING REGULAR LOWER BODY CLOTHING: A LOT
MOVING TO AND FROM BED TO CHAIR: A LOT
MOBILITY SCORE: 14
SUGGESTED CMS G CODE MODIFIER DAILY ACTIVITY: CK
HELP NEEDED FOR BATHING: A LITTLE
SUGGESTED CMS G CODE MODIFIER MOBILITY: CL
SUGGESTED CMS G CODE MODIFIER MOBILITY: CL
CLIMB 3 TO 5 STEPS WITH RAILING: A LOT
TURNING FROM BACK TO SIDE WHILE IN FLAT BAD: A LITTLE
DRESSING REGULAR UPPER BODY CLOTHING: A LITTLE
DAILY ACTIVITIY SCORE: 18
WALKING IN HOSPITAL ROOM: A LOT
DRESSING REGULAR LOWER BODY CLOTHING: A LOT
SUGGESTED CMS G CODE MODIFIER DAILY ACTIVITY: CK
DAILY ACTIVITIY SCORE: 18
STANDING UP FROM CHAIR USING ARMS: A LOT
STANDING UP FROM CHAIR USING ARMS: A LOT
MOBILITY SCORE: 14
MOVING FROM LYING ON BACK TO SITTING ON SIDE OF FLAT BED: A LITTLE
HELP NEEDED FOR BATHING: A LITTLE
WALKING IN HOSPITAL ROOM: A LOT
DRESSING REGULAR UPPER BODY CLOTHING: A LITTLE

## 2024-02-28 ASSESSMENT — LIFESTYLE VARIABLES
HOW MANY TIMES IN THE PAST YEAR HAVE YOU HAD 5 OR MORE DRINKS IN A DAY: 0
HAVE PEOPLE ANNOYED YOU BY CRITICIZING YOUR DRINKING: NO
EVER HAD A DRINK FIRST THING IN THE MORNING TO STEADY YOUR NERVES TO GET RID OF A HANGOVER: NO
CONSUMPTION TOTAL: INCOMPLETE
CONSUMPTION TOTAL: NEGATIVE
TOTAL SCORE: 0
AVERAGE NUMBER OF DAYS PER WEEK YOU HAVE A DRINK CONTAINING ALCOHOL: 0
ON A TYPICAL DAY WHEN YOU DRINK ALCOHOL HOW MANY DRINKS DO YOU HAVE: 0
HAVE YOU EVER FELT YOU SHOULD CUT DOWN ON YOUR DRINKING: NO
ALCOHOL_USE: NO
AVERAGE NUMBER OF DAYS PER WEEK YOU HAVE A DRINK CONTAINING ALCOHOL: 0
ON A TYPICAL DAY WHEN YOU DRINK ALCOHOL HOW MANY DRINKS DO YOU HAVE: 0
TOTAL SCORE: 0
HAVE PEOPLE ANNOYED YOU BY CRITICIZING YOUR DRINKING: NO
HOW MANY TIMES IN THE PAST YEAR HAVE YOU HAD 5 OR MORE DRINKS IN A DAY: 0
HAVE YOU EVER FELT YOU SHOULD CUT DOWN ON YOUR DRINKING: NO
TOTAL SCORE: 0
EVER HAD A DRINK FIRST THING IN THE MORNING TO STEADY YOUR NERVES TO GET RID OF A HANGOVER: NO
ALCOHOL_USE: NO
EVER FELT BAD OR GUILTY ABOUT YOUR DRINKING: NO

## 2024-02-28 ASSESSMENT — PATIENT HEALTH QUESTIONNAIRE - PHQ9
1. LITTLE INTEREST OR PLEASURE IN DOING THINGS: NOT AT ALL
SUM OF ALL RESPONSES TO PHQ9 QUESTIONS 1 AND 2: 0
2. FEELING DOWN, DEPRESSED, IRRITABLE, OR HOPELESS: NOT AT ALL
1. LITTLE INTEREST OR PLEASURE IN DOING THINGS: NOT AT ALL
2. FEELING DOWN, DEPRESSED, IRRITABLE, OR HOPELESS: NOT AT ALL
SUM OF ALL RESPONSES TO PHQ9 QUESTIONS 1 AND 2: 0

## 2024-02-28 ASSESSMENT — PAIN DESCRIPTION - PAIN TYPE
TYPE: ACUTE PAIN
TYPE: ACUTE PAIN

## 2024-02-28 NOTE — ASSESSMENT & PLAN NOTE
Recent TAVR.  Was scheduled to have an outpatient echocardiogram this week.  Echocardiogram was ordered on admission.

## 2024-02-28 NOTE — ED NOTES
Assumed care of pt-awake briefly, vs as charted, young continues to drain pale clear urine. Med held for sbp=87, hr =50's. Remains on 2L n/c. Continues to rest with eyes closed unless disturbed

## 2024-02-28 NOTE — PROGRESS NOTES
Hospital Medicine Daily Progress Note    Date of Service  2/28/2024    Chief Complaint  Chelly Decker is a 88 y.o. female admitted 2/27/2024 with worsening bilateral (R>L) leg swelling and cellulitis.Since 2/16/2024, has been on Keflex followed by Augmentin with no improvement.  She has a history of CHF, CKD 3, COPD (on home oxygen 4 L/min at night, 3 L/min during the day), paroxysmal A-fib, chronic pain syndrome, hypertension, recent TAVR (1/8/2024), Santoyo's esophagus. Had left hip replacement 8/28/2023).    Had cyber knife therapy for lung cancer ~ 2018.     Hospital Course  Lower extremity Dopplers showed no evidence of DVT.  Chest x-ray showed airspace opacity in the left lower lobe (chronic from cyber knife therapy). She was saturating 87% on room air.  EKG showed atrial fibrillation with PVCs. IV lasix was given. Ancef was empirically started.     Interval Problem Update  Afebrile. WBCs are normal. Echocardiogram pending. Bilateral lower extremity edema has significantly improved since admission. Procalcitonin is negative. Patient complains of left lower extremity abdominal pain, CT abdomen pelvis ordered.    I have discussed this patient's plan of care and discharge plan at IDT rounds today with Case Management, Nursing, Nursing leadership, and other members of the IDT team.    Consultants/Specialty  None    Code Status  Full Code    Disposition  The patient is not medically cleared for discharge to home or a post-acute facility.  Anticipate discharge to: home with close outpatient follow-up    I have placed the appropriate orders for post-discharge needs.    Review of Systems  Review of Systems   Constitutional:  Positive for malaise/fatigue.   HENT: Negative.     Eyes: Negative.    Respiratory: Negative.  Negative for shortness of breath.    Cardiovascular: Negative.    Gastrointestinal: Negative.    Genitourinary: Negative.    Musculoskeletal: Negative.    Skin: Negative.    Neurological: Negative.     Endo/Heme/Allergies: Negative.    Psychiatric/Behavioral: Negative.          Physical Exam  Temp:  [35.8 °C (96.5 °F)-36.4 °C (97.6 °F)] 36.4 °C (97.5 °F)  Pulse:  [54-80] 58  Resp:  [15-37] 19  BP: ()/() 109/59  SpO2:  [84 %-100 %] 98 %    Physical Exam  HENT:      Nose: Nose normal.      Mouth/Throat:      Mouth: Mucous membranes are moist.   Eyes:      Pupils: Pupils are equal, round, and reactive to light.   Cardiovascular:      Rate and Rhythm: Normal rate.   Pulmonary:      Effort: Pulmonary effort is normal.   Abdominal:      Palpations: Abdomen is soft.      Tenderness: There is abdominal tenderness.      Comments: Left lower quadrant pain    Musculoskeletal:      Cervical back: Normal range of motion.      Right lower leg: Edema present.      Left lower leg: Edema present.   Skin:     General: Skin is warm.   Neurological:      General: No focal deficit present.      Mental Status: She is alert.   Psychiatric:         Mood and Affect: Mood normal.         Fluids    Intake/Output Summary (Last 24 hours) at 2/28/2024 1758  Last data filed at 2/28/2024 0111  Gross per 24 hour   Intake 100 ml   Output --   Net 100 ml       Laboratory  Recent Labs     02/27/24 1941   WBC 7.7   RBC 4.21   HEMOGLOBIN 11.5*   HEMATOCRIT 36.3*   MCV 86.2   MCH 27.3   MCHC 31.7*   RDW 50.9*   PLATELETCT 164   MPV 10.1     Recent Labs     02/27/24 1941 02/28/24  0339   SODIUM 136 141   POTASSIUM 4.4 3.8   CHLORIDE 98 100   CO2 28 31   GLUCOSE 92 113*   BUN 34* 31*   CREATININE 0.98 0.99   CALCIUM 10.2 10.4*     Recent Labs     02/27/24 1941   APTT 30.4   INR 0.98               Imaging  EC-ECHOCARDIOGRAM COMPLETE W/O CONT   Final Result      US-EXTREMITY VENOUS LOWER BILAT   Final Result         1. No evidence of bilateral lower extremity deep venous thrombosis.      DX-CHEST-PORTABLE (1 VIEW)   Final Result         Airspace opacity in the left lower lobe, likely pneumonia.           Assessment/Plan  Left lower  quadrant abdominal pain  Assessment & Plan  Patient complains of left low quadrant abdominal pain. CT abdomen and pelvis ordered.     Cellulitis of right lower extremity- (present on admission)  Assessment & Plan  Patient has had lower extremity swelling with cellulitis since 2/26/2024.  Symptoms do not resolve with oral Keflex or Augmentin. IV Ancef was started.  Symptoms improved with IV Lasix and Ancef. Cultures pending.    Elevated troponin- (present on admission)  Assessment & Plan  Patient denies chest pain.  EKG showed atrial fibrillation with PVCs.  No signs of acute ischemia.  Echocardiogram ordered.    Normocytic anemia- (present on admission)  Assessment & Plan  No sign of gross bleeding    Dyslipidemia- (present on admission)  Assessment & Plan  Continue home statin    S/P TAVR (transcatheter aortic valve replacement)- (present on admission)  Assessment & Plan  Recent TAVR.  Was scheduled to have an outpatient echocardiogram this week.  Echocardiogram was ordered on admission.     Stage 3b chronic kidney disease (HCC)- (present on admission)  Assessment & Plan  Creatinine is normal.  Monitor.  Avoid nephrotoxins    PAF (paroxysmal atrial fibrillation) (HCC)- (present on admission)  Assessment & Plan  EKG showed atrial fibrillation with PVCs.  Continue home bisoprolol.  Monitor    Lower extremity edema- (present on admission)  Assessment & Plan  Continue IV Lasix.  On Ancef.  Bilateral lower extremity ultrasound showed no evidence of DVT    Chronic obstructive pulmonary disease (HCC)- (present on admission)  Assessment & Plan  No acute exacerbation    Chronic pain syndrome- (present on admission)  Assessment & Plan  Continue home meds, no adjustment at this time    Primary hypertension- (present on admission)  Assessment & Plan  Stable.  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

## 2024-02-28 NOTE — TELEPHONE ENCOUNTER
Received voicemail from patient son Kentrell that patient is currently in the ER.    Phone Number Called: [phone]    Call outcome:  spoke to patient son     Message: Patient son reports that patient is in ER at [facility]. Patient is there for worsening cellulitis or lower extremities. Patient son is requesting BE review ER notes/labs and see if there are any further recommendations once she discharges. All questions answered at this time. Advised to call back with any further questions or concerns.     To BE: patient is currently in  ER. Patient son was requesting review of notes/labs etc. To see if any further recommendations from cardiology stand point. Thank you!

## 2024-02-28 NOTE — H&P
Hospital Medicine History & Physical Note    Date of Service  2/27/2024    Primary Care Physician  Kassi Carrillo M.D.    Consultants  None    Specialist Names: none    Code Status  Full Code    Chief Complaint  Chief Complaint   Patient presents with    Leg Swelling     Bilat  Increasing redness and swelling  Current antibx for treatment of same x 1 week         History of Presenting Illness  Chelly Decker is a 88 y.o. female who presented 2/27/2024 with leg swelling.  Patient has had intermittent leg swelling, has been worse over the last week.  Patient has been on 2 antibiotics with no improvement.  Initially it was Keflex and then Augmentin.  Patient does have a history of cellulitis as well as lower extremity edema.  She also has a history of CHF prior to having a TAVR.  Patient was supposed to have an echocardiogram approximately a month after her TAVR which was in the beginning of January however that was when there was a large snowstorm and patient was unable to make it.  Repeat echocardiogram is scheduled for later this week.  Patient does wear oxygen at night, was noted to be satting 87% on room air while awake.  That was concern for potential pneumonia however this is a chronic change and it has been noted on both previous x-rays and CT scans.  I did discuss the case including labs and imaging with the ER physician.    I discussed the plan of care with patient and family.    Review of Systems  Review of Systems   Constitutional:  Negative for chills, fever and malaise/fatigue.   HENT:  Negative for congestion.    Respiratory:  Negative for cough, sputum production, shortness of breath and stridor.    Cardiovascular:  Positive for leg swelling. Negative for chest pain and palpitations.   Gastrointestinal:  Negative for abdominal pain, constipation, diarrhea, nausea and vomiting.   Genitourinary:  Negative for dysuria and urgency.   Musculoskeletal:  Positive for joint pain (right leg). Negative for  falls and myalgias.   Skin:  Positive for rash (right leg).   Neurological:  Negative for dizziness, tingling, loss of consciousness, weakness and headaches.   Psychiatric/Behavioral:  Negative for depression and suicidal ideas.    All other systems reviewed and are negative.      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 (03/29/2022), Bilateral lower extremity edema (08/03/2022), Breath shortness (12/04/2023), Cancer ([facility]), Cancer ([facility]) (12/04/2023), Cervical cancer ([facility]) (1968), Chronic constipation, Chronic kidney disease (CKD), Chronic obstructive pulmonary disease ([facility]) (06/22/2022), Chronic pain, Congestive heart failure ([facility]), COPD (chronic obstructive pulmonary disease) ([facility]), Dental disorder (12/04/2023), Dizziness (12/29/2022), Foot drop, right foot (12/04/2023), GERD (gastroesophageal reflux disease), H/O Clostridium difficile infection (05/12/2022), Hammer toes of both feet (12/29/2022), Heart burn (12/04/2023), Heart murmur, High cholesterol (12/04/2023), Hypertension (12/04/2023), Hypotension due to hypovolemia (08/17/2022), Hypoxia (03/29/2022), Incomplete defecation (03/29/2022), Infectious disease (04/25/2023), Insomnia due to medical condition (10/13/2022), Irregular heart rate (03/29/2022), Kidney stones, Moderate aortic stenosis, Moderate mitral insufficiency, Oxygen dependent, Pain (12/04/2023), Pain in joint involving multiple sites (10/13/2022), Pain in joint involving multiple sites (10/13/2022), Pelvic floor dysfunction (03/29/2022), Primary hypertension (03/29/2022), Renal insufficiency (08/03/2022), Seasonal allergies, Skin lesions (05/12/2022), Solitary pulmonary nodule, Spinal stenosis, Spinal stenosis of lumbar  region without neurogenic claudication (03/29/2022), Urge incontinence of urine (12/04/2023), and Urinary bladder disorder (Past [id number]).    Surgical History   has a past surgical history that includes cholecystectomy; hip arthroplasty total (Right, 1998); carpal tunnel endoscopic (Bilateral); tonsillectomy; appendectomy; lithotripsy; hysterectomy laparoscopy; primary c section; pr ins/rplc spi npg/rcvr pocket (N/A, 02/05/2021); pump insert/remove (Right, 10/07/2022); pump revision (Right, 05/04/2023); colonoscopy; pr total hip arthroplasty (Left, 08/28/2023); other (Bilateral, 12/04/2023); transcatheter aortic valve replacement (1/8/2024); and echocardiogram, transesophageal, intraoperative (1/8/2024).     Family History  family history includes Heart Disease in her mother.   Family history reviewed with patient. There is family history that is pertinent to the chief complaint.     Social History   reports that she quit smoking about 9 years ago. Her smoking use included cigarettes and electronic cigarettes. She started smoking about 70 years ago. She has a 61 pack-year smoking history. She has never used smokeless tobacco. She reports that she does not currently use alcohol. She reports that she does not currently use drugs after having used the following drugs: Inhaled.    Allergies  Allergies   Allergen Reactions    Sulfa Drugs Unspecified     Stomach ache    Nauseated, diarrhea    Other reaction(s): Unspecified   Stomach ache    Erythromycin Nausea     Other Reaction(s): GI Upset    Other reaction(s): Not available       Medications  Prior to Admission Medications   Prescriptions Last Dose Informant Patient Reported? Taking?   CRANBERRY PO  Patient Yes No   Sig: Take 1 Tablet by mouth every day.   Cholecalciferol (VITAMIN D3 PO)  Patient Yes No   Sig: Take 1 Tablet by mouth every day. Indications: supplement   Cyanocobalamin (VITAMIN B-12 PO)  Patient Yes No   Sig: Take 1 Dose by mouth every day.  Indications: supplement   DULoxetine (CYMBALTA) 20 MG Cap DR Particles  Patient Yes No   Sig: Take 20 mg by mouth 2 times a day. Indications: Musculoskeletal Pain   Melatonin 10 MG Tab  Patient Yes No   Sig: Take 10 mg by mouth every evening. Indications: Trouble Sleeping   Pain Pump (PATIENT SUPPLIED) Device  Patient Yes No   Sig: Inject  as directed continuous. Patient's Pain Pump (placed and maintained as an outpatient)    Medications/concentrations: Hydromorphone 4000 mcg/ml (749.1 mcg/day)  Route: Intrathecal  Last changed: 11/29/2023 @ 1558  Refill pump before date: 2/27/24  Continuous infusion rates (Drug/Rate): Hydromorphone 0.7491 mg/day or 0.0312 mg/hr   MD office:   Indications: severe pain   Probiotic Product (PROBIOTIC DAILY PO)  Patient Yes No   Sig: Take 1 Tablet by mouth every day.   Simethicone (GAS-X PO)  Patient Yes No   Sig: Take 1 Tablet by mouth 3 times a day. Indications: gas   acetaminophen (TYLENOL) 500 MG Tab  Patient Yes No   Sig: Take 1,000 mg by mouth every 6 hours as needed. Indications: Fever, Pain   albuterol 108 (90 Base) MCG/ACT Aero Soln inhalation aerosol  Patient Yes No   Sig: Inhale 2 Puffs every 6 hours as needed for Shortness of Breath. Indications: Chronic Obstructive Lung Disease   amoxicillin-clavulanate (AUGMENTIN) 875-125 MG Tab   No No   Sig: Take 1 Tablet by mouth 2 times a day.   aspirin 81 MG EC tablet   No No   Sig: Take 1 Tablet by mouth every day.   atorvastatin (LIPITOR) 40 MG Tab  Patient Yes No   Sig: Take 40 mg by mouth every day.   bisoprolol (ZEBETA) 5 MG Tab   No No   Sig: Take 1 Tablet by mouth every day.   cephALEXin (KEFLEX) 500 MG Cap   No No   Sig: Take 1 Capsule by mouth 4 times a day.   furosemide (LASIX) 20 MG Tab  Patient Yes No   Sig: Take 20 mg by mouth every morning.   guaiFENesin ER (MUCINEX) 600 MG TABLET SR 12 HR  Patient Yes No   Sig: Take 600 mg by mouth every 12 hours. Indications: Cough   losartan (COZAAR) 25 MG Tab  Patient Yes  No   Sig: Take 12.5 mg by mouth every day. 0.5 tablet (12.5 mg)   magnesium oxide (MAG-OX) 400 MG Tab tablet   No No   Sig: Take 1 Tablet by mouth 2 times a day.   meloxicam (MOBIC) 7.5 MG Tab   No No   Sig: TAKE 1 TABLET BY MOUTH TWICE A DAY   pantoprazole (PROTONIX) 40 MG Tablet Delayed Response  Patient Yes No   Sig: Take 40 mg by mouth every morning.   polyethylene glycol 3350 (MIRALAX) 17 GM/SCOOP Powder  Patient Yes No   Sig: Take 1 Dose by mouth every day.   spironolactone (ALDACTONE) 25 MG Tab  Patient Yes No   Sig: Take 25 mg by mouth every day. Indications: Edema, CHF   traMADol (ULTRAM) 50 MG Tab  Patient Yes No   Sig: Take 50 mg by mouth 2 times a day.      Facility-Administered Medications: None       Physical Exam  Temp:  [35.9 °C (96.6 °F)-36.4 °C (97.6 °F)] 36.4 °C (97.6 °F)  Pulse:  [61-74] 70  Resp:  [15-37] 25  BP: (110-131)/(58-72) 131/72  SpO2:  [87 %-99 %] 99 %  Blood Pressure : 131/72   Temperature: 36.4 °C (97.6 °F)   Pulse: 70   Respiration: (!) 25   Pulse Oximetry: 99 %       Physical Exam  Vitals and nursing note reviewed.   Constitutional:       General: She is not in acute distress.     Appearance: She is well-developed. She is not diaphoretic.   HENT:      Head: Normocephalic and atraumatic.      Right Ear: External ear normal.      Left Ear: External ear normal.      Nose: Nose normal. No congestion or rhinorrhea.      Mouth/Throat:      Mouth: Mucous membranes are moist.      Pharynx: No oropharyngeal exudate.   Eyes:      General:         Right eye: No discharge.         Left eye: No discharge.   Neck:      Trachea: No tracheal deviation.   Cardiovascular:      Rate and Rhythm: Normal rate. Rhythm irregularly irregular.      Heart sounds: No murmur heard.     No friction rub. No gallop.   Pulmonary:      Effort: Tachypnea present. No respiratory distress.      Breath sounds: Normal breath sounds. No stridor. No wheezing or rales.   Chest:      Chest wall: No tenderness.   Abdominal:       General: Bowel sounds are normal. There is no distension.      Palpations: Abdomen is soft.      Tenderness: There is no abdominal tenderness.   Musculoskeletal:         General: Tenderness (right leg) present. Normal range of motion.      Cervical back: Neck supple.      Right lower leg: Edema present.      Left lower leg: Edema present.   Lymphadenopathy:      Cervical: No cervical adenopathy.   Skin:     General: Skin is warm and dry.      Findings: Erythema (right leg) present.   Neurological:      Mental Status: She is alert and oriented to person, place, and time.      Cranial Nerves: No cranial nerve deficit.   Psychiatric:         Mood and Affect: Mood normal.         Behavior: Behavior normal.         Thought Content: Thought content normal.         Judgment: Judgment normal.         Laboratory:  Recent Labs     02/27/24 1941   WBC 7.7   RBC 4.21   HEMOGLOBIN 11.5*   HEMATOCRIT 36.3*   MCV 86.2   MCH 27.3   MCHC 31.7*   RDW 50.9*   PLATELETCT 164   MPV 10.1     Recent Labs     02/27/24 1941   SODIUM 136   POTASSIUM 4.4   CHLORIDE 98   CO2 28   GLUCOSE 92   BUN 34*   CREATININE 0.98   CALCIUM 10.2     Recent Labs     02/27/24 1941   ALTSGPT 12   ASTSGOT 20   ALKPHOSPHAT 129*   TBILIRUBIN 0.4   GLUCOSE 92     Recent Labs     02/27/24 1941   APTT 30.4   INR 0.98     Recent Labs     02/27/24 1941   NTPROBNP 1948*         Recent Labs     02/27/24 1941   TROPONINT 47*       Imaging:  US-EXTREMITY VENOUS LOWER BILAT   Final Result         1. No evidence of bilateral lower extremity deep venous thrombosis.      DX-CHEST-PORTABLE (1 VIEW)   Final Result         Airspace opacity in the left lower lobe, likely pneumonia.      EC-ECHOCARDIOGRAM COMPLETE W/O CONT    (Results Pending)       X-Ray:  I have personally reviewed the images and compared with prior images.    Assessment/Plan:  Justification for Admission Status  I anticipate this patient will require at least two midnights for appropriate medical  management, necessitating inpatient admission because acute respiratory failure with hypoxia, lower extremity cellulitis with failure of outpatient antibiotics    Patient will need a Telemetry bed on CARDIOLOGY service .  The need is secondary to acute respiratory failure with hypoxia, lower extremity cellulitis, failure of outpatient antibiotics.    * Acute respiratory failure with hypoxia (HCC)- (present on admission)  Assessment & Plan  Patient does use chronic oxygen at night  Noted to be satting 87% on room air  I do not appreciate significant pulmonary edema on x-ray, radiologist called pneumonia however that is a chronic finding on the left, no symptoms to suggest pneumonia  Patient will be placed on IV Lasix  While she does have a history of COPD, no acute exacerbation  Start respiratory care per protocol  Continuous pulse ox monitoring    Cellulitis of right lower extremity- (present on admission)  Assessment & Plan  With failure of outpatient antibiotics x 2, Keflex and Augmentin  Start IV Ancef  Await culture results  Not causing sepsis  Potentially due to longstanding edema affecting the skin barrier  Hopefully edema will also be improved with IV Lasix    Elevated troponin- (present on admission)  Assessment & Plan  No chest pain, nausea or vomiting  I do not feel elevated troponin is acute coronary syndrome however I will continue to trend troponin  Monitor patient on telemetry  Patient has not had a post TAVR echo, I do not appreciate a significant murmur, I assume the aortic valve is working properly however with her edema and elevated troponin I will obtain an echocardiogram now    Normocytic anemia- (present on admission)  Assessment & Plan  No sign of gross bleeding    Dyslipidemia- (present on admission)  Assessment & Plan  Continue home statin    S/P TAVR (transcatheter aortic valve replacement)- (present on admission)  Assessment & Plan  Await repeat echocardiogram   no significant  murmur    Stage 3b chronic kidney disease (HCC)- (present on admission)  Assessment & Plan  Appears at baseline  Closely monitor and patient will be on IV Lasix    PAF (paroxysmal atrial fibrillation) ([facility])- (present on admission)  Assessment & Plan  Currently rate controlled  Continue home beta-blocker  Monitor patient on telemetry    Lower extremity edema- (present on admission)  Assessment & Plan  Ultrasound did not show a DVT  Patient will be placed on IV Lasix    Chronic obstructive pulmonary disease (HCC)- (present on admission)  Assessment & Plan  No acute exacerbation    Chronic pain syndrome- (present on admission)  Assessment & Plan  Continue home meds, no adjustment at this time    Primary hypertension- (present on admission)  Assessment & Plan  Continue home bisoprolol and losartan  Start as needed labetalol  Adjust as needed    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

## 2024-02-28 NOTE — ASSESSMENT & PLAN NOTE
She was saturating 87% on room air on admission.  Chest x-ray showed airspace opacity in the left lower lobe which is a chronic finding.  Echocardiogram pending.  Procalcitonin ordered

## 2024-02-28 NOTE — ED NOTES
Called to [id number] for attempt at bm per pt request. Call light in reach. Reminded to use same prior to standing

## 2024-02-28 NOTE — ED NOTES
2 hour post diuretic assessment  /61 HR 72  ml since young insertion at 0145.  Pt was incontinent of large amount of urine prior to young insertion.

## 2024-02-28 NOTE — ED PROVIDER NOTES
ED Provider Note    CHIEF COMPLAINT  Chief Complaint   Patient presents with    Leg Swelling     Bilat  Increasing redness and swelling  Current antibx for treatment of same x 1 week         EXTERNAL RECORDS REVIEWED  Outpatient Notes reviewed office visit progress note dated today via VARINDER Jones.  Patient seen for lower extremity cellulitis, has been on Augmentin twice daily seen for worsening swelling and redness to legs.  Sent emergency department for evaluation at higher level of care.  Additionally reviewed discharge summary by LUKE Lares dated January 9, 2024.  Patient underwent TAVR procedure January 8 and tolerated well.    HPI/ROS  LIMITATION TO HISTORY   Select: : None  OUTSIDE HISTORIAN(S):  Family patient's daughter relates the patient is status post TAVR January 8.    Chelly Decker is a 88 y.o. female who presents for evaluation of acute lower extremity pain and swelling.  Patient relates symptoms for the last 14 days.  She was initially diagnosed with cellulitis, she was treated with cephalexin which did not improve symptoms.  She is since been started on Augmentin with also no improvement.  Swelling and redness noted to be involving both legs but more swelling to the right leg over the last 2 days.  Patient notes history of CHF and is chronically on oxygen at night, she however has been more dyspneic and requiring oxygen during the day as well.  No fever, no chest pain.  Given worsening swelling and given now asymmetrical with more swelling to the right they were seen in urgent care and patient relates he was concern for DVT and as such she was sent here for further evaluation and management.  Patient has no personal history of DVT, recent procedure however as discussed above.    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), Bilateral lower extremity edema (08/03/2022), Breath shortness (12/04/2023), Cancer (HCC), Cancer (HCC) (12/04/2023), Cervical cancer (HCC) (1968), Chronic constipation, Chronic kidney disease (CKD), Chronic obstructive pulmonary disease (HCC) (06/22/2022), Chronic pain, Congestive heart failure (HCC), COPD (chronic obstructive pulmonary disease) (HCC), Dental disorder (12/04/2023), Dizziness (12/29/2022), Foot drop, right foot (12/04/2023), GERD (gastroesophageal reflux disease), H/O Clostridium difficile infection (05/12/2022), Hammer toes of both feet (12/29/2022), Heart burn (12/04/2023), Heart murmur, High cholesterol (12/04/2023), Hypertension (12/04/2023), Hypotension due to hypovolemia (08/17/2022), Hypoxia (03/29/2022), Incomplete defecation (03/29/2022), Infectious disease (04/25/2023), Insomnia due to medical condition (10/13/2022), Irregular heart rate (03/29/2022), Kidney stones, Moderate aortic stenosis, Moderate mitral insufficiency, Oxygen dependent, Pain (12/04/2023), Pain in joint involving multiple sites (10/13/2022), Pain in joint involving multiple sites (10/13/2022), Pelvic floor dysfunction (03/29/2022), Primary hypertension (03/29/2022), Renal insufficiency (08/03/2022), Seasonal allergies, Skin lesions (05/12/2022), Solitary pulmonary nodule, Spinal stenosis, Spinal stenosis of lumbar region without neurogenic claudication (03/29/2022), Urge incontinence of urine (12/04/2023), and Urinary bladder disorder (Past [id number]).    SURGICAL HISTORY   has a past surgical history that includes cholecystectomy; hip arthroplasty total (Right, 1998); carpal tunnel endoscopic (Bilateral); tonsillectomy; appendectomy; lithotripsy; hysterectomy laparoscopy; primary c section; ins/rplc spi npg/rcvr pocket (N/A, 02/05/2021); pump insert/remove (Right, 10/07/2022); pump revision  "(Right, 2023); colonoscopy; total hip arthroplasty (Left, 2023); other (Bilateral, 2023); transcatheter aortic valve replacement (2024); and echocardiogram, transesophageal, intraoperative (2024).    FAMILY HISTORY  Family History   Problem Relation Age of Onset    Heart Disease Mother         Not from congestive heart failure!       SOCIAL HISTORY  Social History     Tobacco Use    Smoking status: Former     Current packs/day: 0.00     Average packs/day: 1 pack/day for 61.0 years (61.0 ttl pk-yrs)     Types: Cigarettes, Electronic Cigarettes     Start date: 1954     Quit date: 2015     Years since quittin.1    Smokeless tobacco: Never    Tobacco comments:     Smoked as a teenager quit as a senior citizen   Vaping Use    Vaping Use: Former    Substances: Flavoring   Substance and Sexual Activity    Alcohol use: Not Currently    Drug use: Not Currently     Types: Inhaled    Sexual activity: Not on file       CURRENT MEDICATIONS  Home Medications    **Home medications have not yet been reviewed for this encounter**         ALLERGIES  Allergies   Allergen Reactions    Sulfa Drugs Unspecified     Stomach ache    Nauseated, diarrhea    Other reaction(s): Unspecified   Stomach ache    Erythromycin Nausea     Other Reaction(s): GI Upset    Other reaction(s): Not available       PHYSICAL EXAM  VITAL SIGNS: /72   Pulse 70   Temp 36.4 °C (97.6 °F) (Temporal)   Resp (!) 25   Ht 1.702 m (5' 7\")   Wt 65.8 kg (145 lb 1 oz)   SpO2 99%   BMI 22.72 kg/m²    General: Alert, no acute distress  Skin: Warm, dry, pale  Head: Normocephalic, atraumatic  Neck: Trachea midline, no tenderness  Eye: PERRL, normal conjunctiva  ENMT: Oral mucosa moist, no pharyngeal erythema or exudate  Cardiovascular: Regular rate and rhythm, No murmur, Normal peripheral perfusion  Respiratory: Lungs mildly diminished at both bases without rales or rhonchi, respirations are non-labored, breath sounds are " equal  Musculoskeletal: Swelling with 3+ pitting edema to the right and 2+ pitting edema to the left lower leg from the foot to the knees bilaterally.  Swelling more so to the right compared to left.  There is also erythema to the anterior tibial regions bilaterally with palpable warmth, no crepitus, no proximal streaking.  Neurological: Alert and oriented to person, place, time, and situation  Lymphatics: No lymphadenopathy  Psychiatric: Cooperative, appropriate mood & affect     DIAGNOSTIC STUDIES / PROCEDURES  EKG  I have independently interpreted this EKG  EKG Interpretation    Interpreted by emergency department physician    Rhythm: normal sinus   Rate: 66  Axis: normal  Ectopy: Frequent PVCs  Conduction: normal  ST Segments: nonspecific changes  T Waves: non specific changes  Q Waves: none    Clinical Impression: non-specific EKG, no pathologic change compared to previous EKG dated January 16, 2024    LABS  Results for orders placed or performed during the hospital encounter of 02/27/24   CBC WITH DIFFERENTIAL   Result Value Ref Range    WBC 7.7 4.8 - 10.8 K/uL    RBC 4.21 4.20 - 5.40 M/uL    Hemoglobin 11.5 (L) 12.0 - 16.0 g/dL    Hematocrit 36.3 (L) 37.0 - 47.0 %    MCV 86.2 81.4 - 97.8 fL    MCH 27.3 27.0 - 33.0 pg    MCHC 31.7 (L) 32.2 - 35.5 g/dL    RDW 50.9 (H) 35.9 - 50.0 fL    Platelet Count 164 164 - 446 K/uL    MPV 10.1 9.0 - 12.9 fL    Neutrophils-Polys 48.20 44.00 - 72.00 %    Lymphocytes 35.20 22.00 - 41.00 %    Monocytes 10.10 0.00 - 13.40 %    Eosinophils 5.70 0.00 - 6.90 %    Basophils 0.50 0.00 - 1.80 %    Immature Granulocytes 0.30 0.00 - 0.90 %    Nucleated RBC 0.00 0.00 - 0.20 /100 WBC    Neutrophils (Absolute) 3.70 1.82 - 7.42 K/uL    Lymphs (Absolute) 2.71 1.00 - 4.80 K/uL    Monos (Absolute) 0.78 0.00 - 0.85 K/uL    Eos (Absolute) 0.44 0.00 - 0.51 K/uL    Baso (Absolute) 0.04 0.00 - 0.12 K/uL    Immature Granulocytes (abs) 0.02 0.00 - 0.11 K/uL    NRBC (Absolute) 0.00 K/uL   Comp  Metabolic Panel   Result Value Ref Range    Sodium 136 135 - 145 mmol/L    Potassium 4.4 3.6 - 5.5 mmol/L    Chloride 98 96 - 112 mmol/L    Co2 28 20 - 33 mmol/L    Anion Gap 10.0 7.0 - 16.0    Glucose 92 65 - 99 mg/dL    Bun 34 (H) 8 - 22 mg/dL    Creatinine 0.98 0.50 - 1.40 mg/dL    Calcium 10.2 8.4 - 10.2 mg/dL    Correct Calcium 10.0 8.5 - 10.5 mg/dL    AST(SGOT) 20 12 - 45 U/L    ALT(SGPT) 12 2 - 50 U/L    Alkaline Phosphatase 129 (H) 30 - 99 U/L    Total Bilirubin 0.4 0.1 - 1.5 mg/dL    Albumin 4.3 3.2 - 4.9 g/dL    Total Protein 7.3 6.0 - 8.2 g/dL    Globulin 3.0 1.9 - 3.5 g/dL    A-G Ratio 1.4 g/dL   proBrain Natriuretic Peptide, NT   Result Value Ref Range    NT-proBNP 1948 (H) 0 - 125 pg/mL   ESTIMATED GFR   Result Value Ref Range    GFR (CKD-EPI) 56 (A) >60 mL/min/1.73 m 2   PT/INR   Result Value Ref Range    PT 13.5 12.0 - 14.6 sec    INR 0.98 0.87 - 1.13   APTT   Result Value Ref Range    APTT 30.4 24.7 - 36.0 sec   Troponin - STAT Once   Result Value Ref Range    Troponin T 47 (H) 6 - 19 ng/L   Lactic Acid   Result Value Ref Range    Lactic Acid 1.0 0.5 - 2.0 mmol/L   EKG   Result Value Ref Range    Report       [facility]    Test Date:  2024  Pt Name:    JACKIE STORY                Department: [facility]  MRN:        [id number]                      Room:       -ROOM 4  Gender:     Female                       Technician: [id number]  :        [id number]                   Requested By:REMA TAI  Order #:    [id number]                    Reading MD: ZAKIYA ROBLERO MD    Measurements  Intervals                                Axis  Rate:       66                           P:          0  NC:         0                            QRS:        40  QRSD:       88                           T:          80  QT:         414  QTc:        434    Interpretive Statements  Atrial fibrillation  Ventricular premature complex  Low voltage, precordial leads  Compared to  ECG 01/16/2024 14:03:13  Low QRS voltage now present  Sinus rhythm no longer present  First degree AV block no longer present  Electronically Signed On 02- 00:29:05 PST by ZAKIYA SMILEY MD          RADIOLOGY  I have independently interpreted the diagnostic imaging associated with this visit and am waiting the final reading from the radiologist.   My preliminary interpretation is as follows: Focal opacities left lower lobe noted on chest x-ray  Radiologist interpretation:   US-EXTREMITY VENOUS LOWER BILAT   Final Result         1. No evidence of bilateral lower extremity deep venous thrombosis.      DX-CHEST-PORTABLE (1 VIEW)   Final Result         Airspace opacity in the left lower lobe, likely pneumonia.      EC-ECHOCARDIOGRAM COMPLETE W/O CONT    (Results Pending)        COURSE & MEDICAL DECISION MAKING    ED Observation Status? Yes; I am placing the patient in to an observation status due to a diagnostic uncertainty as well as therapeutic intensity. Patient placed in observation status at 9:59 PM, 2/27/2024.     Observation plan is as follows: Patient medicated with Ancef 2 g IV per septic workup after blood cultures drawn.  Cardiac/septic workup with BNP and chest x-ray to be obtained.  Lower extremity venous Doppler will be obtained.  Differential diagnosis this point includes but is not restricted to DVT, volume overload, CHF, acute kidney injury, hepatic insufficiency, cellulitis, sepsis    2326: Patient reassessed, I have updated her with concerning findings thus far.  Oxygen saturation 87% on room air challenge.  She relates she usually does not use oxygen 24/7.    2344: I have spoken with the hospitalist with regard to findings thus far and updated patient's daughter.  Plan is for admission, Dr. Flor accepts the patient to his service in improved but guarded condition to the medical surgical floor.    CRITICAL CARE  The very real possibilty of a deterioration of this patient's condition  "required the highest level of my preparedness for sudden, emergent intervention.  I provided critical care services, which included medication orders, frequent reevaluations of the patient's condition and response to treatment, ordering and reviewing test results, and discussing the case with various consultants.  The critical care time associated with the care of the patient was 40 minutes. Review chart for interventions. This time is exclusive of any other billable procedures.      Patient Vitals for the past 24 hrs:   BP Temp Temp src Pulse Resp SpO2 Height Weight   02/27/24 2334 -- -- -- 70 (!) 25 99 % -- --   02/27/24 2330 -- -- -- 74 (!) 37 (!) 87 % -- --   02/27/24 2313 -- -- -- 65 18 96 % -- --   02/27/24 2214 -- -- -- 62 19 -- -- --   02/27/24 1901 131/72 36.4 °C (97.6 °F) Temporal 66 15 91 % -- --   02/27/24 1900 -- -- -- -- -- -- 1.702 m (5' 7\") 65.8 kg (145 lb 1 oz)        Upon Reevaluation, the patient's condition has: not improved; and will be escalated to hospitalization.    Patient discharged from ED Observation status at 2345 (Time) 2/28/24 (Date).     INITIAL ASSESSMENT, COURSE AND PLAN  Care Narrative: This patient is a very pleasant 89-year-old female with complicated medical history including recent TAVR in January presents for evaluation of lower extremity swelling.  Diagnosed with cellulitis and is failed appropriate outpatient management including 2 courses of antibiotics.  Indeed she has persistent cellulitis today.  Thankfully no evidence just of sepsis.  There is asymmetry to the swelling and as such thromboembolic etiology must be considered, thankfully DVT study is unremarkable.  Patient is requiring supplemental oxygen while awake and notes she normally only needs this at nighttime and with activity.  BNP is also elevated beyond her usual baseline.  Presentation is consistent with acute CHF with hypoxia.  Plan thusly is for admission for further evaluation and management.  HTN/IDDM " FOLLOW UP:  The patient has known hypertension and is being followed by their primary care doctor      ADDITIONAL PROBLEM LIST  Dyspnea with increased oxygen requirement, peripheral edema, cellulitis  DISPOSITION AND DISCUSSIONS  I have discussed management of the patient with the following physicians and BRODY's:  Consulted with Dr. Flor accepts the patient to his service in improved but guarded condition to the medical surgical floor.    Discussion of management with other [facility] or appropriate source(s): None     Escalation of care considered, and ultimately not performed:NA    Barriers to care at this time, including but not limited to:  Patient has limited transportation .   4  Decision tools and prescription drugs considered including, but not limited to: HEART Score 4, moderate risk stratification and SIRS criteria for sepsis not met .    FINAL DIAGNOSIS  1. Acute congestive heart failure, unspecified heart failure type (HCC)    2. Cellulitis of lower extremity, unspecified laterality    3. Hypoxia    4. Failure of outpatient treatment           Electronically signed by: Soni Noel M.D., 2/27/2024 9:57 PM

## 2024-02-28 NOTE — PROGRESS NOTES
Pt arrives to unit from ER via gurney. Ambulated from gurney to bed, accompanied by ED staff. PT A&Ox4. Tele monitor applied, vitals taken. History, allergies and med rec reviewed and admission profile completed.     Pt oriented to unit and POC discussed, including echo, monitor swelling in lower legs, O2. Welcome folder provided and discussed. Communication board filled out. Pt instructed to call light usage and encouraged to call for any questions, needs, or concerns and prior to getting out of bed. Fall precautions in place. Pt agrees with the plan and declines any needs at this time. Bed locked and low and bed alarm on.

## 2024-02-28 NOTE — CARE PLAN
The patient is Stable - Low risk of patient condition declining or worsening         Progress made toward(s) clinical / shift goals:  echo, IV lasix      Patient is not progressing towards the following goals:      Problem: Care Map:  Admission Optimal Outcome for the Heart Failure Patient  Goal: Admission:  Optimal Care of the heart failure patient  Outcome: Progressing     Problem: Knowledge Deficit - Standard  Goal: Patient and family/care givers will demonstrate understanding of plan of care, disease process/condition, diagnostic tests and medications  Outcome: Progressing  Note: Echo, lasix, monitor swelling in lower legs

## 2024-02-28 NOTE — ED TRIAGE NOTES
"Chief Complaint   Patient presents with    Leg Swelling     Bilat  Increasing redness and swelling  Current antibx for treatment of same x 1 week       /72   Pulse 66   Temp 36.4 °C (97.6 °F) (Temporal)   Resp 15   Ht 1.702 m (5' 7\")   Wt 65.8 kg (145 lb 1 oz)   SpO2 91%   BMI 22.72 kg/m²     Pt to ED via  w/ visitor for c/o ongoing BLE redness and swelling x 1 week, has been on antibx w/o relief.  Pt states is unable to walk.  Pt sent from  for further eval.    "
Pt assisted out of car into WC, assisted into BR prior to Triage.    
I have re-evaluated the patient's fluid status and reviewed vital signs. Clinical perfusion assessment was performed.

## 2024-02-28 NOTE — ASSESSMENT & PLAN NOTE
Patient denies chest pain.  EKG showed atrial fibrillation with PVCs.  No signs of acute ischemia.  Echocardiogram ordered.

## 2024-02-28 NOTE — ED NOTES
Pt assisted into a different WC for comfort.  Pt provided w/ oxygen tank set at 3L per request.     Ndc# For [place] Only: [id number] Ndc# For [place] Only: [id number]

## 2024-02-28 NOTE — PROGRESS NOTES
Subjective     Chelly Decker is a 88 y.o. female who presents with Other (In wheelchair and Oxygen, Hip replacement 8/28/23/TVAR Procedure, 1/8/24/Mow on amoxicillin 875/125 mg/1 tab twice a day for cellulitis.../Primary Dr Carrillo advised I see you today as antibiotic not working //R leg and foot swollen, L leg swollen )            Leg Pain  This is a new problem. Episode onset: 24 days. Right lower extremity redness, warmth and pain. Not respoonding to Keflex or Augmentin. Pain and swelling has worsened. The problem occurs constantly. The problem has been gradually worsening. Pertinent negatives include no abdominal pain, chest pain, chills, coughing, fatigue, fever, headaches, joint swelling, nausea, rash, sore throat or vomiting. Associated symptoms comments: No chest pain or shortness of breath.. Exacerbated by: bearing weight. Treatments tried: 2 different antibiotics. The treatment provided no relief.   Patient had TAVR procedure on 1/8/24.         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    Breath shortness 12/04/2023    uses oxygen at night, during day sometimes. 8/23/23-SOB with activity.    Cancer (HCC)     cervical cancer 1968    Cancer (HCC) 12/04/2023    lower left lobe cancer    Cervical cancer (HCC) 1968    [id number] chemo or radiation.    Chronic constipation     takes miralax    Chronic kidney disease (CKD)     stage 3    Chronic obstructive pulmonary disease (HCC) 06/22/2022    Chronic pain      follows with Dr Lozoya    Congestive heart failure (Formerly [facility])     follows with Dr Salvador with [facility]    COPD (chronic obstructive pulmonary disease) (Formerly [facility])     Dental disorder 12/04/2023    upper denture and permanent bridge on lower front teeth    Dizziness 12/29/2022    Foot drop, right foot 12/04/2023    GERD (gastroesophageal reflux disease)     H/O Clostridium difficile infection 05/12/2022    Hammer toes of both feet 12/29/2022    Heart burn 12/04/2023    taking pantoprazole.    Heart murmur     High cholesterol 12/04/2023    Controled with medication    Hypertension 12/04/2023    medicated    Hypotension due to hypovolemia 08/17/2022    Hypoxia 03/29/2022    Incomplete defecation 03/29/2022    Infectious disease 04/25/2023    I was just informed that the person who drove me to my doctor appointment and home on the 25th of this month was diagnosed with the flu on the 26th of this month. I had my flu shot and after one day I have no symptoms will keep checking !    Insomnia due to medical condition 10/13/2022    Irregular heart rate 03/29/2022    states has had for years, why atenolol    Kidney stones     Moderate aortic stenosis     Moderate mitral insufficiency     Oxygen dependent     uses oxygen 4 liters nasal cannula at night, sometimes during the day    Pain 12/04/2023    pain everywhere, fibromyalgia    Pain in joint involving multiple sites 10/13/2022    Pain in joint involving multiple sites 10/13/2022    Pelvic floor dysfunction 03/29/2022    Primary hypertension 03/29/2022    Renal insufficiency 08/03/2022    due to meds    Seasonal allergies     Skin lesions 05/12/2022    Solitary pulmonary nodule     had radiation treatment approx 2021 and does follow up scans.    Spinal stenosis     pain pump for pain control    Spinal stenosis of lumbar region without neurogenic claudication 03/29/2022    Urge incontinence of urine 12/04/2023    urinary retention, diapers    Urinary bladder disorder Past  [id number]    I've been under treatment for years with no results I am seeing a new urologist on May 2       Past Surgical History:   Procedure Laterality Date    TRANSCATHETER AORTIC VALVE REPLACEMENT  1/8/2024    Procedure: TRANSCATHETER AORTIC VALVE REPLACEMENT;  Surgeon: Clayton Eddy M.D.;  Location: [facility];  Service: Cardiac    ECHOCARDIOGRAM, TRANSESOPHAGEAL, INTRAOPERATIVE  1/8/2024    Procedure: ECHOCARDIOGRAM, TRANSESOPHAGEAL, INTRAOPERATIVE;  Surgeon: Clayton Eddy M.D.;  Location: [facility];  Service: Cardiac    OTHER Bilateral 12/04/2023    IOLOU    MD TOTAL HIP ARTHROPLASTY Left 08/28/2023    Procedure: LEFT TOTAL HIP ARTHROPLASTY;  Surgeon: Daryl Perrin M.D.;  Location: [facility];  Service: Orthopedics    PUMP REVISION Right 05/04/2023    Procedure: INTRATHECAL PAIN PUMP REVISION;  Surgeon: Tavo Lozoya M.D.;  Location: [facility];  Service: Pain Management    PUMP INSERT/REMOVE Right 10/07/2022    Procedure: MEDTRONIC PUMP REPLACEMENT;  Surgeon: Joel Alanis M.D.;  Location: [facility];  Service: Pain Management    MD INS/RPLC SPI NPG/RCVR POCKET N/A 02/05/2021    Procedure: IMPLANT PUMP AND CATHETER;  Surgeon: Joel Alanis M.D.;  Location: [facility];  Service: Pain Management    HIP ARTHROPLASTY TOTAL Right 1998    APPENDECTOMY      CARPAL TUNNEL ENDOSCOPIC Bilateral     bilat    CHOLECYSTECTOMY      COLONOSCOPY      HYSTERECTOMY LAPAROSCOPY      total    LITHOTRIPSY      kidney stones removed    PRIMARY C SECTION      c section    TONSILLECTOMY           Family History   Problem Relation Age of Onset    Heart Disease Mother         Not from congestive heart failure!     Allergies:   Sulfa drugs and Erythromycin      Medications, Allergies, and current problem list reviewed today in Epic    Review of Systems   Constitutional:  Negative for chills, fatigue, fever and malaise/fatigue.   HENT:  Negative for sore  "throat.    Respiratory:  Negative for cough, sputum production, shortness of breath and wheezing.    Cardiovascular:  Positive for leg swelling (right lower leg swelling and redness). Negative for chest pain and palpitations.   Gastrointestinal:  Negative for abdominal pain, nausea and vomiting.   Musculoskeletal:  Negative for joint pain and joint swelling.   Skin:  Negative for rash.   Neurological:  Negative for headaches.     All other systems reviewed and are negative.            Objective     /58   Pulse 61   Temp 35.9 °C (96.6 °F) (Temporal)   Resp 16   Ht 1.702 m (5' 7\")   Wt 65.8 kg (145 lb)   SpO2 96%   BMI 22.71 kg/m²      Physical Exam  Constitutional:       General: She is not in acute distress.     Appearance: She is not ill-appearing.   HENT:      Head: Normocephalic and atraumatic.   Eyes:      Conjunctiva/sclera: Conjunctivae normal.   Cardiovascular:      Rate and Rhythm: Normal rate and regular rhythm.   Pulmonary:      Effort: Pulmonary effort is normal. No respiratory distress.      Breath sounds: No stridor. No wheezing.   Musculoskeletal:      Right lower leg: Swelling and tenderness (TTP along Deep venous system and calf) present. No deformity. Edema present.      Comments: Right calf markedly larger than left calf. Right lower leg with erythema and warmth   Skin:     General: Skin is warm and dry.   Neurological:      General: No focal deficit present.      Mental Status: She is alert and oriented to person, place, and time.   Psychiatric:         Mood and Affect: Mood normal.         Thought Content: Thought content normal.         Judgment: Judgment normal.                             Assessment & Plan        1. Pain and swelling of right lower leg               Unable to get US this late in the evening. Recommend ER for higher level of care and more thorough evaluation. Patient's daughter will take her to [facility].   Patient is stable up on discharge.  Transfer " center notified.    Yojana Land P.A.-C.

## 2024-02-28 NOTE — PROGRESS NOTES
4 Eyes Skin Assessment Completed by АННА Vargas and АННА Sevilla.    Head WDL  Ears WDL  Nose WDL  Mouth WDL  Neck WDL  Breast/Chest WDL  Shoulder Blades WDL  Spine WDL  (R) Arm/Elbow/Hand Bruising  (L) Arm/Elbow/Hand Bruising  Abdomen WDL  Groin WDL  Scrotum/Coccyx/Buttocks Redness, Excoriation, and Scab  (R) Leg Redness and Swelling  (L) Leg Redness and Swelling  (R) Heel/Foot/Toe Swelling,edema  (L) Heel/Foot/Toe Swelling, edema          Devices In Places Tele Box, Pulse Ox, Gomes, and Nasal Cannula      Interventions In Place Gray Ear Foams, NC W/Ear Foams, Pillows, and Pressure Redistribution Mattress    Possible Skin Injury Yes    Pictures Uploaded Into Epic Yes  Wound Consult Placed N/A  RN Wound Prevention Protocol Ordered No

## 2024-02-28 NOTE — ED NOTES
Called to room-pt remains oob-bsc w/o results. Requesting meds for same. Will update hospitalist per request. Report to snodra arce

## 2024-02-28 NOTE — ASSESSMENT & PLAN NOTE
Patient has had lower extremity swelling with cellulitis since 2/26/2024.  Symptoms do not resolve with oral Keflex or Augmentin. IV Ancef was started.  Symptoms improved with IV Lasix and Ancef. Cultures pending.

## 2024-02-28 NOTE — ED NOTES
PIV placed in triage. Patient educated on need for PIV to be removed prior to leaving hospital legally. Maris demonstrated verbal understanding of education provided.

## 2024-02-28 NOTE — ED NOTES
Pt assisted back to bed from commode. Repositioned for comfort. Pt given all personal belongings. Call light in reach. Waiting for bed assignment. No other needs at this time.

## 2024-02-28 NOTE — ED NOTES
Medication history reviewed with pt. Med rec is complete.  Allergies reviewed, per pt    Received pain pump information from Dr. Lozoya's office    Pt reports that she was to have a procedure done today that she needed to take 1 tablet of ALPRAZOLAM 0.5MG and PERCOCET 5-325MG.    Pt reports that she has not used her CHLORHEXIDINE 0.12% in the last 30 days or longer.    Patient has had any outpatient antibiotics in the last 30 days, pt started KEFLEX 500MG on 2/13/2024 for 10 day course, pt reports that she only took this medications for 6 days and started taking AUGMENTIN on 2/23/2024 for 10 day course.  Pt last took her AUGMENTIN 875-125MG on 2/27/2024 @ 0800    Pt is not on any anticoagulants

## 2024-02-29 ENCOUNTER — PATIENT OUTREACH (OUTPATIENT)
Dept: HEALTH INFORMATION MANAGEMENT | Facility: OTHER | Age: 89
End: 2024-02-29
Payer: MEDICARE

## 2024-02-29 ENCOUNTER — APPOINTMENT (OUTPATIENT)
Dept: RADIOLOGY | Facility: MEDICAL CENTER | Age: 89
DRG: 602 | End: 2024-02-29
Attending: INTERNAL MEDICINE
Payer: MEDICARE

## 2024-02-29 VITALS
DIASTOLIC BLOOD PRESSURE: 58 MMHG | TEMPERATURE: 98 F | HEIGHT: 67 IN | RESPIRATION RATE: 18 BRPM | HEART RATE: 55 BPM | BODY MASS INDEX: 23.36 KG/M2 | WEIGHT: 148.81 LBS | OXYGEN SATURATION: 98 % | SYSTOLIC BLOOD PRESSURE: 129 MMHG

## 2024-02-29 DIAGNOSIS — J44.0 CHRONIC OBSTRUCTIVE PULMONARY DISEASE WITH ACUTE LOWER RESPIRATORY INFECTION (HCC): ICD-10-CM

## 2024-02-29 PROBLEM — L03.115 CELLULITIS AND ABSCESS OF RIGHT LEG: Status: RESOLVED | Noted: 2024-02-28 | Resolved: 2024-02-29

## 2024-02-29 PROBLEM — L02.415 CELLULITIS AND ABSCESS OF RIGHT LEG: Status: RESOLVED | Noted: 2024-02-28 | Resolved: 2024-02-29

## 2024-02-29 LAB
ANION GAP SERPL CALC-SCNC: 10 MMOL/L (ref 7–16)
BASOPHILS # BLD AUTO: 0.6 % (ref 0–1.8)
BASOPHILS # BLD: 0.04 K/UL (ref 0–0.12)
BUN SERPL-MCNC: 44 MG/DL (ref 8–22)
CALCIUM SERPL-MCNC: 9.4 MG/DL (ref 8.4–10.2)
CHLORIDE SERPL-SCNC: 98 MMOL/L (ref 96–112)
CO2 SERPL-SCNC: 30 MMOL/L (ref 20–33)
CREAT SERPL-MCNC: 1.4 MG/DL (ref 0.5–1.4)
EOSINOPHIL # BLD AUTO: 0.32 K/UL (ref 0–0.51)
EOSINOPHIL NFR BLD: 4.9 % (ref 0–6.9)
ERYTHROCYTE [DISTWIDTH] IN BLOOD BY AUTOMATED COUNT: 51.8 FL (ref 35.9–50)
GFR SERPLBLD CREATININE-BSD FMLA CKD-EPI: 37 ML/MIN/1.73 M 2
GLUCOSE SERPL-MCNC: 125 MG/DL (ref 65–99)
HCT VFR BLD AUTO: 32.8 % (ref 37–47)
HGB BLD-MCNC: 10.6 G/DL (ref 12–16)
IMM GRANULOCYTES # BLD AUTO: 0.01 K/UL (ref 0–0.11)
IMM GRANULOCYTES NFR BLD AUTO: 0.2 % (ref 0–0.9)
LYMPHOCYTES # BLD AUTO: 3.13 K/UL (ref 1–4.8)
LYMPHOCYTES NFR BLD: 48.4 % (ref 22–41)
MCH RBC QN AUTO: 28 PG (ref 27–33)
MCHC RBC AUTO-ENTMCNC: 32.3 G/DL (ref 32.2–35.5)
MCV RBC AUTO: 86.8 FL (ref 81.4–97.8)
MONOCYTES # BLD AUTO: 0.95 K/UL (ref 0–0.85)
MONOCYTES NFR BLD AUTO: 14.7 % (ref 0–13.4)
NEUTROPHILS # BLD AUTO: 2.02 K/UL (ref 1.82–7.42)
NEUTROPHILS NFR BLD: 31.2 % (ref 44–72)
NRBC # BLD AUTO: 0 K/UL
NRBC BLD-RTO: 0 /100 WBC (ref 0–0.2)
NT-PROBNP SERPL IA-MCNC: 2257 PG/ML (ref 0–125)
PLATELET # BLD AUTO: 142 K/UL (ref 164–446)
PMV BLD AUTO: 10.4 FL (ref 9–12.9)
POTASSIUM SERPL-SCNC: 4.1 MMOL/L (ref 3.6–5.5)
RBC # BLD AUTO: 3.78 M/UL (ref 4.2–5.4)
SODIUM SERPL-SCNC: 138 MMOL/L (ref 135–145)
WBC # BLD AUTO: 6.5 K/UL (ref 4.8–10.8)

## 2024-02-29 PROCEDURE — 80048 BASIC METABOLIC PNL TOTAL CA: CPT

## 2024-02-29 PROCEDURE — 83880 ASSAY OF NATRIURETIC PEPTIDE: CPT

## 2024-02-29 PROCEDURE — 74177 CT ABD & PELVIS W/CONTRAST: CPT

## 2024-02-29 PROCEDURE — 94760 N-INVAS EAR/PLS OXIMETRY 1: CPT

## 2024-02-29 PROCEDURE — 99239 HOSP IP/OBS DSCHRG MGMT >30: CPT | Performed by: INTERNAL MEDICINE

## 2024-02-29 PROCEDURE — A9270 NON-COVERED ITEM OR SERVICE: HCPCS | Performed by: INTERNAL MEDICINE

## 2024-02-29 PROCEDURE — 36415 COLL VENOUS BLD VENIPUNCTURE: CPT

## 2024-02-29 PROCEDURE — 85025 COMPLETE CBC W/AUTO DIFF WBC: CPT

## 2024-02-29 PROCEDURE — 700102 HCHG RX REV CODE 250 W/ 637 OVERRIDE(OP): Performed by: INTERNAL MEDICINE

## 2024-02-29 PROCEDURE — 97166 OT EVAL MOD COMPLEX 45 MIN: CPT

## 2024-02-29 PROCEDURE — 700105 HCHG RX REV CODE 258: Performed by: INTERNAL MEDICINE

## 2024-02-29 PROCEDURE — 700111 HCHG RX REV CODE 636 W/ 250 OVERRIDE (IP): Performed by: INTERNAL MEDICINE

## 2024-02-29 PROCEDURE — 97535 SELF CARE MNGMENT TRAINING: CPT

## 2024-02-29 PROCEDURE — 700117 HCHG RX CONTRAST REV CODE 255: Performed by: INTERNAL MEDICINE

## 2024-02-29 RX ORDER — AMOXICILLIN AND CLAVULANATE POTASSIUM 875; 125 MG/1; MG/1
1 TABLET, FILM COATED ORAL 2 TIMES DAILY
Qty: 20 TABLET | Refills: 0 | Status: ACTIVE | OUTPATIENT
Start: 2024-02-29 | End: 2024-03-19

## 2024-02-29 RX ORDER — LANOLIN ALCOHOL/MO/W.PET/CERES
400 CREAM (GRAM) TOPICAL 2 TIMES DAILY
Qty: 30 TABLET | Refills: 0 | Status: SHIPPED | OUTPATIENT
Start: 2024-02-29

## 2024-02-29 RX ORDER — FUROSEMIDE 20 MG/1
20 TABLET ORAL 2 TIMES DAILY
Qty: 30 TABLET | Refills: 0 | Status: SHIPPED | OUTPATIENT
Start: 2024-02-29

## 2024-02-29 RX ORDER — OXYCODONE HYDROCHLORIDE 5 MG/1
5 TABLET ORAL ONCE
Status: COMPLETED | OUTPATIENT
Start: 2024-02-29 | End: 2024-02-29

## 2024-02-29 RX ADMIN — MELOXICAM 7.5 MG: 7.5 TABLET ORAL at 05:04

## 2024-02-29 RX ADMIN — TRAMADOL HYDROCHLORIDE 50 MG: 50 TABLET, COATED ORAL at 15:41

## 2024-02-29 RX ADMIN — ATORVASTATIN CALCIUM 40 MG: 40 TABLET, FILM COATED ORAL at 05:04

## 2024-02-29 RX ADMIN — CEFAZOLIN 2 G: 2 INJECTION, POWDER, FOR SOLUTION INTRAMUSCULAR; INTRAVENOUS at 04:59

## 2024-02-29 RX ADMIN — TRAMADOL HYDROCHLORIDE 50 MG: 50 TABLET, COATED ORAL at 03:39

## 2024-02-29 RX ADMIN — FUROSEMIDE 40 MG: 10 INJECTION, SOLUTION INTRAMUSCULAR; INTRAVENOUS at 05:05

## 2024-02-29 RX ADMIN — OXYCODONE HYDROCHLORIDE 5 MG: 5 TABLET ORAL at 05:45

## 2024-02-29 RX ADMIN — DULOXETINE HYDROCHLORIDE 20 MG: 20 CAPSULE, DELAYED RELEASE ORAL at 05:05

## 2024-02-29 RX ADMIN — ASPIRIN 81 MG: 81 TABLET, COATED ORAL at 05:05

## 2024-02-29 RX ADMIN — LOSARTAN POTASSIUM 12.5 MG: 25 TABLET, FILM COATED ORAL at 05:04

## 2024-02-29 RX ADMIN — Medication 400 MG: at 05:04

## 2024-02-29 RX ADMIN — POTASSIUM CHLORIDE 20 MEQ: 1500 TABLET, EXTENDED RELEASE ORAL at 05:04

## 2024-02-29 RX ADMIN — OMEPRAZOLE 20 MG: 20 CAPSULE, DELAYED RELEASE ORAL at 05:04

## 2024-02-29 RX ADMIN — POLYETHYLENE GLYCOL 3350 1 PACKET: 17 POWDER, FOR SOLUTION ORAL at 05:05

## 2024-02-29 RX ADMIN — IOHEXOL 100 ML: 350 INJECTION, SOLUTION INTRAVENOUS at 10:21

## 2024-02-29 RX ADMIN — DOCUSATE SODIUM 100 MG: 100 CAPSULE, LIQUID FILLED ORAL at 05:04

## 2024-02-29 RX ADMIN — SPIRONOLACTONE 25 MG: 25 TABLET ORAL at 05:10

## 2024-02-29 ASSESSMENT — COGNITIVE AND FUNCTIONAL STATUS - GENERAL
HELP NEEDED FOR BATHING: A LITTLE
DAILY ACTIVITIY SCORE: 18
PERSONAL GROOMING: A LITTLE
SUGGESTED CMS G CODE MODIFIER DAILY ACTIVITY: CK
DRESSING REGULAR UPPER BODY CLOTHING: A LITTLE
DRESSING REGULAR LOWER BODY CLOTHING: A LOT
TOILETING: A LITTLE

## 2024-02-29 ASSESSMENT — FIBROSIS 4 INDEX: FIB4 SCORE: 3.5

## 2024-02-29 ASSESSMENT — PAIN DESCRIPTION - PAIN TYPE: TYPE: ACUTE PAIN

## 2024-02-29 ASSESSMENT — GAIT ASSESSMENTS: DISTANCE (FEET): 25

## 2024-02-29 ASSESSMENT — ACTIVITIES OF DAILY LIVING (ADL): TOILETING: INDEPENDENT

## 2024-02-29 NOTE — THERAPY
Occupational Therapy   Initial Evaluation     Patient Name: Chelly Decker  Age:  88 y.o., Sex:  female  Medical Record #: [id number]  Today's Date: 2/29/2024     Precautions  Precautions: (P) Fall Risk    Assessment  Patient is 88 y.o. female with a diagnosis of Santoyo's esophagus w/out dysplasia.  Additional factors influencing patient status / progress: impaired balance, limited activity tolerance.  Pt resides in a 2 story home w/ her daughter and son-in-law in [place], [place].  Family are available to assist as needed.  Primary bedroom and shower are on the ground floor.  Pt does not go to the 2nd floor.  PLOF Mod I to Indep for ADL's (exception of Min assist for showers by daughter), transfers and ambulation via FWW.  Therapist reviewed environmental/home safety, fall precautions, AE/DME, ADL's and transfers.    Plan    Occupational Therapy Initial Treatment Plan   Treatment Interventions: (P) Self Care / Activities of Daily Living, Adaptive Equipment, Neuro Re-Education / Balance, Therapeutic Activity  Treatment Frequency: (P) 3 Times per Week  Duration: (P) Until Therapy Goals Met    DC Equipment Recommendations: (P) Unable to determine at this time  Discharge Recommendations: (P) Recommend home health for continued occupational therapy services.     Subjective    Pt was alert and cooperative w/ tx.     Objective       02/29/24 1050    Services   Is patient using  services for this encounter? No   Initial Contact Note    Initial Contact Note Order Received and Verified, Occupational Therapy Evaluation in Progress with Full Report to Follow.   Prior Living Situation   Housing / Facility 2 Story House   Steps Into Home 2   Steps In Home   (FOS - pt does not go to the 2nd level)   Bathroom Set up Walk In Shower;Grab Bars;Shower Chair   Equipment Owned Front-Wheel Walker;Tub / Shower Seat;Grab Bar(s) In Tub / Shower;Grab Bar(s) By Toilet;Bed Side Commode;Hand Held  Shower;Oxygen  (Intermittent use of suplmental O2)   Lives with - Patient's Self Care Capacity Adult Children   Comments Pt resides in a 2 story home w/ her daughter and son-in-law in [place], [place].  Family are available to assist as needed.  Primary bedroom and shower are on the ground floor.  Pt does not go to the 2nd floor.  PLOF Mod I to Indep for ADL's (exception of Min assist for showers by daughter), transfers and ambulation via FWW.   Prior Level of ADL Function   Self Feeding Independent   Grooming / Hygiene Independent   Bathing Requires Assist  (Min assist)   Dressing Independent   Toileting Independent   Prior Level of IADL Function   Kitchen Mobility Independent   Prior Level Of Mobility Independent With Device in Community;Independent With Steps in Community;Independent With Device in Home;Independent With Steps in Home   Driving / Transportation Relatives / Others Provide Transportation   Occupation (Pre-Hospital Vocational) Retired Due To Age   Precautions   Precautions Fall Risk   Pain   Intervention Declines   Non Verbal Descriptors   Non Verbal Scale  Calm;Unlabored Breathing   Cognition    Cognition / Consciousness WDL   Level of Consciousness Alert   Coordination Upper Body   Coordination WDL   Balance Assessment   Sitting Balance (Static) Good   Sitting Balance (Dynamic) Fair +   Standing Balance (Static) Fair   Standing Balance (Dynamic) Fair -   Weight Shift Sitting Good   Weight Shift Standing Fair   Comments OOB FWW   Bed Mobility    Supine to Sit Supervised   Sit to Supine Supervised   ADL Assessment   Grooming Standby Assist;Standing   Upper Body Dressing Minimal Assist   Lower Body Dressing Moderate Assist   Toileting Minimal Assist   How much help from another person does the patient currently need...   Putting on and taking off regular lower body clothing? 2   Bathing (including washing, rinsing, and drying)? 3   Toileting, which includes using a toilet, bedpan, or urinal? 3    Putting on and taking off regular upper body clothing? 3   Taking care of personal grooming such as brushing teeth? 3   Eating meals? 4   6 Clicks Daily Activity Score 18   Functional Mobility   Sit to Stand Supervised   Bed, Chair, Wheelchair Transfer Supervised   Toilet Transfers Contact Guard Assist   Transfer Method Stand Step   Mobility CGA FWW   Distance (Feet) 25   # of Times Distance was Traveled 2   Edema / Skin Assessment   Edema / Skin  Not Assessed   Short Term Goals   Short Term Goal # 1 Mod I bed mobility (to/from supine and EOB sitting)   Short Term Goal # 2 Mod I UB clothing management   Short Term Goal # 3 Min assist LB clothing management via AE/DME PRN   Short Term Goal # 4 Sup toilet transfer in bathroom via DME   Short Term Goal # 5 Mod I toileting task on commode in bathroom via AE/DME   Education Group   Education Provided Transfers;Role of Occupational Therapist;Activities of Daily Living;Adaptive Equipment;Use of Call Light   Role of Occupational Therapist Patient Response Patient;Acceptance;Explanation;Verbal Demonstration   Transfers Patient Response Patient;Acceptance;Explanation;Demonstration;Teach Back;Verbal Demonstration;Action Demonstration;Reinforcement Needed   ADL Patient Response Patient;Acceptance;Explanation;Demonstration;Teach Back;Verbal Demonstration;Action Demonstration;Reinforcement Needed   Adaptive Equipment Patient Response Patient;Acceptance;Explanation;Demonstration;Teach Back;Verbal Demonstration;Action Demonstration;Reinforcement Needed   Use of Call Light Patient Response Patient;Acceptance;Explanation;Demonstration;Verbal Demonstration   Occupational Therapy Initial Treatment Plan    Treatment Interventions Self Care / Activities of Daily Living;Adaptive Equipment;Neuro Re-Education / Balance;Therapeutic Activity   Treatment Frequency 3 Times per Week   Duration Until Therapy Goals Met   Problem List   Problem List Decreased Active Daily Living Skills;Decreased  Functional Mobility;Decreased Activity Tolerance;Safety Awareness Deficits / Cognition;Impaired Postural Control / Balance   Anticipated Discharge Equipment and Recommendations   DC Equipment Recommendations Unable to determine at this time   Discharge Recommendations Recommend home health for continued occupational therapy services

## 2024-02-29 NOTE — DISCHARGE PLANNING
"HTH/SCP TCN chart review completed. Collaborated with ED CM prior to meeting with the pt. The most current review of medical record, knowledge of pt's PLOF and social support, LACE+ score of 70, 6 clicks scores of 18 ADL and 14 mobility were considered.      TCN met with patient at bedside. Introduced self and TCN program. Patient stated familiarity with TCN from previous acute hospitalization. Patient daughter, Krystal, also participated in TCN visit through telephone conversation as requested by patient.     Provided education and discussion regarding post-acute levels of care. Patient endorsed she was followed by [facility] prior to acute hospitalization noting she enjoyed \"working with PT [facility].\" Patient requested continuation of HH through [facility] at time of discharge. Choice proactively obtained for HH (#1 [facility], #2 [facility]), faxed to [facility] and given to CM. Discussed HTH/SCP plan benefits. Pt and daughter verbalized understanding.     Patient requested additional information regarding a caregiver list (such as [employer]). Note left for CM to provide patient with caregiver list tomorrow as appropriate.     TCN will continue to follow and collaborate with discharge planning team as additional post acute needs arise. Thank you.     Completed today  Choice obtained: [facility] (resumption of HH through [facility]) second choice: [facility]  SCP with [facility] PCP. Referral sent to scheduling to assist in scheduling after hospital visit follow up with primary care  "

## 2024-02-29 NOTE — FACE TO FACE
Face to Face Supporting Documentation - Home Health    The encounter with this patient was in whole or in part the primary reason for home health admission.    Date of encounter:   Patient:                    MRN:                       YOB: 2022  Chelly Decker  [id number]  1935     Home health to see patient for:  Skilled Nursing care for assessment, interventions & education, Physical Therapy evaluation and treatment, and Occupational therapy evaluation and treatment    Skilled need for:  Exacerbation of Chronic Disease State   and Medication Management      Skilled nursing interventions to include:  Comment: Medication management    Homebound status evidenced by:  Need the aid of supportive devices such as crutches, canes, wheelchairs or walkers, Require the use of special transportation, or Needs the assistance of another person in order to leave the home. Leaving home requires a considerable and taxing effort. There is a normal inability to leave the home.    Community Physician to provide follow up care: Kassi Carrillo M.D.     Optional Interventions? No      I certify the face to face encounter for this home health care referral meets the CMS requirements and the encounter/clinical assessment with the patient was, in whole, or in part, for the medical condition(s) listed above, which is the primary reason for home health care. Based on my clinical findings: the service(s) are medically necessary, support the need for home health care, and the homebound criteria are met.  I certify that this patient has had a face to face encounter by myself.  Saima Nur M.D. - NPI: [id number]

## 2024-02-29 NOTE — DISCHARGE PLANNING
"Care Transition Team Assessment    [facility] conducted a chart review and collaborated with TCN. Pt lives with daughter in a \"in law suit\" per TCN. Pt has [facility] prior to admission. Pt uses a walker at BL and home O2 with preferred at 2L. Pt has a portable.     1147 SW reached out to MD Nur for HH order.     1225 Pending HH order    Information Source  Orientation Level: Oriented X4  Information Given By: Other (Comments) (Chart review and TCN)  Who is responsible for making decisions for patient? : Patient    Readmission Evaluation  Is this a readmission?: No    Elopement Risk  Legal Hold: No  Ambulatory or Self Mobile in Wheelchair: Yes  Disoriented: No  Psychiatric Symptoms: None  History of Wandering: No  Elopement this Admit: No  Vocalizing Wanting to Leave: No  Displays Behaviors, Body Language Wanting to Leave: No-Not at Risk for Elopement  Elopement Risk: Not at Risk for Elopement    Interdisciplinary Discharge Planning  Primary Care Physician: JUSTIN TREVINO M.D.  Lives with - Patient's Self Care Capacity: Adult Children  Patient or legal guardian wants to designate a caregiver: No  Support Systems: Friends / Neighbors, Family Member(s)  Housing / Facility: [address]  Durable Medical Equipment: Home Oxygen, Walker  DME Provider / Phone: Preferred [facility]    Discharge Preparedness  What is your plan after discharge?: Home with help, Home health care  What are your discharge supports?: Child (daughter)  Prior Functional Level: Ambulatory, Uses Walker  Difficulity with ADLs: Walking  Difficulity with IADLs: Driving    Functional Assesment  Prior Functional Level: Ambulatory, Uses Walker    Finances  Financial Barriers to Discharge: No    Vision / Hearing Impairment  Vision Impairment : Yes  Right Eye Vision: Impaired, Wears Glasses  Left Eye Vision: Impaired, Wears Glasses  Hearing Impairment : No    Domestic Abuse  Have you ever been the victim of abuse or violence?: No  Physical Abuse or Sexual Abuse: " No  Verbal Abuse or Emotional Abuse: No  Possible Abuse/Neglect Reported to:: Not Applicable    Psychological Assessment  History of Substance Abuse: None  History of Psychiatric Problems: No  Non-compliant with Treatment: No  Newly Diagnosed Illness: No    Discharge Risks or Barriers  Discharge risks or barriers?: No    Anticipated Discharge Information  Discharge Disposition: Discharged to home/self care (01)  Discharge Contact Phone Number: [phone]  [facility] [phone]

## 2024-02-29 NOTE — PROGRESS NOTES
Patient's son Kentrell Barrett called stating his Mother was currently in the hospital. Son has questions regarding current treatment plan and discharge plan.  Nurse recommended son contact nursing unit to speak to pt's current inpatient nurse and the discharge planner. Nurse offered to transfer call. Son states he has the hospital number and will call.

## 2024-02-29 NOTE — PROGRESS NOTES
Telemetry Shift Summary     Rhythm SB SR  HR Range 54-66  Ectopy o-f PVC,o-Coup  Measurements  .20/.10/.42  Per strip printed 0400     Normal Values  Rhythm SR  HR Range    Measurements 0.12-0.20 / 0.06-0.10  / 0.30-0.52

## 2024-02-29 NOTE — DISCHARGE SUMMARY
Discharge Summary    CHIEF COMPLAINT ON ADMISSION  Chief Complaint   Patient presents with    Leg Swelling     Bilat  Increasing redness and swelling  Current antibx for treatment of same x 1 week         Reason for Admission  Leg Swelling, Sent by [facility]     Admission Date  2/27/2024    CODE STATUS  Full Code    HPI & HOSPITAL COURSE  Chelly Decker is a 88 y.o. female admitted 2/27/2024 with worsening bilateral (R>L) leg swelling and cellulitis.Since 2/16/2024, has been on Keflex followed by Augmentin with no improvement.  She has a history of CHF, CKD 3, COPD (on home oxygen 4 L/min at night, 3 L/min during the day), paroxysmal A-fib, chronic pain syndrome, hypertension, recent TAVR (1/8/2024), Santoyo's esophagus. Had left hip replacement 8/28/2023). Had cyber knife therapy for lung cancer ~ 2018.      Lower extremity Dopplers showed no evidence of DVT.  Chest x-ray showed airspace opacity in the left lower lobe (chronic from cyber knife therapy). She was saturating 87% on room air. EKG showed atrial fibrillation with PVCs. IV lasix was given. Ancef was empirically started.    Symptoms significantly improved after IV Lasix.  Echocardiogram from 2/28/2023 showed LVEF of about 70%, grade 1 diastolic dysfunction, known transcatheter aortic valve that is functioning normally with normal transvalvular gradients.    Patient complained of left lower quadrant abdominal pain.  CT abdomen and pelvis from today shows diverticulosis without any evidence of diverticulitis.  No other acute abnormality was found in the abdomen or pelvis.    Her lower extremity edema has almost completely resolved, erythema has significantly improved.  It appears her bilateral lower extremity erythema is due to skin changes from edema.  Patient has been advised to complete Augmentin for 5 days which was recently prescribed as an outpatient.  Home Lasix has been increased to 20 mg twice daily. She has been advised to follow-up with her primary  care physician and her cardiologist within 2 to 3 days.     Discharge instructions were discussed with patient and her son.    Therefore, she is discharged in fair and stable condition to home with close outpatient follow-up.      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 (POA: Yes)    Primary hypertension (POA: Yes)    Chronic pain syndrome (POA: Yes)    Chronic obstructive pulmonary disease (HCC) (POA: Yes)    Lower extremity edema (POA: Yes)    PAF (paroxysmal atrial fibrillation) (HCC) (POA: Yes)    Stage 3b chronic kidney disease (HCC) (POA: Yes)    S/P TAVR (transcatheter aortic valve replacement) (POA: Yes)      Overview:  26 Waldron Kamla S3 Ultra Resilia deployed at nominal volume -1 cc via       the transfemoral approach on 1/8/2024 under general anesthesia    Dyslipidemia (POA: Yes)    Normocytic anemia (POA: Yes)    Elevated troponin (POA: Yes)  Resolved Problems:    Cellulitis and abscess of right leg (POA: Yes)      FOLLOW UP  Future Appointments   Date Time Provider Department Center   3/7/2024  3:00 PM China Barrett D.O. [facility]   4/9/2024  4:50 PM Kassi Carrillo M.D. [facility]   4/22/2024  4:00 PM CELESTE Bliss M.D. [facility] None   12/16/2024  3:15 PM V EXAM 10 ECHO [facility]   12/19/2024  1:15 PM FARRAH Bowers None     No follow-up provider specified.    MEDICATIONS ON DISCHARGE     Medication List        START taking these medications        Instructions   magnesium oxide 400 (240 Mg) MG Tabs  Replaces: magnesium oxide 400 MG Tabs tablet   Take 1 Tablet by mouth 2 times a day.  Dose: 400 mg            CHANGE how you take  these medications        Instructions   amoxicillin-clavulanate 875-125 MG Tabs  What changed: additional instructions  Commonly known as: Augmentin   Take 1 Tablet by mouth 2 times a day.  Dose: 1 Tablet     bisoprolol 5 MG Tabs  What changed: when to take this  Commonly known as: Zebeta   Take 1 Tablet by mouth every day.  Dose: 5 mg     furosemide 20 MG Tabs  What changed: when to take this  Commonly known as: Lasix   Take 1 Tablet by mouth 2 times a day.  Dose: 20 mg     magnesium oxide 400 MG Tabs tablet  What changed: Another medication with the same name was removed. Continue taking this medication, and follow the directions you see here.  Commonly known as: Mag-Ox   Take 400 mg by mouth every day.  Dose: 400 mg            CONTINUE taking these medications        Instructions   acetaminophen 500 MG Tabs  Commonly known as: Tylenol   Take 1,000 mg by mouth every 6 hours as needed. Indications: Fever, Pain  Dose: 1,000 mg     albuterol 108 (90 Base) MCG/ACT Aers inhalation aerosol   Inhale 2 Puffs every 6 hours as needed for Shortness of Breath. Indications: Chronic Obstructive Lung Disease  Dose: 2 Puff     aspirin 81 MG EC tablet   Take 1 Tablet by mouth every day.  Dose: 81 mg     atorvastatin 40 MG Tabs  Commonly known as: Lipitor   Take 40 mg by mouth every evening.  Dose: 40 mg     CRANBERRY PO   Take 1 Tablet by mouth every evening.  Dose: 1 Tablet     diltiazem 120 MG XR capsule  Commonly known as: Cardizem CD   Take 120 mg by mouth every day.  Dose: 120 mg     DULoxetine 20 MG Cpep  Commonly known as: Cymbalta   Take 20 mg by mouth 2 times a day. Indications: Musculoskeletal Pain  Dose: 20 mg     GAS-X PO   Take 1 Tablet by mouth 3 times a day. Indications: gas  Dose: 1 Tablet     guaiFENesin  MG Tb12  Commonly known as: Mucinex   Take 600 mg by mouth every 12 hours. Indications: Cough  Dose: 600 mg     losartan 25 MG Tabs  Commonly known as: Cozaar   Take 25 mg by mouth every evening.  Dose: 25  mg     Melatonin 10 MG Tabs   Take 10 mg by mouth every evening. Indications: Trouble Sleeping  Dose: 10 mg     meloxicam 7.5 MG Tabs  Commonly known as: Mobic   TAKE 1 TABLET BY MOUTH TWICE A DAY  Dose: 7.5 mg     Norco 5-325 MG Tabs per tablet  Generic drug: HYDROcodone-acetaminophen   Take 1 Tablet by mouth every 6 hours as needed. Indications: Pain  Dose: 1 Tablet     Pain Pump Milagros  Commonly known as: Patient Supplied   Inject  as directed continuous. Patient's Pain Pump (placed and maintained as an outpatient)    Medications/concentrations:   HYDROMORPHONE 4.0mg/ml      Last changed: 2/14/2024  Refill pump before date: 5/14/2024    Continuous infusion rates (Drug/Rate):   HYDROMORPHONE 0.7491mg/day (0.0312mg/hr)    MD office:  Dr. Lozoya's  Phone number: [phone]          Indications: severe pain     pantoprazole 40 MG Tbec  Commonly known as: Protonix   Take 40 mg by mouth every morning.  Dose: 40 mg     polyethylene glycol 3350 17 GM/SCOOP Powd  Commonly known as: Miralax   Take 17 g by mouth every evening.  Dose: 17 g     PROBIOTIC DAILY PO   Take 1 Tablet by mouth every evening.  Dose: 1 Tablet     spironolactone 25 MG Tabs  Commonly known as: Aldactone   Take 25 mg by mouth every day. Indications: Edema, CHF  Dose: 25 mg     traMADol 50 MG Tabs  Commonly known as: Ultram   Take 50 mg by mouth 2 times a day.  Dose: 50 mg     VITAMIN B-12 PO   Take 1 Tablet by mouth see administration instructions. Pt takes sporidially   Indications: supplement  Dose: 1 Tablet     VITAMIN D3 PO   Take 1 Tablet by mouth every day. Indications: supplement  Dose: 1 Tablet            STOP taking these medications      magnesium oxide 400 MG Tabs tablet  Commonly known as: Mag-Ox  Replaced by: magnesium oxide 400 (240 Mg) MG Tabs  You also have another medication with the same name that you need to continue taking as instructed.              Allergies  Allergies   Allergen Reactions    Sulfa Drugs Unspecified     Stomach  ache    Nauseated, diarrhea    Other reaction(s): Unspecified   Stomach ache    Erythromycin Nausea     Other Reaction(s): GI Upset    Other reaction(s): Not available       DIET  Orders Placed This Encounter   Procedures    Diet Order Diet: 2 Gram Sodium     Standing Status:   Standing     Number of Occurrences:   1     Order Specific Question:   Diet:     Answer:   2 Gram Sodium [7]       ACTIVITY  As tolerated.      CONSULTATIONS  None    PROCEDURES  None    LABORATORY  Lab Results   Component Value Date    SODIUM 138 02/29/2024    POTASSIUM 4.1 02/29/2024    CHLORIDE 98 02/29/2024    CO2 30 02/29/2024    GLUCOSE 125 (H) 02/29/2024    BUN 44 (H) 02/29/2024    CREATININE 1.40 02/29/2024    GLOMRATE 64 10/06/2023        Lab Results   Component Value Date    WBC 6.5 02/29/2024    HEMOGLOBIN 10.6 (L) 02/29/2024    HEMATOCRIT 32.8 (L) 02/29/2024    PLATELETCT 142 (L) 02/29/2024        Total time of the discharge process exceeds 36 minutes.

## 2024-02-29 NOTE — TELEPHONE ENCOUNTER
Clayton Eddy M.D.  You11 minutes ago (3:52 PM)     records reviewed and I agree with the recommended treatment plan that is being pursued.     Phone Number Called: [phone]    Call outcome: Spoke to patient regarding message below.    Message: Called to inform patient son of [facility] response. Son concerned that they are treating her respiratory failure as he does not feel that is the issues but rather the cellulitis. Advised that we could not speak to other treatment options but from a cardiology standpoint  All questions answered at this time. Advised to call back with any further questions or concerns.

## 2024-02-29 NOTE — DISCHARGE PLANNING
HTH/SCP TCN chart review completed. Collaborated with DYLAN Choe. Discussed current discharge considerations currently noted to home with resumption of home healthcare through [facility] as patient reports being followed by [facility] prior to acute hospitalization.    TCN met with patient at bedside. Discussed oxygen needs. Patient advised that prior to acute hospitalization she used 4 liters of oxygen at night and 3 liters of oxygen during the day. Patient stated her DME provider to [facility]. As requested by CM, choice proactively obtained for DME ([facility]), faxed to [facility] and given to CM to be used in discharge planning as needed.     Voalte sent to CM to provide update. TCN will continue to follow and collaborate with discharge planning team as additional post acute needs arise. Thank you.    Completed  Choice obtained: HH (resumption of HH through [facility] ) second choice: [facility]. DME ([facility])  SCP with [facility] PCP. Per chart review, patient [facility] Follow up with Dr. Barrett Thursday, March 7th, 2024 at 2:45PM

## 2024-02-29 NOTE — RESPIRATORY CARE
"  COPD EDUCATION by COPD CLINICAL EDUCATOR  2/29/2024  at  3:12 PM by Ana Laura Luke, RRT     Patient interviewed by education team.  Patient declined or is unable to participate in the full program.  Therefore, a short intervention has been conducted.  A comprehensive discussion regarding information about COPD, types of treatments to manage their disease and safe home Oxygen usage was provided and reviewed with patient at the bedside.      COPD Screen  COPD Risk Screening  Do you have a history of COPD?: Yes  Do you have a Pulmonologist?: Yes    COPD Assessment  COPD Clinical Specialists ONLY  COPD Education Initiated: Yes--Short Intervention (Just seen at [facility]/9/2024, pt has a rescue inhaler but does not use much just as needed, NO PFT, CT 11/29/23 showed Emphysema, hx smoking 61yrs quit 2015, had a LLL mass tx by Dr. [facility], has Pulmonologist, feels stable & managed well by doctors)  Is this a COPD exacerbation patient?: No (BNP [address])  DME Company: [facility].  DME Equipment Type: oxygen  Physician Name: Kassi Carrillo M.D.  Pulmonologist Name: MAGDI Reynolds treated for Lung Cancer LLL 4 yrs ago  Referrals Initiated: Yes  Pulmonary Rehab: N/A  Smoking Cessation: N/A  Hospice: N/A  Home Health Care: Yes  Mobile Urgent Care Services: Yes (info given)  [facility]: Yes (info given)  Private In-Home Care Agency: N/A  $ Demo/Eval of SVN's, MDI's and Aerosols:  (has spacers encourage use)  (OP) Pulmonary Function Testing: Yes (should have PFT)    PFT Results    No results found for: \"PFT\"    Meds to Beds  [facility] for all eligible patients at discharge.  Would you like to opt out of this program for any reason?: No - Stay Opted In     MY COPD ACTION PLAN     It is recommended that patients and physicians /healthcare providers complete this action plan together. This plan should be discussed at each physician visit and updated as " "needed.    The green, yellow and red zones show groups of symptoms of COPD. This list of symptoms is not comprehensive, and you may experience other symptoms. In the \"Actions\" column, your healthcare provider has recommended actions for you to take based on your symptoms.    Patient Name: Chelly Dceker   YOB: 1935   Last Updated on: 2/29/2024  3:12 PM   Green Zone:  I am doing well today Actions     Usual activitiy and exercise level   Take daily medications     Usual amounts of cough and phlegm/mucus   Use oxygen as prescribed     Sleep well at night   Continue regular exercise/diet plan     Appetite is good   At all times avoid cigarette smoke, inhaled irritants     Daily Medications (these medications are taken every day):   Albuterol (Accuneb, Proair, Proventil, Ventolin) 2 Puffs Every 4 hours PRN     Additional Information:  Use spacer with rescue inhaler, and always rinse mouth after    Yellow Zone:  I am having a bad day or a COPD flare Actions     More breathless than usual   Continue daily medications     I have less energy for my daily activities   Use quick relief inhaler as ordered     Increased or thicker phlegm/mucus   Use oxygen as prescribed     Using quick relief inhaler/nebulizer more often   Get plenty of rest     Swelling of ankles more than usual   Use pursed lip breathing     More coughing than usual   At all times avoid cigarette smoke, inhaled irritants     I feel like I have a \"chest cold\"     Poor sleep and my symptoms woke me up     My appetite is not good     My medicine is not helping      Call provider immediately if symptoms don’t improve     Continue daily medications, add rescue medications:   Albuterol 2 Puffs Every 4 hours       Medications to be used during a flare up, (as Discussed with Provider):           Additional Information:  If symptoms do not improve call provider immediately     Red Zone:  I need urgent medical care Actions     Severe shortness of " breath even at rest   Call 911 or seek medical care immediately     Not able to do any activity because of breathing      Fever or shaking chills      Feeling confused or very drowsy       Chest pains      Coughing up blood

## 2024-02-29 NOTE — CARE PLAN
The patient is Stable - Low risk of patient condition declining or worsening    Shift Goals  Clinical Goals: CT abd, pain management  Patient Goals: rest, manage pain    Progress made toward(s) clinical / shift goals:      Patient is not progressing towards the following goals:      Problem: Care Map:  Day 2 Optimal Outcome for the Heart Failure Patient  Goal: Day 2:  Optimal Care of the heart failure patient  Outcome: Progressing     Problem: Knowledge Deficit - Standard  Goal: Patient and family/care givers will demonstrate understanding of plan of care, disease process/condition, diagnostic tests and medications  Outcome: Progressing  Note: CT abdomen today, patient on baseline O2

## 2024-02-29 NOTE — CARE PLAN
Problem: Skin Integrity  Goal: Skin integrity is maintained or improved  Outcome: Progressing     Problem: Fall Risk  Goal: Patient will remain free from falls  Outcome: Progressing  Note: Fall precaution measures in place   The patient is Stable - Low risk of patient condition declining or worsening    Shift Goals  Clinical Goals: Antibiotics,CT abd and pelvis,pain control  Patient Goals: rest    Progress made toward(s) clinical / shift goals:  Educated on fall prevention measures,encouraged use of call light,updated on plan of care    Patient is not progressing towards the following goals:

## 2024-02-29 NOTE — PROGRESS NOTES
Telemetry Shift Summary     Rhythm: afib/SR  Rate: 50-72  Measurements: -/.10/-  Ectopy (reported by Monitor Tech): f PVC, r coup  *5 beats accelerated idio     Normal Values  Rhythm: Sinus  HR:   Measurements: 0.12-0.20/0.06-0.10/0.30-0.52

## 2024-03-01 LAB
BACTERIA UR CULT: NORMAL
SIGNIFICANT IND 70042: NORMAL
SITE SITE: NORMAL
SOURCE SOURCE: NORMAL

## 2024-03-01 NOTE — PROGRESS NOTES
Dr. Nur spoke with patient at bedside and wrote discharge orders. Pt and patient daughter educated on importance of follow up appointments and taking medications as prescribed. Pt verbalized understanding of instructions with no further questions at this time.  Pt escorted off unit by staff via wheelchair on 3 L o2.  with belongings in hand.

## 2024-03-04 ENCOUNTER — PATIENT OUTREACH (OUTPATIENT)
Dept: MEDICAL GROUP | Facility: PHYSICIAN GROUP | Age: 89
End: 2024-03-04
Payer: MEDICARE

## 2024-03-04 DIAGNOSIS — J44.9 CHRONIC OBSTRUCTIVE PULMONARY DISEASE, UNSPECIFIED COPD TYPE (HCC): ICD-10-CM

## 2024-03-04 LAB
BACTERIA BLD CULT: NORMAL
BACTERIA BLD CULT: NORMAL
SIGNIFICANT IND 70042: NORMAL
SIGNIFICANT IND 70042: NORMAL
SITE SITE: NORMAL
SITE SITE: NORMAL
SOURCE SOURCE: NORMAL
SOURCE SOURCE: NORMAL

## 2024-03-04 NOTE — PROGRESS NOTES
Nurse CM outreach call for hospital follow-up TCM. Pt reports she is doing fair. Reports the swelling in her legs has improved. Reports she is wearing oxygen at 3 liters. Pt states she is unable to weigh daily because she can't balance on a scale. Nurse reviewed all upcoming appts. [facility]. Nurse contacted [facility] and left VM to see if pt was scheduled for visit.     Transitional Care Management  TCM Outreach Date and Time: Filed (3/4/2024  9:08 AM)    Discharge Questions  Actual Discharge Date: 02/29/24  Now that you are home, how are you feeling?: Fair  Did you receive any new prescriptions?: Yes  Were you able to get them filled?: No  Do you have any questions about your current medications or new medications (Review Med Rec)?: No  Did you have any durable medical equipment ordered?: No  Do you have a follow up appointment scheduled with your PCP?: Yes  Appointment Date: 03/06/24  Appointment Time: 1500  Any issues or paperwork you wish to discuss with your PCP?: No  Are you (patient) able to get to the appointment?: Yes  If Home Health was ordered, have they contacted you (Patient): Yes  Did you have enough support after your last discharge?: Yes  Does this patient qualify for the CCM program?: Yes    Transitional Care  Number of attempts made to contact patient: 1  Current or previous attempts completed within two business days of discharge? : Yes  Provided education regarding treatment plan, medications, self-management, ADLs?: Yes  Has patient completed an Advanced Directive?: Yes  Is the patient's advanced directive on file?: Yes  Has the Care Manager's phone number provided?: Yes  Is there anything else I can help you with?: No    Discharge Summary  Chief Complaint: Leg swelling  Admitting Diagnosis: Acute respiratory failure  Discharge Diagnosis: Acute respiratory failure with hypoxia

## 2024-03-05 ENCOUNTER — APPOINTMENT (OUTPATIENT)
Dept: CARDIOLOGY | Facility: MEDICAL CENTER | Age: 89
End: 2024-03-05
Attending: INTERNAL MEDICINE
Payer: MEDICARE

## 2024-03-06 ENCOUNTER — OFFICE VISIT (OUTPATIENT)
Dept: MEDICAL GROUP | Facility: PHYSICIAN GROUP | Age: 89
End: 2024-03-06
Payer: MEDICARE

## 2024-03-06 VITALS
BODY MASS INDEX: 23.17 KG/M2 | DIASTOLIC BLOOD PRESSURE: 60 MMHG | WEIGHT: 147.6 LBS | TEMPERATURE: 98 F | OXYGEN SATURATION: 96 % | HEIGHT: 67 IN | SYSTOLIC BLOOD PRESSURE: 126 MMHG | RESPIRATION RATE: 18 BRPM | HEART RATE: 61 BPM

## 2024-03-06 DIAGNOSIS — M21.371 RIGHT FOOT DROP: ICD-10-CM

## 2024-03-06 DIAGNOSIS — R60.0 LOWER EXTREMITY EDEMA: ICD-10-CM

## 2024-03-06 DIAGNOSIS — L03.119 CELLULITIS OF LOWER EXTREMITY, UNSPECIFIED LATERALITY: ICD-10-CM

## 2024-03-06 DIAGNOSIS — Z95.2 S/P TAVR (TRANSCATHETER AORTIC VALVE REPLACEMENT): ICD-10-CM

## 2024-03-06 DIAGNOSIS — R54 AGE-RELATED PHYSICAL DEBILITY: ICD-10-CM

## 2024-03-06 ASSESSMENT — FIBROSIS 4 INDEX: FIB4 SCORE: 3.5

## 2024-03-07 ENCOUNTER — APPOINTMENT (OUTPATIENT)
Dept: MEDICAL GROUP | Facility: PHYSICIAN GROUP | Age: 89
End: 2024-03-07
Payer: MEDICARE

## 2024-03-07 NOTE — PROGRESS NOTES
Subjective:     Chelly Decker is a 88 y.o. female who presents for Hospital Follow-up.    HPI:   Recently hospitalized for lower extremity cellulitis.    1. Cellulitis of lower extremity, unspecified laterality  Patient presents to the hospital on February 27 after worsening bilateral lower extremity swelling/cellulitis.  Lower extremity Doppler ultrasound negative for DVT.  Patient was started on Ancef and IV Lasix.  After Lasix, patient's symptoms improved.  Patient was discharged on increased dose of Lasix 20 mg twice daily and a 7-day course of Augmentin.  Patient doing well, swelling has significantly decreased and decreased erythema.  Right lower extremity has a bit more edema than the left.  Echocardiogram with an EF of greater than 55%.    2. Lower extremity edema  Patient with a history of CHF, status post TAVR.  Patient increased her Lasix to 20 mg twice daily.  Per cardiology, patient's diltiazem was continued due to possible cause of lower extremity edema.  Patient continues with bisoprolol 5 mg tablets daily.  Echocardiogram completed in the hospital.    3. Age-related physical debility  Due to multiple medical issues, patient has been severely debilitated.  Patient continues to have pain issues in her lower back and left hip.  Using a walker and wheelchair.  Home health was ordered in the hospital for OT/PT to come to the home.  Patient feels very unsteady and struggles with balance.  Patient is very sedentary.    4. Right foot drop  Patient has an appointment neurology          Current medicines (including reconciliation performed today)  Current Outpatient Medications   Medication Sig Dispense Refill    amoxicillin-clavulanate (AUGMENTIN) 875-125 MG Tab Take 1 Tablet by mouth 2 times a day. 20 Tablet 0    magnesium oxide 400 (240 Mg) MG Tab Take 1 Tablet by mouth 2 times a day. 30 Tablet 0    furosemide (LASIX) 20 MG Tab Take 1 Tablet by mouth 2 times a day. 30 Tablet 0    diltiazem (CARDIZEM CD)  120 MG XR capsule Take 120 mg by mouth every day.      HYDROcodone-acetaminophen (NORCO) 5-325 MG Tab per tablet Take 1 Tablet by mouth every 6 hours as needed. Indications: Pain      magnesium oxide (MAG-OX) 400 MG Tab tablet Take 400 mg by mouth every day.      meloxicam (MOBIC) 7.5 MG Tab TAKE 1 TABLET BY MOUTH TWICE A DAY 30 Tablet 1    bisoprolol (ZEBETA) 5 MG Tab Take 1 Tablet by mouth every day. (Patient taking differently: Take 5 mg by mouth every evening.) 100 Tablet 3    aspirin 81 MG EC tablet Take 1 Tablet by mouth every day. 100 Tablet 3    CRANBERRY PO Take 1 Tablet by mouth every evening.      Probiotic Product (PROBIOTIC DAILY PO) Take 1 Tablet by mouth every evening.      traMADol (ULTRAM) 50 MG Tab Take 50 mg by mouth 2 times a day.      atorvastatin (LIPITOR) 40 MG Tab Take 40 mg by mouth every evening.      losartan (COZAAR) 25 MG Tab Take 25 mg by mouth every evening.      pantoprazole (PROTONIX) 40 MG Tablet Delayed Response Take 40 mg by mouth every morning.      polyethylene glycol 3350 (MIRALAX) 17 GM/SCOOP Powder Take 17 g by mouth every evening.      spironolactone (ALDACTONE) 25 MG Tab Take 25 mg by mouth every day. Indications: Edema, CHF      Melatonin 10 MG Tab Take 10 mg by mouth every evening. Indications: Trouble Sleeping      acetaminophen (TYLENOL) 500 MG Tab Take 1,000 mg by mouth every 6 hours as needed. Indications: Fever, Pain      Pain Pump (PATIENT SUPPLIED) Device Inject  as directed continuous. Patient's Pain Pump (placed and maintained as an outpatient)    Medications/concentrations:   HYDROMORPHONE 4.0mg/ml      Last changed: 2/14/2024  Refill pump before date: 5/14/2024    Continuous infusion rates (Drug/Rate):   HYDROMORPHONE 0.7491mg/day (0.0312mg/hr)    MD office:  Dr. Lozoya's  Phone number: [phone]          Indications: severe pain      DULoxetine (CYMBALTA) 20 MG Cap DR Particles Take 20 mg by mouth 2 times a day. Indications: Musculoskeletal Pain       "guaiFENesin ER (MUCINEX) 600 MG TABLET SR 12 HR Take 600 mg by mouth every 12 hours. Indications: Cough      Cyanocobalamin (VITAMIN B-12 PO) Take 1 Tablet by mouth see administration instructions. Pt takes sporidially   Indications: supplement      Cholecalciferol (VITAMIN D3 PO) Take 1 Tablet by mouth every day. Indications: supplement      albuterol 108 (90 Base) MCG/ACT Aero Soln inhalation aerosol Inhale 2 Puffs every 6 hours as needed for Shortness of Breath. Indications: Chronic Obstructive Lung Disease      Simethicone (GAS-X PO) Take 1 Tablet by mouth 3 times a day. Indications: gas       No current facility-administered medications for this visit.       Allergies:   Sulfa drugs and Erythromycin    Social History     Tobacco Use    Smoking status: Former     Current packs/day: 0.00     Average packs/day: 1 pack/day for 61.0 years (61.0 ttl pk-yrs)     Types: Cigarettes, Electronic Cigarettes     Start date: 1954     Quit date: 2015     Years since quittin.1    Smokeless tobacco: Never    Tobacco comments:     Smoked as a teenager quit as a senior citizen   Vaping Use    Vaping Use: Former    Substances: Flavoring   Substance Use Topics    Alcohol use: Not Currently    Drug use: Not Currently     Types: Inhaled       ROS:  Positive ROS.      Objective:     Vitals:    24 1509   BP: 126/60   BP Location: Left arm   Patient Position: Sitting   Pulse: 61   Resp: 18   Temp: 36.7 °C (98 °F)   TempSrc: Temporal   SpO2: 96%   Weight: 67 kg (147 lb 9.6 oz)   Height: 1.702 m (5' 7\")     Body mass index is 23.12 kg/m².    Physical Exam:    Constitutional: Alert, no distress.  Skin: Warm, dry, good turgor, no rashes in visible areas.  Eye: Equal, round and reactive, conjunctiva clear, lids normal.  ENMT: Lips without lesions, good dentition, oropharynx clear.  Neck: Trachea midline, no masses, no thyromegaly. No cervical or supraclavicular lymphadenopathy  Respiratory: Unlabored respiratory effort, " lungs clear to auscultation, no wheezes, no ronchi.  Cardiovascular: Normal S1, S2, no murmur, no edema.  Abdomen: Soft, non-tender, no masses, no hepatosplenomegaly.  Extremities: Right lower extremity +1 pitting edema, left lower extremity trace pitting edema.  Mild resolving erythema lower extremity bilaterally.  No cellulitis/streaking noted  Psych: Alert and oriented x3, normal affect and mood.      Assessment and Plan:   1. Cellulitis of lower extremity, unspecified laterality  Hospital records reviewed.  Patient to finish course of Augmentin.  Lasix 20 mg twice daily.  Increase mobility, elevate legs when sitting.    2. Lower extremity edema  Medications reviewed.  Continue Lasix at current dose.    3. Age-related physical debility  Patient will start PT-OT through home health care.    4. Right foot drop  Follow-up with neurology.        - Chart and discharge summary were reviewed.   - Hospitalization and results reviewed with patient.   - Medications reviewed including instructions regarding high risk medications, dosing and side effects.  - Recommended Services: No services needed at this time  - Advance directive/POLST on file?  Yes    Follow-up:No follow-ups on file.    Face-to-face transitional care management services with MODERATE (today's visit is within 14 days post discharge & LACE+ score of 28-58) medical decision complexity were provided.     LACE+ Historical Score Over Time (0-28: Low, 29-58: Medium, 59+: High): 75      Critical Care

## 2024-03-14 NOTE — DOCUMENTATION QUERY
[facility]                                                                       Query Response Note      PATIENT:               JACKIE STORY  ACCT #:                  [id number]  MRN:                     [id number]  :                      1935  ADMIT DATE:       2024 8:59 PM  DISCH DATE:        2024 6:38 PM  RESPONDING  PROVIDER #:        [id number]           QUERY TEXT:    History of CHF is documented in the medical record.     Patient presented with elevated BNP and BLE edema. Echo with EF 70%, grade 1 diastolic dysfunction.  Treated with IV Lasix and home Lasix increased at discharge.      Please further clarify the acuity and type of CHF.      The patient's Clinical Indicators include:  Clinical Findings:    :  --CXR: Airspace opacity in the left lower lobe; No pleural effusion  --NT proBNP: 1946  --3+ pitting edema to the right and 2+ pitting edema to the left lower leg  --no wheezing/rales    :   --ECHO: EF 70%; Grade I diastolic dysfunction.      Risk Factors: Known CHF prior to TAVR, advanced age, CKD 3b    Treatments: Imaging, ECHO, IV Lasix, Home Lasix has been increased to 20 mg twice daily at discharge      Contact me with any questions.    Thank you for your time and attention,  Daphne Glez RN, [email or web address]  Connect via email, Voalte or messenger.  Options provided:   -- Patient does not have CHF   -- Acute on Chronic Diastolic Congestive Heart Failure   -- Chronic Diastolic Congestive Heart Failure   -- Acute on Chronic Systolic & Diastolic Congestive Heart Failure   -- Chronic Systolic & Diastolic Congestive Heart Failure   -- Other explanation, (please specify other explanation)      Query created by: Daphne Glez on 3/5/2024 11:19 AM    RESPONSE TEXT:    Acute on Chronic Diastolic Congestive Heart Failure          Electronically signed by:  WOODY DAVILA MD 3/13/2024 10:07 PM

## 2024-03-19 ENCOUNTER — OFFICE VISIT (OUTPATIENT)
Dept: CARDIOLOGY | Facility: MEDICAL CENTER | Age: 89
End: 2024-03-19
Attending: INTERNAL MEDICINE
Payer: MEDICARE

## 2024-03-19 VITALS
RESPIRATION RATE: 18 BRPM | BODY MASS INDEX: 23.39 KG/M2 | OXYGEN SATURATION: 94 % | HEART RATE: 63 BPM | WEIGHT: 149 LBS | HEIGHT: 67 IN | SYSTOLIC BLOOD PRESSURE: 124 MMHG | DIASTOLIC BLOOD PRESSURE: 60 MMHG

## 2024-03-19 DIAGNOSIS — I10 PRIMARY HYPERTENSION: ICD-10-CM

## 2024-03-19 DIAGNOSIS — Z79.899 HIGH RISK MEDICATION USE: ICD-10-CM

## 2024-03-19 DIAGNOSIS — R06.09 DYSPNEA ON EXERTION: ICD-10-CM

## 2024-03-19 DIAGNOSIS — J44.0 CHRONIC OBSTRUCTIVE PULMONARY DISEASE WITH ACUTE LOWER RESPIRATORY INFECTION (HCC): ICD-10-CM

## 2024-03-19 DIAGNOSIS — G45.9 TIA (TRANSIENT ISCHEMIC ATTACK): ICD-10-CM

## 2024-03-19 DIAGNOSIS — I27.20 PULMONARY HTN (HCC): ICD-10-CM

## 2024-03-19 DIAGNOSIS — I48.0 PAF (PAROXYSMAL ATRIAL FIBRILLATION) (HCC): ICD-10-CM

## 2024-03-19 DIAGNOSIS — I50.30 ACC/AHA STAGE C HEART FAILURE WITH PRESERVED EJECTION FRACTION (HCC): ICD-10-CM

## 2024-03-19 DIAGNOSIS — I49.3 PVC'S (PREMATURE VENTRICULAR CONTRACTIONS): ICD-10-CM

## 2024-03-19 DIAGNOSIS — I51.89 LEFT VENTRICULAR DIASTOLIC DYSFUNCTION, NYHA CLASS 3: ICD-10-CM

## 2024-03-19 DIAGNOSIS — Z95.2 S/P TAVR (TRANSCATHETER AORTIC VALVE REPLACEMENT): ICD-10-CM

## 2024-03-19 DIAGNOSIS — N18.32 STAGE 3B CHRONIC KIDNEY DISEASE: ICD-10-CM

## 2024-03-19 LAB — EKG IMPRESSION: NORMAL

## 2024-03-19 PROCEDURE — 93010 ELECTROCARDIOGRAM REPORT: CPT | Performed by: INTERNAL MEDICINE

## 2024-03-19 PROCEDURE — 93005 ELECTROCARDIOGRAM TRACING: CPT | Performed by: INTERNAL MEDICINE

## 2024-03-19 PROCEDURE — 99214 OFFICE O/P EST MOD 30 MIN: CPT | Mod: 25 | Performed by: INTERNAL MEDICINE

## 2024-03-19 PROCEDURE — 99213 OFFICE O/P EST LOW 20 MIN: CPT | Performed by: INTERNAL MEDICINE

## 2024-03-19 PROCEDURE — 99214 OFFICE O/P EST MOD 30 MIN: CPT | Performed by: INTERNAL MEDICINE

## 2024-03-19 PROCEDURE — 3074F SYST BP LT 130 MM HG: CPT | Performed by: INTERNAL MEDICINE

## 2024-03-19 PROCEDURE — 3078F DIAST BP <80 MM HG: CPT | Performed by: INTERNAL MEDICINE

## 2024-03-19 RX ORDER — EMPAGLIFLOZIN 10 MG/1
10 TABLET, FILM COATED ORAL DAILY
Qty: 90 TABLET | Refills: 4 | Status: SHIPPED | OUTPATIENT
Start: 2024-03-19

## 2024-03-19 ASSESSMENT — ENCOUNTER SYMPTOMS
DEPRESSION: 0
HEADACHES: 0
COUGH: 0
FEVER: 0
MEMORY LOSS: 0
BLURRED VISION: 0
FALLS: 0
DIAPHORESIS: 0
PALPITATIONS: 0
BRUISES/BLEEDS EASILY: 0
ABDOMINAL PAIN: 0
SENSORY CHANGE: 0
DOUBLE VISION: 0
SHORTNESS OF BREATH: 1
DIZZINESS: 1
MYALGIAS: 0

## 2024-03-19 ASSESSMENT — FIBROSIS 4 INDEX: FIB4 SCORE: 3.5

## 2024-03-19 ASSESSMENT — MINNESOTA LIVING WITH HEART FAILURE QUESTIONNAIRE (MLHF)
WORKING AROUND THE HOUSE OR YARD DIFFICULT: 3
WALKING ABOUT OR CLIMBING STAIRS DIFFICULT: 3
MAKING YOU SHORT OF BREATH: 3
HAVING TO SIT OR LIE DOWN DURING THE DAY: 3
SWELLING IN ANKLES OR LEGS: 5
DIFFICULTY WORKING TO EARN A LIVING: 3
EATING LESS FOODS YOU LIKE: 0
DIFFICULTY WITH SEXUAL ACTIVITIES: 0
TIRED, FATIGUED OR LOW ON ENERGY: 3
GIVING YOU SIDE EFFECTS FROM TREATMENTS: 4
MAKING YOU WORRY: 3
MAKING YOU FEEL DEPRESSED: 3
DIFFICULTY TO CONCENTRATE OR REMEMBERING THINGS: 3
COSTING YOU MONEY FOR MEDICAL CARE: 3
MAKING YOU STAY IN A HOSPITAL: 0
LOSS OF SELF CONTROL IN YOUR LIFE: 3
FEELING LIKE A BURDEN TO FAMILY AND FRIENDS: 5
DIFFICULTY WITH RECREATIONAL PASTIMES, SPORTS, HOBBIES: 3
DIFFICULTY SOCIALIZING WITH FAMILY OR FRIENDS: 3
TOTAL_SCORE: 59
DIFFICULTY GOING AWAY FROM HOME: 3
DIFFICULTY SLEEPING WELL AT NIGHT: 3

## 2024-03-19 NOTE — PROGRESS NOTES
Chief Complaint   Patient presents with    Atrial Fibrillation     F/v dx: PAF (paroxysmal atrial fibrillation) ([facility])    Congestive Heart Failure     F/v dx: Acute on chronic heart failure with preserved ejection fraction ([facility])    Transient Ischemic Attack       Subjective     Chelly Decker is a 88 y.o. female who presents today for cardiac care and management for heart failure with preserved ejection fraction with recent hospitalization for heart failure exacerbation with lower extremity edema and elevated NT proBNP.  Patient also has prior history of TAVR, paroxysmal atrial fibrillation, pulmonary hypertension, chronic kidney disease with stage IIIb, and PVCs.    02/2024 I have independently interpreted and reviewed echocardiogram's actual images with patient which showed normal left ventricular systolic function. No wall motion abnormality.  RVSP of 38 mmHg.  TAVR valve is functioning properly.    I have independently interpreted and reviewed blood tests results with patient in clinic which show NT pro BNP of 2257, K of 4.1. GFR of 37.    Patient still gets winded with daily living activities and exertion. No symptoms at rest.    I have personally interpreted EKG today with patient, there is no evidence of acute coronary syndrome, no evidence of prior infarct, normal LA and QT interval, no significant conduction disease. Sinus rhythm.    Patient's daughter did not confirm the history of stroke or TIA.    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    Breath shortness 12/04/2023    uses oxygen at night, during day sometimes. 8/23/23-SOB with activity.    Cancer (HCC)     cervical cancer 1968    Cancer ([facility]) 12/04/2023    lower left lobe cancer    Cervical cancer (HCC) 1968    [id number] chemo or radiation.    Chronic constipation     takes miralax    Chronic kidney disease (CKD)     stage 3    Chronic obstructive pulmonary disease ([facility]) 06/22/2022    Chronic pain     follows with Dr Lozoya    Congestive heart failure ([facility])     follows with Dr Salvador with [facility]    COPD (chronic obstructive pulmonary disease) ([facility])     Dental disorder 12/04/2023    upper denture and permanent bridge on lower front teeth    Dizziness 12/29/2022    Foot drop, right foot 12/04/2023    GERD (gastroesophageal reflux disease)     H/O Clostridium difficile infection 05/12/2022    Hammer toes of both feet 12/29/2022    Heart burn 12/04/2023    taking pantoprazole.    Heart murmur     High cholesterol 12/04/2023    Controled with medication    Hypertension 12/04/2023    medicated    Hypotension due to hypovolemia 08/17/2022    Hypoxia 03/29/2022    Incomplete defecation 03/29/2022    Infectious disease 04/25/2023    I was just informed that the person who drove me to my doctor appointment and home on the 25th of this month was diagnosed with the flu on the 26th of this month. I had my flu shot and after one day I have no symptoms will keep checking !    Insomnia due to medical condition 10/13/2022    Irregular heart rate 03/29/2022    states has had for years, why atenolol    Kidney stones     Moderate aortic stenosis     Moderate mitral insufficiency     Oxygen dependent     uses oxygen 4 liters nasal cannula at night, sometimes during the day    Pain 12/04/2023    pain everywhere, fibromyalgia    Pain in joint involving multiple sites 10/13/2022    Pain in joint involving multiple sites 10/13/2022    Pelvic floor dysfunction 03/29/2022     Primary hypertension 03/29/2022    Renal insufficiency 08/03/2022    due to meds    Seasonal allergies     Skin lesions 05/12/2022    Solitary pulmonary nodule     had radiation treatment approx 2021 and does follow up scans.    Spinal stenosis     pain pump for pain control    Spinal stenosis of lumbar region without neurogenic claudication 03/29/2022    Urge incontinence of urine 12/04/2023    urinary retention, diapers    Urinary bladder disorder Past [id number]    I've been under treatment for years with no results I am seeing a new urologist on May 2     Past Surgical History:   Procedure Laterality Date    TRANSCATHETER AORTIC VALVE REPLACEMENT  1/8/2024    Procedure: TRANSCATHETER AORTIC VALVE REPLACEMENT;  Surgeon: Clayton Eddy M.D.;  Location: [facility];  Service: Cardiac    ECHOCARDIOGRAM, TRANSESOPHAGEAL, INTRAOPERATIVE  1/8/2024    Procedure: ECHOCARDIOGRAM, TRANSESOPHAGEAL, INTRAOPERATIVE;  Surgeon: Clayton Eddy M.D.;  Location: [facility];  Service: Cardiac    OTHER Bilateral 12/04/2023    IOLOU    [place] TOTAL HIP ARTHROPLASTY Left 08/28/2023    Procedure: LEFT TOTAL HIP ARTHROPLASTY;  Surgeon: Daryl Perrin M.D.;  Location: [facility];  Service: Orthopedics    PUMP REVISION Right 05/04/2023    Procedure: INTRATHECAL PAIN PUMP REVISION;  Surgeon: Tavo Lozoya M.D.;  Location: [facility];  Service: Pain Management    PUMP INSERT/REMOVE Right 10/07/2022    Procedure: MEDTRONIC PUMP REPLACEMENT;  Surgeon: Joel Alanis M.D.;  Location: [facility];  Service: Pain Management    [place] INS/RPLC SPI NPG/RCVR POCKET N/A 02/05/2021    Procedure: IMPLANT PUMP AND CATHETER;  Surgeon: Joel Alanis M.D.;  Location: [facility];  Service: Pain Management    HIP ARTHROPLASTY TOTAL Right 1998    APPENDECTOMY      CARPAL TUNNEL ENDOSCOPIC Bilateral     bilat    CHOLECYSTECTOMY      COLONOSCOPY      HYSTERECTOMY LAPAROSCOPY      total     LITHOTRIPSY      kidney stones removed    PRIMARY C SECTION      c section    TONSILLECTOMY       Family History   Problem Relation Age of Onset    Heart Disease Mother         Not from congestive heart failure!     Social History     Socioeconomic History    Marital status:      Spouse name: Not on file    Number of children: Not on file    Years of education: Not on file    Highest education level: Not on file   Occupational History    Occupation: retired book keeper   Tobacco Use    Smoking status: Former     Current packs/day: 0.00     Average packs/day: 1 pack/day for 61.0 years (61.0 ttl pk-yrs)     Types: Cigarettes, Electronic Cigarettes     Start date: 1954     Quit date: 2015     Years since quittin.2    Smokeless tobacco: Never    Tobacco comments:     Smoked as a teenager quit as a senior citizen   Vaping Use    Vaping Use: Former    Substances: Flavoring   Substance and Sexual Activity    Alcohol use: Not Currently    Drug use: Not Currently     Types: Inhaled    Sexual activity: Not on file   Other Topics Concern    Not on file   Social History Narrative    Not on file     Social Determinants of Health     Financial Resource Strain: Low Risk  (2022)    Overall Financial Resource Strain (CARDIA)     Difficulty of Paying Living Expenses: Not very hard   Food Insecurity: No Food Insecurity (2022)    Hunger Vital Sign     Worried About Running Out of Food in the Last Year: Never true     Ran Out of Food in the Last Year: Never true   Transportation Needs: Unmet Transportation Needs (2022)    PRAPARE - Transportation     Lack of Transportation (Medical): Yes     Lack of Transportation (Non-Medical): No   Physical Activity: Inactive (2022)    Exercise Vital Sign     Days of Exercise per Week: 0 days     Minutes of Exercise per Session: 0 min   Stress: Not on file   Social Connections: Not on file   Intimate Partner Violence: Not on file   Housing Stability: Unknown  (7/22/2022)    Housing Stability Vital Sign     Unable to Pay for Housing in the Last Year: No     Number of Places Lived in the Last Year: Not on file     Unstable Housing in the Last Year: No     Allergies   Allergen Reactions    Sulfa Drugs Unspecified     Stomach ache    Nauseated, diarrhea    Other reaction(s): Unspecified   Stomach ache    Erythromycin Nausea     Other Reaction(s): GI Upset    Other reaction(s): Not available     Outpatient Encounter Medications as of 3/19/2024   Medication Sig Dispense Refill    Empagliflozin (JARDIANCE) 10 MG Tab tablet Take 1 Tablet by mouth every day. 90 Tablet 4    magnesium oxide 400 (240 Mg) MG Tab Take 1 Tablet by mouth 2 times a day. 30 Tablet 0    furosemide (LASIX) 20 MG Tab Take 1 Tablet by mouth 2 times a day. 30 Tablet 0    HYDROcodone-acetaminophen (NORCO) 5-325 MG Tab per tablet Take 1 Tablet by mouth every 6 hours as needed. Indications: Pain      magnesium oxide (MAG-OX) 400 MG Tab tablet Take 400 mg by mouth every day.      meloxicam (MOBIC) 7.5 MG Tab TAKE 1 TABLET BY MOUTH TWICE A DAY 30 Tablet 1    bisoprolol (ZEBETA) 5 MG Tab Take 1 Tablet by mouth every day. (Patient taking differently: Take 5 mg by mouth every evening.) 100 Tablet 3    aspirin 81 MG EC tablet Take 1 Tablet by mouth every day. 100 Tablet 3    CRANBERRY PO Take 1 Tablet by mouth every evening.      Probiotic Product (PROBIOTIC DAILY PO) Take 1 Tablet by mouth every evening.      traMADol (ULTRAM) 50 MG Tab Take 50 mg by mouth 2 times a day.      atorvastatin (LIPITOR) 40 MG Tab Take 40 mg by mouth every evening.      pantoprazole (PROTONIX) 40 MG Tablet Delayed Response Take 40 mg by mouth every morning.      polyethylene glycol 3350 (MIRALAX) 17 GM/SCOOP Powder Take 17 g by mouth every evening.      Melatonin 10 MG Tab Take 10 mg by mouth every evening. Indications: Trouble Sleeping      acetaminophen (TYLENOL) 500 MG Tab Take 1,000 mg by mouth every 6 hours as needed. Indications:  Fever, Pain      Pain Pump (PATIENT SUPPLIED) Device Inject  as directed continuous. Patient's Pain Pump (placed and maintained as an outpatient)    Medications/concentrations:   HYDROMORPHONE 4.0mg/ml      Last changed: 2/14/2024  Refill pump before date: 5/14/2024    Continuous infusion rates (Drug/Rate):   HYDROMORPHONE 0.7491mg/day (0.0312mg/hr)    MD office:  Dr. Lozoya's  Phone number: [phone]          Indications: severe pain      DULoxetine (CYMBALTA) 20 MG Cap DR Particles Take 20 mg by mouth 2 times a day. Indications: Musculoskeletal Pain      guaiFENesin ER (MUCINEX) 600 MG TABLET SR 12 HR Take 600 mg by mouth every 12 hours. Indications: Cough      Cyanocobalamin (VITAMIN B-12 PO) Take 1 Tablet by mouth see administration instructions. Pt takes sporidially   Indications: supplement      Cholecalciferol (VITAMIN D3 PO) Take 1 Tablet by mouth every day. Indications: supplement      albuterol 108 (90 Base) MCG/ACT Aero Soln inhalation aerosol Inhale 2 Puffs every 6 hours as needed for Shortness of Breath. Indications: Chronic Obstructive Lung Disease      Simethicone (GAS-X PO) Take 1 Tablet by mouth 3 times a day. Indications: gas      [DISCONTINUED] amoxicillin-clavulanate (AUGMENTIN) 875-125 MG Tab Take 1 Tablet by mouth 2 times a day. 20 Tablet 0    [DISCONTINUED] diltiazem (CARDIZEM CD) 120 MG XR capsule Take 120 mg by mouth every day.      [DISCONTINUED] losartan (COZAAR) 25 MG Tab Take 25 mg by mouth every evening.      [DISCONTINUED] HYDROcodone/acetaminophen (NORCO)  MG Tab Indications: Pain      [DISCONTINUED] spironolactone (ALDACTONE) 25 MG Tab Take 25 mg by mouth every day. Indications: Edema, CHF       No facility-administered encounter medications on file as of 3/19/2024.     Review of Systems   Constitutional:  Negative for diaphoresis and fever.   HENT:  Negative for nosebleeds.    Eyes:  Negative for blurred vision and double vision.   Respiratory:  Positive for shortness of  "breath. Negative for cough.    Cardiovascular:  Negative for chest pain and palpitations.   Gastrointestinal:  Negative for abdominal pain.   Genitourinary:  Negative for dysuria and frequency.   Musculoskeletal:  Negative for falls and myalgias.   Skin:  Negative for rash.   Neurological:  Positive for dizziness. Negative for sensory change and headaches.   Endo/Heme/Allergies:  Does not bruise/bleed easily.   Psychiatric/Behavioral:  Negative for depression and memory loss.               Objective     /60 (BP Location: Left arm, Patient Position: Sitting, BP Cuff Size: Adult)   Pulse 63   Resp 18   Ht 1.702 m (5' 7\")   Wt 67.6 kg (149 lb)   SpO2 94%   BMI 23.34 kg/m²     Physical Exam  Vitals and nursing note reviewed.   Constitutional:       General: She is not in acute distress.     Appearance: She is not diaphoretic.   HENT:      Head: Normocephalic and atraumatic.      Right Ear: External ear normal.      Left Ear: External ear normal.      Nose: No congestion or rhinorrhea.   Eyes:      General:         Right eye: No discharge.         Left eye: No discharge.   Neck:      Thyroid: No thyromegaly.      Vascular: No JVD.   Cardiovascular:      Rate and Rhythm: Normal rate and regular rhythm.      Pulses: Normal pulses.   Pulmonary:      Effort: No respiratory distress.   Abdominal:      General: There is no distension.      Tenderness: There is no abdominal tenderness.   Musculoskeletal:         General: No swelling or tenderness.      Right lower leg: Edema present.      Left lower leg: Edema present.   Skin:     General: Skin is warm and dry.   Neurological:      Mental Status: She is alert and oriented to person, place, and time.      Cranial Nerves: No cranial nerve deficit.   Psychiatric:         Behavior: Behavior normal.                Assessment & Plan     1. ACC/AHA stage C heart failure with preserved ejection fraction (HCC)  Empagliflozin (JARDIANCE) 10 MG Tab tablet    REFERRAL TO " CARDIOLOGY - HEART FAILURE NURSING EDUCATION      2. Left ventricular diastolic dysfunction, NYHA class 3  Empagliflozin (JARDIANCE) 10 MG Tab tablet    REFERRAL TO CARDIOLOGY - HEART FAILURE NURSING EDUCATION      3. PAF (paroxysmal atrial fibrillation) ([facility])  EKG      4. Stage 3b chronic kidney disease ([facility])        5. PVC's (premature ventricular contractions)        6. Pulmonary HTN ([facility])        7. S/P TAVR (transcatheter aortic valve replacement)        8. Chronic obstructive pulmonary disease with acute lower respiratory infection ([facility])        9. Primary hypertension        10. Dyspnea on exertion  Empagliflozin (JARDIANCE) 10 MG Tab tablet    REFERRAL TO CARDIOLOGY - HEART FAILURE NURSING EDUCATION    Basic Metabolic Panel    proBrain Natriuretic Peptide, NT      11. High risk medication use            Medical Decision Making: Today's Assessment/Status/Plan:   Today, based on physical examination findings, patient is euvolemic. No JVD, lungs are clear to auscultation, no pitting edema in bilateral lower extremities, no ascites.    Dry weight is 149 lbs.    Continue Furosamide 40 mg daily.    Due to reduced GFR and lightheadedness, will stop Losartan, Spironolactone.    Based on recent data on SGLT2 and heart failure with preserved ejection fraction, patient will be benefited from Jardiance 10 mg p.o. once a day for further reduction in mortality and hospitalization with absolute risk reduction of 3.8%.  Therefore, I will start patient on Jardiance 10 mg p.o. once a day.  Risks and benefits were explained to patient and patient has agreed to proceed.    Not a good candidate for Cardiomems.    No anticoagulation due to high risk of bleeding. Continue ASA 81 mg daily.    This visit encounter signifies the visit complexity inherent to evaluation and management associated with medical care services that serve as the continuing focal point for all needed health care services and/or with medical care services that  are part of ongoing care related to this patient's single, serious condition, complex cardiac condition.           Gemini Hathaway M.D.

## 2024-03-26 ENCOUNTER — TELEPHONE (OUTPATIENT)
Dept: HEALTH INFORMATION MANAGEMENT | Facility: OTHER | Age: 89
End: 2024-03-26
Payer: MEDICARE

## 2024-04-01 ENCOUNTER — PATIENT OUTREACH (OUTPATIENT)
Dept: HEALTH INFORMATION MANAGEMENT | Facility: OTHER | Age: 89
End: 2024-04-01
Payer: MEDICARE

## 2024-04-01 DIAGNOSIS — I50.33 ACUTE ON CHRONIC HEART FAILURE WITH PRESERVED EJECTION FRACTION (HCC): ICD-10-CM

## 2024-04-01 NOTE — PROGRESS NOTES
Nurse CM outreach call to pt for monthly CCM assessment.  Pt answered telephone.    Assessment    Pt reports she is doing about the same. Reports home care nurse from [facility] continues to make visits. Pt reports her legs are still swollen. States she did have follow-up with cardiology and is taking furosemide as directed. Pt reports she doesn't monitor daily weights at home. States she is afraid to stand on a scale because of balance issues and falls. Pt reports she monitors her weight when she has a provider visit. Pt reports she hasn't been sleeping well. States she will discuss with PCP at upcoming visit. Pt states she thought she had an appt on 4/9. Nurse assisted pt with scheduling appt.     Education    Fall precautions, continue to follow closely with specialists. Follow low sodium diet.     Plan of Care and Goals    Reviewed    Barriers:    Chronic health conditions    Progress:    Continue to work on progress    Next outreach:  One month  Nurse Care Coordinator:  Radha Martinez  [phone]

## 2024-04-09 ENCOUNTER — APPOINTMENT (OUTPATIENT)
Dept: MEDICAL GROUP | Facility: PHYSICIAN GROUP | Age: 89
End: 2024-04-09
Payer: MEDICARE

## 2024-04-09 ENCOUNTER — TELEMEDICINE (OUTPATIENT)
Dept: MEDICAL GROUP | Facility: PHYSICIAN GROUP | Age: 89
End: 2024-04-09
Payer: MEDICARE

## 2024-04-09 VITALS
HEIGHT: 67 IN | SYSTOLIC BLOOD PRESSURE: 142 MMHG | BODY MASS INDEX: 23.54 KG/M2 | HEART RATE: 57 BPM | WEIGHT: 150 LBS | TEMPERATURE: 98 F | DIASTOLIC BLOOD PRESSURE: 70 MMHG

## 2024-04-09 DIAGNOSIS — M48.061 SPINAL STENOSIS OF LUMBAR REGION WITHOUT NEUROGENIC CLAUDICATION: ICD-10-CM

## 2024-04-09 DIAGNOSIS — I50.33 ACUTE ON CHRONIC HEART FAILURE WITH PRESERVED EJECTION FRACTION (HCC): ICD-10-CM

## 2024-04-09 DIAGNOSIS — R21 RASH OF VULVA: ICD-10-CM

## 2024-04-09 PROCEDURE — 99214 OFFICE O/P EST MOD 30 MIN: CPT | Mod: 95 | Performed by: INTERNAL MEDICINE

## 2024-04-09 ASSESSMENT — FIBROSIS 4 INDEX: FIB4 SCORE: 3.5

## 2024-04-10 NOTE — PROGRESS NOTES
Virtual Visit: Established Patient   This visit was conducted via Zoom using secure and encrypted videoconferencing technology.   The patient was in their home in the state of [place].    The patient's identity was confirmed and verbal consent was obtained for this virtual visit.     Subjective:   CC:   Chief Complaint   Patient presents with    Follow-Up     Rash on genitals for the past week, pt states its gotten worse.        Chelly Decker is a 88 y.o. female presenting for evaluation and management of:    1. Rash of vulva  Patient has complaints of a red rash around her genital area all the way back to her penis.  She has been using wet wipes as she has many episodes of urinary and fecal incontinence.  She has been using A&D ointment but over-the-counter cortisone 10 cream has worked the best.    2. Spinal stenosis of lumbar region without neurogenic claudication  2/6/24:Patient is followed by Dr. Lozoya her pain management physician. She continues with Dilaudid pain pump. She is also requiring Ultram 50 mg twice daily, meloxicam 7.5 mg daily along with hydrocodone 5/325 mg for breakthrough pain or prior to physical therapy. Patient is due for refill on her Dilaudid pain pump on February 14. Status post recent cortisone injection for sciatica .    4/9/2024:.  Patient canceled her epidural injection with pain management due to her rash.     3. Acute on chronic heart failure with preserved ejection fraction (HCC)  Patient with PMH of atrial fibrillation, CHF is followed by cardiology.  Most recently seen in March.  At that visit, patient is instructed to continue Lasix 40 mg daily.  Discontinue losartan and spironolactone due to decreased GFR levels.  Patient was also started on Jardiance for cardioprotection.  Patient states that her lower extremity edema has improved slightly.  She is elevating and using Ace wrap to help with edema.      ROS   Positive ROS per HPI.  Patient denies all other cardiopulmonary,  GI, neurologic symptoms.    Current medicines (including changes today)  Current Outpatient Medications   Medication Sig Dispense Refill    Empagliflozin (JARDIANCE) 10 MG Tab tablet Take 1 Tablet by mouth every day. 90 Tablet 4    magnesium oxide 400 (240 Mg) MG Tab Take 1 Tablet by mouth 2 times a day. 30 Tablet 0    furosemide (LASIX) 20 MG Tab Take 1 Tablet by mouth 2 times a day. 30 Tablet 0    HYDROcodone-acetaminophen (NORCO) 5-325 MG Tab per tablet Take 1 Tablet by mouth every 6 hours as needed. Indications: Pain      magnesium oxide (MAG-OX) 400 MG Tab tablet Take 400 mg by mouth every day.      meloxicam (MOBIC) 7.5 MG Tab TAKE 1 TABLET BY MOUTH TWICE A DAY 30 Tablet 1    bisoprolol (ZEBETA) 5 MG Tab Take 1 Tablet by mouth every day. (Patient taking differently: Take 5 mg by mouth every evening.) 100 Tablet 3    aspirin 81 MG EC tablet Take 1 Tablet by mouth every day. 100 Tablet 3    CRANBERRY PO Take 1 Tablet by mouth every evening.      Probiotic Product (PROBIOTIC DAILY PO) Take 1 Tablet by mouth every evening.      traMADol (ULTRAM) 50 MG Tab Take 50 mg by mouth 2 times a day.      atorvastatin (LIPITOR) 40 MG Tab Take 40 mg by mouth every evening.      pantoprazole (PROTONIX) 40 MG Tablet Delayed Response Take 40 mg by mouth every morning.      polyethylene glycol 3350 (MIRALAX) 17 GM/SCOOP Powder Take 17 g by mouth every evening.      Melatonin 10 MG Tab Take 10 mg by mouth every evening. Indications: Trouble Sleeping      acetaminophen (TYLENOL) 500 MG Tab Take 1,000 mg by mouth every 6 hours as needed. Indications: Fever, Pain      Pain Pump (PATIENT SUPPLIED) Device Inject  as directed continuous. Patient's Pain Pump (placed and maintained as an outpatient)    Medications/concentrations:   HYDROMORPHONE 4.0mg/ml      Last changed: 2/14/2024  Refill pump before date: 5/14/2024    Continuous infusion rates (Drug/Rate):   HYDROMORPHONE 0.7491mg/day (0.0312mg/hr)    MD office:  Dr. Lozoya's  Phone  number: [phone]          Indications: severe pain      DULoxetine (CYMBALTA) 20 MG Cap DR Particles Take 20 mg by mouth 2 times a day. Indications: Musculoskeletal Pain      guaiFENesin ER (MUCINEX) 600 MG TABLET SR 12 HR Take 600 mg by mouth every 12 hours. Indications: Cough      Cyanocobalamin (VITAMIN B-12 PO) Take 1 Tablet by mouth see administration instructions. Pt takes sporidially   Indications: supplement      Cholecalciferol (VITAMIN D3 PO) Take 1 Tablet by mouth every day. Indications: supplement      albuterol 108 (90 Base) MCG/ACT Aero Soln inhalation aerosol Inhale 2 Puffs every 6 hours as needed for Shortness of Breath. Indications: Chronic Obstructive Lung Disease      Simethicone (GAS-X PO) Take 1 Tablet by mouth 3 times a day. Indications: gas       No current facility-administered medications for this visit.       Patient Active Problem List    Diagnosis Date Noted    Dyslipidemia 02/28/2024    Normocytic anemia 02/28/2024    Elevated troponin 02/28/2024    Cellulitis of right lower extremity 02/28/2024    Left lower quadrant abdominal pain 02/28/2024    S/P TAVR (transcatheter aortic valve replacement) 01/08/2024    Stage 3b chronic kidney disease 11/07/2023    PAF (paroxysmal atrial fibrillation) ([facility]) 10/31/2023    Pulmonary HTN ([facility]) 10/31/2023    Physical debility 06/07/2023    Prediabetes 05/08/2023    External hemorrhoid 05/08/2023    TIA (transient ischemic attack) 05/07/2023    Chronic hypoxemic respiratory failure ([facility]) 05/06/2023    S/P insertion of intrathecal pump 05/05/2023    Hammer toes of both feet 12/29/2022    Dizziness 12/29/2022    Osteoarthritis of left hip 11/30/2022    Pain of left hip joint 11/30/2022    Trochanteric bursitis of left hip 11/30/2022    Insomnia due to medical condition 10/13/2022    Avascular necrosis of bone of left hip ([facility]) 08/17/2022    Lower extremity edema 08/03/2022    Chronic obstructive pulmonary disease ([facility]) 06/22/2022    Solitary pulmonary  "nodule 06/22/2022    Acute on chronic heart failure with preserved ejection fraction (HCC) 06/22/2022    Bilateral carotid bruits 06/06/2022    Chronic pain syndrome 06/06/2022    PVC's (premature ventricular contractions) 06/06/2022    Moderate mitral insufficiency 06/06/2022    Urge incontinence of urine 05/12/2022    H/O Clostridium difficile infection 05/12/2022    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  Constitutional: Alert, no distress, well-groomed.  Skin: No rashes in visible areas.  Eye: Round. Conjunctiva clear, lids normal. No icterus.   ENMT: Lips pink without lesions, good dentition, moist mucous membranes. Phonation normal.  Neck: No masses, no thyromegaly. Moves freely without pain.  Respiratory: Unlabored respiratory effort, no cough or audible wheeze  Psych: Alert and oriented x3, normal affect and mood.     Assessment and Plan:   The following treatment plan was discussed:     1. Rash of vulva  At this time.  Patient is stable and recommend to continue cortisone 10 over-the-counter cream as this has improved her symptoms and slowly resolving.  Continue to monitor.    2. Spinal stenosis of lumbar region without neurogenic claudication  Stable.  Continue current plan of care.  Patient will reschedule epidural injection with pain management.    3. Acute on chronic heart failure with preserved ejection fraction " (HCC)  Chronic.  Stable.  Reviewed medications with patients with instruction.  Cardiology notes reviewed with patient.    My total time spent caring for the patient on the day of the encounter was 32 minutes.   This does not include time spent on separately billable procedures/tests.        Follow-up: No follow-ups on file.

## 2024-04-11 ENCOUNTER — TELEPHONE (OUTPATIENT)
Dept: HEALTH INFORMATION MANAGEMENT | Facility: OTHER | Age: 89
End: 2024-04-11
Payer: MEDICARE

## 2024-04-18 ENCOUNTER — DOCUMENTATION (OUTPATIENT)
Dept: HEALTH INFORMATION MANAGEMENT | Facility: OTHER | Age: 89
End: 2024-04-18
Payer: MEDICARE

## 2024-04-19 ENCOUNTER — TELEPHONE (OUTPATIENT)
Dept: NEUROLOGY | Facility: MEDICAL CENTER | Age: 89
End: 2024-04-19
Payer: MEDICARE

## 2024-04-22 ENCOUNTER — OFFICE VISIT (OUTPATIENT)
Dept: NEUROLOGY | Facility: MEDICAL CENTER | Age: 89
End: 2024-04-22
Attending: SPECIALIST
Payer: MEDICARE

## 2024-04-22 VITALS
RESPIRATION RATE: 20 BRPM | BODY MASS INDEX: 25.36 KG/M2 | OXYGEN SATURATION: 93 % | HEIGHT: 67 IN | HEART RATE: 70 BPM | SYSTOLIC BLOOD PRESSURE: 140 MMHG | TEMPERATURE: 97.8 F | DIASTOLIC BLOOD PRESSURE: 60 MMHG | WEIGHT: 161.6 LBS

## 2024-04-22 DIAGNOSIS — G62.9 NEUROPATHY: ICD-10-CM

## 2024-04-22 PROCEDURE — 99204 OFFICE O/P NEW MOD 45 MIN: CPT | Performed by: SPECIALIST

## 2024-04-22 PROCEDURE — 3078F DIAST BP <80 MM HG: CPT | Performed by: SPECIALIST

## 2024-04-22 PROCEDURE — 99212 OFFICE O/P EST SF 10 MIN: CPT | Performed by: SPECIALIST

## 2024-04-22 PROCEDURE — 3077F SYST BP >= 140 MM HG: CPT | Performed by: SPECIALIST

## 2024-04-22 ASSESSMENT — FIBROSIS 4 INDEX: FIB4 SCORE: 3.5

## 2024-04-22 NOTE — PROGRESS NOTES
Subjective     Chelly Decker is a 88 y.o. female who presents with New Patient ( Right foot drop)            HPI  Mrs. Chelly Decker is an 88-year-old woman referred by Dr. Carrillo for evaluation of right foot drop.  She was admitted to [facility] from August 28 to August 30 of last year and underwent a left total hip arthroplasty.  Following that she was transferred to skilled care and remaining skilled care through October 31.  The patient was seen in follow-up on 11 7 by Dr. Felipe and was noted to have a right foot drop at that time.  It was that physician's impression that the foot drop was acute but the patient states that it developed gradually and she noted that when she was in skilled care but it was worse when she was out of the hospital.  It is not improved since that time.  He also has numbness in the top and outside of the right foot.  Of note she has a history of chronic back pain and has had MRI scans at [facility] which my office is attempting to locate.  She has a pain pump and is on chronic narcotics.     The patient had an episode of transiently being unable to speak on 5/7/2023.  She had a brain MRI scan that did not reveal an acute stroke.  CT angiogram of the head and neck were both unremarkable her echocardiogram at that time revealed aortic stenosis..    Past medical history:    1.  Status post TAVR    2.  Status post bilateral total hip arthroplasty    3.  Status post appendectomy, hysterectomy, lithotripsy, and bilateral carpal tunnel release.    4.  Status post placement of [employer] pain pump 10/7/2022.    5.  Status post tonsillectomy    6.  History of COPD, oxygen dependent.    7.   Right foot drop    Medications-aspirin 81 mg daily, Cymbalta 20 mg daily, Jardiance 10 mg daily, Lasix 20 mg daily, tramadol 50 mg as needed    Allergies-sulfa drugs and erythromycin    Social history-she is an ex-smoker and does not drink    Family history-mother had a history of heart  "disease      ROS  As above, she has chronic back pain, bilateral hip pain and foot drop on the right.         Objective     BP (!) 140/60 (BP Location: Right arm, Patient Position: Sitting, BP Cuff Size: Adult)   Pulse 70   Temp 36.6 °C (97.8 °F) (Temporal)   Resp 20   Ht 1.702 m (5' 7\")   Wt 73.3 kg (161 lb 9.6 oz)   SpO2 93%   BMI 25.31 kg/m²      Physical Exam  Patient is a cooperative woman who is alert.  Her speech is fluent and mental status is grossly intact.  She presented a coherent history.  She is in a wheelchair and has oxygen in place.    HEENT exam reveals her to be normocephalic.  Pupils are equal round reactive.  EOMs are full visual fields are full.    Her neck is supple.        Neurological Exam  She was not ambulated.  She has an Ace wrap around her right ankle and has moderate pitting edema on both legs.  She has no drift and no dysmetria.    Motor testing revealed 0 of 5 right tibialis anterior and toe extensors.  She had prominent weakness in the foot even further on the right but tended to invert her foot better.  Other strength appeared intact.    Sensory testing revealed decreased pin sensation over the lateral right foot.  He had absent vibratory sense in the right foot.    Reflexes are 1+ at the arms 2+ at the knees absent at the ankles and she had downgoing toes    Cranial nerves are unremarkable.    She was nontender over the right peroneal nerve.           Assessment & Plan        1. Neuropathy  Mrs. Chelly Decker is an 88-year-old woman who had a prolonged hospitalization last year after hip replacement on the left and was in skilled care for until the end of October.  She was noted to have a foot drop on a follow-up visit in November but the patient states that it developed gradually over a period of time and she feels that she had it while she was in skilled care.  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  She will have nerve conduction studies if possible.  " She is advised not to put pressure over the nerve.  I will see her back for follow-up when she has the nerve conduction studies.  - Referral to Neurodiagnostics (EEG,EP,EMG/NCS/DBS)

## 2024-04-24 ENCOUNTER — PATIENT OUTREACH (OUTPATIENT)
Dept: HEALTH INFORMATION MANAGEMENT | Facility: OTHER | Age: 89
End: 2024-04-24
Payer: MEDICARE

## 2024-04-24 DIAGNOSIS — J44.0 CHRONIC OBSTRUCTIVE PULMONARY DISEASE WITH ACUTE LOWER RESPIRATORY INFECTION (HCC): ICD-10-CM

## 2024-04-24 DIAGNOSIS — I50.33 ACUTE ON CHRONIC HEART FAILURE WITH PRESERVED EJECTION FRACTION (HCC): ICD-10-CM

## 2024-04-24 NOTE — PROGRESS NOTES
Nurse CM outreach call for follow-up. MA requested nurse CM contact pt for follow-up on [employer] message.    Assessment    Pt reports she has been having burning and pain around her rectum and vaginal area. States she feels like public area is reddened and swollen. Denies any vaginal discharge. Reports she does have external burning with urination. Pt reports she is also concerned about ongoing problem with cellulitis in her right leg. States leg is swollen and reddened in calf area. Pt reports home care nurse is no longer making visits. States she is receiving physical therapy at home. Nurse recommended pt go to urgent care for evaluation of vaginal symptoms and concern with right leg being red and swollen. Pt states she will go to urgent care for evaluation.     Education    Go to urgent care for evaluation of symptoms    Plan of Care and Goals    Reviewed/Nurse will continue to follow in CCM program    Barriers:    Chronic health conditions    Progress:    Pt currently not making progress towards goals related to multiple health problems. Pt has been enrolled in CCM program since 7/2022. Risk score currently at 9.     Next outreach: One month  Nurse Care Coordinator:  Radha Martinez  [phone]

## 2024-04-30 ENCOUNTER — PATIENT MESSAGE (OUTPATIENT)
Dept: HEALTH INFORMATION MANAGEMENT | Facility: OTHER | Age: 89
End: 2024-04-30

## 2024-04-30 ENCOUNTER — PATIENT OUTREACH (OUTPATIENT)
Dept: HEALTH INFORMATION MANAGEMENT | Facility: OTHER | Age: 89
End: 2024-04-30
Payer: MEDICARE

## 2024-04-30 DIAGNOSIS — J44.0 CHRONIC OBSTRUCTIVE PULMONARY DISEASE WITH ACUTE LOWER RESPIRATORY INFECTION (HCC): ICD-10-CM

## 2024-04-30 DIAGNOSIS — I50.33 ACUTE ON CHRONIC HEART FAILURE WITH PRESERVED EJECTION FRACTION (HCC): ICD-10-CM

## 2024-04-30 NOTE — PROGRESS NOTES
Nurse CM outreach call to pt.  PCP recommends pt go to urgent care for evaluation of pt complaints of cellulitis and rash. Nurse left pt VM requesting return call to nurse at [phone]. Nurse sent pt [employer] message.

## 2024-05-01 ENCOUNTER — TELEPHONE (OUTPATIENT)
Dept: CARDIOLOGY | Facility: MEDICAL CENTER | Age: 89
End: 2024-05-01
Payer: MEDICARE

## 2024-05-01 ENCOUNTER — PATIENT OUTREACH (OUTPATIENT)
Dept: HEALTH INFORMATION MANAGEMENT | Facility: OTHER | Age: 89
End: 2024-05-01
Payer: MEDICARE

## 2024-05-01 DIAGNOSIS — J44.0 CHRONIC OBSTRUCTIVE PULMONARY DISEASE WITH ACUTE LOWER RESPIRATORY INFECTION (HCC): ICD-10-CM

## 2024-05-01 DIAGNOSIS — I50.33 ACUTE ON CHRONIC HEART FAILURE WITH PRESERVED EJECTION FRACTION (HCC): ICD-10-CM

## 2024-05-01 NOTE — PROGRESS NOTES
Nurse  outreach call for follow-up. Pt answered telephone. Pt states she can't talk right now.  Pt reports she is feeling fine.  Pt states her physical therapist is coming to her house today. Pt disconnected call from nurse.

## 2024-05-01 NOTE — TELEPHONE ENCOUNTER
Call placed to patient as a reminder for blood work to be completed. Per patient she will need to reschedule appt but will call late to do so.

## 2024-05-02 ENCOUNTER — OFFICE VISIT (OUTPATIENT)
Dept: URGENT CARE | Facility: CLINIC | Age: 89
End: 2024-05-02
Payer: MEDICARE

## 2024-05-02 VITALS
DIASTOLIC BLOOD PRESSURE: 48 MMHG | OXYGEN SATURATION: 95 % | HEART RATE: 77 BPM | WEIGHT: 161 LBS | SYSTOLIC BLOOD PRESSURE: 124 MMHG | RESPIRATION RATE: 14 BRPM | BODY MASS INDEX: 25.27 KG/M2 | HEIGHT: 67 IN | TEMPERATURE: 97.7 F

## 2024-05-02 DIAGNOSIS — B37.31 VAGINAL YEAST INFECTION: ICD-10-CM

## 2024-05-02 DIAGNOSIS — L08.9 WOUND INFECTION: ICD-10-CM

## 2024-05-02 DIAGNOSIS — T14.8XXA WOUND INFECTION: ICD-10-CM

## 2024-05-02 PROCEDURE — 99213 OFFICE O/P EST LOW 20 MIN: CPT

## 2024-05-02 PROCEDURE — 3078F DIAST BP <80 MM HG: CPT

## 2024-05-02 PROCEDURE — 3074F SYST BP LT 130 MM HG: CPT

## 2024-05-02 RX ORDER — AMOXICILLIN AND CLAVULANATE POTASSIUM 875; 125 MG/1; MG/1
1 TABLET, FILM COATED ORAL 2 TIMES DAILY
Qty: 10 TABLET | Refills: 0 | Status: SHIPPED | OUTPATIENT
Start: 2024-05-02 | End: 2024-05-07

## 2024-05-02 RX ORDER — FLUCONAZOLE 100 MG/1
100 TABLET ORAL DAILY
Qty: 1 TABLET | Refills: 0 | Status: SHIPPED | OUTPATIENT
Start: 2024-05-02

## 2024-05-02 RX ORDER — NYSTATIN 100000 U/G
1 CREAM TOPICAL 2 TIMES DAILY
Qty: 30 G | Refills: 0 | Status: SHIPPED | OUTPATIENT
Start: 2024-05-02

## 2024-05-02 ASSESSMENT — ENCOUNTER SYMPTOMS
CHILLS: 0
FEVER: 0

## 2024-05-02 ASSESSMENT — FIBROSIS 4 INDEX: FIB4 SCORE: 3.5

## 2024-05-03 NOTE — PROGRESS NOTES
Chief Complaint   Patient presents with    Leg Pain     Cellulitis on legs, swelling, redness, wound on L leg x few months     Vaginal Pain     Rash on genitals x 5 to 6 days        HISTORY OF PRESENT ILLNESS: Patient is a pleasant 88 y.o. female who presents to urgent care today ongoing vaginal rash and itching for the last 5 to 6 days.  Patient wears briefs.  Denies any pain with urination.    Noted secondary wounds to the left lower extremity near the shin with increasing redness and swelling.  Patient is concerned she may have an infection.    Patient Active Problem List    Diagnosis Date Noted    Dyslipidemia 02/28/2024    Normocytic anemia 02/28/2024    Elevated troponin 02/28/2024    Cellulitis of right lower extremity 02/28/2024    Left lower quadrant abdominal pain 02/28/2024    S/P TAVR (transcatheter aortic valve replacement) 01/08/2024    Stage 3b chronic kidney disease 11/07/2023    PAF (paroxysmal atrial fibrillation) (Formerly [facility]) 10/31/2023    Pulmonary HTN (Formerly [facility]) 10/31/2023    Physical debility 06/07/2023    Prediabetes 05/08/2023    External hemorrhoid 05/08/2023    TIA (transient ischemic attack) 05/07/2023    Chronic hypoxemic respiratory failure (Formerly [facility]) 05/06/2023    S/P insertion of intrathecal pump 05/05/2023    Hammer toes of both feet 12/29/2022    Dizziness 12/29/2022    Osteoarthritis of left hip 11/30/2022    Pain of left hip joint 11/30/2022    Trochanteric bursitis of left hip 11/30/2022    Insomnia due to medical condition 10/13/2022    Avascular necrosis of bone of left hip (Formerly [facility]) 08/17/2022    Lower extremity edema 08/03/2022    Chronic obstructive pulmonary disease (Formerly [facility]) 06/22/2022    Solitary pulmonary nodule 06/22/2022    Acute on chronic heart failure with preserved ejection fraction (Formerly [facility]) 06/22/2022    Bilateral carotid bruits 06/06/2022    Chronic pain syndrome 06/06/2022    PVC's (premature ventricular contractions) 06/06/2022    Moderate mitral insufficiency 06/06/2022    Urge  incontinence of urine 05/12/2022    H/O Clostridium difficile infection 05/12/2022    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 1 Tablet 0    nystatin (MYCOSTATIN) 906143 UNIT/GM Cream topical cream Apply 1 g topically 2 times a day. Continue for up to 2 weeks (or less if symptoms resolve). 30 g 0    amoxicillin-clavulanate (AUGMENTIN) 875-125 MG Tab Take 1 Tablet by mouth 2 times a day for 5 days. 10 Tablet 0    Empagliflozin (JARDIANCE) 10 MG Tab tablet Take 1 Tablet by mouth every day. 90 Tablet 4    magnesium oxide 400 (240 Mg) MG Tab Take 1 Tablet by mouth 2 times a day. 30 Tablet 0    furosemide (LASIX) 20 MG Tab Take 1 Tablet by mouth 2 times a day. 30 Tablet 0    meloxicam (MOBIC) 7.5 MG Tab TAKE 1 TABLET BY MOUTH TWICE A DAY 30 Tablet 1    bisoprolol (ZEBETA) 5 MG Tab Take 1 Tablet by mouth every day. (Patient taking differently: Take 5 mg by mouth every evening.) 100 Tablet 3    aspirin 81 MG EC tablet Take 1 Tablet by mouth every day. 100 Tablet 3    CRANBERRY PO Take 1 Tablet by mouth every evening.      Probiotic Product (PROBIOTIC DAILY PO) Take 1 Tablet by mouth every evening.      traMADol (ULTRAM) 50 MG Tab Take 50 mg by mouth 2 times a day.      atorvastatin (LIPITOR) 40 MG Tab Take 40 mg by mouth every evening.      polyethylene glycol 3350 (MIRALAX) 17 GM/SCOOP Powder Take 17 g by mouth every evening.      Melatonin 10 MG Tab Take 10  mg by mouth every evening. Indications: Trouble Sleeping      acetaminophen (TYLENOL) 500 MG Tab Take 1,000 mg by mouth every 6 hours as needed. Indications: Fever, Pain      Pain Pump (PATIENT SUPPLIED) Device Inject  as directed continuous. Patient's Pain Pump (placed and maintained as an outpatient)    Medications/concentrations:   HYDROMORPHONE 4.0mg/ml      Last changed: 2/14/2024  Refill pump before date: 5/14/2024    Continuous infusion rates (Drug/Rate):   HYDROMORPHONE 0.7491mg/day (0.0312mg/hr)    MD office:  Dr. Lozoya's  Phone number: [phone]          Indications: severe pain      DULoxetine (CYMBALTA) 20 MG Cap DR Particles Take 20 mg by mouth 2 times a day. Indications: Musculoskeletal Pain      guaiFENesin ER (MUCINEX) 600 MG TABLET SR 12 HR Take 600 mg by mouth every 12 hours. Indications: Cough      Cyanocobalamin (VITAMIN B-12 PO) Take 1 Tablet by mouth see administration instructions. Pt takes sporidially   Indications: supplement      Cholecalciferol (VITAMIN D3 PO) Take 1 Tablet by mouth every day. Indications: supplement      albuterol 108 (90 Base) MCG/ACT Aero Soln inhalation aerosol Inhale 2 Puffs every 6 hours as needed for Shortness of Breath. Indications: Chronic Obstructive Lung Disease      Simethicone (GAS-X PO) Take 1 Tablet by mouth 3 times a day. Indications: gas       No current Epic-ordered facility-administered medications on file.       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    Breath  shortness 12/04/2023    uses oxygen at night, during day sometimes. 8/23/23-SOB with activity.    Cancer (HCC)     cervical cancer 1968    Cancer ([facility]) 12/04/2023    lower left lobe cancer    Cervical cancer (HCC) 1968    [id number] chemo or radiation.    Chronic constipation     takes miralax    Chronic kidney disease (CKD)     stage 3    Chronic obstructive pulmonary disease ([facility]) 06/22/2022    Chronic pain     follows with Dr Lozoya    Congestive heart failure ([facility])     follows with Dr Salvador with [facility]    COPD (chronic obstructive pulmonary disease) ([facility])     Dental disorder 12/04/2023    upper denture and permanent bridge on lower front teeth    Dizziness 12/29/2022    Foot drop, right foot 12/04/2023    GERD (gastroesophageal reflux disease)     H/O Clostridium difficile infection 05/12/2022    Hammer toes of both feet 12/29/2022    Heart burn 12/04/2023    taking pantoprazole.    Heart murmur     High cholesterol 12/04/2023    Controled with medication    Hypertension 12/04/2023    medicated    Hypotension due to hypovolemia 08/17/2022    Hypoxia 03/29/2022    Incomplete defecation 03/29/2022    Infectious disease 04/25/2023    I was just informed that the person who drove me to my doctor appointment and home on the 25th of this month was diagnosed with the flu on the 26th of this month. I had my flu shot and after one day I have no symptoms will keep checking !    Insomnia due to medical condition 10/13/2022    Irregular heart rate 03/29/2022    states has had for years, why atenolol    Kidney stones     Moderate aortic stenosis     Moderate mitral insufficiency     Oxygen dependent     uses oxygen 4 liters nasal cannula at night, sometimes during the day    Pain 12/04/2023    pain everywhere, fibromyalgia    Pain in joint involving multiple sites 10/13/2022    Pain in joint involving multiple sites 10/13/2022    Pelvic floor dysfunction 03/29/2022    Primary hypertension 03/29/2022  "   Renal insufficiency 2022    due to meds    Seasonal allergies     Skin lesions 2022    Solitary pulmonary nodule     had radiation treatment approx  and does follow up scans.    Spinal stenosis     pain pump for pain control    Spinal stenosis of lumbar region without neurogenic claudication 2022    Urge incontinence of urine 2023    urinary retention, diapers    Urinary bladder disorder Past [id number]    I've been under treatment for years with no results I am seeing a new urologist on May 2       Social History     Tobacco Use    Smoking status: Former     Current packs/day: 0.00     Average packs/day: 1 pack/day for 61.0 years (61.0 ttl pk-yrs)     Types: Cigarettes, Electronic Cigarettes     Start date: 1954     Quit date: 2015     Years since quittin.3    Smokeless tobacco: Never    Tobacco comments:     Smoked as a teenager quit as a senior citizen   Vaping Use    Vaping Use: Former    Substances: Flavoring   Substance Use Topics    Alcohol use: Not Currently    Drug use: Not Currently     Types: Inhaled       Family Status   Relation Name Status    Esvin Decker      2012 (Not Specified)     Family History   Problem Relation Age of Onset    Heart Disease Mother         Not from congestive heart failure!       Review of Systems   Constitutional:  Negative for chills, fever and malaise/fatigue.   Skin:  Positive for itching and rash.        Noted the left shin wound with increasing redness and swelling.       Exam:  /48 (BP Location: Left arm, Patient Position: Sitting, BP Cuff Size: Adult)   Pulse 77   Temp 36.5 °C (97.7 °F) (Temporal)   Resp 14   Ht 1.702 m (5' 7\")   Wt 73 kg (161 lb)   SpO2 95%   Physical Exam  Vitals reviewed.   Constitutional:       General: She is not in acute distress.     Appearance: Normal appearance. She is normal weight. She is not toxic-appearing.   HENT:      Head: Normocephalic.      Right Ear: Tympanic membrane, " ear canal and external ear normal. There is no impacted cerumen.      Left Ear: Tympanic membrane, ear canal and external ear normal. There is no impacted cerumen.      Nose: No nasal tenderness or mucosal edema.      Mouth/Throat:      Mouth: Mucous membranes are moist.      Tonsils: No tonsillar exudate. 1+ on the right. 1+ on the left.   Eyes:      General:         Right eye: No discharge.         Left eye: No discharge.      Extraocular Movements: Extraocular movements intact.      Conjunctiva/sclera: Conjunctivae normal.      Pupils: Pupils are equal, round, and reactive to light.   Cardiovascular:      Rate and Rhythm: Normal rate and regular rhythm.      Pulses: Normal pulses.      Heart sounds: Normal heart sounds. No murmur heard.  Pulmonary:      Effort: Pulmonary effort is normal. No respiratory distress.      Breath sounds: Normal breath sounds.   Musculoskeletal:         General: No swelling, tenderness, deformity or signs of injury. Normal range of motion.      Cervical back: Normal range of motion and neck supple. No tenderness.      Right lower leg: No edema.      Left lower leg: No edema.   Skin:     General: Skin is warm and dry.      Capillary Refill: Capillary refill takes less than 2 seconds.      Findings: Erythema and rash present. No bruising or lesion.      Comments: Noted left shin wound which appears slightly infected, erythremia plus swelling.  No major drainage.  Area is warm.   Neurological:      General: No focal deficit present.      Mental Status: She is alert.      Sensory: No sensory deficit.      Motor: No weakness.      Coordination: Coordination normal.      Gait: Gait normal.   Psychiatric:         Mood and Affect: Mood normal.         Behavior: Behavior normal.         Thought Content: Thought content normal.         Judgment: Judgment normal.             Assessment/Plan:  1. Vaginal yeast infection  - fluconazole (DIFLUCAN) 100 MG Tab; Take 1 Tablet by mouth every day.   Dispense: 1 Tablet; Refill: 0  - nystatin (MYCOSTATIN) 374068 UNIT/GM Cream topical cream; Apply 1 g topically 2 times a day. Continue for up to 2 weeks (or less if symptoms resolve).  Dispense: 30 g; Refill: 0    2. Wound infection  - amoxicillin-clavulanate (AUGMENTIN) 875-125 MG Tab; Take 1 Tablet by mouth 2 times a day for 5 days.  Dispense: 10 Tablet; Refill: 0    Based on physical exam along with review of systems we will provide Diflucan as well as nystatin cream patient advised to change her briefs more frequently.    Noted wound to the left shin, will place patient on Augmentin.  Reviewed plan of care with the patient, she is aware and agreeable at this time, advised take medication with food, drink plenty of fluids.  Patient is allergic to Bactrim.    Supportive care, differential diagnoses, and indications for immediate follow-up discussed with patient.   Pathogenesis of diagnosis discussed including typical length and natural progression.   Instructed to return to clinic or nearest emergency department for any change in condition, further concerns, or worsening of symptoms.  Patient states understanding of the plan of care and discharge instructions.  Instructed to make an appointment, for follow up, with primary care provider.    Please note that this dictation was created using voice recognition software. I have made every reasonable attempt to correct obvious errors, but I expect that there are errors of grammar and possibly content that I did not discover before finalizing the note.      Yaneth BUTLER

## 2024-05-07 ENCOUNTER — APPOINTMENT (OUTPATIENT)
Dept: MEDICAL GROUP | Facility: PHYSICIAN GROUP | Age: 89
End: 2024-05-07
Payer: MEDICARE

## 2024-05-09 ENCOUNTER — APPOINTMENT (OUTPATIENT)
Dept: CARDIOLOGY | Facility: MEDICAL CENTER | Age: 89
End: 2024-05-09
Attending: NURSE PRACTITIONER
Payer: MEDICARE

## 2024-05-09 ENCOUNTER — APPOINTMENT (OUTPATIENT)
Dept: CARDIOLOGY | Facility: MEDICAL CENTER | Age: 89
End: 2024-05-09
Attending: INTERNAL MEDICINE
Payer: MEDICARE

## 2024-05-14 ENCOUNTER — TELEPHONE (OUTPATIENT)
Dept: HEALTH INFORMATION MANAGEMENT | Facility: OTHER | Age: 89
End: 2024-05-14

## 2024-05-14 ENCOUNTER — APPOINTMENT (OUTPATIENT)
Dept: MEDICAL GROUP | Facility: PHYSICIAN GROUP | Age: 89
End: 2024-05-14
Payer: MEDICARE

## 2024-05-14 VITALS
HEIGHT: 67 IN | HEART RATE: 74 BPM | WEIGHT: 162 LBS | BODY MASS INDEX: 25.43 KG/M2 | OXYGEN SATURATION: 97 % | DIASTOLIC BLOOD PRESSURE: 74 MMHG | TEMPERATURE: 97.8 F | SYSTOLIC BLOOD PRESSURE: 136 MMHG

## 2024-05-14 DIAGNOSIS — G57.31 PERONEAL PALSY, RIGHT: ICD-10-CM

## 2024-05-14 DIAGNOSIS — B37.31 VAGINAL YEAST INFECTION: ICD-10-CM

## 2024-05-14 DIAGNOSIS — L03.115 CELLULITIS OF RIGHT LOWER EXTREMITY: ICD-10-CM

## 2024-05-14 DIAGNOSIS — J96.11 CHRONIC HYPOXEMIC RESPIRATORY FAILURE (HCC): ICD-10-CM

## 2024-05-14 DIAGNOSIS — I50.33 ACUTE ON CHRONIC HEART FAILURE WITH PRESERVED EJECTION FRACTION (HCC): ICD-10-CM

## 2024-05-14 PROCEDURE — 99214 OFFICE O/P EST MOD 30 MIN: CPT | Mod: 95 | Performed by: INTERNAL MEDICINE

## 2024-05-14 ASSESSMENT — FIBROSIS 4 INDEX: FIB4 SCORE: 3.5

## 2024-05-30 ENCOUNTER — PATIENT OUTREACH (OUTPATIENT)
Dept: HEALTH INFORMATION MANAGEMENT | Facility: OTHER | Age: 89
End: 2024-05-30
Payer: MEDICARE

## 2024-05-30 DIAGNOSIS — I50.33 ACUTE ON CHRONIC HEART FAILURE WITH PRESERVED EJECTION FRACTION (HCC): ICD-10-CM

## 2024-05-30 DIAGNOSIS — J44.0 CHRONIC OBSTRUCTIVE PULMONARY DISEASE WITH ACUTE LOWER RESPIRATORY INFECTION (HCC): ICD-10-CM

## 2024-05-30 NOTE — PROGRESS NOTES
5/30/24 3:55 pm: Nurse CM outreach call for monthly CCM assessment.  VM left requesting a return call to nurse at [phone].    5/30/24 4:05 pm:  Nurse CM outreach call to pt for monthly CCM assessment.  Pt answered telephone.    Assessment    Pt reports she continues to have problems with her cellulitis in her legs.  States legs have been about the same since she was seen at urgent care. Pt reports her right leg is swollen. States left leg is not as swollen as her right. Pt states she did complete antibiotics as directed. Pt reports she did receive her wheelchair. Pt reports ongoing problems with transportation. Pt relies on daughter for transportation. Pt agreeable to have [facility] [place] outreach for assistance with transportation. Pt reports she has appt with pulmonary provider next week. Pt has history of COPD.  Reports she is wearing oxygen as directed.     Education    Recommended pt be seen in urgent care for evaluation of ongoing problem with her legs.  Pt verbalized understanding. States she doesn't want to go to urgent care at this time. Pt scheduled to see PCP on 6/6.  Pt states she wants to see her PCP and doesn't want sooner appt with another provider at [facility].      Plan of Care and Goals    Reviewed care plan and goals with pt    Barriers:    Chronic health problems    Progress:    Pt working on improving her health care. States multiple health problems.    Next outreach:  One month  Nurse Care Coordinator:  Radha Martinez  [phone]

## 2024-05-31 ENCOUNTER — PATIENT MESSAGE (OUTPATIENT)
Dept: HEALTH INFORMATION MANAGEMENT | Facility: OTHER | Age: 89
End: 2024-05-31

## 2024-06-06 ENCOUNTER — APPOINTMENT (OUTPATIENT)
Dept: MEDICAL GROUP | Facility: PHYSICIAN GROUP | Age: 89
End: 2024-06-06
Payer: MEDICARE

## 2024-06-06 VITALS
WEIGHT: 167 LBS | BODY MASS INDEX: 26.84 KG/M2 | HEART RATE: 73 BPM | SYSTOLIC BLOOD PRESSURE: 110 MMHG | TEMPERATURE: 97.7 F | RESPIRATION RATE: 20 BRPM | OXYGEN SATURATION: 93 % | DIASTOLIC BLOOD PRESSURE: 50 MMHG | HEIGHT: 66 IN

## 2024-06-06 DIAGNOSIS — R60.0 LOWER EXTREMITY EDEMA: ICD-10-CM

## 2024-06-06 DIAGNOSIS — R09.02 HYPOXEMIA: ICD-10-CM

## 2024-06-06 DIAGNOSIS — G57.31 PERONEAL PALSY, RIGHT: ICD-10-CM

## 2024-06-06 PROCEDURE — 99214 OFFICE O/P EST MOD 30 MIN: CPT | Performed by: INTERNAL MEDICINE

## 2024-06-06 PROCEDURE — 3074F SYST BP LT 130 MM HG: CPT | Performed by: INTERNAL MEDICINE

## 2024-06-06 PROCEDURE — 3078F DIAST BP <80 MM HG: CPT | Performed by: INTERNAL MEDICINE

## 2024-06-06 ASSESSMENT — FIBROSIS 4 INDEX: FIB4 SCORE: 3.5

## 2024-06-06 NOTE — PROGRESS NOTES
CC: Requesting O2 through [facility]    HPI:  Chelly presents with the following    1. Hypoxemia  Patient is here today requesting a refill/prescription for additional oxygen supplementation.  She is currently using oxygen at 3-1/2 to 4 L per nasal cannula 24/7.  Her O2 saturations on room air are 87 to 88%.  Patient is quite sedentary, using a wheelchair.    2. Lower extremity edema  Patient with ongoing lower extremity edema.  Patient is prescribed Lasix 40 mg however has only been taking about 20 mg daily as she is unable to get to the restroom on time.  Patient's losartan and spironolactone were discontinued by another provider.  This was replaced with a beta-blocker.  GFR was 37.  Echocardiogram completed in February 2024 which showed a normal EF of 70%, mild MR.  Patient has difficulty using compression hose.    3. Peroneal palsy, right  Patient is followed by neurology, Dr. Bliss.  Patient will need to complete EMG/NCV prior to follow-up in August.      Patient Active Problem List    Diagnosis Date Noted    Peroneal palsy, right 05/14/2024    Dyslipidemia 02/28/2024    Normocytic anemia 02/28/2024    Elevated troponin 02/28/2024    Cellulitis of right lower extremity 02/28/2024    Left lower quadrant abdominal pain 02/28/2024    S/P TAVR (transcatheter aortic valve replacement) 01/08/2024    Stage 3b chronic kidney disease 11/07/2023    PAF (paroxysmal atrial fibrillation) ([facility]) 10/31/2023    Pulmonary HTN ([facility]) 10/31/2023    Physical debility 06/07/2023    Prediabetes 05/08/2023    External hemorrhoid 05/08/2023    TIA (transient ischemic attack) 05/07/2023    Chronic hypoxemic respiratory failure (HCC) 05/06/2023    S/P insertion of intrathecal pump 05/05/2023    Hammer toes of both feet 12/29/2022    Dizziness 12/29/2022    Osteoarthritis of left hip 11/30/2022    Pain of left hip joint 11/30/2022    Trochanteric bursitis of left hip 11/30/2022    Insomnia due to medical condition 10/13/2022     Avascular necrosis of bone of left hip ([facility]) 08/17/2022    Lower extremity edema 08/03/2022    Chronic obstructive pulmonary disease ([facility]) 06/22/2022    Solitary pulmonary nodule 06/22/2022    Acute on chronic heart failure with preserved ejection fraction ([facility]) 06/22/2022    Bilateral carotid bruits 06/06/2022    Chronic pain syndrome 06/06/2022    PVC's (premature ventricular contractions) 06/06/2022    Moderate mitral insufficiency 06/06/2022    Urge incontinence of urine 05/12/2022    H/O Clostridium difficile infection 05/12/2022    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 90 Tablet 4    magnesium oxide 400 (240 Mg) MG Tab Take 1 Tablet by mouth 2 times a day. 30 Tablet 0    furosemide (LASIX) 20 MG Tab Take 1 Tablet by mouth 2 times a day. (Patient taking differently: Take 20 mg by mouth 2 times a day. Maybe takes 1 - 1 1/2 a day   Goes to the bathroom too much) 30 Tablet 0    bisoprolol (ZEBETA) 5 MG Tab Take 1 Tablet by mouth every day. (Patient taking differently: Take 5 mg by mouth every evening.) 100 Tablet 3    CRANBERRY PO Take 1 Tablet by mouth every evening.      Probiotic Product (PROBIOTIC DAILY PO) Take 1 Tablet by mouth every evening.      atorvastatin (LIPITOR) 40 MG Tab Take 40 mg by mouth every evening.      polyethylene glycol 3350 (MIRALAX) 17 GM/SCOOP Powder Take 17 g by mouth every evening.      Melatonin 10 MG Tab Take 10 mg by mouth every evening.  Indications: Trouble Sleeping      Pain Pump (PATIENT SUPPLIED) Device Inject  as directed continuous. Patient's Pain Pump (placed and maintained as an outpatient)    Medications/concentrations:   HYDROMORPHONE 4.0mg/ml      Last changed: 2/14/2024  Refill pump before date: 5/14/2024    Continuous infusion rates (Drug/Rate):   HYDROMORPHONE 0.7491mg/day (0.0312mg/hr)    MD office:  Dr. Lozoya's  Phone number: [phone]          Indications: severe pain      DULoxetine (CYMBALTA) 20 MG Cap DR Particles Take 20 mg by mouth 2 times a day. Indications: Musculoskeletal Pain      guaiFENesin ER (MUCINEX) 600 MG TABLET SR 12 HR Take 600 mg by mouth every 12 hours. Indications: Cough      Cyanocobalamin (VITAMIN B-12 PO) Take 1 Tablet by mouth see administration instructions. Pt takes sporidially   Indications: supplement      albuterol 108 (90 Base) MCG/ACT Aero Soln inhalation aerosol Inhale 2 Puffs every 6 hours as needed for Shortness of Breath. Indications: Chronic Obstructive Lung Disease      Simethicone (GAS-X PO) Take 1 Tablet by mouth 3 times a day. Indications: gas      fluconazole (DIFLUCAN) 100 MG Tab Take 1 Tablet by mouth every day. 1 Tablet 0    nystatin (MYCOSTATIN) 657589 UNIT/GM Cream topical cream Apply 1 g topically 2 times a day. Continue for up to 2 weeks (or less if symptoms resolve). 30 g 0    meloxicam (MOBIC) 7.5 MG Tab TAKE 1 TABLET BY MOUTH TWICE A DAY 30 Tablet 1    aspirin 81 MG EC tablet Take 1 Tablet by mouth every day. 100 Tablet 3    traMADol (ULTRAM) 50 MG Tab Take 50 mg by mouth 2 times a day.      acetaminophen (TYLENOL) 500 MG Tab Take 1,000 mg by mouth every 6 hours as needed. Indications: Fever, Pain      Cholecalciferol (VITAMIN D3 PO) Take 1 Tablet by mouth every day. Indications: supplement       No current facility-administered medications for this visit.         Allergies as of 06/06/2024 - Reviewed 06/06/2024   Allergen Reaction Noted    Sulfa drugs Unspecified 07/12/2021     Erythromycin Nausea 2019        Social History     Socioeconomic History    Marital status:      Spouse name: Not on file    Number of children: Not on file    Years of education: Not on file    Highest education level: Not on file   Occupational History    Occupation: retired book keeper   Tobacco Use    Smoking status: Former     Current packs/day: 0.00     Average packs/day: 1 pack/day for 61.0 years (61.0 ttl pk-yrs)     Types: Cigarettes, Electronic Cigarettes     Start date: 1954     Quit date: 2015     Years since quittin.4    Smokeless tobacco: Never    Tobacco comments:     Smoked as a teenager quit as a senior citizen   Vaping Use    Vaping status: Former    Substances: Flavoring   Substance and Sexual Activity    Alcohol use: Not Currently    Drug use: Not Currently     Types: Inhaled    Sexual activity: Not on file   Other Topics Concern    Not on file   Social History Narrative    Not on file     Social Determinants of Health     Financial Resource Strain: Low Risk  (2022)    Overall Financial Resource Strain (CARDIA)     Difficulty of Paying Living Expenses: Not very hard   Food Insecurity: No Food Insecurity (2022)    Hunger Vital Sign     Worried About Running Out of Food in the Last Year: Never true     Ran Out of Food in the Last Year: Never true   Transportation Needs: Unmet Transportation Needs (2022)    PRAPARE - Transportation     Lack of Transportation (Medical): Yes     Lack of Transportation (Non-Medical): No   Physical Activity: Inactive (2022)    Exercise Vital Sign     Days of Exercise per Week: 0 days     Minutes of Exercise per Session: 0 min   Stress: No Stress Concern Present (2020)    Received from [facility] - Occupational Stress Questionnaire     Feeling of Stress : Only a little   Social Connections: Not on file   Intimate Partner Violence: Low Risk  (10/4/2023)     Received from [facility]    History of Abuse     History of Abuse: Denies   Housing Stability: Unknown (7/22/2022)    Housing Stability Vital Sign     Unable to Pay for Housing in the Last Year: No     Number of Places Lived in the Last Year: Not on file     Unstable Housing in the Last Year: No       Family History   Problem Relation Age of Onset    Heart Disease Mother         Not from congestive heart failure!       Past Surgical History:   Procedure Laterality Date    TRANSCATHETER AORTIC VALVE REPLACEMENT  1/8/2024    Procedure: TRANSCATHETER AORTIC VALVE REPLACEMENT;  Surgeon: Clayton Eddy M.D.;  Location: SURGERY [facility];  Service: Cardiac    ECHOCARDIOGRAM, TRANSESOPHAGEAL, INTRAOPERATIVE  1/8/2024    Procedure: ECHOCARDIOGRAM, TRANSESOPHAGEAL, INTRAOPERATIVE;  Surgeon: Clayton Eddy M.D.;  Location: [facility];  Service: Cardiac    OTHER Bilateral 12/04/2023    IOLOU    MA TOTAL HIP ARTHROPLASTY Left 08/28/2023    Procedure: LEFT TOTAL HIP ARTHROPLASTY;  Surgeon: Daryl Perrin M.D.;  Location: SURGERY [facility];  Service: Orthopedics    PUMP REVISION Right 05/04/2023    Procedure: INTRATHECAL PAIN PUMP REVISION;  Surgeon: Tavo Lozoya M.D.;  Location: SURGERY [facility];  Service: Pain Management    PUMP INSERT/REMOVE Right 10/07/2022    Procedure: MEDTRONIC PUMP REPLACEMENT;  Surgeon: Joel Alanis M.D.;  Location: SURGERY [facility];  Service: Pain Management    MA INS/RPLC SPI NPG/RCVR POCKET N/A 02/05/2021    Procedure: IMPLANT PUMP AND CATHETER;  Surgeon: Joel Alanis M.D.;  Location: SURGERY [facility];  Service: Pain Management    HIP ARTHROPLASTY TOTAL Right 1998    APPENDECTOMY      CARPAL TUNNEL ENDOSCOPIC Bilateral     bilat    CHOLECYSTECTOMY      COLONOSCOPY      HYSTERECTOMY LAPAROSCOPY      total    LITHOTRIPSY      kidney stones removed    PRIMARY C SECTION      c section    TONSILLECTOMY         ROS:  Denies any Headache,Chest  "pain,  Shortness of breath,  Abdominal pain, Changes of bowel or bladder,  Fevers, Nights sweats, Weight Changes,.  All other systems are negative.    /50 (BP Location: Left arm, Patient Position: Sitting, BP Cuff Size: Adult)   Pulse 73   Temp 36.5 °C (97.7 °F) (Temporal)   Resp 20   Ht 1.676 m (5' 6\")   Wt 75.8 kg (167 lb)   SpO2 93%   BMI 26.95 kg/m²      Constitutional: Alert, no distress, well-groomed.  Using wheelchair.  Skin: Warm, dry, good turgor, no rashes in visible areas.  Eye: Equal, round and reactive, conjunctiva clear, lids normal.  ENMT: Lips without lesions, good dentition, moist mucous membranes.  Neck: Trachea midline, no masses, no thyromegaly.  Respiratory: Unlabored respiratory effort, no cough.  Abdomen: Soft, no gross masses.  MSK: Bilateral lower extremity 2+ edema.  Venous stasis changes.  Neuro: Grossly non-focal. No cranial nerve deficit. Strength and sensation intact.   Psych: Alert and oriented x3, normal affect and mood.      Assessment and Plan.   88 y.o. female presenting with the following.     1. Hypoxemia  Patient meets criteria for O2 supplementation as noted above.  Orders placed to be sent to [facility].     2. Lower extremity edema  Advised patient to increase Lasix to 40 mg daily.  Trial with different brand of compression hose/stockings.  Patient has follow-up with cardiology in July.    3. Peroneal palsy, right  Complete EMG/NCV and follow-up with neurology.    "

## 2024-06-06 NOTE — PROGRESS NOTES
Virtual Visit: Established Patient   This visit was conducted via Zoom using secure and encrypted videoconferencing technology.   The patient was in their home in the [facility].    The patient's identity was confirmed and verbal consent was obtained for this virtual visit.     Subjective:   CC:   Chief Complaint   Patient presents with    Follow-Up     Cellulitis, swollen toes, ankles, and calves       Chelly Decker is a 88 y.o. female presenting for evaluation and management of:    1. Peroneal palsy, right  Patient with complaints of neuropathy, right foot drop is being evaluated by neurology.  EMG/NCV have been ordered.    2. Cellulitis of right lower extremity  Patient presents with ongoing cellulitis of bilateral lower extremities.  Initially began after an oxygen tank fell on her legs.  She is completing a course of Augmentin for the last 5 days.  Symptoms only slightly improving.    3. Acute on chronic heart failure with preserved ejection fraction (HCC)  Chronic.  Stable.  Followed by cardiology.  Patient has been noticing increased lower extremity edema.  Patient's losartan and spironolactone were discontinued by another provider.  Patient taking Lasix 20 mg daily.    4. Chronic hypoxemic respiratory failure (HCC)  Chronic.  Stable.  Patient will be meeting to renew her O2 tanks through [facility].    5. Vaginal yeast infection  Patient states that she is now experiencing vaginal yeast infectio after taking Augmentin.      ROS   Positive ROS per HPI.  Denies all other cardiopulmonary, GI, neurologic symptoms.    Current medicines (including changes today)  Current Outpatient Medications   Medication Sig Dispense Refill    fluconazole (DIFLUCAN) 100 MG Tab Take 1 Tablet by mouth every day. 1 Tablet 0    nystatin (MYCOSTATIN) 382402 UNIT/GM Cream topical cream Apply 1 g topically 2 times a day. Continue for up to 2 weeks (or less if symptoms resolve). 30 g 0    Empagliflozin (JARDIANCE) 10  MG Tab tablet Take 1 Tablet by mouth every day. 90 Tablet 4    magnesium oxide 400 (240 Mg) MG Tab Take 1 Tablet by mouth 2 times a day. 30 Tablet 0    furosemide (LASIX) 20 MG Tab Take 1 Tablet by mouth 2 times a day. (Patient taking differently: Take 20 mg by mouth 2 times a day. Maybe takes 1 - 1 1/2 a day   Goes to the bathroom too much) 30 Tablet 0    meloxicam (MOBIC) 7.5 MG Tab TAKE 1 TABLET BY MOUTH TWICE A DAY 30 Tablet 1    bisoprolol (ZEBETA) 5 MG Tab Take 1 Tablet by mouth every day. (Patient taking differently: Take 5 mg by mouth every evening.) 100 Tablet 3    aspirin 81 MG EC tablet Take 1 Tablet by mouth every day. 100 Tablet 3    CRANBERRY PO Take 1 Tablet by mouth every evening.      Probiotic Product (PROBIOTIC DAILY PO) Take 1 Tablet by mouth every evening.      traMADol (ULTRAM) 50 MG Tab Take 50 mg by mouth 2 times a day.      atorvastatin (LIPITOR) 40 MG Tab Take 40 mg by mouth every evening.      polyethylene glycol 3350 (MIRALAX) 17 GM/SCOOP Powder Take 17 g by mouth every evening.      Melatonin 10 MG Tab Take 10 mg by mouth every evening. Indications: Trouble Sleeping      acetaminophen (TYLENOL) 500 MG Tab Take 1,000 mg by mouth every 6 hours as needed. Indications: Fever, Pain      Pain Pump (PATIENT SUPPLIED) Device Inject  as directed continuous. Patient's Pain Pump (placed and maintained as an outpatient)    Medications/concentrations:   HYDROMORPHONE 4.0mg/ml      Last changed: 2/14/2024  Refill pump before date: 5/14/2024    Continuous infusion rates (Drug/Rate):   HYDROMORPHONE 0.7491mg/day (0.0312mg/hr)    MD office:  Dr. Lozoya's  Phone number: [phone]          Indications: severe pain      DULoxetine (CYMBALTA) 20 MG Cap DR Particles Take 20 mg by mouth 2 times a day. Indications: Musculoskeletal Pain      guaiFENesin ER (MUCINEX) 600 MG TABLET SR 12 HR Take 600 mg by mouth every 12 hours. Indications: Cough      Cyanocobalamin (VITAMIN B-12 PO) Take 1 Tablet by mouth see  administration instructions. Pt takes sporidially   Indications: supplement      Cholecalciferol (VITAMIN D3 PO) Take 1 Tablet by mouth every day. Indications: supplement      albuterol 108 (90 Base) MCG/ACT Aero Soln inhalation aerosol Inhale 2 Puffs every 6 hours as needed for Shortness of Breath. Indications: Chronic Obstructive Lung Disease      Simethicone (GAS-X PO) Take 1 Tablet by mouth 3 times a day. Indications: gas       No current facility-administered medications for this visit.       Patient Active Problem List    Diagnosis Date Noted    Peroneal palsy, right 05/14/2024    Dyslipidemia 02/28/2024    Normocytic anemia 02/28/2024    Elevated troponin 02/28/2024    Cellulitis of right lower extremity 02/28/2024    Left lower quadrant abdominal pain 02/28/2024    S/P TAVR (transcatheter aortic valve replacement) 01/08/2024    Stage 3b chronic kidney disease 11/07/2023    PAF (paroxysmal atrial fibrillation) ([facility]) 10/31/2023    Pulmonary HTN ([facility]) 10/31/2023    Physical debility 06/07/2023    Prediabetes 05/08/2023    External hemorrhoid 05/08/2023    TIA (transient ischemic attack) 05/07/2023    Chronic hypoxemic respiratory failure ([facility]) 05/06/2023    S/P insertion of intrathecal pump 05/05/2023    Hammer toes of both feet 12/29/2022    Dizziness 12/29/2022    Osteoarthritis of left hip 11/30/2022    Pain of left hip joint 11/30/2022    Trochanteric bursitis of left hip 11/30/2022    Insomnia due to medical condition 10/13/2022    Avascular necrosis of bone of left hip ([facility]) 08/17/2022    Lower extremity edema 08/03/2022    Chronic obstructive pulmonary disease ([facility]) 06/22/2022    Solitary pulmonary nodule 06/22/2022    Acute on chronic heart failure with preserved ejection fraction ([facility]) 06/22/2022    Bilateral carotid bruits 06/06/2022    Chronic pain syndrome 06/06/2022    PVC's (premature ventricular contractions) 06/06/2022    Moderate mitral insufficiency 06/06/2022    Urge incontinence of urine  "05/12/2022    H/O Clostridium difficile infection 05/12/2022    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    Primary hypertension 03/29/2022    Incomplete defecation 03/29/2022    Moderate asthma without complication 03/29/2022    Spinal stenosis of lumbar region without neurogenic claudication 03/29/2022    Pelvic floor dysfunction 03/29/2022    Hypoxemia 03/29/2022    Other hyperlipidemia 01/13/2021    Primary cancer of left lower lobe of lung (HCC) 01/15/2020    Lichen sclerosus 01/18/2011        Objective:   /74   Pulse 74   Temp 36.6 °C (97.8 °F)   Ht 1.702 m (5' 7\")   Wt 73.5 kg (162 lb)   SpO2 97% Comment: 3.5-4 liters oxygen  BMI 25.37 kg/m²     Physical Exam: Via virtual visit  Constitutional: Alert, no distress, well-groomed.  Skin: No rashes in visible areas.  Eye: Round. Conjunctiva clear, lids normal. No icterus.   ENMT: Lips pink without lesions, good dentition, moist mucous membranes. Phonation normal.  Neck: No masses, no thyromegaly. Moves freely without pain.  Respiratory: Unlabored respiratory effort, no cough or audible wheeze  Psych: Alert and oriented x3, normal affect and mood.     Assessment and Plan:   The following treatment plan was discussed:     1. Peroneal palsy, right  Keep follow-up appointment with neurology to complete EMG/NCV.    2. Cellulitis of right lower extremity  Complete course of Augmentin.    3. Acute on chronic heart failure with preserved ejection fraction (HCC)  Chronic.  Stable.  Increase Lasix to 40 mg daily.  Compression hose.  Follow-up with cardiology.  Echocardiogram reviewed from February 2024.  EF 70%.    4. Chronic hypoxemic respiratory failure (HCC)  [facility] to [facility] O[facility] supplementation.    5. Vaginal yeast infection  Prescription for Diflucan 100 mg x 1 sent to pharmacy.    Follow-up: No follow-ups on file.           "

## 2024-06-12 ENCOUNTER — PATIENT MESSAGE (OUTPATIENT)
Dept: HEALTH INFORMATION MANAGEMENT | Facility: OTHER | Age: 89
End: 2024-06-12

## 2024-06-12 DIAGNOSIS — B37.31 VAGINAL YEAST INFECTION: ICD-10-CM

## 2024-06-12 RX ORDER — FLUCONAZOLE 100 MG/1
100 TABLET ORAL DAILY
Qty: 1 TABLET | Refills: 0 | Status: SHIPPED | OUTPATIENT
Start: 2024-06-12

## 2024-06-12 RX ORDER — NYSTATIN 100000 U/G
1 CREAM TOPICAL 2 TIMES DAILY
Qty: 30 G | Refills: 2 | Status: SHIPPED | OUTPATIENT
Start: 2024-06-12

## 2024-06-20 ENCOUNTER — PATIENT MESSAGE (OUTPATIENT)
Dept: MEDICAL GROUP | Facility: PHYSICIAN GROUP | Age: 89
End: 2024-06-20
Payer: MEDICARE

## 2024-06-21 ENCOUNTER — OFFICE VISIT (OUTPATIENT)
Dept: URGENT CARE | Facility: CLINIC | Age: 89
End: 2024-06-21
Payer: MEDICARE

## 2024-06-21 VITALS
WEIGHT: 165 LBS | DIASTOLIC BLOOD PRESSURE: 60 MMHG | HEART RATE: 55 BPM | BODY MASS INDEX: 26.52 KG/M2 | SYSTOLIC BLOOD PRESSURE: 98 MMHG | OXYGEN SATURATION: 92 % | HEIGHT: 66 IN | TEMPERATURE: 98.7 F | RESPIRATION RATE: 18 BRPM

## 2024-06-21 DIAGNOSIS — J44.0 CHRONIC OBSTRUCTIVE PULMONARY DISEASE WITH ACUTE LOWER RESPIRATORY INFECTION (HCC): ICD-10-CM

## 2024-06-21 DIAGNOSIS — R60.0 LOWER EXTREMITY EDEMA: ICD-10-CM

## 2024-06-21 DIAGNOSIS — I48.0 PAF (PAROXYSMAL ATRIAL FIBRILLATION) (HCC): ICD-10-CM

## 2024-06-21 DIAGNOSIS — N18.32 STAGE 3B CHRONIC KIDNEY DISEASE: ICD-10-CM

## 2024-06-21 PROCEDURE — 99214 OFFICE O/P EST MOD 30 MIN: CPT | Performed by: PHYSICIAN ASSISTANT

## 2024-06-21 PROCEDURE — 3078F DIAST BP <80 MM HG: CPT | Performed by: PHYSICIAN ASSISTANT

## 2024-06-21 PROCEDURE — 3074F SYST BP LT 130 MM HG: CPT | Performed by: PHYSICIAN ASSISTANT

## 2024-06-21 ASSESSMENT — FIBROSIS 4 INDEX: FIB4 SCORE: 3.5

## 2024-06-21 NOTE — PROGRESS NOTES
Subjective:   Chelly Decker is a 88 y.o. female who presents for Leg Swelling (Left leg swelling x 2 days)  This a very pleasant 88-year-old female who presents with chief complaint of significant bilateral lower extremity edema associated with weeping.  She has a complex medical history including history of congestive heart failure, paroxysmal atrial fibrillation, hypertension, recent TAVR, CKD 3, COPD on home oxygen.  She was admitted in March for bilateral lower extremity edema.  She is endorsing today worsening swelling associated with increasing shortness of breath.  She denies fevers or chills.  Extreme tenderness to the lower extremities.  She does endorse fatigue.  I did review her recent hospital stay.  Of note most recently she was noted to be in sinus rhythm by the cardiologist      Medications:  acetaminophen Tabs  albuterol Aers  aspirin  atorvastatin Tabs  bisoprolol Tabs  CRANBERRY PO  DULoxetine Cpep  fluconazole Tabs  furosemide Tabs  GAS-X PO  guaiFENesin ER Tb12  HYDROcodone/acetaminophen Tabs  Jardiance Tabs  magnesium oxide Tabs  Melatonin Tabs  meloxicam Tabs  nystatin Crea  Pain Pump Milagros  polyethylene glycol 3350 Powd  PROBIOTIC DAILY PO  traMADol Tabs  VITAMIN B-12 PO  VITAMIN D3 PO    Allergies:             Sulfa drugs and Erythromycin    Surgical History:         Past Surgical History:   Procedure Laterality Date    TRANSCATHETER AORTIC VALVE REPLACEMENT  1/8/2024    Procedure: TRANSCATHETER AORTIC VALVE REPLACEMENT;  Surgeon: Clayton Eddy M.D.;  Location: [facility];  Service: Cardiac    ECHOCARDIOGRAM, TRANSESOPHAGEAL, INTRAOPERATIVE  1/8/2024    Procedure: ECHOCARDIOGRAM, TRANSESOPHAGEAL, INTRAOPERATIVE;  Surgeon: Clayton Eddy M.D.;  Location: [facility];  Service: Cardiac    OTHER Bilateral 12/04/2023    IOLOU    CO TOTAL HIP ARTHROPLASTY Left 08/28/2023    Procedure: LEFT TOTAL HIP ARTHROPLASTY;  Surgeon: Daryl Perrin M.D.;  Location: [facility]  "[facility];  Service: Orthopedics    PUMP REVISION Right 05/04/2023    Procedure: INTRATHECAL PAIN PUMP REVISION;  Surgeon: Tavo Lozoya M.D.;  Location: SURGERY [facility];  Service: Pain Management    PUMP INSERT/REMOVE Right 10/07/2022    Procedure: MEDTRONIC PUMP REPLACEMENT;  Surgeon: Joel Alanis M.D.;  Location: SURGERY [facility];  Service: Pain Management    AL INS/RPLC SPI NPG/RCVR POCKET N/A 02/05/2021    Procedure: IMPLANT PUMP AND CATHETER;  Surgeon: Joel Alanis M.D.;  Location: SURGERY [facility];  Service: Pain Management    HIP ARTHROPLASTY TOTAL Right 1998    APPENDECTOMY      CARPAL TUNNEL ENDOSCOPIC Bilateral     bilat    CHOLECYSTECTOMY      COLONOSCOPY      HYSTERECTOMY LAPAROSCOPY      total    LITHOTRIPSY      kidney stones removed    PRIMARY C SECTION      c section    TONSILLECTOMY         Past Social Hx:  Chelly Decker  reports that she quit smoking about 9 years ago. Her smoking use included cigarettes and electronic cigarettes. She started smoking about 70 years ago. She has a 61 pack-year smoking history. She has never used smokeless tobacco. She reports that she does not currently use alcohol. She reports that she does not currently use drugs after having used the following drugs: Inhaled.     Past Family Hx:   Chelly Decker family history includes Heart Disease in her mother.       Problem list, medications, and allergies reviewed by myself today in Epic.     Objective:     BP 98/60   Pulse (!) 55   Temp 37.1 °C (98.7 °F) (Temporal)   Resp 18   Ht 1.676 m (5' 6\")   Wt 74.8 kg (165 lb)   SpO2 92% Comment: pt uses oxgyen at home ad at night , 4 liters , came to [facility] with portable o2  BMI 26.63 kg/m²     Physical Exam  Vitals reviewed.   Constitutional:       General: She is not in acute distress.     Appearance: She is well-developed. She is not ill-appearing, toxic-appearing or diaphoretic.   HENT:      Head: Normocephalic.   Eyes:      Pupils: Pupils " are equal, round, and reactive to light.   Cardiovascular:      Rate and Rhythm: Normal rate. Rhythm irregularly irregular.      Pulses: No decreased pulses.      Comments: Rate is irregularly irregular  Pulmonary:      Effort: Pulmonary effort is normal.      Comments: Lungs are clear to auscultation bilaterally with the exception of trace bibasilar Rales  Musculoskeletal:      Comments: Bilateral lower extremity edema to proximal tibia associated with erythema and weeping to the left lower extremity.  Extreme tenderness to palpation.  1-2+ pitting edema.   Skin:     General: Skin is warm and dry.      Findings: No erythema or rash.   Neurological:      Mental Status: She is alert and oriented to person, place, and time.   Psychiatric:         Behavior: Behavior is cooperative.         Assessment/Plan:     Diagnosis and Associated Orders:     1. Chronic obstructive pulmonary disease with acute lower respiratory infection (HCC)    2. Lower extremity edema    3. PAF (paroxysmal atrial fibrillation) (HCC)    4. Stage 3b chronic kidney disease        Comments/MDM:  This is a very pleasant 88-year-old female with multiple medical comorbidities including congestive heart failure, chronic renal disease, status post TAVR, COPD on oxygen who presents with worsening bilateral lower extremity edema associated with weeping and increased shortness of breath.  Patient is transferred to the ER for higher level of care for more comprehensive workup given her past medical history.  She will transfer via private vehicle to [facility] with her daughter.  O2 sat stable at 92% on home oxygen.  She does appear to be in A-fib and of note was noted to be in sinus rhythm by her cardiologist in March.  I personally reviewed prior external notes and test results pertinent to today's visit. Supportive care, natural history, differential diagnoses, and indications for immediate follow-up discussed. Return to clinic or go to ED if  symptoms worsen or persist.  Red flag symptoms discussed.  Patient/Parent/Guardian voices understanding. Follow-up with your primary care provider in 3-5 days.  All side effects of medication discussed including allergic response, GI upset, tendon injury, rash, sedation etc    Please note that this dictation was created using voice recognition software. I have made a reasonable attempt to correct obvious errors, but I expect that there are errors of grammar and possibly content that I did not discover before finalizing the note.    This note was electronically signed by Crystal Marte PA-C

## 2024-07-01 ENCOUNTER — PATIENT OUTREACH (OUTPATIENT)
Dept: HEALTH INFORMATION MANAGEMENT | Facility: OTHER | Age: 89
End: 2024-07-01
Payer: MEDICARE

## 2024-07-01 DIAGNOSIS — I50.33 ACUTE ON CHRONIC HEART FAILURE WITH PRESERVED EJECTION FRACTION (HCC): ICD-10-CM

## 2024-07-01 DIAGNOSIS — J44.0 CHRONIC OBSTRUCTIVE PULMONARY DISEASE WITH ACUTE LOWER RESPIRATORY INFECTION (HCC): ICD-10-CM

## 2024-07-05 ENCOUNTER — TELEPHONE (OUTPATIENT)
Dept: MEDICAL GROUP | Facility: PHYSICIAN GROUP | Age: 89
End: 2024-07-05
Payer: MEDICARE

## 2024-07-08 ENCOUNTER — PATIENT OUTREACH (OUTPATIENT)
Dept: HEALTH INFORMATION MANAGEMENT | Facility: OTHER | Age: 89
End: 2024-07-08
Payer: MEDICARE

## 2024-07-08 DIAGNOSIS — I10 PRIMARY HYPERTENSION: ICD-10-CM

## 2024-07-08 DIAGNOSIS — J44.0 CHRONIC OBSTRUCTIVE PULMONARY DISEASE WITH ACUTE LOWER RESPIRATORY INFECTION (HCC): ICD-10-CM

## 2024-07-08 DIAGNOSIS — I50.33 ACUTE ON CHRONIC HEART FAILURE WITH PRESERVED EJECTION FRACTION (HCC): ICD-10-CM

## 2024-07-23 ENCOUNTER — OFFICE VISIT (OUTPATIENT)
Dept: CARDIOLOGY | Facility: MEDICAL CENTER | Age: 89
End: 2024-07-23
Attending: NURSE PRACTITIONER
Payer: MEDICARE

## 2024-07-23 ENCOUNTER — OFFICE VISIT (OUTPATIENT)
Dept: CARDIOLOGY | Facility: MEDICAL CENTER | Age: 89
End: 2024-07-23
Attending: INTERNAL MEDICINE
Payer: MEDICARE

## 2024-07-23 VITALS
WEIGHT: 165 LBS | SYSTOLIC BLOOD PRESSURE: 120 MMHG | RESPIRATION RATE: 15 BRPM | DIASTOLIC BLOOD PRESSURE: 60 MMHG | HEIGHT: 67 IN | OXYGEN SATURATION: 94 % | BODY MASS INDEX: 25.9 KG/M2 | HEART RATE: 75 BPM

## 2024-07-23 DIAGNOSIS — E78.49 OTHER HYPERLIPIDEMIA: ICD-10-CM

## 2024-07-23 DIAGNOSIS — I48.0 PAF (PAROXYSMAL ATRIAL FIBRILLATION) (HCC): ICD-10-CM

## 2024-07-23 DIAGNOSIS — Z79.899 HIGH RISK MEDICATION USE: ICD-10-CM

## 2024-07-23 DIAGNOSIS — Z95.2 S/P TAVR (TRANSCATHETER AORTIC VALVE REPLACEMENT): ICD-10-CM

## 2024-07-23 DIAGNOSIS — I50.9 HEART FAILURE, NYHA CLASS 3 (HCC): ICD-10-CM

## 2024-07-23 DIAGNOSIS — I50.30 ACC/AHA STAGE C HEART FAILURE WITH PRESERVED EJECTION FRACTION (HCC): ICD-10-CM

## 2024-07-23 DIAGNOSIS — R23.8 REDNESS AND SWELLING OF LOWER LEG: ICD-10-CM

## 2024-07-23 DIAGNOSIS — R06.02 SOB (SHORTNESS OF BREATH): ICD-10-CM

## 2024-07-23 DIAGNOSIS — M79.89 REDNESS AND SWELLING OF LOWER LEG: ICD-10-CM

## 2024-07-23 DIAGNOSIS — Z71.89 ENCOUNTER FOR EDUCATION ABOUT HEART FAILURE: ICD-10-CM

## 2024-07-23 PROCEDURE — 3078F DIAST BP <80 MM HG: CPT | Performed by: NURSE PRACTITIONER

## 2024-07-23 PROCEDURE — 99999 PR NO CHARGE: CPT | Performed by: INTERNAL MEDICINE

## 2024-07-23 PROCEDURE — 3074F SYST BP LT 130 MM HG: CPT | Performed by: NURSE PRACTITIONER

## 2024-07-23 PROCEDURE — 99214 OFFICE O/P EST MOD 30 MIN: CPT | Performed by: NURSE PRACTITIONER

## 2024-07-23 PROCEDURE — 99213 OFFICE O/P EST LOW 20 MIN: CPT | Performed by: NURSE PRACTITIONER

## 2024-07-23 RX ORDER — FUROSEMIDE 20 MG/1
40 TABLET ORAL DAILY
Qty: 180 TABLET | Refills: 3 | Status: SHIPPED | OUTPATIENT
Start: 2024-07-23

## 2024-07-23 ASSESSMENT — ENCOUNTER SYMPTOMS
ABDOMINAL PAIN: 0
COUGH: 0
PND: 0
CLAUDICATION: 0
DIZZINESS: 1
PALPITATIONS: 0
SHORTNESS OF BREATH: 1
ORTHOPNEA: 0
FEVER: 0
MYALGIAS: 0

## 2024-07-23 ASSESSMENT — FIBROSIS 4 INDEX: FIB4 SCORE: 2.46

## 2024-08-01 ENCOUNTER — APPOINTMENT (OUTPATIENT)
Dept: MEDICAL GROUP | Facility: PHYSICIAN GROUP | Age: 89
End: 2024-08-01
Payer: MEDICARE

## 2024-08-01 VITALS — BODY MASS INDEX: 25.9 KG/M2 | OXYGEN SATURATION: 90 % | WEIGHT: 165 LBS | HEART RATE: 70 BPM | HEIGHT: 67 IN

## 2024-08-01 DIAGNOSIS — R60.0 LOWER EXTREMITY EDEMA: ICD-10-CM

## 2024-08-01 DIAGNOSIS — J44.0 CHRONIC OBSTRUCTIVE PULMONARY DISEASE WITH ACUTE LOWER RESPIRATORY INFECTION (HCC): ICD-10-CM

## 2024-08-01 DIAGNOSIS — I50.33 ACUTE ON CHRONIC HEART FAILURE WITH PRESERVED EJECTION FRACTION (HCC): ICD-10-CM

## 2024-08-01 DIAGNOSIS — E55.9 VITAMIN D DEFICIENCY: ICD-10-CM

## 2024-08-01 DIAGNOSIS — D64.9 NORMOCYTIC ANEMIA: ICD-10-CM

## 2024-08-01 DIAGNOSIS — R73.03 PREDIABETES: ICD-10-CM

## 2024-08-01 PROCEDURE — 99214 OFFICE O/P EST MOD 30 MIN: CPT | Mod: 95 | Performed by: INTERNAL MEDICINE

## 2024-08-01 ASSESSMENT — FIBROSIS 4 INDEX: FIB4 SCORE: 2.46

## 2024-08-01 NOTE — PROGRESS NOTES
Virtual Visit: Established Patient   This visit was conducted via Zoom using secure and encrypted videoconferencing technology.   The patient was in their home in the [facility].    The patient's identity was confirmed and verbal consent was obtained for this virtual visit.     Subjective:   CC:   Chief Complaint   Patient presents with    Follow-Up     Edema        Chelly Decker is a 88 y.o. female presenting for evaluation and management of:    1. Acute on chronic heart failure with preserved ejection fraction (HCC)  Chronic.  Stable.  Followed by cardiology.  Patient was seen on July 23.  Status post TAVR January 2024.  Patient was also started on Jardiance 10 mg daily.    2. Chronic obstructive pulmonary disease with acute lower respiratory infection (HCC)  Chronic.  Stable.  Patient is able to receive her oxygen.    3. Lower extremity edema  Patient has ongoing lower extremity edema, right greater than left.  Left lower extremity Doppler ultrasound ordered however patient did not complete this due to transportation issues.  She was instructed to increase her furosemide to 40 mg twice daily for 3 days and then back down to 40 mg daily.  She is unable to check her weight due to unsteadiness and lack of balance.  Edema still present.  Patient is very sedentary and unable to move about.  Follow-up BMP, BNP ordered    4. Normocytic anemia  Most recent CBC showed a hemoglobin of 10.8.  Chronically low.  Patient feeling fatigued and lightheaded.  - CBC WITH DIFFERENTIAL; Future  - IRON/TOTAL IRON BIND; Future  - FERRITIN; Future  - TRANSFERRIN SATURATION      ROS   Positive ROS per HPI.  Denies all other cardiopulmonary, GI, neurologic symptoms.    Current medicines (including changes today)  Current Outpatient Medications   Medication Sig Dispense Refill    furosemide (LASIX) 20 MG Tab Take 2 Tablets by mouth every day. 180 Tablet 3    nystatin (MYCOSTATIN) 056103 UNIT/GM Cream topical cream Apply 1 g  topically 2 times a day. Continue for up to 2 weeks (or less if symptoms resolve). 30 g 2    fluconazole (DIFLUCAN) 100 MG Tab Take 1 Tablet by mouth every day. 1 Tablet 0    Empagliflozin (JARDIANCE) 10 MG Tab tablet Take 1 Tablet by mouth every day. 90 Tablet 4    magnesium oxide 400 (240 Mg) MG Tab Take 1 Tablet by mouth 2 times a day. 30 Tablet 0    meloxicam (MOBIC) 7.5 MG Tab TAKE 1 TABLET BY MOUTH TWICE A DAY 30 Tablet 1    bisoprolol (ZEBETA) 5 MG Tab Take 1 Tablet by mouth every day. (Patient taking differently: Take 5 mg by mouth every evening.) 100 Tablet 3    aspirin 81 MG EC tablet Take 1 Tablet by mouth every day. 100 Tablet 3    CRANBERRY PO Take 1 Tablet by mouth every evening.      Probiotic Product (PROBIOTIC DAILY PO) Take 1 Tablet by mouth every evening.      traMADol (ULTRAM) 50 MG Tab Take 50 mg by mouth 2 times a day.      atorvastatin (LIPITOR) 40 MG Tab Take 40 mg by mouth every evening.      polyethylene glycol 3350 (MIRALAX) 17 GM/SCOOP Powder Take 17 g by mouth every evening.      Melatonin 10 MG Tab Take 10 mg by mouth every evening. Indications: Trouble Sleeping      acetaminophen (TYLENOL) 500 MG Tab Take 1,000 mg by mouth every 6 hours as needed. Indications: Fever, Pain      Pain Pump (PATIENT SUPPLIED) Device Inject  as directed continuous. Patient's Pain Pump (placed and maintained as an outpatient)    Medications/concentrations:   HYDROMORPHONE 4.0mg/ml      Last changed: 2/14/2024  Refill pump before date: 5/14/2024    Continuous infusion rates (Drug/Rate):   HYDROMORPHONE 0.7491mg/day (0.0312mg/hr)    MD office:  Dr. Lozoya's  Phone number: [phone]          Indications: severe pain      DULoxetine (CYMBALTA) 20 MG Cap DR Particles Take 20 mg by mouth 2 times a day. Indications: Musculoskeletal Pain      guaiFENesin ER (MUCINEX) 600 MG TABLET SR 12 HR Take 600 mg by mouth every 12 hours. Indications: Cough      Cyanocobalamin (VITAMIN B-12 PO) Take 1 Tablet by mouth see  administration instructions. Pt takes sporidially   Indications: supplement      Cholecalciferol (VITAMIN D3 PO) Take 1 Tablet by mouth every day. Indications: supplement      albuterol 108 (90 Base) MCG/ACT Aero Soln inhalation aerosol Inhale 2 Puffs every 6 hours as needed for Shortness of Breath. Indications: Chronic Obstructive Lung Disease      Simethicone (GAS-X PO) Take 1 Tablet by mouth 3 times a day. Indications: gas       No current facility-administered medications for this visit.       Patient Active Problem List    Diagnosis Date Noted    Vitamin D deficiency 08/01/2024    Peroneal palsy, right 05/14/2024    Dyslipidemia 02/28/2024    Normocytic anemia 02/28/2024    Elevated troponin 02/28/2024    Cellulitis of right lower extremity 02/28/2024    Left lower quadrant abdominal pain 02/28/2024    S/P TAVR (transcatheter aortic valve replacement) 01/08/2024    Stage 3b chronic kidney disease 11/07/2023    PAF (paroxysmal atrial fibrillation) ([facility]) 10/31/2023    Pulmonary HTN ([facility]) 10/31/2023    Physical debility 06/07/2023    Prediabetes 05/08/2023    External hemorrhoid 05/08/2023    TIA (transient ischemic attack) 05/07/2023    Chronic hypoxemic respiratory failure ([facility]) 05/06/2023    S/P insertion of intrathecal pump 05/05/2023    Hammer toes of both feet 12/29/2022    Dizziness 12/29/2022    Osteoarthritis of left hip 11/30/2022    Pain of left hip joint 11/30/2022    Trochanteric bursitis of left hip 11/30/2022    Insomnia due to medical condition 10/13/2022    Avascular necrosis of bone of left hip ([facility]) 08/17/2022    Lower extremity edema 08/03/2022    Chronic obstructive pulmonary disease ([facility]) 06/22/2022    Solitary pulmonary nodule 06/22/2022    Acute on chronic heart failure with preserved ejection fraction ([facility]) 06/22/2022    Bilateral carotid bruits 06/06/2022    Chronic pain syndrome 06/06/2022    PVC's (premature ventricular contractions) 06/06/2022    Moderate mitral insufficiency  "06/06/2022    Urge incontinence of urine 05/12/2022    H/O Clostridium difficile infection 05/12/2022    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    Primary hypertension 03/29/2022    Incomplete defecation 03/29/2022    Moderate asthma without complication 03/29/2022    Spinal stenosis of lumbar region without neurogenic claudication 03/29/2022    Pelvic floor dysfunction 03/29/2022    Hypoxemia 03/29/2022    Other hyperlipidemia 01/13/2021    Primary cancer of left lower lobe of lung (HCC) 01/15/2020    Lichen sclerosus 01/18/2011        Objective:   Pulse 70   Ht 1.702 m (5' 7\")   Wt 74.8 kg (165 lb) Comment: last weigh-in  SpO2 90%   BMI 25.84 kg/m²     Physical Exam:via virtual visit  Constitutional: Alert, no distress, well-groomed.  Skin: No rashes in visible areas.  Eye: Round. Conjunctiva clear, lids normal. No icterus.   ENMT: Lips pink without lesions, good dentition, moist mucous membranes. Phonation normal.  Neck: No masses, no thyromegaly. Moves freely without pain.  Respiratory: Unlabored respiratory effort, no cough or audible wheeze  Psych: Alert and oriented x3, normal affect and mood.     Assessment and Plan:   The following treatment plan was discussed:     1. Acute on chronic heart failure with preserved ejection fraction (HCC)  Chronic.  Stable.  Follow-up with cardiology in 4 weeks.  Complete CMP, BNP.    2. Chronic obstructive pulmonary disease with acute lower respiratory infection (HCC)  Chronic.  Stable.  Continue plan of care per pulmonology.  Continue O2 supplementation.    3. Lower extremity edema  Patient will complete lower extremity Doppler ultrasound.  Continue Lasix 40 mg daily.    4. Normocytic anemia  - CBC WITH DIFFERENTIAL; Future  - IRON/TOTAL IRON BIND; Future  - FERRITIN; Future  - TRANSFERRIN SATURATION    5. Prediabetes  - HEMOGLOBIN A1C; Future  - Comp Metabolic Panel; Future    6. Vitamin D deficiency  - VITAMIN D,25 HYDROXY (DEFICIENCY); " Future      Follow-up: No follow-ups on file.

## 2024-08-07 ENCOUNTER — APPOINTMENT (OUTPATIENT)
Dept: NEUROLOGY | Facility: MEDICAL CENTER | Age: 89
End: 2024-08-07
Attending: SPECIALIST
Payer: MEDICARE

## 2024-08-08 ENCOUNTER — PATIENT OUTREACH (OUTPATIENT)
Dept: HEALTH INFORMATION MANAGEMENT | Facility: OTHER | Age: 89
End: 2024-08-08
Payer: MEDICARE

## 2024-08-08 DIAGNOSIS — I50.33 ACUTE ON CHRONIC HEART FAILURE WITH PRESERVED EJECTION FRACTION (HCC): ICD-10-CM

## 2024-08-08 NOTE — PROGRESS NOTES
Nurse CM outreach call for monthly CCM assessment.  Pt answered telephone.    Assessment    Pt reports no changes to her overall medical problems.  States her lower extremities continue to be swollen. Reports when she elevates her legs edema does decrease. Reports by the end of the day her legs are more swollen. Pt hasn't completed US of lower extremities that was ordered. States she is trying to find facility in [place]. States she has used [facility] in the past and may contact them.  Pt also provided with telephone number for [facility] to see if they are able to complete imaging in the home as pt has transportation issues. Pt has been referred to [facility] in the past for transportation assist. [facility] for transportation assist in [place] per notes. Pt will see if insurance covers either of the facilities to get imaging.    Pt has history of CHF. Reports no current shortness of breath. Pt follows with cardiology. Reports she is taking medications as directed. Pt currently taking lasix 40 mg a day. Pt reports she does spend a lot of time in the bathroom having to urinate. Stated she needs to follow-up with urology. Pt states she had to cancel appt in the past related to other health conditions.     Education and Self Management of Care    Get imaging completed as ordered. Get labs completed. Nurse reviewed all upcoming appts. Continue to keep legs elevated. Follow low sodium diet.     Plan of Care and Goals    Reviewed care plan and goals:     Goal:  Pt will have a reduction in lower extremity edema  Barriers: Mobility issues, transporation  Interventions: Follow low sodium diet, follow-up with specialists as scheduled. Elevate legs when sitting down. Wear oxygen as directed. Take medications as directed.     Start Date: 8/8/24  End Date:        Barriers:    Multiple barriers, transportation, chronic health conditions    Progress:    Continue to work on progress    Next outreach:   One month  Nurse Care Coordinator:  Radha Martinez  [phone]                            0 = understands/communicates without difficulty

## 2024-08-27 ENCOUNTER — TELEPHONE (OUTPATIENT)
Dept: CARDIOLOGY | Facility: MEDICAL CENTER | Age: 89
End: 2024-08-27
Payer: MEDICARE

## 2024-08-27 NOTE — TELEPHONE ENCOUNTER
JEAN PIERRE    Caller: Chelly Decker    Topic/issue: MEDICAL ADVICE    Chelly is requesting that our office fax over her pending imaging  lab orders to the following facility:    [facility]  Central Fax: [phone]  Imaging  Lab Fax: [phone]    Chelly has an upcoming appointment on 9/3 and would like to have the results for these tests at her OV. Please advise.    Thank you,  Cali ALVAREZ    Callback Number: [phone]

## 2024-09-03 ENCOUNTER — TELEPHONE (OUTPATIENT)
Dept: CARDIOLOGY | Facility: MEDICAL CENTER | Age: 89
End: 2024-09-03
Payer: MEDICARE

## 2024-09-03 ENCOUNTER — APPOINTMENT (OUTPATIENT)
Dept: CARDIOLOGY | Facility: MEDICAL CENTER | Age: 89
End: 2024-09-03
Attending: NURSE PRACTITIONER
Payer: MEDICARE

## 2024-09-11 ENCOUNTER — PATIENT OUTREACH (OUTPATIENT)
Dept: HEALTH INFORMATION MANAGEMENT | Facility: OTHER | Age: 89
End: 2024-09-11
Payer: MEDICARE

## 2024-09-11 DIAGNOSIS — J44.0 CHRONIC OBSTRUCTIVE PULMONARY DISEASE WITH ACUTE LOWER RESPIRATORY INFECTION (HCC): ICD-10-CM

## 2024-09-11 DIAGNOSIS — I50.33 ACUTE ON CHRONIC HEART FAILURE WITH PRESERVED EJECTION FRACTION (HCC): ICD-10-CM

## 2024-09-11 NOTE — PROGRESS NOTES
Nurse  outreach call to pt for monthly CCM assessment.    Assessment    Pt reports she had a fall on 8/29/24. States she went to [facility] urgent care on 8/29. Pt reports she had x-ray and was informed she could have hairline fracture of her right ankle.  Reports she was given a boot to wear on her foot and told to go to ER. Reports because she had possible cellulitis and needed ultrasound and additional imaging she was referred to [facility].  Pt reports in the ER she had multiple imaging tests done. Reports she was told in the ER to follow-up with [facility] for her foot.   Pt states she went back to [facility] and was told she couldn't see a specialist for her foot until November. Pt reports she also has foot drop in the right foot from a previous injury.  Pt reports her right foot is not as swollen as it previously was.  Reports she is putting a small amount of weight on the foot. Pt would like recommendation from PCP as she can't be seen by foot specialist until November. Pt is not sure if she has a fracture on the ankle/leg. Pt reports she does feel like her cellulitis has improved. Reports leg is not as red or swollen. Pt currently noted on schedule to see PCP on 10/9.  No sooner appt at [facility] to see PCP. Nurse will route update to PCP for recommendation. Pt has history of COPD. Reports no current respiratory problems.     Education and Self Management of Care    Follow-up with specialist as scheduled. Fall precautions.  Go to ER if any worsening symptoms. Reviewed upcoming appts.     Plan of Care and Goals    Reviewed care plan    Annual case review:  Pt has general risk score of 10.  Multiple chronic health conditions. Pt recently seen in ER after a fall.  Pt will continue to be followed in the CCM program for continued support and resources related to chronic health conditions.     Barriers:    Limited mobility    Progress:    Continue to work on progress    Next outreach: One month.     Nurse Care  Coordinator:  Radha Martinez  [phone]

## 2024-09-17 ENCOUNTER — TELEPHONE (OUTPATIENT)
Dept: CARDIOLOGY | Facility: MEDICAL CENTER | Age: 89
End: 2024-09-17

## 2024-09-17 ENCOUNTER — TELEPHONE (OUTPATIENT)
Dept: HEALTH INFORMATION MANAGEMENT | Facility: OTHER | Age: 89
End: 2024-09-17

## 2024-09-17 ENCOUNTER — TELEMEDICINE (OUTPATIENT)
Dept: MEDICAL GROUP | Facility: PHYSICIAN GROUP | Age: 89
End: 2024-09-17
Payer: MEDICARE

## 2024-09-17 VITALS
BODY MASS INDEX: 25.9 KG/M2 | OXYGEN SATURATION: 95 % | WEIGHT: 165 LBS | HEART RATE: 79 BPM | SYSTOLIC BLOOD PRESSURE: 128 MMHG | DIASTOLIC BLOOD PRESSURE: 68 MMHG | HEIGHT: 67 IN

## 2024-09-17 DIAGNOSIS — S99.911D INJURY OF RIGHT ANKLE, SUBSEQUENT ENCOUNTER: ICD-10-CM

## 2024-09-17 DIAGNOSIS — R60.0 LOWER EXTREMITY EDEMA: ICD-10-CM

## 2024-09-17 PROBLEM — S99.911A INJURY OF RIGHT ANKLE: Status: ACTIVE | Noted: 2024-09-17

## 2024-09-17 PROCEDURE — 99214 OFFICE O/P EST MOD 30 MIN: CPT | Performed by: INTERNAL MEDICINE

## 2024-09-17 ASSESSMENT — FIBROSIS 4 INDEX: FIB4 SCORE: 3.12

## 2024-09-17 NOTE — TELEPHONE ENCOUNTER
Nurse CM outreach call to pt for follow-up.  Pt answered telephone.  Nurse informed pt that an appt was available to see her PCP for follow-up on her foot problem. Pt reports she is currently sleeping. Requesting nurse call back later. Nalini TOMLIN, sent pt a [employer] message with update on appt time.  Pt scheduled for today at 4:50 for virtual appt.

## 2024-09-17 NOTE — TELEPHONE ENCOUNTER
JEAN PIERRE    Caller: Chelly Decker    Topic/issue: MEDICAL ADVICE    Chelly needs some advice regarding her appointment in December with . She would like for her care to be managed by a provider in [place], [place]. Please advise.    Thank you,  Cali ALVAREZ    Callback Number: [phone]

## 2024-09-18 ENCOUNTER — APPOINTMENT (OUTPATIENT)
Dept: MEDICAL GROUP | Facility: PHYSICIAN GROUP | Age: 89
End: 2024-09-18
Payer: MEDICARE

## 2024-09-18 NOTE — PROGRESS NOTES
Virtual Visit: Established Patient   This visit was conducted via Teams using secure and encrypted videoconferencing technology.   The patient was in their home in the [facility].    The patient's identity was confirmed and verbal consent was obtained for this virtual visit.     Subjective:   CC:   Chief Complaint   Patient presents with    Follow-Up     Right knee pain, right foot. PT went to [facility]; foot drop        Chelly Decker is a 88 y.o. female presenting for evaluation and management of:    CC: Right ankle injury    HPI:  Chelly presents with the following    1. Injury of right ankle, subsequent encounter  Patient presented to the [facility] [place] on August 29 with complaints of bilateral knee pain, hip pain and for evaluation of right foot and ankle pain.  Preliminary x-rays completed which showed questionable nondisplaced fracture to the right distal fibula.  Patient was given a walking boot with recommendations to follow-up with a foot and ankle specialist in 10 to 14 days.  Additionally, patient was sent to the ER for evaluation and treatment for lower extremity cellulitis.  Patient presented to the emergency room after leaving the urgent care.  In regards to possible right ankle fracture, recommendations made to continue with the walking boot and follow-up with foot and ankle specialist in 10 to 14 days.  Patient has tried to contact orthopedic specialist with [facility], but was given an appointment at the end of November.  Patient does not know how to proceed.  She is unable to travel to [place] due to transportation issues.    2. Lower extremity edema  Patient presented to [facility] in [place] for knee pain, hip pain and right ankle pain.  Possible right ankle fracture.  Sent to the emergency room for lower extremity edema and questionable cellulitis.  Rule out DVT.  In the ER, lower extremity Doppler ultrasound negative for DVT.  Patient was started on Keflex and doxycycline for  possible cellulitis.  Patient's edema has since slowly resolved.    ROS   Of ROS per HPI.  Denies all other cardiopulmonary, GI, neurologic symptoms at this time.    Current medicines (including changes today)  Current Outpatient Medications   Medication Sig Dispense Refill    furosemide (LASIX) 20 MG Tab Take 2 Tablets by mouth every day. 180 Tablet 3    Empagliflozin (JARDIANCE) 10 MG Tab tablet Take 1 Tablet by mouth every day. 90 Tablet 4    magnesium oxide 400 (240 Mg) MG Tab Take 1 Tablet by mouth 2 times a day. 30 Tablet 0    bisoprolol (ZEBETA) 5 MG Tab Take 1 Tablet by mouth every day. (Patient taking differently: Take 5 mg by mouth every evening.) 100 Tablet 3    aspirin 81 MG EC tablet Take 1 Tablet by mouth every day. 100 Tablet 3    CRANBERRY PO Take 1 Tablet by mouth every evening.      Probiotic Product (PROBIOTIC DAILY PO) Take 1 Tablet by mouth every evening.      atorvastatin (LIPITOR) 40 MG Tab Take 40 mg by mouth every evening.      polyethylene glycol 3350 (MIRALAX) 17 GM/SCOOP Powder Take 17 g by mouth every evening.      Melatonin 10 MG Tab Take 10 mg by mouth every evening. Indications: Trouble Sleeping      acetaminophen (TYLENOL) 500 MG Tab Take 1,000 mg by mouth every 6 hours as needed. Indications: Fever, Pain      Pain Pump (PATIENT SUPPLIED) Device Inject  as directed continuous. Patient's Pain Pump (placed and maintained as an outpatient)    Medications/concentrations:   HYDROMORPHONE 4.0mg/ml      Last changed: 2/14/2024  Refill pump before date: 5/14/2024    Continuous infusion rates (Drug/Rate):   HYDROMORPHONE 0.7491mg/day (0.0312mg/hr)    MD office:  Dr. Lozoya's  Phone number: [phone]          Indications: severe pain      DULoxetine (CYMBALTA) 20 MG Cap DR Particles Take 20 mg by mouth 2 times a day. Indications: Musculoskeletal Pain      guaiFENesin ER (MUCINEX) 600 MG TABLET SR 12 HR Take 600 mg by mouth every 12 hours. Indications: Cough      Cyanocobalamin (VITAMIN B-12  PO) Take 1 Tablet by mouth see administration instructions. Pt takes sporidially   Indications: supplement      albuterol 108 (90 Base) MCG/ACT Aero Soln inhalation aerosol Inhale 2 Puffs every 6 hours as needed for Shortness of Breath. Indications: Chronic Obstructive Lung Disease      Simethicone (GAS-X PO) Take 1 Tablet by mouth 3 times a day. Indications: gas      nystatin (MYCOSTATIN) 020178 UNIT/GM Cream topical cream Apply 1 g topically 2 times a day. Continue for up to 2 weeks (or less if symptoms resolve). 30 g 2    fluconazole (DIFLUCAN) 100 MG Tab Take 1 Tablet by mouth every day. 1 Tablet 0    meloxicam (MOBIC) 7.5 MG Tab TAKE 1 TABLET BY MOUTH TWICE A DAY 30 Tablet 1    traMADol (ULTRAM) 50 MG Tab Take 50 mg by mouth 2 times a day.      Cholecalciferol (VITAMIN D3 PO) Take 1 Tablet by mouth every day. Indications: supplement       No current facility-administered medications for this visit.       Patient Active Problem List    Diagnosis Date Noted    Injury of right ankle 09/17/2024    Vitamin D deficiency 08/01/2024    Peroneal palsy, right 05/14/2024    Dyslipidemia 02/28/2024    Normocytic anemia 02/28/2024    Elevated troponin 02/28/2024    Cellulitis of right lower extremity 02/28/2024    Left lower quadrant abdominal pain 02/28/2024    S/P TAVR (transcatheter aortic valve replacement) 01/08/2024    Stage 3b chronic kidney disease 11/07/2023    PAF (paroxysmal atrial fibrillation) ([facility]) 10/31/2023    Pulmonary HTN ([facility]) 10/31/2023    Physical debility 06/07/2023    Prediabetes 05/08/2023    External hemorrhoid 05/08/2023    TIA (transient ischemic attack) 05/07/2023    Chronic hypoxemic respiratory failure ([facility]) 05/06/2023    S/P insertion of intrathecal pump 05/05/2023    Hammer toes of both feet 12/29/2022    Dizziness 12/29/2022    Osteoarthritis of left hip 11/30/2022    Pain of left hip joint 11/30/2022    Trochanteric bursitis of left hip 11/30/2022    Insomnia due to medical condition  "10/13/2022    Avascular necrosis of bone of left hip (HCC) 08/17/2022    Lower extremity edema 08/03/2022    Chronic obstructive pulmonary disease (HCC) 06/22/2022    Solitary pulmonary nodule 06/22/2022    Acute on chronic heart failure with preserved ejection fraction (HCC) 06/22/2022    Bilateral carotid bruits 06/06/2022    Chronic pain syndrome 06/06/2022    PVC's (premature ventricular contractions) 06/06/2022    Moderate mitral insufficiency 06/06/2022    Urge incontinence of urine 05/12/2022    H/O Clostridium difficile infection 05/12/2022    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  Skin: No rashes in visible areas.  Eye: Round. Conjunctiva clear, lids normal. No icterus.   ENMT: Lips pink without lesions, good dentition, moist mucous membranes. Phonation normal.  Neck: No masses, no thyromegaly. Moves freely without pain.  Respiratory: Unlabored respiratory effort, no cough or audible wheeze  Psych: Alert and oriented x3, normal affect and mood.     Assessment and Plan:   The following treatment plan was discussed:     1. Injury of right ankle, subsequent encounter  ER/UC clinical documentation reviewed.  Recommend for patient to follow-up with [facility] in [place] for acute care follow-up as patient unable to be seen by specialist until November.  Possible reimaging.    2. Lower extremity " edema  Completed antibiotics, symptoms resolved.    Follow-up: No follow-ups on file.

## 2024-09-19 RX ORDER — SPIRONOLACTONE 25 MG/1
25 TABLET ORAL DAILY
Qty: 100 TABLET | Refills: 0 | Status: SHIPPED | OUTPATIENT
Start: 2024-09-19

## 2024-09-24 ENCOUNTER — TELEPHONE (OUTPATIENT)
Dept: CARDIOLOGY | Facility: MEDICAL CENTER | Age: 89
End: 2024-09-24
Payer: MEDICARE

## 2024-09-24 NOTE — TELEPHONE ENCOUNTER
Called and LVM re: pt requesting to be scheduled in [place] instead of [place]. This is okay per CARO Cabezas, but appt needs to be prior to 03/08/2025. Please schedule pt w/ SC. /cg 09/24/24

## 2024-10-14 ENCOUNTER — PATIENT OUTREACH (OUTPATIENT)
Dept: HEALTH INFORMATION MANAGEMENT | Facility: OTHER | Age: 89
End: 2024-10-14
Payer: MEDICARE

## 2024-10-14 DIAGNOSIS — J44.0 CHRONIC OBSTRUCTIVE PULMONARY DISEASE WITH ACUTE LOWER RESPIRATORY INFECTION (HCC): ICD-10-CM

## 2024-10-22 ENCOUNTER — APPOINTMENT (OUTPATIENT)
Dept: MEDICAL GROUP | Facility: PHYSICIAN GROUP | Age: 89
End: 2024-10-22
Payer: MEDICARE

## 2024-10-23 ENCOUNTER — TELEMEDICINE (OUTPATIENT)
Dept: MEDICAL GROUP | Facility: PHYSICIAN GROUP | Age: 89
End: 2024-10-23
Payer: MEDICARE

## 2024-10-23 VITALS — BODY MASS INDEX: 25.9 KG/M2 | WEIGHT: 165 LBS | HEIGHT: 67 IN | OXYGEN SATURATION: 95 %

## 2024-10-23 DIAGNOSIS — M25.562 PAIN IN BOTH KNEES, UNSPECIFIED CHRONICITY: ICD-10-CM

## 2024-10-23 DIAGNOSIS — S99.911D INJURY OF RIGHT ANKLE, SUBSEQUENT ENCOUNTER: ICD-10-CM

## 2024-10-23 DIAGNOSIS — R42 DIZZINESS: ICD-10-CM

## 2024-10-23 DIAGNOSIS — M21.371 RIGHT FOOT DROP: ICD-10-CM

## 2024-10-23 DIAGNOSIS — N31.9 NEUROGENIC BLADDER: ICD-10-CM

## 2024-10-23 DIAGNOSIS — M25.561 PAIN IN BOTH KNEES, UNSPECIFIED CHRONICITY: ICD-10-CM

## 2024-10-23 PROBLEM — R79.89 ELEVATED TROPONIN: Status: RESOLVED | Noted: 2024-02-28 | Resolved: 2024-10-23

## 2024-10-23 PROBLEM — K64.4 EXTERNAL HEMORRHOID: Status: RESOLVED | Noted: 2023-05-08 | Resolved: 2024-10-23

## 2024-10-23 PROCEDURE — 99214 OFFICE O/P EST MOD 30 MIN: CPT | Mod: 95 | Performed by: INTERNAL MEDICINE

## 2024-10-23 ASSESSMENT — FIBROSIS 4 INDEX: FIB4 SCORE: 3.12

## 2024-10-29 ENCOUNTER — TELEPHONE (OUTPATIENT)
Dept: CARDIOLOGY | Facility: MEDICAL CENTER | Age: 89
End: 2024-10-29
Payer: MEDICARE

## 2024-10-30 ENCOUNTER — OFFICE VISIT (OUTPATIENT)
Dept: NEUROLOGY | Facility: MEDICAL CENTER | Age: 89
End: 2024-10-30
Attending: SPECIALIST
Payer: MEDICARE

## 2024-10-30 VITALS
SYSTOLIC BLOOD PRESSURE: 110 MMHG | BODY MASS INDEX: 26.02 KG/M2 | TEMPERATURE: 97.5 F | HEART RATE: 72 BPM | HEIGHT: 67 IN | OXYGEN SATURATION: 96 % | WEIGHT: 165.79 LBS | RESPIRATION RATE: 12 BRPM | DIASTOLIC BLOOD PRESSURE: 52 MMHG

## 2024-10-30 DIAGNOSIS — G62.9 NEUROPATHY: ICD-10-CM

## 2024-10-30 DIAGNOSIS — M48.07 SPINAL STENOSIS OF LUMBOSACRAL REGION: ICD-10-CM

## 2024-10-30 PROCEDURE — 99212 OFFICE O/P EST SF 10 MIN: CPT | Performed by: SPECIALIST

## 2024-10-30 RX ORDER — PANTOPRAZOLE SODIUM 40 MG/1
20 TABLET, DELAYED RELEASE ORAL DAILY
COMMUNITY
Start: 2024-09-21

## 2024-10-30 RX ORDER — HYDROCODONE BITARTRATE AND ACETAMINOPHEN 5; 325 MG/1; MG/1
1 TABLET ORAL EVERY 4 HOURS PRN
COMMUNITY
Start: 2024-09-30

## 2024-10-30 ASSESSMENT — PATIENT HEALTH QUESTIONNAIRE - PHQ9
5. POOR APPETITE OR OVEREATING: 2 - MORE THAN HALF THE DAYS
SUM OF ALL RESPONSES TO PHQ QUESTIONS 1-9: 17
CLINICAL INTERPRETATION OF PHQ2 SCORE: 3

## 2024-10-30 ASSESSMENT — FIBROSIS 4 INDEX: FIB4 SCORE: 3.12

## 2024-11-25 ENCOUNTER — PATIENT OUTREACH (OUTPATIENT)
Dept: HEALTH INFORMATION MANAGEMENT | Facility: OTHER | Age: 89
End: 2024-11-25
Payer: MEDICARE

## 2024-11-25 DIAGNOSIS — J44.0 CHRONIC OBSTRUCTIVE PULMONARY DISEASE WITH ACUTE LOWER RESPIRATORY INFECTION (HCC): ICD-10-CM

## 2024-11-25 NOTE — PROGRESS NOTES
11/25/24 1:35pm: Nurse CM outreach call to pt for monthly CCM assessment.  VM left requesting a return call to nurse at [phone].    12/2/24 1:40 pm: Nurse CM second outreach attempt to pt for monthly CCM assessment.  CARISSA left with CM contact number requesting a return call.

## 2024-12-03 ENCOUNTER — PATIENT MESSAGE (OUTPATIENT)
Dept: MEDICAL GROUP | Facility: PHYSICIAN GROUP | Age: 89
End: 2024-12-03
Payer: MEDICARE

## 2024-12-04 RX ORDER — ATORVASTATIN CALCIUM 40 MG/1
40 TABLET, FILM COATED ORAL EVERY EVENING
Qty: 100 TABLET | Refills: 3 | Status: SHIPPED | OUTPATIENT
Start: 2024-12-04

## 2024-12-04 NOTE — PATIENT COMMUNICATION
Received request via: Patient    Was the patient seen in the last year in this department? Yes    Does the patient have an active prescription (recently filled or refills available) for medication(s) requested? No    Pharmacy Name: [facility]    Does the patient have [facility] and need 100-day supply? (This applies to ALL medications) Yes, quantity updated to 100 days

## 2024-12-06 ENCOUNTER — PATIENT OUTREACH (OUTPATIENT)
Dept: HEALTH INFORMATION MANAGEMENT | Facility: OTHER | Age: 89
End: 2024-12-06
Payer: MEDICARE

## 2024-12-06 DIAGNOSIS — J44.0 CHRONIC OBSTRUCTIVE PULMONARY DISEASE WITH ACUTE LOWER RESPIRATORY INFECTION (HCC): ICD-10-CM

## 2024-12-06 DIAGNOSIS — I50.33 ACUTE ON CHRONIC HEART FAILURE WITH PRESERVED EJECTION FRACTION (HCC): ICD-10-CM

## 2024-12-06 NOTE — CARE PLAN
Problem: Knowledge deficit of congestive heart failure disease process  Goal: HP Increase understanding of congestive heart failure/long term goal  Outcome: Progressing     Problem: Low Sodium Diet Management  Goal: Limit dietary sodium/long term goal  Outcome: Progressing     Problem: Excess Fluid  Goal: Establish a plan for excess fluid management/long term goal  Outcome: Progressing

## 2024-12-06 NOTE — PROGRESS NOTES
Pt called nurse DYLAN for update.  Monthly CCM assessment completed.     Assessment    Pt reports she has been following at [facility] for her knee pain. Reports she has received injections in her knees.  Pt has history of spinal stenosis.  Following with [facility] and also pain management. Pt reports she follows with Dr. Lozoya for pain pump management. Pt using walker or wheelchair to assist with mobility.     Pt has history of CHF. Pt reports she has follow-up appt this month with cardiology. Pt denies any lower extremity edema at this time. States legs have improved. Denies feeling short of breath. States with exertion will get some shortness of breath.     Pt reports she continues [place] Pt relies on family for transportation.  Pt reports she has had a difficult month after her dog recently passed away. Pt reports family support.     Education and Self-Management of Care    Continue to monitor legs for edema.  Pt unable to do daily weights related to mobility issues.  Notify cardiology if any worsening shortness of breath, edema in lower extremities. Follow-up with specialists as scheduled. Follow low sodium diet.     Plan of Care and Goals    Goal:  Pt will have a reduction in lower extremity edema  Barriers: Mobility issues, transporation  Interventions: Follow low sodium diet, follow-up with specialists as scheduled. Elevate legs when sitting down. Wear oxygen as directed. Take medications as directed.      Start Date: 8/8/24    Barriers:    Limited mobility, multiple chronic health conditions    Progress:    Continue to work on progress    Next outreach: One month  Nurse Care Coordinator: Radha Martinez  [phone]

## 2024-12-13 ENCOUNTER — TELEPHONE (OUTPATIENT)
Dept: CARDIOLOGY | Facility: MEDICAL CENTER | Age: 89
End: 2024-12-13
Payer: MEDICARE

## 2024-12-13 NOTE — TELEPHONE ENCOUNTER
Received notification that patient had reached out to reschedule 1 year post TAVR follow up visit.     Called and spoke to patient. Visit with Chantal BUTLER rescheduled for 1/15/2025. All questions answered.

## 2024-12-16 ENCOUNTER — HOSPITAL ENCOUNTER (OUTPATIENT)
Dept: CARDIOLOGY | Facility: MEDICAL CENTER | Age: 89
End: 2024-12-16
Attending: INTERNAL MEDICINE
Payer: MEDICARE

## 2024-12-16 DIAGNOSIS — I35.0 NONRHEUMATIC AORTIC (VALVE) STENOSIS: ICD-10-CM

## 2024-12-16 PROCEDURE — 93306 TTE W/DOPPLER COMPLETE: CPT

## 2024-12-17 LAB
LV EJECT FRACT  99904: 55
LV EJECT FRACT MOD 2C 99903: 45.42
LV EJECT FRACT MOD 4C 99902: 48.1
LV EJECT FRACT MOD BP 99901: 48.22

## 2024-12-17 PROCEDURE — 93306 TTE W/DOPPLER COMPLETE: CPT | Mod: 26 | Performed by: INTERNAL MEDICINE

## 2024-12-19 ENCOUNTER — APPOINTMENT (OUTPATIENT)
Dept: CARDIOLOGY | Facility: MEDICAL CENTER | Age: 89
End: 2024-12-19
Payer: MEDICARE

## 2025-01-07 RX ORDER — SPIRONOLACTONE 25 MG/1
25 TABLET ORAL DAILY
Qty: 100 TABLET | Refills: 0 | Status: SHIPPED | OUTPATIENT
Start: 2025-01-07

## 2025-01-13 ENCOUNTER — TELEPHONE (OUTPATIENT)
Dept: CARDIOLOGY | Facility: MEDICAL CENTER | Age: 89
End: 2025-01-13
Payer: MEDICARE

## 2025-01-14 NOTE — TELEPHONE ENCOUNTER
Received call from centralized scheduling stating patient had called and needed to reschedule 1 year post TAVR appointment. Call transferred to RN, but patient disconnected call.    Called and spoke to patient. She states she needs to talk with her daughter Krystal to confirm what days she is available for rescheduling. Patient states she will reach out tomorrow.

## 2025-01-14 NOTE — TELEPHONE ENCOUNTER
Received call back from patient. 1 year post TAVR appointment rescheduled to 1/22/2025. All questions answered. Patient verbalizes understanding.

## 2025-01-15 ENCOUNTER — APPOINTMENT (OUTPATIENT)
Dept: CARDIOLOGY | Facility: MEDICAL CENTER | Age: 89
End: 2025-01-15
Payer: MEDICARE

## 2025-01-15 ENCOUNTER — PATIENT OUTREACH (OUTPATIENT)
Dept: HEALTH INFORMATION MANAGEMENT | Facility: OTHER | Age: 89
End: 2025-01-15
Payer: MEDICARE

## 2025-01-15 DIAGNOSIS — I50.33 ACUTE ON CHRONIC HEART FAILURE WITH PRESERVED EJECTION FRACTION (HCC): ICD-10-CM

## 2025-01-15 DIAGNOSIS — J44.0 CHRONIC OBSTRUCTIVE PULMONARY DISEASE WITH ACUTE LOWER RESPIRATORY INFECTION (HCC): ICD-10-CM

## 2025-01-15 NOTE — PROGRESS NOTES
1/15/25 11:20 am: Nurse CM outreach call to pt for monthly CCM assessment. Pt answered telephone. Reports she currently can't talk as she is resting. Reports she recently had injections in her knees.  Pt agreeable to outreach call at later date and time.     1/21/25 4:00 pm: Nurse CM outreach call to pt for monthly CCM assessment.  Pt answered telephone. Pt reports she is too tired to talk to nurse CM at this time. Pt reports she would like a call back at another time. Pt does have follow-up with PCP this week. Pt aware of appt date and time.

## 2025-01-22 ENCOUNTER — DOCUMENTATION (OUTPATIENT)
Dept: CARDIOLOGY | Facility: MEDICAL CENTER | Age: 89
End: 2025-01-22

## 2025-01-22 ENCOUNTER — OFFICE VISIT (OUTPATIENT)
Dept: CARDIOLOGY | Facility: MEDICAL CENTER | Age: 89
End: 2025-01-22
Payer: MEDICARE

## 2025-01-22 VITALS
RESPIRATION RATE: 16 BRPM | HEIGHT: 67 IN | BODY MASS INDEX: 26.68 KG/M2 | SYSTOLIC BLOOD PRESSURE: 128 MMHG | HEART RATE: 61 BPM | DIASTOLIC BLOOD PRESSURE: 52 MMHG | OXYGEN SATURATION: 92 % | WEIGHT: 170 LBS

## 2025-01-22 DIAGNOSIS — I10 PRIMARY HYPERTENSION: ICD-10-CM

## 2025-01-22 DIAGNOSIS — E78.49 OTHER HYPERLIPIDEMIA: ICD-10-CM

## 2025-01-22 DIAGNOSIS — I48.0 PAF (PAROXYSMAL ATRIAL FIBRILLATION) (HCC): ICD-10-CM

## 2025-01-22 DIAGNOSIS — N18.32 STAGE 3B CHRONIC KIDNEY DISEASE: ICD-10-CM

## 2025-01-22 DIAGNOSIS — Z95.2 S/P TAVR (TRANSCATHETER AORTIC VALVE REPLACEMENT): ICD-10-CM

## 2025-01-22 DIAGNOSIS — J96.11 CHRONIC HYPOXEMIC RESPIRATORY FAILURE (HCC): ICD-10-CM

## 2025-01-22 DIAGNOSIS — I27.20 PULMONARY HTN (HCC): ICD-10-CM

## 2025-01-22 DIAGNOSIS — I50.33 ACUTE ON CHRONIC HEART FAILURE WITH PRESERVED EJECTION FRACTION (HCC): ICD-10-CM

## 2025-01-22 DIAGNOSIS — J44.0 CHRONIC OBSTRUCTIVE PULMONARY DISEASE WITH ACUTE LOWER RESPIRATORY INFECTION (HCC): ICD-10-CM

## 2025-01-22 PROCEDURE — 3074F SYST BP LT 130 MM HG: CPT

## 2025-01-22 PROCEDURE — 99214 OFFICE O/P EST MOD 30 MIN: CPT

## 2025-01-22 PROCEDURE — 3078F DIAST BP <80 MM HG: CPT

## 2025-01-22 PROCEDURE — 99213 OFFICE O/P EST LOW 20 MIN: CPT

## 2025-01-22 ASSESSMENT — FIBROSIS 4 INDEX: FIB4 SCORE: 3.16

## 2025-01-22 NOTE — PROGRESS NOTES
Chief Complaint   Patient presents with    Atrial Fibrillation     FV DX:PAF    Hypertension    Hyperlipidemia       Subjective     Chelly Decker is a 89 y.o. female who presents today for her 1 year postoperative follow-up visit.  She has a history of severe aortic stenosis, status post TAVR on 1/8/2024, lung cancer status post radiation, chronic lower extremity edema, pain related to avascular hip necrosis, walker and wheelchair dependent, on chronic home O2 3 to 4 L     1/22/2025 she is able to do short walks around the house. NYHA class III symptoms. Has chronic hip and back pain. She is on 4 L home O2.     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    Breath shortness 12/04/2023    uses oxygen at night, during day sometimes. 8/23/23-SOB with activity.    Cancer ([facility])     cervical cancer 1968    Cancer ([facility]) 12/04/2023    lower left lobe cancer    Cervical cancer ([facility]) 1968    [id number] chemo or radiation.    Chronic constipation     takes miralax    Chronic kidney disease (CKD)     stage 3    Chronic obstructive pulmonary disease ([facility]) 06/22/2022    Chronic pain     follows with Dr Lozoya    Congestive heart failure (HCC)     follows with Dr Salvador with [facility]    COPD (chronic obstructive pulmonary disease) ([facility])     Dental disorder 12/04/2023    upper denture and permanent bridge on lower front teeth    Dizziness 12/29/2022    Foot drop, right foot 12/04/2023    GERD (gastroesophageal reflux disease)      H/O Clostridium difficile infection 05/12/2022    Hammer toes of both feet 12/29/2022    Heart burn 12/04/2023    taking pantoprazole.    Heart murmur     High cholesterol 12/04/2023    Controled with medication    Hypertension 12/04/2023    medicated    Hypotension due to hypovolemia 08/17/2022    Hypoxia 03/29/2022    Incomplete defecation 03/29/2022    Infectious disease 04/25/2023    I was just informed that the person who drove me to my doctor appointment and home on the 25th of this month was diagnosed with the flu on the 26th of this month. I had my flu shot and after one day I have no symptoms will keep checking !    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    Spinal stenosis     pain pump for pain control    Spinal stenosis of lumbar region without neurogenic claudication 03/29/2022    Urge incontinence of urine 12/04/2023    urinary retention, diapers    Urinary bladder disorder Past [id number]    I've been under treatment for years with no results I am seeing a new urologist on May 2    Vitamin D deficiency 08/01/2024     Past Surgical History:   Procedure Laterality Date    TRANSCATHETER AORTIC VALVE  REPLACEMENT  1/8/2024    Procedure: TRANSCATHETER AORTIC VALVE REPLACEMENT;  Surgeon: Clayton Eddy M.D.;  Location: SURGERY [facility];  Service: Cardiac    ECHOCARDIOGRAM, TRANSESOPHAGEAL, INTRAOPERATIVE  1/8/2024    Procedure: ECHOCARDIOGRAM, TRANSESOPHAGEAL, INTRAOPERATIVE;  Surgeon: Clayton Eddy M.D.;  Location: SURGERY [facility];  Service: Cardiac    OTHER Bilateral 12/04/2023    IOLOU    ID TOTAL HIP ARTHROPLASTY Left 08/28/2023    Procedure: LEFT TOTAL HIP ARTHROPLASTY;  Surgeon: Daryl Perrin M.D.;  Location: SURGERY [facility];  Service: Orthopedics    PUMP REVISION Right 05/04/2023    Procedure: INTRATHECAL PAIN PUMP REVISION;  Surgeon: Tavo Lozoya M.D.;  Location: SURGERY [facility];  Service: Pain Management    PUMP INSERT/REMOVE Right 10/07/2022    Procedure: MEDTRONIC PUMP REPLACEMENT;  Surgeon: Joel Alanis M.D.;  Location: SURGERY [facility];  Service: Pain Management    ID INS/RPLC SPI NPG/RCVR POCKET N/A 02/05/2021    Procedure: IMPLANT PUMP AND CATHETER;  Surgeon: Joel Alanis M.D.;  Location: SURGERY [facility];  Service: Pain Management    HIP ARTHROPLASTY TOTAL Right 1998    APPENDECTOMY      CARPAL TUNNEL ENDOSCOPIC Bilateral     bilat    CHOLECYSTECTOMY      COLONOSCOPY      HYSTERECTOMY LAPAROSCOPY      total    LITHOTRIPSY      kidney stones removed    PRIMARY C SECTION      c section    TONSILLECTOMY       Family History   Problem Relation Age of Onset    Heart Disease Mother         Not from congestive heart failure!     Social History     Socioeconomic History    Marital status:      Spouse name: Not on file    Number of children: Not on file    Years of education: Not on file    Highest education level: Not on file   Occupational History    Occupation: retired book keeper   Tobacco Use    Smoking status: Former     Current packs/day: 0.00     Average packs/day: 1 pack/day for 61.0 years (61.0 ttl pk-yrs)     Types: Cigarettes, Electronic  Cigarettes     Start date: 1/1/1954     Quit date: 1/1/2015     Years since quitting: 10.0    Smokeless tobacco: Never    Tobacco comments:     Smoked as a teenager quit as a senior citizen   Vaping Use    Vaping status: Former    Substances: Flavoring   Substance and Sexual Activity    Alcohol use: Not Currently    Drug use: Not Currently    Sexual activity: Not on file   Other Topics Concern    Not on file   Social History Narrative    Not on file     Social Drivers of Health     Financial Resource Strain: Low Risk  (7/22/2022)    Overall Financial Resource Strain (CARDIA)     Difficulty of Paying Living Expenses: Not very hard   Food Insecurity: No Food Insecurity (7/22/2022)    Hunger Vital Sign     Worried About Running Out of Food in the Last Year: Never true     Ran Out of Food in the Last Year: Never true   Transportation Needs: Unmet Transportation Needs (7/22/2022)    PRAPARE - Transportation     Lack of Transportation (Medical): Yes     Lack of Transportation (Non-Medical): No   Physical Activity: Inactive (7/22/2022)    Exercise Vital Sign     Days of Exercise per Week: 0 days     Minutes of Exercise per Session: 0 min   Stress: No Stress Concern Present (12/8/2020)    Received from [facility] - Occupational Stress Questionnaire     Feeling of Stress : Only a little   Social Connections: Not on file   Intimate Partner Violence: Low Risk  (6/22/2024)    [facility]    History of Abuse     Have you ever been afraid of, threatened, neglected, or abused by someone?: No   Housing Stability: Unknown (7/22/2022)    Housing Stability Vital Sign     Unable to Pay for Housing in the Last Year: No     Number of Places Lived in the Last Year: Not on file     Unstable Housing in the Last Year: No     Allergies   Allergen Reactions    Sulfa Drugs Unspecified     Stomach  ache    Nauseated, diarrhea    Other reaction(s): Unspecified   Stomach ache    Erythromycin Nausea     Other Reaction(s): GI Upset    Other reaction(s): Not available     Outpatient Encounter Medications as of 1/22/2025   Medication Sig Dispense Refill    Naloxone (NARCAN) 4 MG/0.1ML Liquid 0.04 GRAM AS NEEDED AS NEEDED      spironolactone (ALDACTONE) 25 MG Tab TAKE 1 TABLET BY MOUTH EVERY  Tablet 0    atorvastatin (LIPITOR) 40 MG Tab Take 1 Tablet by mouth every evening. 100 Tablet 3    HYDROcodone-acetaminophen (NORCO) 5-325 MG Tab per tablet 1 Tablet every four hours as needed.      pantoprazole (PROTONIX) 40 MG Tablet Delayed Response Take 20 mg by mouth every day.      furosemide (LASIX) 20 MG Tab Take 2 Tablets by mouth every day. 180 Tablet 3    Empagliflozin (JARDIANCE) 10 MG Tab tablet Take 1 Tablet by mouth every day. 90 Tablet 4    magnesium oxide 400 (240 Mg) MG Tab Take 1 Tablet by mouth 2 times a day. 30 Tablet 0    bisoprolol (ZEBETA) 5 MG Tab Take 1 Tablet by mouth every day. (Patient taking differently: Take 5 mg by mouth every evening.) 100 Tablet 3    aspirin 81 MG EC tablet Take 1 Tablet by mouth every day. 100 Tablet 3    CRANBERRY PO Take 1 Tablet by mouth every evening.      Probiotic Product (PROBIOTIC DAILY PO) Take 1 Tablet by mouth every evening.      polyethylene glycol 3350 (MIRALAX) 17 GM/SCOOP Powder Take 17 g by mouth every evening.      Melatonin 10 MG Tab Take 10 mg by mouth every evening. Indications: Trouble Sleeping      acetaminophen (TYLENOL) 500 MG Tab Take 1,000 mg by mouth every 6 hours as needed. Indications: Fever, Pain      Pain Pump (PATIENT SUPPLIED) Device Inject  as directed continuous. Patient's Pain Pump (placed and maintained as an outpatient)    Medications/concentrations:   HYDROMORPHONE 4.0mg/ml      Last changed: 2/14/2024  Refill pump before date: 5/14/2024    Continuous infusion rates (Drug/Rate):   HYDROMORPHONE 0.7491mg/day (0.0312mg/hr)    MD  "office:  Dr. Lozoya's  Phone number: [phone]          Indications: severe pain      DULoxetine (CYMBALTA) 20 MG Cap DR Particles Take 20 mg by mouth 2 times a day. Indications: Musculoskeletal Pain      guaiFENesin ER (MUCINEX) 600 MG TABLET SR 12 HR Take 600 mg by mouth every 12 hours. Indications: Cough      Cyanocobalamin (VITAMIN B-12 PO) Take 1 Tablet by mouth see administration instructions. Pt takes sporidially   Indications: supplement      albuterol 108 (90 Base) MCG/ACT Aero Soln inhalation aerosol Inhale 2 Puffs every 6 hours as needed for Shortness of Breath. Indications: Chronic Obstructive Lung Disease      Simethicone (GAS-X PO) Take 1 Tablet by mouth 3 times a day. Indications: gas      [DISCONTINUED] HYDROcodone/acetaminophen (NORCO)  MG Tab Indications: Pain       No facility-administered encounter medications on file as of 1/22/2025.     ROS           Objective     /52 (BP Location: Left arm, Patient Position: Sitting, BP Cuff Size: Adult)   Pulse 61   Resp 16   Ht 1.702 m (5' 7\")   Wt 77.1 kg (170 lb)   SpO2 92%   BMI 26.63 kg/m²     Physical Exam       Lab Results   Component Value Date/Time    WBC 6.5 02/29/2024 01:01 AM    RBC 4.57 08/29/2024 08:04 PM    RBC 3.78 (L) 02/29/2024 01:01 AM    HEMOGLOBIN 11.5 (L) 08/29/2024 08:04 PM    HEMOGLOBIN 10.6 (L) 02/29/2024 01:01 AM    HEMATOCRIT 36.4 08/29/2024 08:04 PM    HEMATOCRIT 32.8 (L) 02/29/2024 01:01 AM    MCV 79.7 (L) 08/29/2024 08:04 PM    MCV 86.8 02/29/2024 01:01 AM    MCH 25.2 (L) 08/29/2024 08:04 PM    MCH 28.0 02/29/2024 01:01 AM    MCHC 31.6 (L) 08/29/2024 08:04 PM    MCHC 32.3 02/29/2024 01:01 AM    MPV 8.1 08/29/2024 08:04 PM    MPV 10.4 02/29/2024 01:01 AM    NEUTSPOLYS 49.0 08/29/2024 08:04 PM    NEUTSPOLYS 31.20 (L) 02/29/2024 01:01 AM    LYMPHOCYTES 33.0 08/29/2024 08:04 PM    LYMPHOCYTES 48.40 (H) 02/29/2024 01:01 AM    MONOCYTES 12.7 08/29/2024 08:04 PM    MONOCYTES 14.70 (H) 02/29/2024 01:01 AM    " EOSINOPHILS 4.2 08/29/2024 08:04 PM    EOSINOPHILS 4.90 02/29/2024 01:01 AM    BASOPHILS 1.1 08/29/2024 08:04 PM    BASOPHILS 0.60 02/29/2024 01:01 AM      Lab Results   Component Value Date/Time    CHOLSTRLTOT 115 05/08/2023 01:41 AM    LDL 34 05/08/2023 01:41 AM    HDL 67 05/08/2023 01:41 AM    TRIGLYCERIDE 69 05/08/2023 01:41 AM       Lab Results   Component Value Date/Time    SODIUM 138 02/29/2024 01:01 AM    POTASSIUM 4.1 02/29/2024 01:01 AM    CHLORIDE 98 02/29/2024 01:01 AM    CO2 30 02/29/2024 01:01 AM    GLUCOSE 125 (H) 02/29/2024 01:01 AM    BUN 44 (H) 02/29/2024 01:01 AM    CREATININE 1.40 02/29/2024 01:01 AM    GLOMRATE 64 10/06/2023 04:33 AM     Lab Results   Component Value Date/Time    ALKPHOSPHAT 129 (H) 02/27/2024 07:41 PM    ASTSGOT 20 02/27/2024 07:41 PM    ALTSGPT 12 02/27/2024 07:41 PM    TBILIRUBIN 0.4 02/27/2024 07:41 PM      Cardiac event monitor   DOS: 2/12/2024     Summary:     Short runs of supraventricular tachycardia that do not meet criteria for diagnosis of atrial flutter or atrial fibrillation.   Rare premature atrial complexes (<1%).   Occasional premature ventricular complexes (2.7%).   No malignant arrhythmias identified.   No sinus pause.   No significant AV block.       Echocardiogram 12/16/2024  CONCLUSIONS   Normal left ventricular systolic function.   Known TAVR aortic valve that is functioning normally with normal   transvalvular gradients.   Mild mitral stenosis - mean gradient of 2-3 mmHg at 72 bpm.     Assessment & Plan     1. Acute on chronic heart failure with preserved ejection fraction (HCC)        2. PAF (paroxysmal atrial fibrillation) ([facility])        3. S/P TAVR (transcatheter aortic valve replacement)        4. Other hyperlipidemia        5. Primary hypertension            Medical Decision Making: Today's Assessment/Status/Plan:        [facility] Gap Form    Diagnosis to address: J96.11 - Chronic hypoxemic respiratory failure ([facility])  Assessment and plan: Chronic, stable.  Continue with current defined treatment plan: On 4 L O2. Follow-up at least annually.  Diagnosis: I27.20 - Pulmonary HTN (HCC)  Assessment and plan: Chronic, stable. Continue with current defined treatment plan: Echo did not demonstrate significantly elevated RVSP. Follow-up at least annually.  Diagnosis: I48.0 - PAF (paroxysmal atrial fibrillation) (HCC)  Assessment and plan: Chronic, stable. Continue with current defined treatment plan: See below. Follow-up at least annually.  Diagnosis: J44.0 - Chronic obstructive pulmonary disease with acute lower respiratory infection (HCC)  Assessment and plan: Chronic, stable, as based on today's assessment and impact on other conditions evaluated today. Continue with current treatment plan: Managed by PCP follow-up with specialist as directed, but at least annually.  Diagnosis: N18.32 - Stage 3b chronic kidney disease  Assessment and plan: Chronic, stable. Continue with current defined treatment plan: Renally dosed medications, avoid nephrotoxic agents, upcoming labs. Follow-up at least annually.  Last edited 01/22/25 15:58 PST by Nikki Lares A.P.R.N.           S/P TAVR  -cont asa lifelong  -SBE prophylaxis understands  -echo showed normal valve function with normal transvalvular gradients  -Repeat echo annually     PAF   -Questionable history of PAF  -No recurrence on cardiac event monitor     Heart Failure with preserved Ejection Fraction  -EF 55%  - mild bilateral lower extremity edema  - Continue Lasix 40 mg daily (she had been inconsistent with this)  -Spironolactone 25 mg daily  -Jardiance 10 mg daily  -She has outstanding labs ordered which I have encouraged her to get checked  - daily weights, daily symptom check, daily blood pressure reading     Hypertension  - good control  - continue current regimen  - goal < 130/80     Hyperlipidemia  -Most recent LDL 34  -Continue atorvastatin   -Goal of less than 70  -Check lipid panel annually      Follow up in 4 to  6 weeks, patient prefers appointments at [facility]     This note was dictated using Dragon speech recognition software.

## 2025-01-23 ENCOUNTER — TELEMEDICINE (OUTPATIENT)
Dept: MEDICAL GROUP | Facility: PHYSICIAN GROUP | Age: 89
End: 2025-01-23
Payer: MEDICARE

## 2025-01-23 DIAGNOSIS — C34.32 PRIMARY CANCER OF LEFT LOWER LOBE OF LUNG (HCC): ICD-10-CM

## 2025-01-23 NOTE — PROGRESS NOTES
Valve Program Functional Assessment:     [id number]   1a) Showering/bathin  1b) Walking 1 block on ground: 1  1c) Hurrying or joggin  2) Swellin  3) Fatigue: 1  4) Shortness of breath: 2  5) Sleep sitting up: 1  6) Limited enjoyment of life: 1  7) Spend the rest of your life with HF: 1  8a) Hobbies, recreational activities:1  8b) Working or doing household chores:1  8c) Visiting family or friends: 2

## 2025-02-18 ENCOUNTER — TELEMEDICINE (OUTPATIENT)
Dept: MEDICAL GROUP | Facility: PHYSICIAN GROUP | Age: 89
End: 2025-02-18
Payer: MEDICARE

## 2025-02-18 VITALS — WEIGHT: 170 LBS | OXYGEN SATURATION: 95 % | HEART RATE: 59 BPM | HEIGHT: 67 IN | BODY MASS INDEX: 26.68 KG/M2

## 2025-02-18 DIAGNOSIS — M48.061 SPINAL STENOSIS OF LUMBAR REGION WITHOUT NEUROGENIC CLAUDICATION: ICD-10-CM

## 2025-02-18 DIAGNOSIS — Z95.2 S/P TAVR (TRANSCATHETER AORTIC VALVE REPLACEMENT): ICD-10-CM

## 2025-02-18 DIAGNOSIS — R73.03 PREDIABETES: ICD-10-CM

## 2025-02-18 DIAGNOSIS — M21.371 RIGHT FOOT DROP: ICD-10-CM

## 2025-02-18 DIAGNOSIS — C34.32 PRIMARY CANCER OF LEFT LOWER LOBE OF LUNG (HCC): ICD-10-CM

## 2025-02-18 DIAGNOSIS — I50.30 CONGESTIVE HEART FAILURE WITH PRESERVED LEFT VENTRICULAR FUNCTION, NYHA CLASS 3 (HCC): ICD-10-CM

## 2025-02-18 PROBLEM — I50.33 ACUTE ON CHRONIC HEART FAILURE WITH PRESERVED EJECTION FRACTION (HCC): Status: RESOLVED | Noted: 2022-06-22 | Resolved: 2025-02-18

## 2025-02-18 PROCEDURE — 99214 OFFICE O/P EST MOD 30 MIN: CPT | Mod: 95 | Performed by: INTERNAL MEDICINE

## 2025-02-18 ASSESSMENT — PATIENT HEALTH QUESTIONNAIRE - PHQ9: CLINICAL INTERPRETATION OF PHQ2 SCORE: 0

## 2025-02-18 ASSESSMENT — FIBROSIS 4 INDEX: FIB4 SCORE: 3.1

## 2025-02-18 NOTE — PROGRESS NOTES
"Virtual Visit: Established Patient   This visit was conducted via Teams using secure and encrypted videoconferencing technology.   The patient was in their home in the [facility].    The patient's identity was confirmed and verbal consent was obtained for this virtual visit.     Subjective:   CC:   Chief Complaint   Patient presents with    Follow-Up     Knee joint pain; right foot swelling, drop foot; ankle to knee is freezing cold    Loss Of Balance       Chelly Decker is a 89 y.o. female presenting for evaluation and management of:    1. Primary cancer of left lower lobe of lung (HCC)  Chronic.  Stable.  Being monitored.    2. Spinal stenosis of lumbar region without neurogenic claudication  Chronic.  Stable.  Followed by [facility].  Patient currently has a pain pump and followed by Dr. Lozoya.  Consideration for injection therapy versus spinal cord stimulator.  She is currently on hydrocodone 5 mg twice daily and recently adjusted her hydromorphone which was not very helpful.  She struggles with imbalance, radiculopathy and ongoing pain issues.  She is also taking Cymbalta 20 mg twice daily, unable to tolerate higher doses due to side effects.    3. Right foot drop  Followed by neurology, Dr. Bliss for right foot drop and peripheral neuropathy.  Patient completed an EMG/NCV which showed severe neuropathy.  Labs were ordered to include CARRIE, SPEP, ESR and CMP but patient did not complete them yet.    4. Congestive heart failure with preserved left ventricular function, NYHA class 3 (HCC)  Chronic.  Stable.  Followed by cardiology.  Patient is treated with Aldactone and Lasix 20 mg twice daily.  Patient decreased her dose as she was using the restroom quite a bit.  BNP improved to 877.    5. Prediabetes  Hemoglobin A1c 6.4, up from 5.9.  Patient states that she has been on a \"candy binge\".  Eating Vale bars.  Not following the regular diet.  Not physically active due to limitations.    6. S/P TAVR " (transcatheter aortic valve replacement)  Chronic.  Stable.  Followed by cardiology.  Recent echocardiogram in December showed an EF of 55% with a TAVR functioning with mild mitral stenosis.    7.  Right knee osteoarthritis.  Patient recently underwent cortisone injection in January but states that it is actually worse.  Continues to take Norco twice daily.    ROS   Positive ROS per HPI.  Denies all other cardiovascular, GI, neurologic symptoms.    Current medicines (including changes today)  Current Outpatient Medications   Medication Sig Dispense Refill    Naloxone (NARCAN) 4 MG/0.1ML Liquid 0.04 GRAM AS NEEDED AS NEEDED      spironolactone (ALDACTONE) 25 MG Tab TAKE 1 TABLET BY MOUTH EVERY  Tablet 0    atorvastatin (LIPITOR) 40 MG Tab Take 1 Tablet by mouth every evening. 100 Tablet 3    HYDROcodone-acetaminophen (NORCO) 5-325 MG Tab per tablet 1 Tablet every four hours as needed.      pantoprazole (PROTONIX) 40 MG Tablet Delayed Response Take 20 mg by mouth every day.      furosemide (LASIX) 20 MG Tab Take 2 Tablets by mouth every day. 180 Tablet 3    Empagliflozin (JARDIANCE) 10 MG Tab tablet Take 1 Tablet by mouth every day. 90 Tablet 4    magnesium oxide 400 (240 Mg) MG Tab Take 1 Tablet by mouth 2 times a day. 30 Tablet 0    bisoprolol (ZEBETA) 5 MG Tab Take 1 Tablet by mouth every day. (Patient taking differently: Take 5 mg by mouth every evening.) 100 Tablet 3    aspirin 81 MG EC tablet Take 1 Tablet by mouth every day. 100 Tablet 3    CRANBERRY PO Take 1 Tablet by mouth every evening.      Probiotic Product (PROBIOTIC DAILY PO) Take 1 Tablet by mouth every evening.      polyethylene glycol 3350 (MIRALAX) 17 GM/SCOOP Powder Take 17 g by mouth every evening.      Melatonin 10 MG Tab Take 10 mg by mouth every evening. Indications: Trouble Sleeping      acetaminophen (TYLENOL) 500 MG Tab Take 1,000 mg by mouth every 6 hours as needed. Indications: Fever, Pain      Pain Pump (PATIENT SUPPLIED) Device  Inject  as directed continuous. Patient's Pain Pump (placed and maintained as an outpatient)    Medications/concentrations:   HYDROMORPHONE 4.0mg/ml      Last changed: 2/14/2024  Refill pump before date: 5/14/2024    Continuous infusion rates (Drug/Rate):   HYDROMORPHONE 0.7491mg/day (0.0312mg/hr)    MD office:  Dr. Lozoya's  Phone number: [phone]          Indications: severe pain      DULoxetine (CYMBALTA) 20 MG Cap DR Particles Take 20 mg by mouth 2 times a day. Indications: Musculoskeletal Pain      guaiFENesin ER (MUCINEX) 600 MG TABLET SR 12 HR Take 600 mg by mouth every 12 hours. Indications: Cough      Cyanocobalamin (VITAMIN B-12 PO) Take 1 Tablet by mouth see administration instructions. Pt takes sporidially   Indications: supplement      albuterol 108 (90 Base) MCG/ACT Aero Soln inhalation aerosol Inhale 2 Puffs every 6 hours as needed for Shortness of Breath. Indications: Chronic Obstructive Lung Disease      Simethicone (GAS-X PO) Take 1 Tablet by mouth 3 times a day. Indications: gas       No current facility-administered medications for this visit.       Patient Active Problem List    Diagnosis Date Noted    Congestive heart failure with preserved left ventricular function, NYHA class 3 ([facility]) 02/18/2025    Right foot drop 10/23/2024    Neurogenic bladder 10/23/2024    Injury of right ankle 09/17/2024    Vitamin D deficiency 08/01/2024    Peroneal palsy, right 05/14/2024    Dyslipidemia 02/28/2024    Normocytic anemia 02/28/2024    Cellulitis of right lower extremity 02/28/2024    Left lower quadrant abdominal pain 02/28/2024    S/P TAVR (transcatheter aortic valve replacement) 01/08/2024    Stage 3b chronic kidney disease 11/07/2023    PAF (paroxysmal atrial fibrillation) ([facility]) 10/31/2023    Pulmonary HTN ([facility]) 10/31/2023    Physical debility 06/07/2023    Prediabetes 05/08/2023    TIA (transient ischemic attack) 05/07/2023    Chronic hypoxemic respiratory failure ([facility]) 05/06/2023    S/P insertion  "of intrathecal pump 05/05/2023    Hammer toes of both feet 12/29/2022    Dizziness 12/29/2022    Osteoarthritis of left hip 11/30/2022    Pain of left hip joint 11/30/2022    Trochanteric bursitis of left hip 11/30/2022    Insomnia due to medical condition 10/13/2022    Avascular necrosis of bone of left hip (HCC) 08/17/2022    Lower extremity edema 08/03/2022    Chronic obstructive pulmonary disease (HCC) 06/22/2022    Solitary pulmonary nodule 06/22/2022    Bilateral carotid bruits 06/06/2022    Chronic pain syndrome 06/06/2022    PVC's (premature ventricular contractions) 06/06/2022    Moderate mitral insufficiency 06/06/2022    Urge incontinence of urine 05/12/2022    H/O Clostridium difficile infection 05/12/2022    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    Primary hypertension 03/29/2022    Incomplete defecation 03/29/2022    Moderate asthma without complication 03/29/2022    Spinal stenosis of lumbar region without neurogenic claudication 03/29/2022    Pelvic floor dysfunction 03/29/2022    Hypoxemia 03/29/2022    Other hyperlipidemia 01/13/2021    Primary cancer of left lower lobe of lung (HCC) 01/15/2020    Lichen sclerosus 01/18/2011        Objective:   Pulse (!) 59   Ht 1.702 m (5' 7\")   Wt 77.1 kg (170 lb)   SpO2 95% Comment: 4L of Oxygen  BMI 26.63 kg/m²     Physical Exam:Virtual  Constitutional: Alert, no distress, well-groomed.  Skin: No rashes in visible areas.  Eye: Round. Conjunctiva clear, lids normal. No icterus.   ENMT: Lips pink without lesions, good dentition, moist mucous membranes. Phonation normal.  Neck: No masses, no thyromegaly. Moves freely without pain.  Respiratory: Unlabored respiratory effort, no cough or audible wheeze  Psych: Alert and oriented x3, normal affect and mood.     Assessment and Plan:   The following treatment plan was discussed:     1. Primary cancer of left lower lobe of lung (HCC)  Chronic.  Stable.  Continue to monitor.    2. Spinal stenosis of lumbar " region without neurogenic claudication  Chronic.  Stable.  Follow-up with pain management.    3. Right foot drop  Stable.  Complete lab work requested by neurology.    4. Congestive heart failure with preserved left ventricular function, NYHA class 3 (HCC)  Chronic.  Stable.  Continue current plan of care per cardiology.    5. Prediabetes  Worsening A1c.  Patient declines medications.  Counseled patient on diet lifestyle modifications.  Monitor A1c.    6. S/P TAVR (transcatheter aortic valve replacement)  Chronic.  Stable.  Follow-up with cardiology.    Follow-up: No follow-ups on file.

## 2025-02-21 ENCOUNTER — TELEPHONE (OUTPATIENT)
Dept: CARDIOLOGY | Facility: MEDICAL CENTER | Age: 89
End: 2025-02-21
Payer: MEDICARE

## 2025-02-21 DIAGNOSIS — I50.30 ACC/AHA STAGE C HEART FAILURE WITH PRESERVED EJECTION FRACTION (HCC): ICD-10-CM

## 2025-02-21 DIAGNOSIS — I51.89 LEFT VENTRICULAR DIASTOLIC DYSFUNCTION, NYHA CLASS 3: ICD-10-CM

## 2025-02-21 DIAGNOSIS — R06.09 DYSPNEA ON EXERTION: ICD-10-CM

## 2025-02-21 RX ORDER — EMPAGLIFLOZIN 10 MG/1
10 TABLET, FILM COATED ORAL DAILY
Qty: 100 TABLET | Refills: 3 | Status: SHIPPED | OUTPATIENT
Start: 2025-02-21

## 2025-02-25 ENCOUNTER — PATIENT OUTREACH (OUTPATIENT)
Dept: HEALTH INFORMATION MANAGEMENT | Facility: OTHER | Age: 89
End: 2025-02-25
Payer: MEDICARE

## 2025-02-25 DIAGNOSIS — I50.33 ACUTE ON CHRONIC HEART FAILURE WITH PRESERVED EJECTION FRACTION (HCC): ICD-10-CM

## 2025-02-25 DIAGNOSIS — J44.0 CHRONIC OBSTRUCTIVE PULMONARY DISEASE WITH ACUTE LOWER RESPIRATORY INFECTION (HCC): ICD-10-CM

## 2025-02-26 ENCOUNTER — TELEPHONE (OUTPATIENT)
Dept: CARDIOLOGY | Facility: MEDICAL CENTER | Age: 89
End: 2025-02-26
Payer: MEDICARE

## 2025-02-26 NOTE — PROGRESS NOTES
2/25/25 4:15 pm: Nurse CM outreach call to pt for monthly CCM assessment.  VM left requesting a return call to nurse at [phone].    2/26/25 Nurse CM outreach call for monthly CCM assessment.  CARISSA left with CM contact number requesting a return call to nurse at [phone].

## 2025-03-05 ENCOUNTER — APPOINTMENT (OUTPATIENT)
Dept: CARDIOLOGY | Facility: PHYSICIAN GROUP | Age: 89
End: 2025-03-05
Payer: MEDICARE

## 2025-03-16 DIAGNOSIS — I48.0 PAF (PAROXYSMAL ATRIAL FIBRILLATION) (HCC): ICD-10-CM

## 2025-03-16 DIAGNOSIS — I10 PRIMARY HYPERTENSION: ICD-10-CM

## 2025-03-16 DIAGNOSIS — Z95.2 S/P TAVR (TRANSCATHETER AORTIC VALVE REPLACEMENT): ICD-10-CM

## 2025-03-17 RX ORDER — BISOPROLOL FUMARATE 5 MG/1
5 TABLET, FILM COATED ORAL DAILY
Qty: 100 TABLET | Refills: 3 | Status: SHIPPED | OUTPATIENT
Start: 2025-03-17

## 2025-03-17 NOTE — TELEPHONE ENCOUNTER
Is the patient due for a refill? Yes    Was the patient seen the last 15 months? Yes    Date of last office visit: 01.22.2025    Does the patient have an upcoming appointment?  Yes   If yes, When? 03.18.2025    Provider to refill:LH    Does the patient have group home Plus and need 100-day supply? (This applies to ALL medications) Yes, quantity updated to 100 days

## 2025-03-18 ENCOUNTER — OFFICE VISIT (OUTPATIENT)
Dept: CARDIOLOGY | Facility: PHYSICIAN GROUP | Age: 89
End: 2025-03-18
Payer: MEDICARE

## 2025-03-18 VITALS
RESPIRATION RATE: 12 BRPM | DIASTOLIC BLOOD PRESSURE: 52 MMHG | SYSTOLIC BLOOD PRESSURE: 104 MMHG | BODY MASS INDEX: 26.63 KG/M2 | HEART RATE: 75 BPM | HEIGHT: 67 IN | OXYGEN SATURATION: 93 %

## 2025-03-18 DIAGNOSIS — I25.10 CORONARY ARTERY DISEASE DUE TO CALCIFIED CORONARY LESION: ICD-10-CM

## 2025-03-18 DIAGNOSIS — I35.0 SEVERE AORTIC STENOSIS: ICD-10-CM

## 2025-03-18 DIAGNOSIS — I10 PRIMARY HYPERTENSION: ICD-10-CM

## 2025-03-18 DIAGNOSIS — E78.5 DYSLIPIDEMIA: ICD-10-CM

## 2025-03-18 DIAGNOSIS — I50.30 CONGESTIVE HEART FAILURE WITH PRESERVED LEFT VENTRICULAR FUNCTION, NYHA CLASS 3 (HCC): ICD-10-CM

## 2025-03-18 DIAGNOSIS — Z95.2 S/P TAVR (TRANSCATHETER AORTIC VALVE REPLACEMENT): ICD-10-CM

## 2025-03-18 DIAGNOSIS — I48.0 PAF (PAROXYSMAL ATRIAL FIBRILLATION) (HCC): ICD-10-CM

## 2025-03-18 DIAGNOSIS — I25.84 CORONARY ARTERY DISEASE DUE TO CALCIFIED CORONARY LESION: ICD-10-CM

## 2025-03-18 PROCEDURE — 3078F DIAST BP <80 MM HG: CPT | Performed by: INTERNAL MEDICINE

## 2025-03-18 PROCEDURE — 3074F SYST BP LT 130 MM HG: CPT | Performed by: INTERNAL MEDICINE

## 2025-03-18 PROCEDURE — G2211 COMPLEX E/M VISIT ADD ON: HCPCS | Performed by: INTERNAL MEDICINE

## 2025-03-18 PROCEDURE — 99214 OFFICE O/P EST MOD 30 MIN: CPT | Performed by: INTERNAL MEDICINE

## 2025-03-18 RX ORDER — FUROSEMIDE 40 MG/1
40 TABLET ORAL 2 TIMES DAILY
Qty: 200 TABLET | Refills: 3 | Status: SHIPPED | OUTPATIENT
Start: 2025-03-18

## 2025-03-18 RX ORDER — POTASSIUM CHLORIDE 1500 MG/1
20 TABLET, EXTENDED RELEASE ORAL 2 TIMES DAILY
Qty: 200 TABLET | Refills: 3 | Status: SHIPPED | OUTPATIENT
Start: 2025-03-18

## 2025-03-18 ASSESSMENT — ENCOUNTER SYMPTOMS
COUGH: 0
LOSS OF CONSCIOUSNESS: 0
PND: 0
DIZZINESS: 0
ORTHOPNEA: 0
PALPITATIONS: 0
SHORTNESS OF BREATH: 1
MYALGIAS: 0

## 2025-03-18 NOTE — PROGRESS NOTES
Cardiology Initial Consultation Note    Date of note:    3/18/2025    Primary Care Provider: Kassi Carrillo M.D.  Referring Provider: No ref. provider found    Patient Name: Chelly Decker   YOB: 1935  MRN:              [id number]    Chief Complaint   Patient presents with    Hypertension     F/V DX: Primary hypertension    Transient Ischemic Attack     F/V DX: TIA (transient ischemic attack)    Atrial Fibrillation     F/V DX: PAF (paroxysmal atrial fibrillation) (HCC)         History of Present Illness: Ms. Chelly Decker is a 89-year-old woman with past medical history significant for severe aortic stenosis status post TAVR 1/8/2024, hypertension, hyperlipidemia, HFpEF, nonobstructive CAD, lung cancer status post therapies, chronic hypoxic respiratory failure on supplemental oxygen history of pAF (not on OAC) who presents to the cardiology office for follow-up.    She is an established patient of our practice.  Recently saw Nikki Lares in the office.  During that visit, she was continued on medical therapy.  Referral was made for her to establish with our office in [place] due to patient preference.    Chart review shows documented history of PAF. It appears she is not on OAC due to high risk of bleeding.    She presents today for follow-up.  She tells me that she does experience dyspnea with exertion.  No dyspnea at rest.  Has been on Lasix 40 mg daily for chronic lower extremity edema/HFpEF.  Has not noticed significant improvement in edema since increasing dose of Lasix last month.  Otherwise, she denies having chest pain.  No palpitations.  No episodes of syncope.    Cardiovascular Risk Factors:  1. Smoking status: Former smoker  2. Type II Diabetes Mellitus: Yes  Lab Results   Component Value Date/Time    HBA1C 6.4 (H) 02/12/2025 02:03 PM    HBA1C 5.9 (H) 05/07/2023 07:22 PM    HBA1C 5.8 08/20/2021 04:40 PM     3. Hypertension: Yes  4. Dyslipidemia: Yes   Cholesterol,Tot    Date Value Ref Range Status   05/08/2023 115 100 - 199 mg/dL Final     LDL   Date Value Ref Range Status   05/08/2023 34 <100 mg/dL Final     HDL   Date Value Ref Range Status   05/08/2023 67 >=40 mg/dL Final     Triglycerides   Date Value Ref Range Status   05/08/2023 69 0 - 149 mg/dL Final     5. Family history of early Coronary Artery Disease in a first degree relative (Male less than 55 years of age; Female less than 65 years of age): Denies      Review of Systems:  Review of Systems   Respiratory:  Positive for shortness of breath. Negative for cough.    Cardiovascular:  Positive for leg swelling. Negative for chest pain, palpitations, orthopnea and PND.   Musculoskeletal:  Negative for joint pain and myalgias.   Neurological:  Negative for dizziness and loss of consciousness.       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    Breath shortness 12/04/2023    uses oxygen at night, during day sometimes. 8/23/23-SOB with activity.    Cancer (HCC)     cervical cancer 1968    Cancer (HCC) 12/04/2023    lower left lobe cancer    Cervical cancer (HCC) 1968    Ldvwco-brnfelfdosss-iz chemo or radiation.    Chronic constipation     takes miralax    Chronic kidney disease (CKD)     stage 3    Chronic obstructive pulmonary disease (HCC) 06/22/2022    Chronic pain     follows with Dr Lozoya    Congestive heart failure (HCC)     follows with Dr Salvador with [facility]    Congestive heart failure with preserved left ventricular function,  NYHA class 3 (HCC) 02/18/2025    COPD (chronic obstructive pulmonary disease) ([facility])     Dental disorder 12/04/2023    upper denture and permanent bridge on lower front teeth    Dizziness 12/29/2022    Foot drop, right foot 12/04/2023    GERD (gastroesophageal reflux disease)     H/O Clostridium difficile infection 05/12/2022    Hammer toes of both feet 12/29/2022    Heart burn 12/04/2023    taking pantoprazole.    Heart murmur     High cholesterol 12/04/2023    Controled with medication    Hypertension 12/04/2023    medicated    Hypotension due to hypovolemia 08/17/2022    Hypoxia 03/29/2022    Incomplete defecation 03/29/2022    Infectious disease 04/25/2023    I was just informed that the person who drove me to my doctor appointment and home on the 25th of this month was diagnosed with the flu on the 26th of this month. I had my flu shot and after one day I have no symptoms will keep checking !    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    Spinal stenosis     pain pump for pain control    Spinal stenosis of lumbar region without neurogenic claudication 03/29/2022    Urge incontinence of urine 12/04/2023     urinary retention, diapers    Urinary bladder disorder Past [id number]    I've been under treatment for years with no results I am seeing a new urologist on May 2    Vitamin D deficiency 08/01/2024         Past Surgical History:   Procedure Laterality Date    TRANSCATHETER AORTIC VALVE REPLACEMENT  1/8/2024    Procedure: TRANSCATHETER AORTIC VALVE REPLACEMENT;  Surgeon: Clayton Eddy M.D.;  Location: [facility];  Service: Cardiac    ECHOCARDIOGRAM, TRANSESOPHAGEAL, INTRAOPERATIVE  1/8/2024    Procedure: ECHOCARDIOGRAM, TRANSESOPHAGEAL, INTRAOPERATIVE;  Surgeon: Clayton Eddy M.D.;  Location: [facility];  Service: Cardiac    OTHER Bilateral 12/04/2023    IOLOU    FL TOTAL HIP ARTHROPLASTY Left 08/28/2023    Procedure: LEFT TOTAL HIP ARTHROPLASTY;  Surgeon: Daryl Perrin M.D.;  Location: [facility];  Service: Orthopedics    PUMP REVISION Right 05/04/2023    Procedure: INTRATHECAL PAIN PUMP REVISION;  Surgeon: Tavo Lozoya M.D.;  Location: [facility];  Service: Pain Management    PUMP INSERT/REMOVE Right 10/07/2022    Procedure: MEDTRONIC PUMP REPLACEMENT;  Surgeon: Joel Alanis M.D.;  [facility];  Service: Pain Management    FL INS/RPLC SPI NPG/RCVR POCKET N/A 02/05/2021    Procedure: IMPLANT PUMP AND CATHETER;  Surgeon: Joel Alanis M.D.;  Location: [facility];  Service: Pain Management    HIP ARTHROPLASTY TOTAL Right 1998    APPENDECTOMY      CARPAL TUNNEL ENDOSCOPIC Bilateral     bilat    CHOLECYSTECTOMY      COLONOSCOPY      HYSTERECTOMY LAPAROSCOPY      total    LITHOTRIPSY      kidney stones removed    PRIMARY C SECTION      c section    TONSILLECTOMY           Current Outpatient Medications   Medication Sig Dispense Refill    furosemide (LASIX) 40 MG Tab Take 1 Tablet by mouth 2 times a day. 200 Tablet 3    potassium chloride SA (KDUR) 20 MEQ Tab CR Take 1 Tablet by mouth 2 times a day. 200 Tablet 3    bisoprolol (ZEBETA)  5 MG Tab TAKE 1 TABLET BY MOUTH EVERY  Tablet 3    Empagliflozin (JARDIANCE) 10 MG Tab tablet Take 1 Tablet by mouth every day. 100 Tablet 3    Naloxone (NARCAN) 4 MG/0.1ML Liquid 0.04 GRAM AS NEEDED AS NEEDED      spironolactone (ALDACTONE) 25 MG Tab TAKE 1 TABLET BY MOUTH EVERY  Tablet 0    atorvastatin (LIPITOR) 40 MG Tab Take 1 Tablet by mouth every evening. 100 Tablet 3    HYDROcodone-acetaminophen (NORCO) 5-325 MG Tab per tablet 1 Tablet every four hours as needed. (Patient taking differently: 2 Tablets every four hours as needed.)      pantoprazole (PROTONIX) 40 MG Tablet Delayed Response Take 20 mg by mouth every day.      magnesium oxide 400 (240 Mg) MG Tab Take 1 Tablet by mouth 2 times a day. 30 Tablet 0    aspirin 81 MG EC tablet Take 1 Tablet by mouth every day. 100 Tablet 3    CRANBERRY PO Take 1 Tablet by mouth every evening.      Probiotic Product (PROBIOTIC DAILY PO) Take 1 Tablet by mouth every evening.      polyethylene glycol 3350 (MIRALAX) 17 GM/SCOOP Powder Take 17 g by mouth every evening.      Melatonin 10 MG Tab Take 10 mg by mouth every evening. Indications: Trouble Sleeping      acetaminophen (TYLENOL) 500 MG Tab Take 1,000 mg by mouth every 6 hours as needed. Indications: Fever, Pain      Pain Pump (PATIENT SUPPLIED) Device Inject  as directed continuous. Patient's Pain Pump (placed and maintained as an outpatient)    Medications/concentrations:   HYDROMORPHONE 4.0mg/ml      Last changed: 2/14/2024  Refill pump before date: 5/14/2024    Continuous infusion rates (Drug/Rate):   HYDROMORPHONE 0.7491mg/day (0.0312mg/hr)    MD office:  Dr. Lozoya's  Phone number: [phone]          Indications: severe pain      DULoxetine (CYMBALTA) 20 MG Cap DR Particles Take 20 mg by mouth 2 times a day. Indications: Musculoskeletal Pain (Patient taking differently: Take 40 mg by mouth 2 times a day. Indications: Musculoskeletal Pain)      guaiFENesin ER (MUCINEX) 600 MG TABLET SR 12 HR  Take 600 mg by mouth every 12 hours. Indications: Cough      Cyanocobalamin (VITAMIN B-12 PO) Take 1 Tablet by mouth see administration instructions. Pt takes sporidially   Indications: supplement      albuterol 108 (90 Base) MCG/ACT Aero Soln inhalation aerosol Inhale 2 Puffs every 6 hours as needed for Shortness of Breath. Indications: Chronic Obstructive Lung Disease      Simethicone (GAS-X PO) Take 1 Tablet by mouth 3 times a day. Indications: gas       No current facility-administered medications for this visit.         Allergies   Allergen Reactions    Sulfa Drugs Unspecified     Stomach ache    Nauseated, diarrhea    Other reaction(s): Unspecified   Stomach ache    Erythromycin Nausea     Other Reaction(s): GI Upset    Other reaction(s): Not available         Family History   Problem Relation Age of Onset    Heart Disease Mother         Not from congestive heart failure!         Social History     Socioeconomic History    Marital status:      Spouse name: Not on file    Number of children: Not on file    Years of education: Not on file    Highest education level: Not on file   Occupational History    Occupation: retired book keeper   Tobacco Use    Smoking status: Former     Current packs/day: 0.00     Average packs/day: 1 pack/day for 61.0 years (61.0 ttl pk-yrs)     Types: Cigarettes, Electronic Cigarettes     Start date: 1/1/1954     Quit date: 1/1/2015     Years since quitting: 10.2    Smokeless tobacco: Never    Tobacco comments:     Smoked as a teenager quit as a senior citizen   Vaping Use    Vaping status: Former    Substances: Flavoring   Substance and Sexual Activity    Alcohol use: Not Currently    Drug use: Not Currently    Sexual activity: Not on file   Other Topics Concern    Not on file   Social History Narrative    Not on file     Social Drivers of Health     Financial Resource Strain: Low Risk  (7/22/2022)    Overall Financial Resource Strain (CARDIA)     Difficulty of Paying Living  "Expenses: Not very hard   Food Insecurity: No Food Insecurity (7/22/2022)    Hunger Vital Sign     Worried About Running Out of Food in the Last Year: Never true     Ran Out of Food in the Last Year: Never true   Transportation Needs: Unmet Transportation Needs (7/22/2022)    PRAPARE - Transportation     Lack of Transportation (Medical): Yes     Lack of Transportation (Non-Medical): No   Physical Activity: Inactive (7/22/2022)    Exercise Vital Sign     Days of Exercise per Week: 0 days     Minutes of Exercise per Session: 0 min   Stress: No Stress Concern Present (12/8/2020)    Received from [facility], [facility] of Occupational Health - Occupational Stress Questionnaire     Feeling of Stress : Only a little   Social Connections: Not on file   Intimate Partner Violence: Low Risk  (6/22/2024)    Received from [facility], [facility]    History of Abuse     Have you ever been afraid of, threatened, neglected, or abused by someone?: No   Housing Stability: Unknown (7/22/2022)    Housing Stability Vital Sign     Unable to Pay for Housing in the Last Year: No     Number of Places Lived in the Last Year: Not on file     Unstable Housing in the Last Year: No         Physical Exam:  Ambulatory Vitals  /52 (BP Location: Left arm, Patient Position: Sitting, BP Cuff Size: Adult)   Pulse 75   Resp 12   Ht 1.702 m (5' 7\")   SpO2 93%    Oxygen Therapy:  Pulse Oximetry: 93 %  BP Readings from Last 4 Encounters:   03/18/25 104/52   01/22/25 128/52   10/30/24 110/52   09/17/24 128/68       Weight/BMI: Body mass index is 26.63 kg/m².  Wt Readings from Last 4 Encounters:   02/18/25 77.1 kg (170 lb)   01/22/25 77.1 kg (170 lb)   11/21/24 77.1 kg (170 lb)   11/18/24 72.6 kg (160 lb)         General: Not in acute distress, appears comfortable  HEENT: OP clear   Neck:  No carotid bruits, +JVD  CVS:  RRR, Normal S1, S2. No murmurs, rubs or " gallops  Resp: Normal respiratory effort, lungs CTA bilaterally. No rales or rhonchi  Abdomen: Soft, non-distended, non-tender to palpation  Skin: No obvious rashes, no cyanosis  Neurological: Alert and oriented x3, moves all extremities, no focal neurologic deficits  Psychiatric: Appropriate affect  Extremities:   Extremities warm, 2+ pitting edema in BL lower ext      Lab Data Review:  Lab Results   Component Value Date/Time    CHOLSTRLTOT 115 05/08/2023 01:41 AM    LDL 34 05/08/2023 01:41 AM    HDL 67 05/08/2023 01:41 AM    TRIGLYCERIDE 69 05/08/2023 01:41 AM       Lab Results   Component Value Date/Time    SODIUM 138 02/29/2024 01:01 AM    POTASSIUM 4.1 02/29/2024 01:01 AM    CHLORIDE 98 02/29/2024 01:01 AM    CO2 30 02/29/2024 01:01 AM    GLUCOSE 125 (H) 02/29/2024 01:01 AM    BUN 44 (H) 02/29/2024 01:01 AM    CREATININE 1.40 02/29/2024 01:01 AM    GLOMRATE 64 10/06/2023 04:33 AM     Lab Results   Component Value Date/Time    ALKPHOSPHAT 129 (H) 02/27/2024 07:41 PM    ASTSGOT 20 02/27/2024 07:41 PM    ALTSGPT 12 02/27/2024 07:41 PM    TBILIRUBIN 0.4 02/27/2024 07:41 PM      Lab Results   Component Value Date/Time    WBC 6.5 02/29/2024 01:01 AM    HEMOGLOBIN 13.3 02/12/2025 02:03 PM    HEMOGLOBIN 10.6 (L) 02/29/2024 01:01 AM     Lab Results   Component Value Date/Time    HBA1C 6.4 (H) 02/12/2025 02:03 PM    HBA1C 5.9 (H) 05/07/2023 07:22 PM    HBA1C 5.8 08/20/2021 04:40 PM         Cardiac Imaging and Procedures Review:    EKG dated 3/18/2025:   My personal interpretation is sinus rhythm    Cath 12/2023:  The left main coronary artery is patent and bifurcates into the left anterior descending and left circumflex coronaries.  Catheter depends on yet sometimes they release the band previously other times is chronically disease and takes over time that he never quite know it yet that you have the bleeding stopped from the hole that I put the artery but usually it is not 2 hours  The left anterior descending coronary  artery is a large, transapical vessel that supplies a moderate first diagonal branch and has mid 30% disease.  The left circumflex coronary artery is a large, nondominant vessel that supplies a large first obtuse marginal branch and has mid 30% disease.  The right coronary artery is a large, dominant vessel with proximal 50% disease and mid 30% disease.  Distally, it bifurcates into a small posterior descending coronary and a moderate posterolateral branch with luminal irregularities.    Echo 12/16/2024:  Normal left ventricular systolic function.  Known TAVR aortic valve that is functioning normally with normal   transvalvular gradients.  Mild mitral stenosis - mean gradient of 2-3 mmHg at 72 bpm.      Assessment & Plan     1. Congestive heart failure with preserved left ventricular function, NYHA class 3 ([facility])  Basic Metabolic Panel      2. Severe aortic stenosis        3. S/P TAVR (transcatheter aortic valve replacement)  furosemide (LASIX) 40 MG Tab      4. Coronary artery disease due to calcified coronary lesion        5. PAF (paroxysmal atrial fibrillation) ([facility])  EKG      6. Primary hypertension        7. Dyslipidemia              Medical Decision Making:  Ms. Chelly Decker is a 89-year-old woman with past medical history significant for severe aortic stenosis status post TAVR 1/8/2024, hypertension, hyperlipidemia, HFpEF, nonobstructive CAD, lung cancer status post therapies, chronic hypoxic respiratory failure on supplemental oxygen history of pAF (not on OAC) who presents to the cardiology office for follow-up.    1. Congestive heart failure with preserved left ventricular function, NYHA class 3 ([facility])  -Known history of HFpEF.  She appears to be volume overloaded with evidence of JVD and 2+ pitting edema.  Only on Lasix 40 mg once daily  -Increase Lasix to 40 mg twice daily.  Add on potassium supplementation with 20 mEq twice daily.  Check repeat BMP in 1 to 2 weeks to ensure no major electrolyte  derangements.  -Continue spironolactone and Jardiance for HFpEF    2. Severe aortic stenosis  3. S/P TAVR (transcatheter aortic valve replacement)  -Known history of severe AS status post TAVR.  Last echocardiogram 12/20/2024 shows normal transvalvular gradients and normal TAVR valve function.  -Continue aspirin 81 mg daily.  SBE prophylaxis.    4. Coronary artery disease due to calcified coronary lesion  -Nonobstructive CAD on angiogram prior to TAVR.  Continue lipid-lowering therapy and aspirin 81 mg daily.    5. PAF (paroxysmal atrial fibrillation) ([facility])  -In sinus rhythm.  Documented history of PAF.  Currently on anticoagulation due to documented high risk for bleeding.    6. Primary hypertension  -Chronic, stable condition.  -Continue spironolactone and bisoprolol.    7. Dyslipidemia  -Continue atorvastatin 40 mg daily.    () Today's E/M visit is associated with medical care services that serve as the continuing focal point for all needed health care services and/or with medical care services that  are part of ongoing care related to a patient's single, serious condition, or a complex condition: This includes  furnishing services to patients on an ongoing basis that result in care that is personalized  to the patient. The services result in a comprehensive, longitudinal, and continuous  relationship with the patient and involve delivery of team-based care that is accessible, coordinated with other practitioners and providers, and integrated with the broader health care landscape.       It was a pleasure seeing Ms. Chelly Decker in the office today. Return in about 6 weeks (around 4/29/2025) for Heart failure. Patient is aware to call the cardiology clinic with any questions or concerns.      Ty Grullon MD, [facility]  Cardiologist, [facility]  [address] [address]  Phone: [phone]  Fax:  [phone]    Please note that this dictation was created using voice recognition software. I have made every reasonable attempt to correct obvious errors, but it is possible there are errors of grammar and possibly content that I did not discover before finalizing the note.

## 2025-03-20 ENCOUNTER — PATIENT MESSAGE (OUTPATIENT)
Dept: HEALTH INFORMATION MANAGEMENT | Facility: OTHER | Age: 89
End: 2025-03-20

## 2025-03-26 ENCOUNTER — PATIENT OUTREACH (OUTPATIENT)
Dept: HEALTH INFORMATION MANAGEMENT | Facility: OTHER | Age: 89
End: 2025-03-26
Payer: MEDICARE

## 2025-03-26 DIAGNOSIS — I50.33 ACUTE ON CHRONIC HEART FAILURE WITH PRESERVED EJECTION FRACTION (HCC): ICD-10-CM

## 2025-03-26 DIAGNOSIS — J44.0 CHRONIC OBSTRUCTIVE PULMONARY DISEASE WITH ACUTE LOWER RESPIRATORY INFECTION (HCC): ICD-10-CM

## 2025-03-26 NOTE — PROGRESS NOTES
3/26/25 3:45 pm: Nurse DYLAN outreach call to pt for monthly CCM assessment call.   requesting a return call to nurse at [phone].    4/2/25 3:40 pm: Nurse DYLAN second outreach attempt for monthly CCM assessment.  No answer.  Nurse CM has not been able to reach pt to complete monthly assessment for past 3 months. Nurse CM will discharge pt from CCM services.  [employer] message will be sent to pt.  If pt decides she would like to continue to participate in services, nurse CM available at [phone].

## 2025-04-02 ENCOUNTER — PATIENT MESSAGE (OUTPATIENT)
Dept: HEALTH INFORMATION MANAGEMENT | Facility: OTHER | Age: 89
End: 2025-04-02

## 2025-04-11 RX ORDER — SPIRONOLACTONE 25 MG/1
25 TABLET ORAL DAILY
Qty: 100 TABLET | Refills: 3 | Status: SHIPPED | OUTPATIENT
Start: 2025-04-11

## 2025-04-11 NOTE — TELEPHONE ENCOUNTER
Received request via: Pharmacy    Was the patient seen in the last year in this department? Yes    Does the patient have an active prescription (recently filled or refills available) for medication(s) requested? No    Pharm[facility] Name[facility]    Does the patient have prison Plus and need 100-day supply? (This applies to ALL medications) Yes, quantity updated to 100 days

## 2025-04-13 SDOH — ECONOMIC STABILITY: TRANSPORTATION INSECURITY
IN THE PAST 12 MONTHS, HAS LACK OF RELIABLE TRANSPORTATION KEPT YOU FROM MEDICAL APPOINTMENTS, MEETINGS, WORK OR FROM GETTING THINGS NEEDED FOR DAILY LIVING?: NO

## 2025-04-13 SDOH — ECONOMIC STABILITY: INCOME INSECURITY: HOW HARD IS IT FOR YOU TO PAY FOR THE VERY BASICS LIKE FOOD, HOUSING, MEDICAL CARE, AND HEATING?: NOT VERY HARD

## 2025-04-13 SDOH — ECONOMIC STABILITY: FOOD INSECURITY: WITHIN THE PAST 12 MONTHS, THE FOOD YOU BOUGHT JUST DIDN'T LAST AND YOU DIDN'T HAVE MONEY TO GET MORE.: NEVER TRUE

## 2025-04-13 SDOH — HEALTH STABILITY: PHYSICAL HEALTH: ON AVERAGE, HOW MANY DAYS PER WEEK DO YOU ENGAGE IN MODERATE TO STRENUOUS EXERCISE (LIKE A BRISK WALK)?: 0 DAYS

## 2025-04-13 SDOH — ECONOMIC STABILITY: TRANSPORTATION INSECURITY
IN THE PAST 12 MONTHS, HAS THE LACK OF TRANSPORTATION KEPT YOU FROM MEDICAL APPOINTMENTS OR FROM GETTING MEDICATIONS?: NO

## 2025-04-13 SDOH — ECONOMIC STABILITY: INCOME INSECURITY: IN THE LAST 12 MONTHS, WAS THERE A TIME WHEN YOU WERE NOT ABLE TO PAY THE MORTGAGE OR RENT ON TIME?: NO

## 2025-04-13 SDOH — ECONOMIC STABILITY: FOOD INSECURITY: WITHIN THE PAST 12 MONTHS, YOU WORRIED THAT YOUR FOOD WOULD RUN OUT BEFORE YOU GOT MONEY TO BUY MORE.: NEVER TRUE

## 2025-04-13 SDOH — HEALTH STABILITY: PHYSICAL HEALTH: ON AVERAGE, HOW MANY MINUTES DO YOU ENGAGE IN EXERCISE AT THIS LEVEL?: 0 MIN

## 2025-04-13 SDOH — HEALTH STABILITY: MENTAL HEALTH
STRESS IS WHEN SOMEONE FEELS TENSE, NERVOUS, ANXIOUS, OR CAN'T SLEEP AT NIGHT BECAUSE THEIR MIND IS TROUBLED. HOW STRESSED ARE YOU?: TO SOME EXTENT

## 2025-04-13 ASSESSMENT — SOCIAL DETERMINANTS OF HEALTH (SDOH)
HOW OFTEN DO YOU ATTEND CHURCH OR RELIGIOUS SERVICES?: NEVER
WITHIN THE PAST 12 MONTHS, YOU WORRIED THAT YOUR FOOD WOULD RUN OUT BEFORE YOU GOT THE MONEY TO BUY MORE: NEVER TRUE
IN A TYPICAL WEEK, HOW MANY TIMES DO YOU TALK ON THE PHONE WITH FAMILY, FRIENDS, OR NEIGHBORS?: MORE THAN THREE TIMES A WEEK
HOW OFTEN DO YOU ATTENT MEETINGS OF THE CLUB OR ORGANIZATION YOU BELONG TO?: NEVER
HOW HARD IS IT FOR YOU TO PAY FOR THE VERY BASICS LIKE FOOD, HOUSING, MEDICAL CARE, AND HEATING?: NOT VERY HARD
HOW OFTEN DO YOU GET TOGETHER WITH FRIENDS OR RELATIVES?: ONCE A WEEK
HOW OFTEN DO YOU ATTENT MEETINGS OF THE CLUB OR ORGANIZATION YOU BELONG TO?: NEVER
HOW OFTEN DO YOU HAVE SIX OR MORE DRINKS ON ONE OCCASION: NEVER
HOW OFTEN DO YOU GET TOGETHER WITH FRIENDS OR RELATIVES?: ONCE A WEEK
IN THE PAST 12 MONTHS, HAS THE ELECTRIC, GAS, OIL, OR WATER COMPANY THREATENED TO SHUT OFF SERVICE IN YOUR HOME?: NO
HOW OFTEN DO YOU HAVE A DRINK CONTAINING ALCOHOL: NEVER
HOW OFTEN DO YOU ATTEND CHURCH OR RELIGIOUS SERVICES?: NEVER
DO YOU BELONG TO ANY CLUBS OR ORGANIZATIONS SUCH AS CHURCH GROUPS UNIONS, FRATERNAL OR ATHLETIC GROUPS, OR SCHOOL GROUPS?: NO
HOW MANY DRINKS CONTAINING ALCOHOL DO YOU HAVE ON A TYPICAL DAY WHEN YOU ARE DRINKING: PATIENT DOES NOT DRINK
IN A TYPICAL WEEK, HOW MANY TIMES DO YOU TALK ON THE PHONE WITH FAMILY, FRIENDS, OR NEIGHBORS?: MORE THAN THREE TIMES A WEEK
DO YOU BELONG TO ANY CLUBS OR ORGANIZATIONS SUCH AS CHURCH GROUPS UNIONS, FRATERNAL OR ATHLETIC GROUPS, OR SCHOOL GROUPS?: NO

## 2025-04-13 ASSESSMENT — LIFESTYLE VARIABLES
HOW MANY STANDARD DRINKS CONTAINING ALCOHOL DO YOU HAVE ON A TYPICAL DAY: PATIENT DOES NOT DRINK
HOW OFTEN DO YOU HAVE SIX OR MORE DRINKS ON ONE OCCASION: NEVER
AUDIT-C TOTAL SCORE: 0
HOW OFTEN DO YOU HAVE A DRINK CONTAINING ALCOHOL: NEVER
SKIP TO QUESTIONS 9-10: 1

## 2025-04-16 ENCOUNTER — APPOINTMENT (OUTPATIENT)
Dept: MEDICAL GROUP | Facility: PHYSICIAN GROUP | Age: 89
End: 2025-04-16
Payer: MEDICARE

## 2025-04-16 VITALS
OXYGEN SATURATION: 90 % | RESPIRATION RATE: 12 BRPM | HEIGHT: 67 IN | SYSTOLIC BLOOD PRESSURE: 120 MMHG | TEMPERATURE: 96.6 F | HEART RATE: 69 BPM | BODY MASS INDEX: 26.63 KG/M2 | DIASTOLIC BLOOD PRESSURE: 52 MMHG

## 2025-04-16 DIAGNOSIS — L89.311: ICD-10-CM

## 2025-04-16 DIAGNOSIS — N39.41 URGE INCONTINENCE OF URINE: ICD-10-CM

## 2025-04-16 DIAGNOSIS — N31.9 NEUROGENIC BLADDER: ICD-10-CM

## 2025-04-16 PROBLEM — R42 DIZZINESS: Status: RESOLVED | Noted: 2022-12-29 | Resolved: 2025-04-16

## 2025-04-16 PROBLEM — R10.32 LEFT LOWER QUADRANT ABDOMINAL PAIN: Status: RESOLVED | Noted: 2024-02-28 | Resolved: 2025-04-16

## 2025-04-16 PROBLEM — S99.911A INJURY OF RIGHT ANKLE: Status: RESOLVED | Noted: 2024-09-17 | Resolved: 2025-04-16

## 2025-04-16 PROCEDURE — 3078F DIAST BP <80 MM HG: CPT | Performed by: STUDENT IN AN ORGANIZED HEALTH CARE EDUCATION/TRAINING PROGRAM

## 2025-04-16 PROCEDURE — 99214 OFFICE O/P EST MOD 30 MIN: CPT | Performed by: STUDENT IN AN ORGANIZED HEALTH CARE EDUCATION/TRAINING PROGRAM

## 2025-04-16 PROCEDURE — 3074F SYST BP LT 130 MM HG: CPT | Performed by: STUDENT IN AN ORGANIZED HEALTH CARE EDUCATION/TRAINING PROGRAM

## 2025-04-16 RX ORDER — DOXYCYCLINE HYCLATE 100 MG
1 TABLET ORAL 2 TIMES DAILY
COMMUNITY
End: 2025-04-16

## 2025-04-16 RX ORDER — FLUCONAZOLE 100 MG/1
1 TABLET ORAL
COMMUNITY

## 2025-04-16 RX ORDER — TRAMADOL HYDROCHLORIDE 50 MG/1
1 TABLET ORAL 2 TIMES DAILY PRN
COMMUNITY
End: 2025-04-16

## 2025-04-16 RX ORDER — MELOXICAM 7.5 MG/1
1 TABLET ORAL 2 TIMES DAILY
COMMUNITY
End: 2025-04-16

## 2025-04-16 RX ORDER — OXYCODONE AND ACETAMINOPHEN 5; 325 MG/1; MG/1
TABLET ORAL
COMMUNITY
End: 2025-04-16

## 2025-04-16 RX ORDER — NYSTATIN 100000 U/G
CREAM TOPICAL
COMMUNITY
End: 2025-04-16

## 2025-04-16 RX ORDER — AMOXICILLIN 250 MG
CAPSULE ORAL
COMMUNITY

## 2025-04-16 RX ORDER — LORAZEPAM 1 MG/1
TABLET ORAL
COMMUNITY
End: 2025-04-16

## 2025-04-16 RX ORDER — AZELASTINE 1 MG/ML
SPRAY, METERED NASAL
COMMUNITY

## 2025-04-16 NOTE — LETTER
[employer]  Faraz Fu D.O.  [address]  [place] [address]  Fax: [phone]   Authorization for Release/Disclosure of   Protected Health Information   Name: CHELLY DECKER : 1935 SSN: [id number]   Address[place] [address] [place] [address] Phone:    There are no phone numbers on file.   I authorize the entity listed below to release/disclose the PHI below to:   [employer]Faraz Fu D.O. and Faraz Fu D.O.   Provider or Entity Name:  Katalina Khan    Address   City, [facility], [facility]   Phone:      Fax:     Reason for request: continuity of care   Information to be released:    [  ] LAST COLONOSCOPY,  including any PATH REPORT and follow-up  [  ] LAST FIT/COLOGUARD RESULT [  ] LAST DEXA  [  ] LAST MAMMOGRAM  [  ] LAST PAP  [  ] LAST LABS [  ] RETINA EXAM REPORT  [  ] IMMUNIZATION RECORDS  [  X ] Release all info      [  ] Check here and initial the line next to each item to release ALL health information INCLUDING  _____ Care and treatment for drug and / or alcohol abuse  _____ HIV testing, infection status, or AIDS  _____ Genetic Testing    DATES OF SERVICE OR TIME PERIOD TO BE DISCLOSED: _____________  I understand and acknowledge that:  * This Authorization may be revoked at any time by you in writing, except if your health information has already been used or disclosed.  * Your health information that will be used or disclosed as a result of you signing this authorization could be re-disclosed by the recipient. If this occurs, your re-disclosed health information may no longer be protected by State or Federal laws.  * You may refuse to sign this Authorization. Your refusal will not affect your ability to obtain treatment.  * This Authorization becomes effective upon signing and will  on (date) __________.      If no date is indicated, this Authorization will  one (1) year from the signature date.    Name: Chelly Decker  Signature: Date:    4/16/2025     PLEASE FAX REQUESTED RECORDS BACK TO: [phone]

## 2025-04-17 NOTE — PROGRESS NOTES
Verbal Consent given for MIRE[employer] recording software    HISTORY OF PRESENT ILLNESS: Chelly is a pleasant 89 y.o. female, new  patient who presents today to discuss medical problems as listed below:    History of Present Illness  The patient is an 89-year-old female presenting to establish care.    History of neurogenic bladder. She has experienced urinary difficulties chronically, including waking at 4:00 AM with a strong urge to urinate, often resulting in minor leakage but unable to void upon arrival. Previous ultrasounds showed no urinary retention. She has a history of neurogenic bladder with urge incontinence, treated with Botox, acupuncture, and various medications, but symptoms persist. Follow-up with urology has lapsed for 3 years.     She reports pressure sores on the back of her leg due to prolonged sitting on the toilet, alleviated with a foam rubber pillow. She spends most of her time lying down and has spinal stenosis in her lower back, managed by Dr. Lozoya with a pain pump. Scheduled for a knee nerve block next Wednesday.    She has atrial fibrillation, managed with bisoprolol 5 mg for rate control, and is not on anticoagulation therapy.    She has hypertension.    She has a history of lung cancer, previously managed by Dr. Khan, with no consultation in over a year. She was getting CT scans every 2 to 3 months.    She has decreased kidney function and kidney stones, managed with Jardiance 10 mg for heart failure and CKD.    She has pulmonary hypertension. Last echocardiogram on 12/17/2024 showed normal left ventricular systolic function with no mention of pulmonary arterial pressures.    She has aortic valve replacement, managed by a cardiologist.    She reports no constipation and has daily loose bowel movements.    PAST SURGICAL HISTORY:  - Pain pump placement in the abdomen  - Hip replacement  - Heart valve replacement    SOCIAL HISTORY  - Lives with her daughter       Current Outpatient Medications  Ordered in [facility]   Medication Sig Dispense Refill    azelastine (ASTELIN) 137 MCG/SPRAY nasal spray SPRAY 2 SPRAYS INTO EACH NOSTRIL TWICE A DAY FOR 30 DAYS      menthol-zinc oxide (CALMOSEPTINE) 0.44-20.6 % Ointment ointment Apply 1 Application topically every day. 71 g 0    spironolactone (ALDACTONE) 25 MG Tab TAKE 1 TABLET BY MOUTH EVERY  Tablet 3    furosemide (LASIX) 40 MG Tab Take 1 Tablet by mouth 2 times a day. 200 Tablet 3    potassium chloride SA (KDUR) 20 MEQ Tab CR Take 1 Tablet by mouth 2 times a day. 200 Tablet 3    Empagliflozin (JARDIANCE) 10 MG Tab tablet Take 1 Tablet by mouth every day. 100 Tablet 3    atorvastatin (LIPITOR) 40 MG Tab Take 1 Tablet by mouth every evening. 100 Tablet 3    magnesium oxide 400 (240 Mg) MG Tab Take 1 Tablet by mouth 2 times a day. 30 Tablet 0    aspirin 81 MG EC tablet Take 1 Tablet by mouth every day. 100 Tablet 3    CRANBERRY PO Take 1 Tablet by mouth every evening.      Probiotic Product (PROBIOTIC DAILY PO) Take 1 Tablet by mouth every evening.      polyethylene glycol 3350 (MIRALAX) 17 GM/SCOOP Powder Take 17 g by mouth every evening.      Melatonin 10 MG Tab Take 10 mg by mouth every evening. Indications: Trouble Sleeping      acetaminophen (TYLENOL) 500 MG Tab Take 1,000 mg by mouth every 6 hours as needed. Indications: Fever, Pain      Pain Pump (PATIENT SUPPLIED) Device Inject  as directed continuous. Patient's Pain Pump (placed and maintained as an outpatient)    Medications/concentrations:   HYDROMORPHONE 4.0mg/ml      Last changed: 2/14/2024  Refill pump before date: 5/14/2024    Continuous infusion rates (Drug/Rate):   HYDROMORPHONE 0.7491mg/day (0.0312mg/hr)    MD office:  Dr. Lozoya's  Phone number: [phone]          Indications: severe pain      DULoxetine (CYMBALTA) 20 MG Cap DR Particles Take 20 mg by mouth 2 times a day. Indications: Musculoskeletal Pain (Patient taking differently: Take 40 mg by mouth 2 times a day. Indications:  Musculoskeletal Pain)      guaiFENesin ER (MUCINEX) 600 MG TABLET SR 12 HR Take 600 mg by mouth every 12 hours. Indications: Cough      Cyanocobalamin (VITAMIN B-12 PO) Take 1 Tablet by mouth see administration instructions. Pt takes sporidially   Indications: supplement      albuterol 108 (90 Base) MCG/ACT Aero Soln inhalation aerosol Inhale 2 Puffs every 6 hours as needed for Shortness of Breath. Indications: Chronic Obstructive Lung Disease      senna-docusate (SENEXON-S) 8.6-50 MG Tab TAKE 1 TABLET BY MOUTH 1 TIME A DAY AS NEEDED (CONSTIPATION).      fluconazole (DIFLUCAN) 100 MG Tab Take 1 Tablet by mouth every day.      bisoprolol (ZEBETA) 5 MG Tab TAKE 1 TABLET BY MOUTH EVERY  Tablet 3    Naloxone (NARCAN) 4 MG/0.1ML Liquid 0.04 GRAM AS NEEDED AS NEEDED      pantoprazole (PROTONIX) 40 MG Tablet Delayed Response Take 20 mg by mouth every day.      Simethicone (GAS-X PO) Take 1 Tablet by mouth 3 times a day. Indications: gas       No current Epic-ordered facility-administered medications on file.       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    Dyslipidemia 02/28/2024    Normocytic anemia 02/28/2024    Cellulitis of right lower extremity 02/28/2024    S/P TAVR (transcatheter aortic valve replacement) 01/08/2024    Stage 3b chronic kidney disease 11/07/2023    PAF (paroxysmal atrial fibrillation) ([facility]) 10/31/2023    Pulmonary HTN ([facility]) 10/31/2023    Physical debility 06/07/2023    Prediabetes 05/08/2023    TIA (transient ischemic attack) 05/07/2023    Chronic hypoxemic respiratory failure ([facility]) 05/06/2023    S/P insertion of intrathecal pump 05/05/2023    Hammer toes of both feet 12/29/2022    Osteoarthritis of left hip 11/30/2022     Pain of left hip joint 11/30/2022    Trochanteric bursitis of left hip 11/30/2022    Insomnia due to medical condition 10/13/2022    Avascular necrosis of bone of left hip (HCC) 08/17/2022    Lower extremity edema 08/03/2022    Chronic obstructive pulmonary disease (HCC) 06/22/2022    Solitary pulmonary nodule 06/22/2022    Bilateral carotid bruits 06/06/2022    Chronic pain syndrome 06/06/2022    PVC's (premature ventricular contractions) 06/06/2022    Moderate mitral insufficiency 06/06/2022    Urge incontinence of urine 05/12/2022    H/O Clostridium difficile infection 05/12/2022    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    Primary hypertension 03/29/2022    Incomplete defecation 03/29/2022    Moderate asthma without complication 03/29/2022    Spinal stenosis of lumbar region without neurogenic claudication 03/29/2022    Pelvic floor dysfunction 03/29/2022    Hypoxemia 03/29/2022    Other hyperlipidemia 01/13/2021    Primary cancer of left lower lobe of lung (HCC) 01/15/2020    Lichen sclerosus 01/18/2011     Past Surgical History:   Procedure Laterality Date    TRANSCATHETER AORTIC VALVE REPLACEMENT  01/08/2024    Procedure: TRANSCATHETER AORTIC VALVE REPLACEMENT;  Surgeon: Clayton Eddy M.D.;  Location: [facility];  Service: Cardiac    ECHOCARDIOGRAM, TRANSESOPHAGEAL, INTRAOPERATIVE  01/08/2024    Procedure: ECHOCARDIOGRAM, TRANSESOPHAGEAL, INTRAOPERATIVE;  Surgeon: Clayton Eddy M.D.;  Location: [facility];  Service: Cardiac    OTHER Bilateral 12/04/2023    IOLOU    ME TOTAL HIP ARTHROPLASTY Left 08/28/2023    Procedure: LEFT TOTAL HIP ARTHROPLASTY;  Surgeon: Daryl Perrin M.D.;  Location: [facility];  Service: Orthopedics    PUMP REVISION Right 05/04/2023    Procedure: INTRATHECAL PAIN PUMP REVISION;  Surgeon: Tavo Lozoya M.D.;  Location: [facility];  Service: Pain Management    PUMP INSERT/REMOVE Right 10/07/2022    Procedure: MEDTRONIC PUMP  "REPLACEMENT;  Surgeon: Joel Alanis M.D.;  Location: [facility];  Service: Pain Management    [place] INS/RPLC SPI NPG/RCVR POCKET N/A 02/05/2021    Procedure: IMPLANT PUMP AND CATHETER;  Surgeon: Joel Alanis M.D.;  Location: SURGERY [facility];  Service: Pain Management    HIP ARTHROPLASTY TOTAL Right 1998    APPENDECTOMY      CARPAL TUNNEL ENDOSCOPIC Bilateral     bilat    CHOLECYSTECTOMY      COLONOSCOPY      HAND SURG[facility]  2012[facility]2013 [facility]    HYSTERECTOMY LAPAROSCOPY      total    LITHOTRIPSY      kidney stones removed    ORIF, HIP  1998 [facility]  AND  8/28/2023  [facility]    I was in severe to mild  pain since 1998 and it continues at a medium to mild \"constant pain\" in my right hip replacement to this day ) My fear of  falling was alleviated by grab bars and stationary furniture in my residence for all those years.    ORIF, PELVIS  LEFT HIP    Recent left HIP surgery: doctor diagnosed  AVN  and deterioration of left hip ... accompanied by agonizing pain which led us to earliest possible hip replacement which occurred on August 28, 2023    PRIMARY C SECTION      c section    TONSILLECTOMY       Social History     Tobacco Use    Smoking status: Former     Current packs/day: 0.00     Average packs/day: 1 pack/day for 61.0 years (61.0 ttl pk-yrs)     Types: Cigarettes, Electronic Cigarettes     Start date: 1/1/1954     Quit date: 1/1/2015     Years since quitting: 10.2    Smokeless tobacco: Never    Tobacco comments:     Smoked as a teenager quit as a senior citizen   Vaping Use    Vaping status: Former    Substances: Flavoring   Substance Use Topics    Alcohol use: Not Currently    Drug use: Not Currently      Family History   Problem Relation Age of Onset    Heart Disease Mother         Not from congestive heart failure!     Current Outpatient Medications   Medication Sig Dispense Refill    azelastine (ASTELIN) 137 " MCG/SPRAY nasal spray SPRAY 2 SPRAYS INTO EACH NOSTRIL TWICE A DAY FOR 30 DAYS      menthol-zinc oxide (CALMOSEPTINE) 0.44-20.6 % Ointment ointment Apply 1 Application topically every day. 71 g 0    spironolactone (ALDACTONE) 25 MG Tab TAKE 1 TABLET BY MOUTH EVERY  Tablet 3    furosemide (LASIX) 40 MG Tab Take 1 Tablet by mouth 2 times a day. 200 Tablet 3    potassium chloride SA (KDUR) 20 MEQ Tab CR Take 1 Tablet by mouth 2 times a day. 200 Tablet 3    Empagliflozin (JARDIANCE) 10 MG Tab tablet Take 1 Tablet by mouth every day. 100 Tablet 3    atorvastatin (LIPITOR) 40 MG Tab Take 1 Tablet by mouth every evening. 100 Tablet 3    magnesium oxide 400 (240 Mg) MG Tab Take 1 Tablet by mouth 2 times a day. 30 Tablet 0    aspirin 81 MG EC tablet Take 1 Tablet by mouth every day. 100 Tablet 3    CRANBERRY PO Take 1 Tablet by mouth every evening.      Probiotic Product (PROBIOTIC DAILY PO) Take 1 Tablet by mouth every evening.      polyethylene glycol 3350 (MIRALAX) 17 GM/SCOOP Powder Take 17 g by mouth every evening.      Melatonin 10 MG Tab Take 10 mg by mouth every evening. Indications: Trouble Sleeping      acetaminophen (TYLENOL) 500 MG Tab Take 1,000 mg by mouth every 6 hours as needed. Indications: Fever, Pain      Pain Pump (PATIENT SUPPLIED) Device Inject  as directed continuous. Patient's Pain Pump (placed and maintained as an outpatient)    Medications/concentrations:   HYDROMORPHONE 4.0mg/ml      Last changed: 2/14/2024  Refill pump before date: 5/14/2024    Continuous infusion rates (Drug/Rate):   HYDROMORPHONE 0.7491mg/day (0.0312mg/hr)    MD office:  Dr. Lozoya's  Phone number: [phone]          Indications: severe pain      DULoxetine (CYMBALTA) 20 MG Cap DR Particles Take 20 mg by mouth 2 times a day. Indications: Musculoskeletal Pain (Patient taking differently: Take 40 mg by mouth 2 times a day. Indications: Musculoskeletal Pain)      guaiFENesin ER (MUCINEX) 600 MG TABLET SR 12 HR Take 600 mg  "by mouth every 12 hours. Indications: Cough      Cyanocobalamin (VITAMIN B-12 PO) Take 1 Tablet by mouth see administration instructions. Pt takes sporidially   Indications: supplement      albuterol 108 (90 Base) MCG/ACT Aero Soln inhalation aerosol Inhale 2 Puffs every 6 hours as needed for Shortness of Breath. Indications: Chronic Obstructive Lung Disease      senna-docusate (SENEXON-S) 8.6-50 MG Tab TAKE 1 TABLET BY MOUTH 1 TIME A DAY AS NEEDED (CONSTIPATION).      fluconazole (DIFLUCAN) 100 MG Tab Take 1 Tablet by mouth every day.      bisoprolol (ZEBETA) 5 MG Tab TAKE 1 TABLET BY MOUTH EVERY  Tablet 3    Naloxone (NARCAN) 4 MG/0.1ML Liquid 0.04 GRAM AS NEEDED AS NEEDED      pantoprazole (PROTONIX) 40 MG Tablet Delayed Response Take 20 mg by mouth every day.      Simethicone (GAS-X PO) Take 1 Tablet by mouth 3 times a day. Indications: gas       No current facility-administered medications for this visit.       Allergies:  Allergies   Allergen Reactions    Sulfa Drugs Unspecified     Stomach ache    Nauseated, diarrhea    Other reaction(s): Unspecified   Stomach ache    Erythromycin Nausea     Other Reaction(s): GI Upset    Other reaction(s): Not available       Allergies, past medical history, past surgical history, family history, social history reviewed and updated.    Objective:    /52   Pulse 69   Temp 35.9 °C (96.6 °F) (Temporal)   Resp 12   Ht 1.702 m (5' 7\")   SpO2 90%   BMI 26.63 kg/m²    Body mass index is 26.63 kg/m².    Physical exam:  General: Normal appearance, no acute distress, not ill-appearing  HEENT: EOM intact, conjunctiva normal limits, negative right/left eye discharge.  Sclera anicteric  Cardiovascular: Normal rate and rhythm, no murmurs  Pulmonary: No respiratory distress, no wheezing, no rales, breath sounds normal.  Abdomen: Bowel sounds normal, flat, soft.  Musculoskeletal: No edema bilaterally  Skin: r ischial area with erythema aswelling and skin " breakdown  Neurological: No focal deficits, wheelchair bound   Psychiatric: Mood within normal limits    Assessment/Plan:    Assessment & Plan  1. Neurogenic bladder with urge incontinence: Chronic.  - Referral to urologist for further evaluation and management.    2. Right-sided ischial ulcer stage I.  - Prescription for calmoseptine cream for barrier protection  - Advised to avoid prolonged sitting on the toilet to facilitate healing.    3. Spinal stenosis.  - Managed with pain pump by Dr. Lozoya.  - Scheduled for knee nerve block next Wednesday.  - Continue current pain management regimen and follow up with pain management specialist.    4. Atrial fibrillation.  - Managed with bisoprolol 5 mg for rate control.  - Continue bisoprolol 5 mg and with cardiology     5. Pulmonary hypertension.  - Last echocardiogram on 12/17/2024 showed normal left ventricular systolic function.  - Continue monitoring and follow up with cardiologist as needed.    6. Decreased renal function.  - Baseline GFR of 44, managed with Jardiance 10 mg for heart failure and CKD.  - Continue Jardiance 10 mg.    7. Suspected lung cancer.  - No recent imaging or follow-up.  - records requested     8. Hypertension.  - Continue current blood pressure management regimen.    Follow-up  - Follow up in 2 weeks.       Problem List Items Addressed This Visit       Urge incontinence of urine    Relevant Orders    Referral to Urology    Neurogenic bladder    Relevant Orders    Referral to Urology     Other Visit Diagnoses         Decubitus ulcer of ischial area, right, stage I        Relevant Medications    menthol-zinc oxide (CALMOSEPTINE) 0.44-20.6 % Ointment ointment            Return in about 4 weeks (around 5/14/2025), or if symptoms worsen or fail to improve, for AWV.

## 2025-04-21 ENCOUNTER — TELEPHONE (OUTPATIENT)
Dept: MEDICAL GROUP | Facility: PHYSICIAN GROUP | Age: 89
End: 2025-04-21
Payer: MEDICARE

## 2025-04-21 NOTE — TELEPHONE ENCOUNTER
Pt LVM stating unable to  ointment Calmoseptine due to pharmacy being out of stock, she will like an alternative.

## 2025-04-23 ENCOUNTER — TELEPHONE (OUTPATIENT)
Dept: CARDIOLOGY | Facility: MEDICAL CENTER | Age: 89
End: 2025-04-23
Payer: MEDICARE

## 2025-04-23 NOTE — TELEPHONE ENCOUNTER
Called pt in regards to lab work that was ordered at previous OV. Patient states she will be getting the labs done before her appointment. Pt has follow up appointment scheduled with MK on 05.06.2025.

## 2025-05-06 ENCOUNTER — APPOINTMENT (OUTPATIENT)
Dept: MEDICAL GROUP | Facility: PHYSICIAN GROUP | Age: 89
End: 2025-05-06
Payer: MEDICARE

## 2025-05-06 ENCOUNTER — APPOINTMENT (OUTPATIENT)
Dept: CARDIOLOGY | Facility: PHYSICIAN GROUP | Age: 89
End: 2025-05-06
Payer: MEDICARE

## 2025-05-06 VITALS
RESPIRATION RATE: 16 BRPM | OXYGEN SATURATION: 92 % | DIASTOLIC BLOOD PRESSURE: 60 MMHG | SYSTOLIC BLOOD PRESSURE: 94 MMHG | BODY MASS INDEX: 26.63 KG/M2 | HEIGHT: 67 IN | HEART RATE: 67 BPM

## 2025-05-06 DIAGNOSIS — Z95.2 S/P TAVR (TRANSCATHETER AORTIC VALVE REPLACEMENT): ICD-10-CM

## 2025-05-06 DIAGNOSIS — I48.0 PAF (PAROXYSMAL ATRIAL FIBRILLATION) (HCC): ICD-10-CM

## 2025-05-06 DIAGNOSIS — I10 PRIMARY HYPERTENSION: ICD-10-CM

## 2025-05-06 DIAGNOSIS — I35.0 SEVERE AORTIC STENOSIS: ICD-10-CM

## 2025-05-06 DIAGNOSIS — I50.32 CHRONIC HEART FAILURE WITH PRESERVED EJECTION FRACTION (HFPEF) (HCC): ICD-10-CM

## 2025-05-06 DIAGNOSIS — I48.0 HYPERCOAGULABLE STATE DUE TO PAROXYSMAL ATRIAL FIBRILLATION (HCC): ICD-10-CM

## 2025-05-06 DIAGNOSIS — D68.69 HYPERCOAGULABLE STATE DUE TO PAROXYSMAL ATRIAL FIBRILLATION (HCC): ICD-10-CM

## 2025-05-06 DIAGNOSIS — E78.5 DYSLIPIDEMIA: ICD-10-CM

## 2025-05-06 PROCEDURE — 3078F DIAST BP <80 MM HG: CPT | Performed by: INTERNAL MEDICINE

## 2025-05-06 PROCEDURE — 3074F SYST BP LT 130 MM HG: CPT | Performed by: INTERNAL MEDICINE

## 2025-05-06 PROCEDURE — 99214 OFFICE O/P EST MOD 30 MIN: CPT | Performed by: INTERNAL MEDICINE

## 2025-05-06 PROCEDURE — G2211 COMPLEX E/M VISIT ADD ON: HCPCS | Performed by: INTERNAL MEDICINE

## 2025-05-06 RX ORDER — FUROSEMIDE 40 MG/1
40 TABLET ORAL DAILY
Qty: 100 TABLET | Refills: 3 | Status: SHIPPED | OUTPATIENT
Start: 2025-05-06

## 2025-05-06 RX ORDER — FUROSEMIDE 40 MG/1
40 TABLET ORAL 2 TIMES DAILY
Qty: 100 TABLET | Refills: 3 | Status: SHIPPED | OUTPATIENT
Start: 2025-05-06 | End: 2025-05-06 | Stop reason: SDUPTHER

## 2025-05-06 NOTE — PROGRESS NOTES
Cardiology Follow Up Consultation Note    Date of note:    5/6/2025  Primary Care Provider: Kassi Carrillo M.D.  Referring Provider: No ref. provider found    Patient Name: Chelly Decker   YOB: 1935  MRN:              [id number]    Chief Complaint   Patient presents with    Follow-Up     Congestive heart failure with preserved left ventricular function, NYHA class 3 (Formerly [facility])         History of Present Illness: Ms. Chelly Decker is a 89-year-old woman with past medical history significant for severe aortic stenosis status post TAVR 1/8/2024, hypertension, hyperlipidemia, HFpEF, nonobstructive CAD, lung cancer status post therapies, chronic hypoxic respiratory failure on supplemental oxygen, history of pAF (not on OAC) who presents to the cardiology office for follow-up.    Originally seen in my clinic on 3/18/2025 in [place] but she is well known to the [facility] practice. Chart review shows documented history of PAF. It appears she is not on OAC due to high risk of bleeding and hx of anemia. Originally diagnosed at [facility]--admitted with AF with RVR which spontaneously converted back to sinus.    During OV on 3/18/2025, patient was found to have significant lower extremity edema.  Her dose of diuretics were increased to Lasix 40 mg twice daily supplemented with potassium.  She has been taking it as prescribed.  She has noticed improvement in her lower extremity edema.    Currently, she has no major cardiac complaints.  She denies having symptoms of chest pain or dyspnea.  No orthopnea or PND.  No edema has improved.    Cardiovascular Risk Factors:  1. Smoking status: Former smoker  2. Type II Diabetes Mellitus: Yes  Lab Results   Component Value Date/Time    HBA1C 6.4 (H) 02/12/2025 02:03 PM    HBA1C 5.9 (H) 05/07/2023 07:22 PM    HBA1C 5.8 08/20/2021 04:40 PM     3. Hypertension: Yes  4. Dyslipidemia: Yes   Cholesterol,Tot   Date Value Ref Range Status   05/08/2023  115 100 - 199 mg/dL Final     LDL   Date Value Ref Range Status   05/08/2023 34 <100 mg/dL Final     HDL   Date Value Ref Range Status   05/08/2023 67 >=40 mg/dL Final     Triglycerides   Date Value Ref Range Status   05/08/2023 69 0 - 149 mg/dL Final     5. Family history of early Coronary Artery Disease in a first degree relative (Male less than 55 years of age; Female less than 65 years of age): Denies      Review of Systems:  As per HPI.  Review of systems assessed and are negative except as stated above.      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    Breath shortness 12/04/2023    uses oxygen at night, during day sometimes. 8/23/23-SOB with activity.    Cancer (HCC)     cervical cancer 1968    Cancer ([facility]) 12/04/2023    lower left lobe cancer    Cervical cancer (HCC) 1968    [id number] chemo or radiation.    Chronic constipation     takes miralax    Chronic kidney disease (CKD)     stage 3    Chronic obstructive pulmonary disease ([facility]) 06/22/2022    Chronic pain     follows with Dr Lozoya    Complication of anesthesia     Congestive heart failure (HCC)     follows with Dr Salvador with [facility]    Congestive heart failure with preserved left ventricular function, NYHA class 3 ([facility]) 02/18/2025    COPD (chronic obstructive pulmonary disease) ([facility])     Coronary artery disease     Dental disorder 12/04/2023    upper denture and permanent bridge on lower front teeth    Dizziness 12/29/2022    Fibromyalgia,  primary     Foot drop, right foot 12/04/2023    GERD (gastroesophageal reflux disease)     H/O Clostridium difficile infection 05/12/2022    Hammer toes of both feet 12/29/2022    Heart burn 12/04/2023    taking pantoprazole.    Heart disease     Heart murmur     High cholesterol 12/04/2023    Controled with medication    Hypertension 12/04/2023    medicated    Hypotension due to hypovolemia 08/17/2022    Hypoxia 03/29/2022    Incomplete defecation 03/29/2022    Infectious disease 04/25/2023    I was just informed that the person who drove me to my doctor appointment and home on the 25th of this month was diagnosed with the flu on the 26th of this month. I had my flu shot and after one day I have no symptoms will keep checking !    Injury of right ankle 09/17/2024    Insomnia due to medical condition 10/13/2022    Irregular heart rate 03/29/2022    states has had for years, why atenolol    Kidney stones     Moderate aortic stenosis     Moderate mitral insufficiency     Neurogenic bladder 10/23/2024    Osteoporosis     Oxygen dependent     uses oxygen 4 liters nasal cannula at night, sometimes during the day    Pain 12/04/2023    pain everywhere, fibromyalgia    Pain in joint involving multiple sites 10/13/2022    Pain in joint involving multiple sites 10/13/2022    Pelvic floor dysfunction 03/29/2022    Peroneal palsy, 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    Spinal disorder     Spinal stenosis     pain pump for pain control    Spinal stenosis of lumbar region without neurogenic claudication 03/29/2022    Supplemental oxygen dependent     Urge incontinence of urine 12/04/2023    urinary retention, diapers    Urinary bladder disorder Past [id number]    I've been under treatment for  "years with no results I am seeing a new urologist on May 2    Vitamin D deficiency 08/01/2024         Past Surgical History:   Procedure Laterality Date    TRANSCATHETER AORTIC VALVE REPLACEMENT  01/08/2024    Procedure: TRANSCATHETER AORTIC VALVE REPLACEMENT;  Surgeon: Clayton Eddy M.D.;  Location: [facility];  Service: Cardiac    ECHOCARDIOGRAM, TRANSESOPHAGEAL, INTRAOPERATIVE  01/08/2024    Procedure: ECHOCARDIOGRAM, TRANSESOPHAGEAL, INTRAOPERATIVE;  Surgeon: Clayton Eddy M.D.;  Location: [facility];  Service: Cardiac    OTHER Bilateral 12/04/2023    IOLOU    CO TOTAL HIP ARTHROPLASTY Left 08/28/2023    Procedure: LEFT TOTAL HIP ARTHROPLASTY;  Surgeon: Daryl Perrin M.D.;  Location: SURGERY [facility];  Service: Orthopedics    PUMP REVISION Right 05/04/2023    Procedure: INTRATHECAL PAIN PUMP REVISION;  Surgeon: Tavo Lozoya M.D.;  Location: SURGERY [facility];  Service: Pain Management    PUMP INSERT/REMOVE Right 10/07/2022    Procedure: MEDTRONIC PUMP REPLACEMENT;  Surgeon: Joel Alanis M.D.;  Location: SURGERY [facility];  Service: Pain Management    CO INS/RPLC SPI NPG/RCVR POCKET N/A 02/05/2021    Procedure: IMPLANT PUMP AND CATHETER;  Surgeon: Joel Alanis M.D.;  Location: [facility];  Service: Pain Management    HIP ARTHROPLASTY TOTAL Right 1998    APPENDECTOMY      CARPAL TUNNEL ENDOSCOPIC Bilateral     bilat    CHOLECYSTECTOMY      COLONOSCOPY      HAND SURGERY  2012/2013 [facility]    [facility] Dr. Pavon    HYSTERECTOMY LAPAROSCOPY      total    LITHOTRIPSY      kidney stones removed    ORIF, HIP  1998 [facility]  AND  8/28/2023  [facility]    I was in severe to mild  pain since 1998 and it continues at a medium to mild \"constant pain\" in my right hip replacement to this day ) My fear of  falling was alleviated by grab bars and stationary furniture in my residence for all those years.    ORIF, " PELVIS  LEFT HIP    Recent left HIP surgery: doctor diagnosed  AVN  and deterioration of left hip ... accompanied by agonizing pain which led us to earliest possible hip replacement which occurred on August 28, 2023    PRIMARY C SECTION      c section    TONSILLECTOMY           Current Outpatient Medications   Medication Sig Dispense Refill    furosemide (LASIX) 40 MG Tab Take 1 Tablet by mouth every day. 100 Tablet 3    menthol-zinc oxide (CALMOSEPTINE) 0.44-20.6 % Ointment ointment Apply 1 Application topically every day. 71 g 0    spironolactone (ALDACTONE) 25 MG Tab TAKE 1 TABLET BY MOUTH EVERY  Tablet 3    potassium chloride SA (KDUR) 20 MEQ Tab CR Take 1 Tablet by mouth 2 times a day. 200 Tablet 3    bisoprolol (ZEBETA) 5 MG Tab TAKE 1 TABLET BY MOUTH EVERY  Tablet 3    Empagliflozin (JARDIANCE) 10 MG Tab tablet Take 1 Tablet by mouth every day. 100 Tablet 3    Naloxone (NARCAN) 4 MG/0.1ML Liquid 0.04 GRAM AS NEEDED AS NEEDED      atorvastatin (LIPITOR) 40 MG Tab Take 1 Tablet by mouth every evening. 100 Tablet 3    pantoprazole (PROTONIX) 40 MG Tablet Delayed Response Take 20 mg by mouth every day.      magnesium oxide 400 (240 Mg) MG Tab Take 1 Tablet by mouth 2 times a day. 30 Tablet 0    aspirin 81 MG EC tablet Take 1 Tablet by mouth every day. 100 Tablet 3    CRANBERRY PO Take 1 Tablet by mouth every evening.      Probiotic Product (PROBIOTIC DAILY PO) Take 1 Tablet by mouth every evening.      polyethylene glycol 3350 (MIRALAX) 17 GM/SCOOP Powder Take 17 g by mouth every evening.      Melatonin 10 MG Tab Take 10 mg by mouth every evening. Indications: Trouble Sleeping      acetaminophen (TYLENOL) 500 MG Tab Take 1,000 mg by mouth every 6 hours as needed. Indications: Fever, Pain      Pain Pump (PATIENT SUPPLIED) Device Inject  as directed continuous. Patient's Pain Pump (placed and maintained as an outpatient)    Medications/concentrations:   HYDROMORPHONE 4.0mg/ml      Last changed:  2/14/2024  Refill pump before date: 5/14/2024    Continuous infusion rates (Drug/Rate):   HYDROMORPHONE 0.7491mg/day (0.0312mg/hr)    MD office:  Dr. Lozoya's  Phone number: [phone]          Indications: severe pain      DULoxetine (CYMBALTA) 20 MG Cap DR Particles Take 20 mg by mouth 2 times a day. Indications: Musculoskeletal Pain (Patient taking differently: Take 40 mg by mouth 2 times a day. Indications: Musculoskeletal Pain)      guaiFENesin ER (MUCINEX) 600 MG TABLET SR 12 HR Take 600 mg by mouth every 12 hours. Indications: Cough      Cyanocobalamin (VITAMIN B-12 PO) Take 1 Tablet by mouth see administration instructions. Pt takes sporidially   Indications: supplement      albuterol 108 (90 Base) MCG/ACT Aero Soln inhalation aerosol Inhale 2 Puffs every 6 hours as needed for Shortness of Breath. Indications: Chronic Obstructive Lung Disease      Simethicone (GAS-X PO) Take 1 Tablet by mouth 3 times a day. Indications: gas      senna-docusate (SENEXON-S) 8.6-50 MG Tab TAKE 1 TABLET BY MOUTH 1 TIME A DAY AS NEEDED (CONSTIPATION).       No current facility-administered medications for this visit.         Allergies   Allergen Reactions    Sulfa Drugs Unspecified     Stomach ache    Nauseated, diarrhea    Other reaction(s): Unspecified   Stomach ache    Erythromycin Nausea     Other Reaction(s): GI Upset    Other reaction(s): Not available         Family History   Problem Relation Age of Onset    Heart Disease Mother         Not from congestive heart failure!         Social History     Socioeconomic History    Marital status:      Spouse name: Not on file    Number of children: Not on file    Years of education: Not on file    Highest education level: 12th grade   Occupational History    Occupation: retired book keeper   Tobacco Use    Smoking status: Former     Current packs/day: 0.00     Average packs/day: 1 pack/day for 61.0 years (61.0 ttl pk-yrs)     Types: Cigarettes, Electronic Cigarettes      Start date: 1/1/1954     Quit date: 1/1/2015     Years since quitting: 10.3    Smokeless tobacco: Never    Tobacco comments:     Smoked as a teenager quit as a senior citizen   Vaping Use    Vaping status: Former    Substances: Flavoring   Substance and Sexual Activity    Alcohol use: Not Currently    Drug use: Not Currently    Sexual activity: Not on file   Other Topics Concern    Not on file   Social History Narrative    Not on file     Social Drivers of Health     Financial Resource Strain: Low Risk  (4/13/2025)    Overall Financial Resource Strain (CARDIA)     Difficulty of Paying Living Expenses: Not very hard   Food Insecurity: No Food Insecurity (4/13/2025)    Hunger Vital Sign     Worried About Running Out of Food in the Last Year: Never true     Ran Out of Food in the Last Year: Never true   Transportation Needs: No Transportation Needs (4/13/2025)    PRAPARE - Transportation     Lack of Transportation (Medical): No     Lack of Transportation (Non-Medical): No   Physical Activity: Inactive (4/13/2025)    Exercise Vital Sign     Days of Exercise per Week: 0 days     Minutes of Exercise per Session: 0 min   Stress: Stress Concern Present (4/13/2025)    [employer] - Occupational Stress Questionnaire     Feeling of Stress : To some extent   Social Connections: Socially Isolated (4/13/2025)    Social Connection and Isolation Panel [NHANES]     Frequency of Communication with Friends and Family: More than three times a week     Frequency of Social Gatherings with Friends and Family: Once a week     Attends Sikh Services: Never     Active Member of Clubs or Organizations: No     Attends Club or Organization Meetings: Never     Marital Status:    Intimate Partner Violence: Low Risk  (6/22/2024)    [facility]    History of Abuse     Have you ever been afraid of, threatened, neglected, or abused by someone?: No  "  Housing Stability: Low Risk  (4/13/2025)    Housing Stability Vital Sign     Unable to Pay for Housing in the Last Year: No     Number of Times Moved in the Last Year: 0     Homeless in the Last Year: No         Physical Exam:  Ambulatory Vitals  BP 94/60 (BP Location: Left arm, Patient Position: Sitting, BP Cuff Size: Adult)   Pulse 67   Resp 16   Ht 1.702 m (5' 7\")   SpO2 92%    Oxygen Therapy:  Pulse Oximetry: 92 %, O2 Delivery Device: Nasal Cannula  BP Readings from Last 4 Encounters:   05/06/25 94/60   04/16/25 120/52   03/18/25 104/52   01/22/25 128/52       Weight/BMI: Body mass index is 26.63 kg/m².  Wt Readings from Last 4 Encounters:   03/26/25 77.1 kg (170 lb)   02/18/25 77.1 kg (170 lb)   01/22/25 77.1 kg (170 lb)   11/21/24 77.1 kg (170 lb)         General: Not in acute distress, appears comfortable  HEENT: OP clear   Neck:  No carotid bruits, no JVD  CVS:  RRR, Normal S1, S2. No murmurs, rubs or gallops  Resp: Normal respiratory effort, lungs CTA bilaterally.  Skin: No obvious rashes, no cyanosis  Neurological: Alert and oriented x3, moves all extremities, no focal neurologic deficits  Psychiatric: Appropriate affect  Extremities:   Extremities warm, minimal lower extremity edema      Lab Data Review:  Lab Results   Component Value Date/Time    CHOLSTRLTOT 115 05/08/2023 01:41 AM    LDL 34 05/08/2023 01:41 AM    HDL 67 05/08/2023 01:41 AM    TRIGLYCERIDE 69 05/08/2023 01:41 AM       Lab Results   Component Value Date/Time    SODIUM 138 02/29/2024 01:01 AM    POTASSIUM 4.1 02/29/2024 01:01 AM    CHLORIDE 98 02/29/2024 01:01 AM    CO2 30 02/29/2024 01:01 AM    GLUCOSE 125 (H) 02/29/2024 01:01 AM    BUN 44 (H) 02/29/2024 01:01 AM    CREATININE 1.40 02/29/2024 01:01 AM    GLOMRATE 64 10/06/2023 04:33 AM     Lab Results   Component Value Date/Time    ALKPHOSPHAT 129 (H) 02/27/2024 07:41 PM    ASTSGOT 20 02/27/2024 07:41 PM    ALTSGPT 12 02/27/2024 07:41 PM    TBILIRUBIN 0.4 02/27/2024 07:41 PM    "   Lab Results   Component Value Date/Time    WBC 6.5 02/29/2024 01:01 AM    HEMOGLOBIN 13.3 02/12/2025 02:03 PM    HEMOGLOBIN 10.6 (L) 02/29/2024 01:01 AM     Lab Results   Component Value Date/Time    HBA1C 6.4 (H) 02/12/2025 02:03 PM    HBA1C 5.9 (H) 05/07/2023 07:22 PM    HBA1C 5.8 08/20/2021 04:40 PM         Cardiac Imaging and Procedures Review:    EKG dated 3/18/2025:   My personal interpretation is sinus rhythm    Cath 12/2023:  The left main coronary artery is patent and bifurcates into the left anterior descending and left circumflex coronaries.  Catheter depends on yet sometimes they release the band previously other times is chronically disease and takes over time that he never quite know it yet that you have the bleeding stopped from the hole that I put the artery but usually it is not 2 hours  The left anterior descending coronary artery is a large, transapical vessel that supplies a moderate first diagonal branch and has mid 30% disease.  The left circumflex coronary artery is a large, nondominant vessel that supplies a large first obtuse marginal branch and has mid 30% disease.  The right coronary artery is a large, dominant vessel with proximal 50% disease and mid 30% disease.  Distally, it bifurcates into a small posterior descending coronary and a moderate posterolateral branch with luminal irregularities.    Echo 12/16/2024:  Normal left ventricular systolic function.  Known TAVR aortic valve that is functioning normally with normal   transvalvular gradients.  Mild mitral stenosis - mean gradient of 2-3 mmHg at 72 bpm.      Assessment & Plan     1. Chronic heart failure with preserved ejection fraction (HFpEF) ([facility])        2. Severe aortic stenosis        3. S/P TAVR (transcatheter aortic valve replacement)  furosemide (LASIX) 40 MG Tab    DISCONTINUED: furosemide (LASIX) 40 MG Tab      4. PAF (paroxysmal atrial fibrillation) ([facility])        5. Hypercoagulable state due to paroxysmal atrial  fibrillation (HCC)        6. Primary hypertension        7. Dyslipidemia              Medical Decision Making:  Ms. Chelly Decker is a 89-year-old woman with past medical history significant for severe aortic stenosis status post TAVR 1/8/2024, hypertension, hyperlipidemia, HFpEF, nonobstructive CAD, lung cancer status post therapies, chronic hypoxic respiratory failure on supplemental oxygen history of pAF (not on OAC) who presents to the cardiology office for follow-up.    1. Chronic heart failure with preserved ejection fraction (HFpEF) (HCC)  - Known history of HFpEF.  Was volume overloaded during last office visit.  However with increased dose of Lasix, she is now close to euvolemic.  - Repeat labs performed earlier this month shows mild elevation in creatinine at 1.2.  - Decrease Lasix to 40 mg once daily.  Decrease potassium supplementation to once daily.  - Continue spironolactone and SGLT2 inhibitor therapy.    2. Severe aortic stenosis  3. S/P TAVR (transcatheter aortic valve replacement)  -Underwent successful TAVR.  Last echocardiogram in December 2024 shows normal valve function with normal transvalvular gradients.  Continue aspirin 81 mg daily.  SBE prophylaxis discussed.    4. PAF (paroxysmal atrial fibrillation) (HCC)  5. Hypercoagulable state due to paroxysmal atrial fibrillation (HCC)  -History of paroxysmal atrial fibrillation based on documentation.  Had hospitalization with A-fib with RVR in 2023.  Not on anticoagulation due to documented high risk for bleeding and anemia.  - We discussed Watchman device implant for stroke risk reduction.  She states that she is not currently interested in invasive procedures.    6. Primary hypertension  -This is a chronic, stable condition.  Continue spironolactone and bisoprolol.    7. Dyslipidemia  -Atorvastatin 40 mg daily    () Today's E/M visit is associated with medical care services that serve as the continuing focal point for all needed health care  services and/or with medical care services that  are part of ongoing care related to a patient's single, serious condition, or a complex condition: This includes  furnishing services to patients on an ongoing basis that result in care that is personalized  to the patient. The services result in a comprehensive, longitudinal, and continuous  relationship with the patient and involve delivery of team-based care that is accessible, coordinated with other practitioners and providers, and integrated with the broader health care landscape.       It was a pleasure seeing Ms. Chelly Decker in the office today. Return in about 6 months (around 11/6/2025) for Heart failure, Atrial fibrillation. Patient is aware to call the cardiology clinic with any questions or concerns.      Ty Grullon MD, [facility]  Cardiologist, [facility] Advanced Medicine, [facility]  [address] [address]  Phone: [phone]  Fax: [phone]    Please note that this dictation was created using voice recognition software. I have made every reasonable attempt to correct obvious errors, but it is possible there are errors of grammar and possibly content that I did not discover before finalizing the note.

## 2025-05-15 ENCOUNTER — HOSPITAL ENCOUNTER (OUTPATIENT)
Facility: MEDICAL CENTER | Age: 89
End: 2025-05-15
Payer: MEDICARE

## 2025-05-15 PROCEDURE — G0480 DRUG TEST DEF 1-7 CLASSES: HCPCS

## 2025-05-15 RX ORDER — PANTOPRAZOLE SODIUM 40 MG/1
TABLET, DELAYED RELEASE ORAL
Qty: 100 TABLET | Refills: 3 | Status: SHIPPED | OUTPATIENT
Start: 2025-05-15

## 2025-05-15 NOTE — TELEPHONE ENCOUNTER
Received request via: Pharmacy    Was the patient seen in the last year in this department? Yes    Does the patient have an active prescription (recently filled or refills available) for medication(s) requested? No    [facility]    Does the patient have shelter Plus and need 100-day supply? (This applies to ALL medications) Yes, quantity updated to 100 days

## 2025-05-20 ENCOUNTER — APPOINTMENT (OUTPATIENT)
Dept: MEDICAL GROUP | Facility: PHYSICIAN GROUP | Age: 89
End: 2025-05-20
Payer: MEDICARE

## 2025-05-24 LAB
6MAM UR CFM-MCNC: <10 NG/ML
CODEINE UR CFM-MCNC: <20 NG/ML
HYDROCODONE UR CFM-MCNC: 489 NG/ML
HYDROMORPHONE UR CFM-MCNC: 75 NG/ML
MORPHINE UR CFM-MCNC: <20 NG/ML
NORHYDROCODONE UR CFM-MCNC: 1983 NG/ML
NOROXYCODONE UR CFM-MCNC: <20 NG/ML
OPIATES UR NOROXYM Q0836: <20 NG/ML
OXYCODONE UR CFM-MCNC: <20 NG/ML
OXYMORPHONE UR CFM-MCNC: <20 NG/ML

## 2025-05-29 ENCOUNTER — OFFICE VISIT (OUTPATIENT)
Dept: MEDICAL GROUP | Facility: PHYSICIAN GROUP | Age: 89
End: 2025-05-29
Payer: MEDICARE

## 2025-05-29 VITALS
SYSTOLIC BLOOD PRESSURE: 110 MMHG | HEIGHT: 67 IN | DIASTOLIC BLOOD PRESSURE: 54 MMHG | BODY MASS INDEX: 26.63 KG/M2 | RESPIRATION RATE: 12 BRPM | HEART RATE: 71 BPM | OXYGEN SATURATION: 96 % | TEMPERATURE: 98.2 F

## 2025-05-29 DIAGNOSIS — J44.0 CHRONIC OBSTRUCTIVE PULMONARY DISEASE WITH ACUTE LOWER RESPIRATORY INFECTION (HCC): Primary | ICD-10-CM

## 2025-05-29 DIAGNOSIS — E55.9 VITAMIN D DEFICIENCY: ICD-10-CM

## 2025-05-29 DIAGNOSIS — M81.0 AGE-RELATED OSTEOPOROSIS WITHOUT CURRENT PATHOLOGICAL FRACTURE: ICD-10-CM

## 2025-05-29 DIAGNOSIS — Z23 NEED FOR VACCINATION: ICD-10-CM

## 2025-05-29 DIAGNOSIS — R73.03 PREDIABETES: ICD-10-CM

## 2025-05-29 DIAGNOSIS — N31.9 NEUROGENIC BLADDER: ICD-10-CM

## 2025-05-29 DIAGNOSIS — E83.52 HYPERCALCEMIA: ICD-10-CM

## 2025-05-29 DIAGNOSIS — M21.371 RIGHT FOOT DROP: ICD-10-CM

## 2025-05-29 DIAGNOSIS — N18.32 STAGE 3B CHRONIC KIDNEY DISEASE: ICD-10-CM

## 2025-05-29 PROBLEM — R15.0 INCOMPLETE DEFECATION: Status: RESOLVED | Noted: 2022-03-29 | Resolved: 2025-05-29

## 2025-05-29 PROBLEM — L03.115 CELLULITIS OF RIGHT LOWER EXTREMITY: Status: RESOLVED | Noted: 2024-02-28 | Resolved: 2025-05-29

## 2025-05-29 PROBLEM — M87.052 AVASCULAR NECROSIS OF BONE OF LEFT HIP (HCC): Status: RESOLVED | Noted: 2022-08-17 | Resolved: 2025-05-29

## 2025-05-29 PROBLEM — M16.12 OSTEOARTHRITIS OF LEFT HIP: Status: RESOLVED | Noted: 2022-11-30 | Resolved: 2025-05-29

## 2025-05-29 RX ORDER — ALBUTEROL SULFATE 90 UG/1
2 INHALANT RESPIRATORY (INHALATION) EVERY 6 HOURS PRN
Qty: 18 G | Refills: 3 | Status: SHIPPED | OUTPATIENT
Start: 2025-05-29

## 2025-05-29 ASSESSMENT — ACTIVITIES OF DAILY LIVING (ADL): BATHING_REQUIRES_ASSISTANCE: 1

## 2025-05-29 ASSESSMENT — ENCOUNTER SYMPTOMS: GENERAL WELL-BEING: FAIR

## 2025-05-29 NOTE — PROGRESS NOTES
Chief Complaint   Patient presents with    Medicare Annual Wellness       HPI:  Chelly Decker is a 89 y.o. here for Medicare Annual Wellness Visit     Patient Active Problem List    Diagnosis Date Noted    Age-related osteoporosis without current pathological fracture 05/29/2025    Congestive heart failure with preserved left ventricular function, NYHA class 3 ([facility]) 02/18/2025    Right foot drop 10/23/2024    Neurogenic bladder 10/23/2024    Vitamin D deficiency 08/01/2024    Peroneal palsy, right 05/14/2024    Dyslipidemia 02/28/2024    Normocytic anemia 02/28/2024    S/P TAVR (transcatheter aortic valve replacement) 01/08/2024    Stage 3b chronic kidney disease 11/07/2023    PAF (paroxysmal atrial fibrillation) ([facility]) 10/31/2023    Pulmonary HTN ([facility]) 10/31/2023    Physical debility 06/07/2023    Prediabetes 05/08/2023    TIA (transient ischemic attack) 05/07/2023    Chronic hypoxemic respiratory failure ([facility]) 05/06/2023    S/P insertion of intrathecal pump 05/05/2023    Hammer toes of both feet 12/29/2022    Pain of left hip joint 11/30/2022    Trochanteric bursitis of left hip 11/30/2022    Insomnia due to medical condition 10/13/2022    Lower extremity edema 08/03/2022    Chronic obstructive pulmonary disease (HCC) 06/22/2022    Solitary pulmonary nodule 06/22/2022    Bilateral carotid bruits 06/06/2022    Chronic pain syndrome 06/06/2022    PVC's (premature ventricular contractions) 06/06/2022    Moderate mitral insufficiency 06/06/2022    Urge incontinence of urine 05/12/2022    H/O Clostridium difficile infection 05/12/2022    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     Allergies: Sulfa drugs and Erythromycin    Current social contact/activities: socially isolated      She  reports that she quit smoking about 10 years ago. Her smoking use included cigarettes and electronic cigarettes. She started smoking about 71 years ago. She has a 61 pack-year smoking history. She has never used smokeless tobacco. She reports that she does not currently use alcohol. She reports that she does not currently use drugs after having used the following drugs: Cocaine.  Counseling given: Not Answered  Tobacco comments: Smoked as a teenager quit as a senior citizen      ROS:    Gait: Uses a wheelchair  Ostomy: No  Other tubes: No  Amputations: No  Chronic oxygen use: Yes  Last eye exam: been over a year   Wears hearing aids: No   : Denies any urinary leakage during the last 6 months    Screening:  Up to date     Depression Screening  Little interest or pleasure in doing things?     Feeling down, depressed , or hopeless?    Trouble falling or staying asleep, or sleeping too much?     Feeling tired or having little energy?     Poor appetite or overeating?     Feeling bad about yourself - or that you are a failure or have let yourself or your family down?    Trouble concentrating on things, such as reading the newspaper or watching television?    Moving or speaking so slowly that other people could have noticed.  Or the opposite - being so fidgety or restless that you have been moving around a lot more than usual?     Thoughts that you would be better off dead, or of hurting yourself?     Patient Health Questionnaire Score:      If depressive symptoms identified deferred to follow up visit unless specifically addressed in assessment and plan.    Interpretation of PHQ-9 Total Score   Score Severity   1-4 No Depression   5-9 Mild Depression   10-14 Moderate Depression   15-19 Moderately Severe Depression    20-27 Severe Depression    Screening for Cognitive Impairment  Do you or any of your friends or family members have any concern about your memory? No  Three Minute Recall (Village, Kitchen, Baby) 3/3    Mitchell clock face with all 12 numbers and set the hands to show 10 minutes past 11.  Yes    Cognitive concerns identified deferred for follow up unless specifically addressed in assessment and plan.    Fall Risk Assessment  Has the patient had two or more falls in the last year or any fall with injury in the last year?  No    Safety Assessment  Do you always wear your seatbelt?  Yes  Any changes to home needed to function safely? No  Difficulty hearing.  Yes  Patient counseled about all safety risks that were identified.    Functional Assessment ADLs  Are there any barriers preventing you from cooking for yourself or meeting nutritional needs?  Yes.    Are there any barriers preventing you from driving safely or obtaining transportation?  No.    Are there any barriers preventing you from using a telephone or calling for help?  No    Are there any barriers preventing you from shopping?  Yes.    Are there any barriers preventing you from taking care of your own finances?  No    Are there any barriers preventing you from managing your medications?  No    Are there any barriers preventing you from showering, bathing or dressing yourself? Yes    Are there any barriers preventing you from doing housework or laundry? No    Are there any barriers preventing you from using the toilet?No    Are you currently engaging in any exercise or physical activity?  No.      Self-Assessment of Health  What is your perception of your health? Fair    Do you sleep more than six hours a night? No    In the past 7 days, how much did pain keep you from doing your normal work? None    Do you spend quality time with family or friends (virtually or in person)? Yes    Do you usually eat a heart healthy diet that constists of a variety of fruits,  vegetables, whole grains and fiber? Yes    Do you eat foods high in fat and/or Fast Food more than three times per week? No    How concerned are you that your medical conditions are not being well managed? Not at all    Are you worried that in the next 2 months, you may not have stable housing that you own, rent, or stay in as part of a household? N      Advance Care Planning  Do you have an Advance Directive, Living Will, Durable Power of , or POLST? Yes not on file.       Health Maintenance Summary            Current Care Gaps       IMM DTaP/Tdap/Td Vaccine (2 - Td or Tdap) Overdue since 5/2/2021 05/02/2011  Imm Admin: Tdap Vaccine              COVID-19 Vaccine (3 - Moderna risk series) Overdue since 5/11/2021 04/13/2021  Imm Admin: MODERNA SARS-COV-2 VACCINE (12+)    03/15/2021  Imm Admin: MODERNA SARS-COV-2 VACCINE (12+)              Annual Pulmonary Function Test / Spirometry (Yearly) Never done     No completion history exists for this topic.              Zoster (Shingles) Vaccines (1 of 2) Never done     No completion history exists for this topic.                      Needs Review       Bone Density Scan (Every 5 Years) Tentatively due on 2/12/2030 02/12/2025  DS-BONE DENSITY STUDY (DEXA)                      Upcoming       Influenza Vaccine (Season Ended) Next due on 9/1/2025      10/13/2022  Imm Admin: Influenza Vaccine Adult HD    10/19/2021  Imm Admin: Influenza Vaccine Quad Recombinant    10/06/2020  Imm Admin: Influenza split virus trivalent (PF)    10/06/2020  Imm Admin: Influenza, unspecified formulation    12/17/2019  Imm Admin: Influenza Vac Subunit Quad Inj (Pf)     Only the first 5 history entries have been loaded, but more history exists.            Annual Wellness Visit (Yearly) Next due on 5/29/2026 05/29/2025  Level of Service: [place] ANNUAL WELLNESS VISIT-INCLUDES PPPS SUBSEQUE*                      Completed or No Longer Recommended       Pneumococcal Vaccine: 50+  Years (Series Information) Completed      05/29/2025  Imm Admin: Pneumococcal Conjugate Vaccine (PCV20)    12/30/2014  Imm Admin: Pneumococcal polysaccharide vaccine (PPSV-23)    12/05/2002  Imm Admin: Pneumococcal polysaccharide vaccine (PPSV-23)              Hepatitis A Vaccine (Hep A) (Series Information) Aged Out      No completion history exists for this topic.              Hepatitis B Vaccine (Hep B) (Series Information) Aged Out     No completion history exists for this topic.              HPV Vaccines (Series Information) Aged Out     No completion history exists for this topic.              Polio Vaccine (Inactivated Polio) (Series Information) Aged Out     No completion history exists for this topic.              Meningococcal Immunization (Series Information) Aged Out     No completion history exists for this topic.              Meningococcal B Vaccine (Series Information) Aged Out     No completion history exists for this topic.                            Patient Care Team:  Faraz Fu D.O. as PCP - General (Family Medicine)  Katalina Khan M.D. as Consulting Physician (Radiation Oncology)  Merline Ordaz R.N. as Nurse Navigator (Medical Oncology)  Narayan Martinez M.D. as Consulting Physician (Pulmonary Medicine)  Papi Hammond M.D. as Consulting Physician (Surgery)  Merline Finley, PT, DPT as Transitional Care Navigator  Jacque Rasheed (Inactive) as Community Health Worker      Social History[2]  Family History   Problem Relation Age of Onset    Heart Disease Mother         Not from congestive heart failure!     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 (03/29/2022), Bilateral lower extremity edema (08/03/2022), Breath shortness  (12/04/2023), Cancer ([facility]), Cancer ([facility]) (12/04/2023), Cervical cancer ([facility]) (1968), Chronic constipation, Chronic kidney disease (CKD), Chronic obstructive pulmonary disease ([facility]) (06/22/2022), Chronic pain, Complication of anesthesia, Congestive heart failure ([facility]), Congestive heart failure with preserved left ventricular function, NYHA class 3 ([facility]) (02/18/2025), COPD (chronic obstructive pulmonary disease) ([facility]), Coronary artery disease, Dental disorder (12/04/2023), Dizziness (12/29/2022), Fibromyalgia, primary, Foot drop, right foot (12/04/2023), GERD (gastroesophageal reflux disease), H/O Clostridium difficile infection (05/12/2022), Hammer toes of both feet (12/29/2022), Heart burn (12/04/2023), Heart disease, Heart murmur, High cholesterol (12/04/2023), Hypertension (12/04/2023), Hypotension due to hypovolemia (08/17/2022), Hypoxia (03/29/2022), Incomplete defecation (03/29/2022), Infectious disease (04/25/2023), Injury of right ankle (09/17/2024), Insomnia due to medical condition (10/13/2022), Irregular heart rate (03/29/2022), Kidney stones, Moderate aortic stenosis, Moderate mitral insufficiency, Neurogenic bladder (10/23/2024), Osteoporosis, Oxygen dependent, Pain (12/04/2023), Pain in joint involving multiple sites (10/13/2022), Pain in joint involving multiple sites (10/13/2022), Pelvic floor dysfunction (03/29/2022), Peroneal palsy, right (05/14/2024), Primary hypertension (03/29/2022), Renal insufficiency (08/03/2022), Right foot drop (10/23/2024), Seasonal allergies, Skin lesions (05/12/2022), Solitary pulmonary nodule, Spinal disorder, Spinal stenosis, Spinal stenosis of lumbar region without neurogenic claudication (03/29/2022), Supplemental oxygen dependent, Ulcer, Urge incontinence of urine (12/04/2023), Urinary bladder disorder (Past [id number]), and Vitamin D deficiency (08/01/2024).    She has no past medical history of Anesthesia, Anginal syndrome (HCC), Blood clotting disorder (HCC), Bowel  "habit changes, Bronchitis, Carcinoma in situ of respiratory system, Cataract, Continuous ambulatory peritoneal dialysis status ([facility]), Coughing blood, Diabetes (HCC), Dialysis patient ([facility]), Disorder of thyroid, Glaucoma, Hemorrhagic disorder (HCC), Hiatus hernia syndrome, Indigestion, Jaundice, Malignant hyperthermia, Myocardial infarct (HCC), Pacemaker, Pneumonia, PONV (postoperative nausea and vomiting), Pregnant, Psychiatric problem, Rheumatic fever, Seizure (HCC), Sleep apnea, Snoring, Stroke ([facility]), or Tuberculosis.   Past Surgical History[3]    Exam:   /54   Pulse 71   Temp 36.8 °C (98.2 °F) (Temporal)   Resp 12   Ht 1.702 m (5' 7\")   SpO2 96%  Body mass index is 26.63 kg/m².    Hearing excellent.    Dentition upper and lower dentures  Alert, oriented in no acute distress.  Eye contact is good, speech goal directed, affect calm    Component  Ref Range & Units 3 wk ago   Sodium  136 - 144 mmol/L 138   Potassium  3.6 - 5.1 mmol/L 4.6   Chloride  102 - 110 mmol/L 100 Low    Co2  22 - 32 mmol/L 30   Bun  8 - 20 mg/dL 39 High    Creatinine  0.60 - 1.10 mg/dL 1.2 High    Calcium  8.7 - 10.3 mg/dL 10.6 High    Glom Filt Rate, Est  >60 mL/min/1.7 42 Low    Comment: The estimated glomerular filtration rate (eGFR) was calculated using the 2021 CKD-EPI eGFR equation, which does not include race as a factor.  This equation is validated in individuals 18 years of age and older.   Glucose  60 - 99 mg/dL 104 High    Anion Gap  2 - 11 mmol/L 8       Assessment and Plan. The following treatment and monitoring plan is recommended:      Problem List Items Addressed This Visit       Chronic obstructive pulmonary disease (HCC) - Primary    Relevant Medications    albuterol 108 (90 Base) MCG/ACT Aero Soln inhalation aerosol    Prediabetes    A1c 6.4  On jardiance for HR  Advised cut down sugar  Unable to exercise           Stage 3b chronic kidney disease    Vitamin D deficiency    Relevant Orders    VITAMIN D,25 HYDROXY " (DEFICIENCY)    Right foot drop    Neurogenic bladder    Age-related osteoporosis without current pathological fracture    Recent dexa shows osteoporosis  Labs show vitamin D def and elevated Ca  Repeat labs with pth, phos  Double dose of vitamin D  Follow in 2-3 for lab review and medication options in setting of CKD stage 3b          Other Visit Diagnoses         Hypercalcemia        Relevant Orders    PTH INTACT (PTH ONLY)    Comp Metabolic Panel    PHOSPHORUS      Need for vaccination        Relevant Orders    Pneumococcal Conjugate Vaccine 20-Valent (6 wks+) (Completed)              Services suggested: No services needed at this time  Health Care Screening: Age-appropriate preventive services recommended by USPTF and ACIP covered by Medicare were discussed today. Services ordered if indicated and agreed upon by the patient.  Referrals offered: Community-based lifestyle interventions to reduce health risks and promote self-management and wellness, fall prevention, nutrition, physical activity, tobacco-use cessation, weight loss, and mental health services as per orders if indicated.    Discussion today about general wellness and lifestyle habits:    Prevent falls and reduce trip hazards; Cautioned about securing or removing rugs.  Have a working fire alarm and carbon monoxide detector;   Engage in regular physical activity and social activities     Follow-up: Return in about 2 weeks (around 6/12/2025), or if symptoms worsen or fail to improve.         [1]   Current Outpatient Medications   Medication Sig Dispense Refill    albuterol 108 (90 Base) MCG/ACT Aero Soln inhalation aerosol Inhale 2 Puffs every 6 hours as needed for Shortness of Breath. Indications: Chronic Obstructive Lung Disease 18 g 3    pantoprazole (PROTONIX) 40 MG Tablet Delayed Response TAKE 1 TABLET ORALLY 30 MIN BEFORE MEAL IN THE MORNING, 30 DAY(S) 100 Tablet 3    furosemide (LASIX) 40 MG Tab Take 1 Tablet by mouth every day. 100 Tablet 3     menthol-zinc oxide (CALMOSEPTINE) 0.44-20.6 % Ointment ointment Apply 1 Application topically every day. 71 g 0    spironolactone (ALDACTONE) 25 MG Tab TAKE 1 TABLET BY MOUTH EVERY  Tablet 3    potassium chloride SA (KDUR) 20 MEQ Tab CR Take 1 Tablet by mouth 2 times a day. 200 Tablet 3    bisoprolol (ZEBETA) 5 MG Tab TAKE 1 TABLET BY MOUTH EVERY  Tablet 3    Empagliflozin (JARDIANCE) 10 MG Tab tablet Take 1 Tablet by mouth every day. 100 Tablet 3    Naloxone (NARCAN) 4 MG/0.1ML Liquid 0.04 GRAM AS NEEDED AS NEEDED      atorvastatin (LIPITOR) 40 MG Tab Take 1 Tablet by mouth every evening. 100 Tablet 3    magnesium oxide 400 (240 Mg) MG Tab Take 1 Tablet by mouth 2 times a day. 30 Tablet 0    aspirin 81 MG EC tablet Take 1 Tablet by mouth every day. 100 Tablet 3    CRANBERRY PO Take 1 Tablet by mouth every evening.      Probiotic Product (PROBIOTIC DAILY PO) Take 1 Tablet by mouth every evening.      polyethylene glycol 3350 (MIRALAX) 17 GM/SCOOP Powder Take 17 g by mouth every evening.      Melatonin 10 MG Tab Take 10 mg by mouth every evening. Indications: Trouble Sleeping      acetaminophen (TYLENOL) 500 MG Tab Take 1,000 mg by mouth every 6 hours as needed. Indications: Fever, Pain      DULoxetine (CYMBALTA) 20 MG Cap DR Particles Take 20 mg by mouth 2 times a day. Indications: Musculoskeletal Pain (Patient taking differently: Take 40 mg by mouth 2 times a day. Indications: Musculoskeletal Pain)      guaiFENesin ER (MUCINEX) 600 MG TABLET SR 12 HR Take 600 mg by mouth every 12 hours. Indications: Cough      Cyanocobalamin (VITAMIN B-12 PO) Take 1 Tablet by mouth see administration instructions. Pt takes sporidially   Indications: supplement      Simethicone (GAS-X PO) Take 1 Tablet by mouth 3 times a day. Indications: gas      Pain Pump (PATIENT SUPPLIED) Device Inject  as directed continuous. Patient's Pain Pump (placed and maintained as an outpatient)    Medications/concentrations:    HYDROMORPHONE 4.0mg/ml      Last changed: 2/14/2024  Refill pump before date: 5/14/2024    Continuous infusion rates (Drug/Rate):   HYDROMORPHONE 0.7491mg/day (0.0312mg/hr)    MD office:  Dr. Lozoya's  Phone number: [phone]          Indications: severe pain       No current facility-administered medications for this visit.   [2]   Social History  Tobacco Use    Smoking status: Former     Current packs/day: 0.00     Average packs/day: 1 pack/day for 61.0 years (61.0 ttl pk-yrs)     Types: Cigarettes, Electronic Cigarettes     Start date: 1/1/1954     Quit date: 1/1/2015     Years since quitting: 10.4    Smokeless tobacco: Never    Tobacco comments:     Smoked as a teenager quit as a senior citizen   Vaping Use    Vaping status: Former    Substances: Flavoring   Substance Use Topics    Alcohol use: Not Currently     Comment: Was  is a social drinker until approximately 2015    Drug use: Not Currently     Types: Cocaine     Comment: quit cocaine usein 1998   [3]   Past Surgical History:  Procedure Laterality Date    TRANSCATHETER AORTIC VALVE REPLACEMENT  01/08/2024    Procedure: TRANSCATHETER AORTIC VALVE REPLACEMENT;  Surgeon: Clayton Eddy M.D.;  Location: [facility];  Service: Cardiac    ECHOCARDIOGRAM, TRANSESOPHAGEAL, INTRAOPERATIVE  01/08/2024    Procedure: ECHOCARDIOGRAM, TRANSESOPHAGEAL, INTRAOPERATIVE;  Surgeon: Clayton Eddy M.D.;  Location: [facility];  Service: Cardiac    OTHER Bilateral 12/04/2023    IOLOU    GA TOTAL HIP ARTHROPLASTY Left 08/28/2023    Procedure: LEFT TOTAL HIP ARTHROPLASTY;  Surgeon: Daryl Perrin M.D.;  Location: [facility];  Service: Orthopedics    PUMP REVISION Right 05/04/2023    Procedure: INTRATHECAL PAIN PUMP REVISION;  Surgeon: Tavo Lozoya M.D.;  Location: [facility];  Service: Pain Management    PUMP INSERT/REMOVE Right 10/07/2022    Procedure: MEDTRONIC PUMP REPLACEMENT;  Surgeon: Joel Alanis M.D.;  Location:  "[facility]ERY [facility];  Service: Pain Management    MI INS/RPLC SPI NPG/RCVR POCKET N/A 02/05/2021    Procedure: IMPLANT PUMP AND CATHETER;  Surgeon: Joel Alanis M.D.;  Location: [facility];  Service: Pain Management    HIP ARTHROPLASTY TOTAL Right 1998    APPENDECTOMY      CARPAL TUNNEL ENDOSCOPIC Bilateral     bilat    CHOLECYSTECTOMY      COLONOSCOPY      HAND SURGERY  2012/2013 [facility]    HYSTERECTOMY LAPAROSCOPY      total    LITHOTRIPSY      kidney stones removed    MITRAL VALVE REPLACE      ORIF, HIP  1998 [facility]  AND  8/28/2023  [facility]    I was in severe to mild  pain since 1998 and it continues at a medium to mild \"constant pain\" in my right hip replacement to this day ) My fear of  falling was alleviated by grab bars and stationary furniture in my residence for all those years.    ORIF, PELVIS  LEFT HIP    Recent left HIP surgery: doctor diagnosed  AVN  and deterioration of left hip ... accompanied by agonizing pain which led us to earliest possible hip replacement which occurred on August 28, 2023    PRIMARY C SECTION      c section    TONSILLECTOMY       "

## 2025-05-29 NOTE — ASSESSMENT & PLAN NOTE
Recent dexa shows osteoporosis  Labs show vitamin D def and elevated Ca  Repeat labs with pth, phos  Double dose of vitamin D  Follow in 2-3 for lab review and medication options in setting of CKD stage 3b

## 2025-05-30 ASSESSMENT — PATIENT HEALTH QUESTIONNAIRE - PHQ9: CLINICAL INTERPRETATION OF PHQ2 SCORE: 0

## 2025-05-30 NOTE — PROGRESS NOTES
Chief Complaint   Patient presents with    Medicare Annual Wellness       HPI:  Chelly Decker is a 89 y.o. here for Medicare Annual Wellness Visit     Patient Active Problem List    Diagnosis Date Noted    Age-related osteoporosis without current pathological fracture 05/29/2025    Congestive heart failure with preserved left ventricular function, NYHA class 3 ([facility]) 02/18/2025    Right foot drop 10/23/2024    Neurogenic bladder 10/23/2024    Vitamin D deficiency 08/01/2024    Peroneal palsy, right 05/14/2024    Dyslipidemia 02/28/2024    Normocytic anemia 02/28/2024    S/P TAVR (transcatheter aortic valve replacement) 01/08/2024    Stage 3b chronic kidney disease 11/07/2023    PAF (paroxysmal atrial fibrillation) ([facility]) 10/31/2023    Pulmonary HTN ([facility]) 10/31/2023    Physical debility 06/07/2023    Prediabetes 05/08/2023    TIA (transient ischemic attack) 05/07/2023    Chronic hypoxemic respiratory failure ([facility]) 05/06/2023    S/P insertion of intrathecal pump 05/05/2023    Hammer toes of both feet 12/29/2022    Pain of left hip joint 11/30/2022    Trochanteric bursitis of left hip 11/30/2022    Insomnia due to medical condition 10/13/2022    Lower extremity edema 08/03/2022    Chronic obstructive pulmonary disease (HCC) 06/22/2022    Solitary pulmonary nodule 06/22/2022    Bilateral carotid bruits 06/06/2022    Chronic pain syndrome 06/06/2022    PVC's (premature ventricular contractions) 06/06/2022    Moderate mitral insufficiency 06/06/2022    Urge incontinence of urine 05/12/2022    H/O Clostridium difficile infection 05/12/2022    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     Allergies: Sulfa drugs and Erythromycin    Current social contact/activities: social isolation     She  reports that she quit smoking about 10 years ago. Her smoking use included cigarettes and electronic cigarettes. She started smoking about 71 years ago. She has a 61 pack-year smoking history. She has never used smokeless tobacco. She reports that she does not currently use alcohol. She reports that she does not currently use drugs after having used the following drugs: Cocaine.  Counseling given: Not Answered  Tobacco comments: Smoked as a teenager quit as a senior citizen      ROS:    Gait: Uses a wheelchair  Ostomy: No  Other tubes: No  Amputations: No  Chronic oxygen use: Yes  Last eye exam: has scheduled ahs been a few years  Wears hearing aids: No   : Denies any urinary leakage during the last 6 months    Screening:  Up to date     Depression Screening  Little interest or pleasure in doing things?  0 - not at all  Feeling down, depressed , or hopeless? 0 - not at all  Patient Health Questionnaire Score: 0     If depressive symptoms identified deferred to follow up visit unless specifically addressed in assessment and plan.    Interpretation of PHQ-9 Total Score   Score Severity   1-4 No Depression   5-9 Mild Depression   10-14 Moderate Depression   15-19 Moderately Severe Depression   20-27 Severe Depression    Screening for Cognitive Impairment  Do you or any of your friends or family members have any concern about your memory? No  Three Minute Recall (Village, Kitchen, Baby) 3/3    Mitchell clock face with all 12 numbers and set the hands to show 10 minutes past 11.  Yes    Cognitive concerns identified deferred for follow up unless specifically addressed in assessment and plan.    Fall Risk Assessment  Has the patient had two or more falls in the last year or any fall with injury in the last year?   No    Safety Assessment  Do you always wear your seatbelt?  Yes  Any changes to home needed to function safely? No  Difficulty hearing.  Yes  Patient counseled about all safety risks that were identified.    Functional Assessment ADLs  Are there any barriers preventing you from cooking for yourself or meeting nutritional needs?  Yes.    Are there any barriers preventing you from driving safely or obtaining transportation?  No.    Are there any barriers preventing you from using a telephone or calling for help?  No    Are there any barriers preventing you from shopping?  Yes.    Are there any barriers preventing you from taking care of your own finances?  No    Are there any barriers preventing you from managing your medications?  No    Are there any barriers preventing you from showering, bathing or dressing yourself? Yes    Are there any barriers preventing you from doing housework or laundry? No  Are there any barriers preventing you from using the toilet?No  Are you currently engaging in any exercise or physical activity?  No.      Self-Assessment of Health  What is your perception of your health? Fair    Do you sleep more than six hours a night? No    In the past 7 days, how much did pain keep you from doing your normal work? None    Do you spend quality time with family or friends (virtually or in person)? Yes    Do you usually eat a heart healthy diet that constists of a variety of fruits, vegetables, whole grains and fiber? Yes    Do you eat foods high in fat and/or Fast Food more than three times per week? No    How concerned are you that your medical conditions are not being well managed? Not at all    Are you worried that in the next 2 months, you may not have stable housing that you own, rent, or stay in as part of a household? N    Advance Care Planning  Do you have an Advance Directive, Living Will, Durable Power of , or POLST?    Y      Health Maintenance Summary            Current Care Gaps        IMM DTaP/Tdap/Td Vaccine (2 - Td or Tdap) Overdue since 5/2/2021 05/02/2011  Imm Admin: Tdap Vaccine              COVID-19 Vaccine (3 - Moderna risk series) Overdue since 5/11/2021 04/13/2021  Imm Admin: MODERNA SARS-COV-2 VACCINE (12+)    03/15/2021  Imm Admin: MODERNA SARS-COV-2 VACCINE (12+)              Annual Pulmonary Function Test / Spirometry (Yearly) Never done     No completion history exists for this topic.              Zoster (Shingles) Vaccines (1 of 2) Never done     No completion history exists for this topic.                      Needs Review       Bone Density Scan (Every 5 Years) Tentatively due on 2/12/2030 02/12/2025  DS-BONE DENSITY STUDY (DEXA)                      Upcoming       Influenza Vaccine (Season Ended) Next due on 9/1/2025      10/13/2022  Imm Admin: Influenza Vaccine Adult HD    10/19/2021  Imm Admin: Influenza Vaccine Quad Recombinant    10/06/2020  Imm Admin: Influenza split virus trivalent (PF)    10/06/2020  Imm Admin: Influenza, unspecified formulation    12/17/2019  Imm Admin: Influenza Vac Subunit Quad Inj (Pf)     Only the first 5 history entries have been loaded, but more history exists.            Annual Wellness Visit (Yearly) Next due on 5/29/2026 05/29/2025  Level of Service: [place] ANNUAL WELLNESS VISIT-INCLUDES PPPS SUBSEQUE*                      Completed or No Longer Recommended       Pneumococcal Vaccine: 50+ Years (Series Information) Completed      05/29/2025  Imm Admin: Pneumococcal Conjugate Vaccine (PCV20)    12/30/2014  Imm Admin: Pneumococcal polysaccharide vaccine (PPSV-23)    12/05/2002  Imm Admin: Pneumococcal polysaccharide vaccine (PPSV-23)              Hepatitis A Vaccine (Hep A) (Series Information) Aged Out      No completion history exists for this topic.              Hepatitis B Vaccine (Hep B) (Series Information) Aged Out     No completion history exists for this topic.              HPV Vaccines (Series Information) Aged Out      No completion history exists for this topic.              Polio Vaccine (Inactivated Polio) (Series Information) Aged Out     No completion history exists for this topic.              Meningococcal Immunization (Series Information) Aged Out     No completion history exists for this topic.              Meningococcal B Vaccine (Series Information) Aged Out     No completion history exists for this topic.                            Patient Care Team:  Faraz Fu D.O. as PCP - General (Family Medicine)  Katalina Khan M.D. as Consulting Physician (Radiation Oncology)  Merline Ordaz R.N. as Nurse Navigator (Medical Oncology)  Narayan Martinez M.D. as Consulting Physician (Pulmonary Medicine)  Papi Hammond M.D. as Consulting Physician (Surgery)  Merline Finley, PT, DPT as Transitional Care Navigator  Jacque Rasheed ([facility]) as Community Health Worker        Social History[2]  Family History   Problem Relation Age of Onset    Heart Disease Mother         Not from congestive heart failure!     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 (03/29/2022), Bilateral lower extremity edema (08/03/2022), Breath shortness (12/04/2023), Cancer (HCC), Cancer (HCC) (12/04/2023), Cervical cancer (HCC) (1968), Chronic constipation, Chronic kidney disease (CKD), Chronic obstructive pulmonary disease (HCC) (06/22/2022), Chronic pain, Complication of anesthesia, Congestive heart failure (HCC), Congestive heart failure with preserved left ventricular function, NYHA class 3 ([facility]) (02/18/2025), COPD (chronic obstructive pulmonary disease) ([facility]), Coronary artery disease, Dental disorder (12/04/2023), Dizziness (12/29/2022), Fibromyalgia, primary, Foot drop, right foot  (12/04/2023), GERD (gastroesophageal reflux disease), H/O Clostridium difficile infection (05/12/2022), Hammer toes of both feet (12/29/2022), Heart burn (12/04/2023), Heart disease, Heart murmur, High cholesterol (12/04/2023), Hypertension (12/04/2023), Hypotension due to hypovolemia (08/17/2022), Hypoxia (03/29/2022), Incomplete defecation (03/29/2022), Infectious disease (04/25/2023), Injury of right ankle (09/17/2024), Insomnia due to medical condition (10/13/2022), Irregular heart rate (03/29/2022), Kidney stones, Moderate aortic stenosis, Moderate mitral insufficiency, Neurogenic bladder (10/23/2024), Osteoporosis, Oxygen dependent, Pain (12/04/2023), Pain in joint involving multiple sites (10/13/2022), Pain in joint involving multiple sites (10/13/2022), Pelvic floor dysfunction (03/29/2022), Peroneal palsy, right (05/14/2024), Primary hypertension (03/29/2022), Renal insufficiency (08/03/2022), Right foot drop (10/23/2024), Seasonal allergies, Skin lesions (05/12/2022), Solitary pulmonary nodule, Spinal disorder, Spinal stenosis, Spinal stenosis of lumbar region without neurogenic claudication (03/29/2022), Supplemental oxygen dependent, Ulcer, Urge incontinence of urine (12/04/2023), Urinary bladder disorder (Past [id number]), and Vitamin D deficiency (08/01/2024).    She has no past medical history of Anesthesia, Anginal syndrome ([facility]), Blood clotting disorder ([facility]), Bowel habit changes, Bronchitis, Carcinoma in situ of respiratory system, Cataract, Continuous ambulatory peritoneal dialysis status ([facility]), Coughing blood, Diabetes ([facility]), Dialysis patient ([facility]), Disorder of thyroid, Glaucoma, Hemorrhagic disorder ([facility]), Hiatus hernia syndrome, Indigestion, Jaundice, Malignant hyperthermia, Myocardial infarct ([facility]), Pacemaker, Pneumonia, PONV (postoperative nausea and vomiting), Pregnant, Psychiatric problem, Rheumatic fever, Seizure ([facility]), Sleep apnea, Snoring, Stroke (HCC), or Tuberculosis.   Past Surgical  "History[3]    Exam:   /54   Pulse 71   Temp 36.8 °C (98.2 °F) (Temporal)   Resp 12   Ht 1.702 m (5' 7\")   SpO2 96%  Body mass index is 26.63 kg/m².    Hearing excellent.    Dentition upper and lower dentures  Alert, oriented in no acute distress.  Eye contact is good, speech goal directed, affect calm    Assessment and Plan. The following treatment and monitoring plan is recommended:      Problem List Items Addressed This Visit       Chronic obstructive pulmonary disease (HCC) - Primary    Relevant Medications    albuterol 108 (90 Base) MCG/ACT Aero Soln inhalation aerosol    Prediabetes    A1c 6.4  On jardiance for HR  Advised cut down sugar  Unable to exercise           Stage 3b chronic kidney disease    Vitamin D deficiency    Relevant Orders    VITAMIN D,25 HYDROXY (DEFICIENCY)    Right foot drop    Neurogenic bladder    Age-related osteoporosis without current pathological fracture    Recent dexa shows osteoporosis  Labs show vitamin D def and elevated Ca  Repeat labs with pth, phos  Double dose of vitamin D  Follow in 2-3 for lab review and medication options in setting of CKD stage 3b          Other Visit Diagnoses         Hypercalcemia        Relevant Orders    PTH INTACT (PTH ONLY)    Comp Metabolic Panel    PHOSPHORUS      Need for vaccination        Relevant Orders    Pneumococcal Conjugate Vaccine 20-Valent (6 wks+) (Completed)              Services suggested: No services needed at this time  Health Care Screening: Age-appropriate preventive services recommended by USPTF and ACIP covered by Medicare were discussed today. Services ordered if indicated and agreed upon by the patient.  Referrals offered: Community-based lifestyle interventions to reduce health risks and promote self-management and wellness, fall prevention, nutrition, physical activity, tobacco-use cessation, weight loss, and mental health services as per orders if indicated.    Discussion today about general wellness and " lifestyle habits:    Prevent falls and reduce trip hazards; Cautioned about securing or removing rugs.  Have a working fire alarm and carbon monoxide detector;   Engage in regular physical activity and social activities     Follow-up: Return in about 2 weeks (around 6/12/2025), or if symptoms worsen or fail to improve.         [1]   Current Outpatient Medications   Medication Sig Dispense Refill    albuterol 108 (90 Base) MCG/ACT Aero Soln inhalation aerosol Inhale 2 Puffs every 6 hours as needed for Shortness of Breath. Indications: Chronic Obstructive Lung Disease 18 g 3    pantoprazole (PROTONIX) 40 MG Tablet Delayed Response TAKE 1 TABLET ORALLY 30 MIN BEFORE MEAL IN THE MORNING, 30 DAY(S) 100 Tablet 3    furosemide (LASIX) 40 MG Tab Take 1 Tablet by mouth every day. 100 Tablet 3    menthol-zinc oxide (CALMOSEPTINE) 0.44-20.6 % Ointment ointment Apply 1 Application topically every day. 71 g 0    spironolactone (ALDACTONE) 25 MG Tab TAKE 1 TABLET BY MOUTH EVERY  Tablet 3    potassium chloride SA (KDUR) 20 MEQ Tab CR Take 1 Tablet by mouth 2 times a day. 200 Tablet 3    bisoprolol (ZEBETA) 5 MG Tab TAKE 1 TABLET BY MOUTH EVERY  Tablet 3    Empagliflozin (JARDIANCE) 10 MG Tab tablet Take 1 Tablet by mouth every day. 100 Tablet 3    Naloxone (NARCAN) 4 MG/0.1ML Liquid 0.04 GRAM AS NEEDED AS NEEDED      atorvastatin (LIPITOR) 40 MG Tab Take 1 Tablet by mouth every evening. 100 Tablet 3    magnesium oxide 400 (240 Mg) MG Tab Take 1 Tablet by mouth 2 times a day. 30 Tablet 0    aspirin 81 MG EC tablet Take 1 Tablet by mouth every day. 100 Tablet 3    CRANBERRY PO Take 1 Tablet by mouth every evening.      Probiotic Product (PROBIOTIC DAILY PO) Take 1 Tablet by mouth every evening.      polyethylene glycol 3350 (MIRALAX) 17 GM/SCOOP Powder Take 17 g by mouth every evening.      Melatonin 10 MG Tab Take 10 mg by mouth every evening. Indications: Trouble Sleeping      acetaminophen (TYLENOL) 500 MG Tab Take  1,000 mg by mouth every 6 hours as needed. Indications: Fever, Pain      DULoxetine (CYMBALTA) 20 MG Cap DR Particles Take 20 mg by mouth 2 times a day. Indications: Musculoskeletal Pain (Patient taking differently: Take 40 mg by mouth 2 times a day. Indications: Musculoskeletal Pain)      guaiFENesin ER (MUCINEX) 600 MG TABLET SR 12 HR Take 600 mg by mouth every 12 hours. Indications: Cough      Cyanocobalamin (VITAMIN B-12 PO) Take 1 Tablet by mouth see administration instructions. Pt takes sporidially   Indications: supplement      Simethicone (GAS-X PO) Take 1 Tablet by mouth 3 times a day. Indications: gas      Pain Pump (PATIENT SUPPLIED) Device Inject  as directed continuous. Patient's Pain Pump (placed and maintained as an outpatient)    Medications/concentrations:   HYDROMORPHONE 4.0mg/ml      Last changed: 2/14/2024  Refill pump before date: 5/14/2024    Continuous infusion rates (Drug/Rate):   HYDROMORPHONE 0.7491mg/day (0.0312mg/hr)    MD office:  Dr. Lozoya's  Phone number: [phone]          Indications: severe pain       No current facility-administered medications for this visit.   [2]   Social History  Tobacco Use    Smoking status: Former     Current packs/day: 0.00     Average packs/day: 1 pack/day for 61.0 years (61.0 ttl pk-yrs)     Types: Cigarettes, Electronic Cigarettes     Start date: 1/1/1954     Quit date: 1/1/2015     Years since quitting: 10.4    Smokeless tobacco: Never    Tobacco comments:     Smoked as a teenager quit as a senior citizen   Vaping Use    Vaping status: Former    Substances: Flavoring   Substance Use Topics    Alcohol use: Not Currently     Comment: Was  is a social drinker until approximately 2015    Drug use: Not Currently     Types: Cocaine     Comment: quit cocaine usein 1998   [3]   Past Surgical History:  Procedure Laterality Date    TRANSCATHETER AORTIC VALVE REPLACEMENT  01/08/2024    Procedure: TRANSCATHETER AORTIC VALVE REPLACEMENT;  Surgeon: Clayton  "ANDRES Eddy;  Location: SURGERY [facility];  Service: Cardiac    ECHOCARDIOGRAM, TRANSESOPHAGEAL, INTRAOPERATIVE  01/08/2024    Procedure: ECHOCARDIOGRAM, TRANSESOPHAGEAL, INTRAOPERATIVE;  Surgeon: Clayton Eddy M.D.;  Location: SURGERY [facility];  Service: Cardiac    OTHER Bilateral 12/04/2023    IOLOU    TX TOTAL HIP ARTHROPLASTY Left 08/28/2023    Procedure: LEFT TOTAL HIP ARTHROPLASTY;  Surgeon: Daryl Perrin M.D.;  Location: SURGERY [facility];  Service: Orthopedics    PUMP REVISION Right 05/04/2023    Procedure: INTRATHECAL PAIN PUMP REVISION;  Surgeon: Tavo Lozoya M.D.;  Location: SURGERY [facility];  Service: Pain Management    PUMP INSERT/REMOVE Right 10/07/2022    Procedure: MEDTRONIC PUMP REPLACEMENT;  Surgeon: Joel Alanis M.D.;  Location: SURGERY [facility];  Service: Pain Management    TX INS/RPLC SPI NPG/RCVR POCKET N/A 02/05/2021    Procedure: IMPLANT PUMP AND CATHETER;  Surgeon: Joel Alanis M.D.;  Location: SURGERY [facility];  Service: Pain Management    HIP ARTHROPLASTY TOTAL Right 1998    APPENDECTOMY      CARPAL TUNNEL ENDOSCOPIC Bilateral     bilat    CHOLECYSTECTOMY      COLONOSCOPY      HAND SURGERY  2012/2013 App[facility]    [facility] Dr. Pavon    HYSTERECTOMY LAPAROSCOPY      total    LITHOTRIPSY      kidney stones removed    MITRAL VALVE REPLACE      ORIF, HIP  1998 [facility]  AND  8/28/2023  [facility]    I was in severe to mild  pain since 1998 and it continues at a medium to mild \"constant pain\" in my right hip replacement to this day ) My fear of  falling was alleviated by grab bars and stationary furniture in my residence for all those years.    ORIF, PELVIS  LEFT HIP    Recent left HIP surgery: doctor diagnosed  AVN  and deterioration of left hip ... accompanied by agonizing pain which led us to earliest possible hip replacement which occurred on August 28, 2023    PRIMARY C SECTION      c section    " TONSILLECTOMY

## 2025-06-12 ENCOUNTER — OFFICE VISIT (OUTPATIENT)
Dept: MEDICAL GROUP | Facility: PHYSICIAN GROUP | Age: 89
End: 2025-06-12
Payer: MEDICARE

## 2025-06-12 VITALS
HEART RATE: 58 BPM | HEIGHT: 67 IN | BODY MASS INDEX: 26.63 KG/M2 | SYSTOLIC BLOOD PRESSURE: 122 MMHG | TEMPERATURE: 98.6 F | DIASTOLIC BLOOD PRESSURE: 60 MMHG | RESPIRATION RATE: 12 BRPM | OXYGEN SATURATION: 98 %

## 2025-06-12 DIAGNOSIS — E21.3 HYPERPARATHYROIDISM (HCC): ICD-10-CM

## 2025-06-12 DIAGNOSIS — R73.09 ELEVATED GLUCOSE: Primary | ICD-10-CM

## 2025-06-12 LAB
HBA1C MFR BLD: 6 % (ref ?–5.8)
POCT INT CON NEG: NEGATIVE
POCT INT CON POS: POSITIVE

## 2025-06-12 PROCEDURE — 3074F SYST BP LT 130 MM HG: CPT | Performed by: STUDENT IN AN ORGANIZED HEALTH CARE EDUCATION/TRAINING PROGRAM

## 2025-06-12 PROCEDURE — 99213 OFFICE O/P EST LOW 20 MIN: CPT | Performed by: STUDENT IN AN ORGANIZED HEALTH CARE EDUCATION/TRAINING PROGRAM

## 2025-06-12 PROCEDURE — 83036 HEMOGLOBIN GLYCOSYLATED A1C: CPT | Performed by: STUDENT IN AN ORGANIZED HEALTH CARE EDUCATION/TRAINING PROGRAM

## 2025-06-12 PROCEDURE — 3078F DIAST BP <80 MM HG: CPT | Performed by: STUDENT IN AN ORGANIZED HEALTH CARE EDUCATION/TRAINING PROGRAM

## 2025-06-13 ENCOUNTER — TELEPHONE (OUTPATIENT)
Dept: CARDIOLOGY | Facility: MEDICAL CENTER | Age: 89
End: 2025-06-13
Payer: MEDICARE

## 2025-06-13 ASSESSMENT — ENCOUNTER SYMPTOMS
BACK PAIN: 1
VOMITING: 0
WEAKNESS: 1
NAUSEA: 0
ABDOMINAL PAIN: 0
SHORTNESS OF BREATH: 0

## 2025-06-13 NOTE — PROGRESS NOTES
Verbal Consent given for [employer] recording software    HISTORY OF PRESENT ILLNESS: Chelly is a pleasant 89 y.o. female, established patient who presents today to discuss medical problems as listed below:    History of Present Illness  89-year-old female with stage 3 chronic kidney disease presents for follow-up on recent labs.    Concerned about a 10-point increase in glucose levels despite fasting. Has eliminated sweets, incorporated fresh fruits, and significantly reduced chocolate consumption since 05/02/2025.           Current Medications and Prescriptions Ordered in Epic[1]    Review of systems:  Review of Systems   Respiratory:  Negative for shortness of breath.    Cardiovascular:  Negative for chest pain.   Gastrointestinal:  Negative for abdominal pain, nausea and vomiting.   Musculoskeletal:  Positive for back pain.   Neurological:  Positive for weakness.         Patient Active Problem List    Diagnosis Date Noted    Hyperparathyroidism ([facility]) 06/12/2025    Age-related osteoporosis without current pathological fracture 05/29/2025    Congestive heart failure with preserved left ventricular function, NYHA class 3 ([facility]) 02/18/2025    Right foot drop 10/23/2024    Neurogenic bladder 10/23/2024    Vitamin D deficiency 08/01/2024    Peroneal palsy, right 05/14/2024    Dyslipidemia 02/28/2024    Normocytic anemia 02/28/2024    S/P TAVR (transcatheter aortic valve replacement) 01/08/2024    Stage 3b chronic kidney disease 11/07/2023    PAF (paroxysmal atrial fibrillation) ([facility]) 10/31/2023    Pulmonary HTN ([facility]) 10/31/2023    Physical debility 06/07/2023    Prediabetes 05/08/2023    TIA (transient ischemic attack) 05/07/2023    Chronic hypoxemic respiratory failure ([facility]) 05/06/2023    S/P insertion of intrathecal pump 05/05/2023    Hammer toes of both feet 12/29/2022    Pain of left hip joint 11/30/2022    Trochanteric bursitis of left hip 11/30/2022    Insomnia due to medical condition 10/13/2022    Lower extremity  "edema 08/03/2022    Chronic obstructive pulmonary disease (HCC) 06/22/2022    Solitary pulmonary nodule 06/22/2022    Bilateral carotid bruits 06/06/2022    Chronic pain syndrome 06/06/2022    PVC's (premature ventricular contractions) 06/06/2022    Moderate mitral insufficiency 06/06/2022    Urge incontinence of urine 05/12/2022    H/O Clostridium difficile infection 05/12/2022    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     Current Medications[4]    Allergies:  Allergies[5]    Allergies, past medical history, past surgical history, family history, social history reviewed and updated.    Objective:    /60   Pulse (!) 58   Temp 37 °C (98.6 °F) (Temporal)   Resp 12   Ht 1.702 m (5' 7\")   SpO2 98%   BMI 26.63 kg/m²    Body mass index is 26.63 kg/m².    Physical exam:  General: Normal appearance, no acute distress, not ill-appearing  HEENT: EOM intact, conjunctiva normal limits, negative right/left eye discharge.  Sclera anicteric  Cardiovascular: Normal rate and rhythm, no murmurs  Pulmonary: No respiratory distress, no wheezing, no rales, breath sounds normal.  Musculoskeletal: No edema bilaterally  Skin: Warm, dry, no lesions  Neurological: No focal deficits, normal gait  Psychiatric: Mood within normal limits    Results  - Labs:    - Vitamin D (06/11/2025): 50    - CMP (06/11/2025):      - Creatinine: 1.23      - Calcium: 10.5      - GFR: 41      - Glucose: 114      - Phosphorus: 3    " - PTH (06/11/2025): 164 H        Assessment/Plan:    Assessment & Plan  1. Hyperparathyroidism: Chronic. Calcium 10.5 on 06/11/2025,  on 06/11/2025.  - Referral to endocrinologist for formal consultation.  - asymptomatic has chronic weakness that is likely unrelated    2. Elevated glucose levels. Glucose 114 on 06/11/2025.  - poc A1c 6.0  - Discussed glucose concerns and dietary changes, including cutting out sweets    Follow-up  - A1c test results.       Problem List Items Addressed This Visit       Hyperparathyroidism (HCC)    Relevant Orders    Referral to Endocrinology     Other Visit Diagnoses         Elevated glucose    -  Primary    Relevant Orders    POCT  A1C (Completed)            Return in about 2 weeks (around 6/26/2025), or if symptoms worsen or fail to improve, for symptoms.        [1]   Current Outpatient Medications Ordered in Epic   Medication Sig Dispense Refill    albuterol 108 (90 Base) MCG/ACT Aero Soln inhalation aerosol Inhale 2 Puffs every 6 hours as needed for Shortness of Breath. Indications: Chronic Obstructive Lung Disease 18 g 3    pantoprazole (PROTONIX) 40 MG Tablet Delayed Response TAKE 1 TABLET ORALLY 30 MIN BEFORE MEAL IN THE MORNING, 30 DAY(S) 100 Tablet 3    furosemide (LASIX) 40 MG Tab Take 1 Tablet by mouth every day. 100 Tablet 3    menthol-zinc oxide (CALMOSEPTINE) 0.44-20.6 % Ointment ointment Apply 1 Application topically every day. 71 g 0    spironolactone (ALDACTONE) 25 MG Tab TAKE 1 TABLET BY MOUTH EVERY  Tablet 3    potassium chloride SA (KDUR) 20 MEQ Tab CR Take 1 Tablet by mouth 2 times a day. 200 Tablet 3    bisoprolol (ZEBETA) 5 MG Tab TAKE 1 TABLET BY MOUTH EVERY  Tablet 3    Empagliflozin (JARDIANCE) 10 MG Tab tablet Take 1 Tablet by mouth every day. 100 Tablet 3    Naloxone (NARCAN) 4 MG/0.1ML Liquid 0.04 GRAM AS NEEDED AS NEEDED      atorvastatin (LIPITOR) 40 MG Tab Take 1 Tablet by mouth every evening. 100 Tablet 3    magnesium oxide 400  (240 Mg) MG Tab Take 1 Tablet by mouth 2 times a day. 30 Tablet 0    aspirin 81 MG EC tablet Take 1 Tablet by mouth every day. 100 Tablet 3    CRANBERRY PO Take 1 Tablet by mouth every evening.      Probiotic Product (PROBIOTIC DAILY PO) Take 1 Tablet by mouth every evening.      polyethylene glycol 3350 (MIRALAX) 17 GM/SCOOP Powder Take 17 g by mouth every evening.      Melatonin 10 MG Tab Take 10 mg by mouth every evening. Indications: Trouble Sleeping      acetaminophen (TYLENOL) 500 MG Tab Take 1,000 mg by mouth every 6 hours as needed. Indications: Fever, Pain      DULoxetine (CYMBALTA) 20 MG Cap DR Particles Take 20 mg by mouth 2 times a day. Indications: Musculoskeletal Pain (Patient taking differently: Take 40 mg by mouth 2 times a day. Indications: Musculoskeletal Pain)      guaiFENesin ER (MUCINEX) 600 MG TABLET SR 12 HR Take 600 mg by mouth every 12 hours. Indications: Cough      Cyanocobalamin (VITAMIN B-12 PO) Take 1 Tablet by mouth see administration instructions. Pt takes sporidially   Indications: supplement      Simethicone (GAS-X PO) Take 1 Tablet by mouth 3 times a day. Indications: gas      Pain Pump (PATIENT SUPPLIED) Device Inject  as directed continuous. Patient's Pain Pump (placed and maintained as an outpatient)    Medications/concentrations:   HYDROMORPHONE 4.0mg/ml      Last changed: 2/14/2024  Refill pump before date: 5/14/2024    Continuous infusion rates (Drug/Rate):   HYDROMORPHONE 0.7491mg/day (0.0312mg/hr)    MD office:  Dr. Lozoya's  Phone number: [phone]          Indications: severe pain       No current Epic-ordered facility-administered medications on file.   [2]   Past Surgical History:  Procedure Laterality Date    TRANSCATHETER AORTIC VALVE REPLACEMENT  01/08/2024    Procedure: TRANSCATHETER AORTIC VALVE REPLACEMENT;  Surgeon: Clayton Eddy M.D.;  Location: SURGERY [facility];  Service: Cardiac    ECHOCARDIOGRAM, TRANSESOPHAGEAL, INTRAOPERATIVE  01/08/2024     "Procedure: ECHOCARDIOGRAM, TRANSESOPHAGEAL, INTRAOPERATIVE;  Surgeon: Clayton Eddy M.D.;  Location: SURGERY [facility];  Service: Cardiac    OTHER Bilateral 12/04/2023    IOLOU    TN TOTAL HIP ARTHROPLASTY Left 08/28/2023    Procedure: LEFT TOTAL HIP ARTHROPLASTY;  Surgeon: Daryl Perrin M.D.;  Location: SURGERY [facility];  Service: Orthopedics    PUMP REVISION Right 05/04/2023    Procedure: INTRATHECAL PAIN PUMP REVISION;  Surgeon: Tavo Lozoya M.D.;  Location: SURGERY [facility];  Service: Pain Management    PUMP INSERT/REMOVE Right 10/07/2022    Procedure: MEDTRONIC PUMP REPLACEMENT;  Surgeon: Joel Alanis M.D.;  Location: SURGERY [facility];  Service: Pain Management    TN INS/RPLC SPI NPG/RCVR POCKET N/A 02/05/2021    Procedure: IMPLANT PUMP AND CATHETER;  Surgeon: Joel Alanis M.D.;  Location: SURGERY [facility];  Service: Pain Management    HIP ARTHROPLASTY TOTAL Right 1998    APPENDECTOMY      CARPAL TUNNEL ENDOSCOPIC Bilateral     bilat    CHOLECYSTECTOMY      COLONOSCOPY      HAND SURGERY  2012/2013 Appx    [facility] orthopedic Dr. Pavon    HYSTERECTOMY LAPAROSCOPY      total    LITHOTRIPSY      kidney stones removed    MITRAL VALVE REPLACE      ORIF, HIP  1998 [facility]  AND  8/28/2023  [facility]    I was in severe to mild  pain since 1998 and it continues at a medium to mild \"constant pain\" in my right hip replacement to this day ) My fear of  falling was alleviated by grab bars and stationary furniture in my residence for all those years.    ORIF, PELVIS  LEFT HIP    Recent left HIP surgery: doctor diagnosed  AVN  and deterioration of left hip ... accompanied by agonizing pain which led us to earliest possible hip replacement which occurred on August 28, 2023    PRIMARY C SECTION      c section    TONSILLECTOMY     [3]   Social History  Tobacco Use    Smoking status: Former     Current packs/day: 0.00     Average packs/day: 1 " pack/day for 61.0 years (61.0 ttl pk-yrs)     Types: Cigarettes, Electronic Cigarettes     Start date: 1/1/1954     Quit date: 1/1/2015     Years since quitting: 10.4    Smokeless tobacco: Never    Tobacco comments:     Smoked as a teenager quit as a senior citizen   Vaping Use    Vaping status: Former    Substances: Flavoring   Substance Use Topics    Alcohol use: Not Currently     Comment: Was  is a social drinker until approximately 2015    Drug use: Not Currently     Types: Cocaine     Comment: quit cocaine usein 1998   [4]   Current Outpatient Medications   Medication Sig Dispense Refill    albuterol 108 (90 Base) MCG/ACT Aero Soln inhalation aerosol Inhale 2 Puffs every 6 hours as needed for Shortness of Breath. Indications: Chronic Obstructive Lung Disease 18 g 3    pantoprazole (PROTONIX) 40 MG Tablet Delayed Response TAKE 1 TABLET ORALLY 30 MIN BEFORE MEAL IN THE MORNING, 30 DAY(S) 100 Tablet 3    furosemide (LASIX) 40 MG Tab Take 1 Tablet by mouth every day. 100 Tablet 3    menthol-zinc oxide (CALMOSEPTINE) 0.44-20.6 % Ointment ointment Apply 1 Application topically every day. 71 g 0    spironolactone (ALDACTONE) 25 MG Tab TAKE 1 TABLET BY MOUTH EVERY  Tablet 3    potassium chloride SA (KDUR) 20 MEQ Tab CR Take 1 Tablet by mouth 2 times a day. 200 Tablet 3    bisoprolol (ZEBETA) 5 MG Tab TAKE 1 TABLET BY MOUTH EVERY  Tablet 3    Empagliflozin (JARDIANCE) 10 MG Tab tablet Take 1 Tablet by mouth every day. 100 Tablet 3    Naloxone (NARCAN) 4 MG/0.1ML Liquid 0.04 GRAM AS NEEDED AS NEEDED      atorvastatin (LIPITOR) 40 MG Tab Take 1 Tablet by mouth every evening. 100 Tablet 3    magnesium oxide 400 (240 Mg) MG Tab Take 1 Tablet by mouth 2 times a day. 30 Tablet 0    aspirin 81 MG EC tablet Take 1 Tablet by mouth every day. 100 Tablet 3    CRANBERRY PO Take 1 Tablet by mouth every evening.      Probiotic Product (PROBIOTIC DAILY PO) Take 1 Tablet by mouth every evening.      polyethylene glycol  3350 (MIRALAX) 17 GM/SCOOP Powder Take 17 g by mouth every evening.      Melatonin 10 MG Tab Take 10 mg by mouth every evening. Indications: Trouble Sleeping      acetaminophen (TYLENOL) 500 MG Tab Take 1,000 mg by mouth every 6 hours as needed. Indications: Fever, Pain      DULoxetine (CYMBALTA) 20 MG Cap DR Particles Take 20 mg by mouth 2 times a day. Indications: Musculoskeletal Pain (Patient taking differently: Take 40 mg by mouth 2 times a day. Indications: Musculoskeletal Pain)      guaiFENesin ER (MUCINEX) 600 MG TABLET SR 12 HR Take 600 mg by mouth every 12 hours. Indications: Cough      Cyanocobalamin (VITAMIN B-12 PO) Take 1 Tablet by mouth see administration instructions. Pt takes sporidially   Indications: supplement      Simethicone (GAS-X PO) Take 1 Tablet by mouth 3 times a day. Indications: gas      Pain Pump (PATIENT SUPPLIED) Device Inject  as directed continuous. Patient's Pain Pump (placed and maintained as an outpatient)    Medications/concentrations:   HYDROMORPHONE 4.0mg/ml      Last changed: 2/14/2024  Refill pump before date: 5/14/2024    Continuous infusion rates (Drug/Rate):   HYDROMORPHONE 0.7491mg/day (0.0312mg/hr)    MD office:  Dr. Lozoya's  Phone number: [phone]          Indications: severe pain       No current facility-administered medications for this visit.   [5]   Allergies  Allergen Reactions    Sulfa Drugs Unspecified     Stomach ache    Nauseated, diarrhea    Other reaction(s): Unspecified   Stomach ache    Erythromycin Nausea     Other Reaction(s): GI Upset    Other reaction(s): Not available

## 2025-06-13 NOTE — TELEPHONE ENCOUNTER
VERNA    Caller: Chelly Decker     Topic/issue: Pt needs her oxygen renewed- [employer] [phone] needs to be called and they will send a fax for renewal on oxygen   FAX- [phone]    Callback Number: [phone]    Thank You   Yolanda ENRIQUE

## 2025-06-13 NOTE — TELEPHONE ENCOUNTER
Noted tele encounter below.   ======================  Phone Number Called: [phone]     Call outcome: Spoke to patient regarding message below.    Message: Called to discuss pt request.    Instructed pt to contact her PCP.   Pt verbalized understanding.

## 2025-06-19 ENCOUNTER — TELEPHONE (OUTPATIENT)
Dept: CARDIOLOGY | Facility: MEDICAL CENTER | Age: 89
End: 2025-06-19
Payer: MEDICARE

## 2025-06-19 NOTE — TELEPHONE ENCOUNTER
Noted tele encounter below.   =============================  Phone Number Called:  [phone]   SonKentrell  Call outcome: spoke with son.     Message: Called to discuss pt questions     Pt son's who lives out of town is concerned r/t pt being admitted to [facility] for vomiting up blood and is possibly having a GI procedure. Son wants cardiology to be aware.     Informed son that while in the hospital the patient will be under the care of the Hospital physicians and staff. Assured pt that if they needed to consult with her cardiologist they will reach out.     Son verbalized understanding

## 2025-06-19 NOTE — TELEPHONE ENCOUNTER
VERNA        Caller: Kentrell(son)      Topic/issue: Patient was returning our call regarding his mother's care and a procedure she has at 10:30 today and he asked for a call back. Please advise        Callback Number: [phone]          Thank you    -Cristian WATKINS

## 2025-06-19 NOTE — TELEPHONE ENCOUNTER
VERNA      Caller: Kentrell (Son)    Topic/issue: Pt is at [facility] and they need to perform procedures that require okay from Cardio - They are trying to do this at 10:30 am please advise asap      Callback Number: [phone]    Thank You   Yolanda ENRIQUE

## 2025-06-19 NOTE — TELEPHONE ENCOUNTER
Noted tele encounter below.   =============================  Phone Number Called: [phone]   John    Call outcome: Did not leave a detailed message. Requested patient to call back.    Message: Called to discuss pt concerns.

## 2025-07-15 ENCOUNTER — HOME HEALTH ADMISSION (OUTPATIENT)
Dept: HOME HEALTH SERVICES | Facility: HOME HEALTHCARE | Age: 89
End: 2025-07-15
Payer: MEDICARE

## 2025-08-14 DIAGNOSIS — E78.5 DYSLIPIDEMIA: Primary | ICD-10-CM

## 2025-08-14 PROCEDURE — RXMED WILLOW AMBULATORY MEDICATION CHARGE: Performed by: STUDENT IN AN ORGANIZED HEALTH CARE EDUCATION/TRAINING PROGRAM

## 2025-08-14 RX ORDER — ATORVASTATIN CALCIUM 40 MG/1
40 TABLET, FILM COATED ORAL EVERY EVENING
Qty: 100 TABLET | Refills: 1 | Status: SHIPPED | OUTPATIENT
Start: 2025-08-14

## 2025-08-15 ENCOUNTER — PHARMACY VISIT (OUTPATIENT)
Dept: PHARMACY | Facility: MEDICAL CENTER | Age: 89
End: 2025-08-15
Payer: COMMERCIAL

## 2025-08-22 ENCOUNTER — TELEPHONE (OUTPATIENT)
Dept: CARDIOLOGY | Facility: MEDICAL CENTER | Age: 89
End: 2025-08-22
Payer: MEDICARE

## 2025-09-17 ENCOUNTER — APPOINTMENT (OUTPATIENT)
Dept: CARDIOLOGY | Facility: PHYSICIAN GROUP | Age: 89
End: 2025-09-17
Payer: MEDICARE

## (undated) DEVICE — Device

## (undated) DEVICE — PACK TAVR (3EA/CA)

## (undated) DEVICE — DRAPE STRLE REG TOWEL 18X24 - (10/BX 4BX/CA)"

## (undated) DEVICE — SUTURE  0 ETHIBOND CT-1 30 IN (36PK/BX)

## (undated) DEVICE — IV TUBING HI-FLO RATE W/CLAMP (50/CA)

## (undated) DEVICE — CANISTER SUCTION RIGID RED 1500CC (40EA/CA)

## (undated) DEVICE — CATHETER EP 6FR 90CM FEM BP J CRV (J-TIP)

## (undated) DEVICE — GOWN WARMING STANDARD FLEX - (30/CA)

## (undated) DEVICE — GLOVE BIOGEL PI INDICATOR SZ 7.5 SURGICAL PF LF -(50/BX 4BX/CA)

## (undated) DEVICE — BIT DRILL SINGLE USE 3.3MM X 25MM

## (undated) DEVICE — COVER LIGHT HANDLE ALC PLUS DISP (18EA/BX)

## (undated) DEVICE — SUTURE 2-0 ETHILON FS - (36/BX) 18 INCH

## (undated) DEVICE — SUTURE 3-0 VICRYL PLUS SH - 8X 18 INCH (12/BX)

## (undated) DEVICE — CHLORAPREP 26 ML APPLICATOR - ORANGE TINT(25/CA)

## (undated) DEVICE — DRESSING XEROFORM 1X8 - (50/BX 4BX/CA)

## (undated) DEVICE — STOCKINETTE IMPERVIOUS 12X48 - STERILELF (10/CA)"

## (undated) DEVICE — HUMID-VENT HEAT AND MOISTURE EXCHANGE- (50/BX)

## (undated) DEVICE — TOWELS CLOTH SURGICAL - (4/PK 20PK/CA)

## (undated) DEVICE — CABLE TEMPORARY PACING

## (undated) DEVICE — BLADE SURGICAL #15 - (50/BX 3BX/CA)

## (undated) DEVICE — TOWEL STOP TIMEOUT SAFETY FLAG (40EA/CA)

## (undated) DEVICE — LACTATED RINGERS INJ 1000 ML - (14EA/CA 60CA/PF)

## (undated) DEVICE — HANDPIECE 10FT INTPLS SCT PLS IRRIGATION HAND CONTROL SET (6/PK)

## (undated) DEVICE — SODIUM CHL IRRIGATION 0.9% 1000ML (12EA/CA)

## (undated) DEVICE — CATHETER PIGTAIL 6FR 145 (5EA/BX)

## (undated) DEVICE — DECANTER FLD BLS - (50/CA)

## (undated) DEVICE — SUTURE 1 VICRYL PLUS CTX - 8 X 18 INCH (12/BX)

## (undated) DEVICE — SENSOR OXIMETER ADULT SPO2 RD SET (20EA/BX)

## (undated) DEVICE — SUTURE 2-0 VICRYL PLUS CT-1 - 8 X 18 INCH(12/BX)

## (undated) DEVICE — TUBE E-T HI-LO CUFF 8.0MM (10EA/PK)

## (undated) DEVICE — WIRE GUIDE AES .035 260CM WITH 3MM J TIP"

## (undated) DEVICE — WIRE VERSACORE .035 MOD J 145CM (5EA/BX)

## (undated) DEVICE — SUTURE DEVICE CLOSURE REPAIR SYSTEM PERCLOSE PROSTYLE (10EA/BX)

## (undated) DEVICE — SHEET TRANSVERSE LAP - (12EA/CA)

## (undated) DEVICE — BLADE SAGITTAL SAW DUAL CUT 75.0 X 25.0MM (1/EA)

## (undated) DEVICE — GLOVE BIOGEL SZ 7.5 SURGICAL PF LTX - (50PR/BX 4BX/CA)

## (undated) DEVICE — PACK TOTAL HIP - (1/CA)

## (undated) DEVICE — PEN SKIN MARKER W/RULER - (50EA/BX)

## (undated) DEVICE — TUBE CONNECTING SUCTION - CLEAR PLASTIC STERILE 72 IN (50EA/CA)

## (undated) DEVICE — CRIMPER CATHETER EDWARDS DISPOSABLE (1EA)

## (undated) DEVICE — SYR ANGIO CNRST INJ HI-PRS 3W 65 - (10EA/CA)"

## (undated) DEVICE — COVER FOOT UNIVERSAL DISP. - (25EA/CA)

## (undated) DEVICE — BLADE SURGICAL #11 - (50/BX)

## (undated) DEVICE — SYSTEM DELIVERY COMMANDER TAVR KIT 26MM COMPONENT (1EA)

## (undated) DEVICE — GLOVE BIOGEL SZ 8 SURGICAL PF LTX - (50PR/BX 4BX/CA)

## (undated) DEVICE — GLOVE BIOGEL SZ 6.5 SURGICAL PF LTX (50PR/BX 4BX/CA)

## (undated) DEVICE — SODIUM CHL. IRRIGATION 0.9% 3000ML (4EA/CA 65CA/PF)

## (undated) DEVICE — ELECTRODE DUAL RETURN W/ CORD - (50/PK)

## (undated) DEVICE — INTRODUCER SHEATH 6FR 2.5CM - DILATOR PROTRUDING (10/BX)

## (undated) DEVICE — DRAPE CLEAR W/ELASTIC BAND RAD CARM 40 X40" (20EA/CA)"

## (undated) DEVICE — DRAPE MAYO STAND - (30/CA)

## (undated) DEVICE — DEVICE INFLATION ATRION NOVALFEX TRANSFEMORAL SYSTEM (1EA)

## (undated) DEVICE — COVER LIGHT HANDLE FLEXIBLE - SOFT (2EA/PK 80PK/CA)

## (undated) DEVICE — GLOVE BIOGEL PI INDICATOR SZ 6.5 SURGICAL PF LF - (50/BX 4BX/CA)

## (undated) DEVICE — SUTURE GENERAL

## (undated) DEVICE — DRESSING TRANSPARENT FILM TEGADERM 4 X 4.75" (50EA/BX)"

## (undated) DEVICE — SET EXTENSION WITH 2 PORTS (48EA/CA) ***PART #2C8610 IS A SUBSTITUTE*****

## (undated) DEVICE — CATHETER 6FR AL1 100CM (5/BX)

## (undated) DEVICE — INTRODUCER CATHETER  DILATOR PROTRUDING 8FR 2.5CM (10EA/BX)

## (undated) DEVICE — SUCTION INSTRUMENT YANKAUER BULBOUS TIP W/O VENT (50EA/CA)

## (undated) DEVICE — DRAPE C-ARM LARGE 41IN X 74 IN - (10/BX 2BX/CA)

## (undated) DEVICE — DRAPE LARGE 3 QUARTER - (20/CA)

## (undated) DEVICE — GLOVE BIOGEL PI INDICATOR SZ 8.0 SURGICAL PF LF -(50/BX 4BX/CA)

## (undated) DEVICE — ELECTRODE RADIOLUCNT SOLID GEL DEFIB PADS (12EA/CA)

## (undated) DEVICE — LENS/HOOD FOR SPACESUIT - (32/PK) PEEL AWAY FACE

## (undated) DEVICE — SUTURE 1 PDS-2 PLUS CTX - (24/BX)

## (undated) DEVICE — DRAPE IOBAN II INCISE 23X17 - (10EA/BX 4BX/CA)

## (undated) DEVICE — MASK AIRWAY SIZE 4 UNIQUE SILICON (10EA/BX)

## (undated) DEVICE — TUBING CLEARLINK DUO-VENT - C-FLO (48EA/CA)

## (undated) DEVICE — WIRE GUIDE LUNDQST.035X180 - TSMG-35-180-4-LES ORDER BY BOX (5EA/BX)

## (undated) DEVICE — SUTURE 5 ETHIBOND V-37 (12PK/BX)

## (undated) DEVICE — GLOVESZ 8.5 BIOGEL PI MICRO - PF LF (50PR/BX)

## (undated) DEVICE — MEDICINE CUP STERILE 2 OZ - (100/CA)

## (undated) DEVICE — BLANKET UNDERBODY FULL ACCES - (5/CA)

## (undated) DEVICE — SYRINGE 10 ML CONTROL LL (25EA/BX 4BX/CA)

## (undated) DEVICE — WIRE GUIDE SENSOR DUAL FLEX - 5/BX

## (undated) DEVICE — BOVIE NEEDLE TIP INSULATD NON-SAFETY 2CM (50/PK)

## (undated) DEVICE — STAPLER SKIN DISP - (6/BX 10BX/CA) VISISTAT

## (undated) DEVICE — GUIDEWIRE STARTER STRAIGHT FIXED CORE .035 150CM 4 STRAIGHT PTFE/HEPARIN COATED (10/BX)

## (undated) DEVICE — SET INTRO MIRCROPUNCTURE - MPIS-501-SST

## (undated) DEVICE — BAG SPONGE COUNT 10.25 X 32 - BLUE (250/CA)

## (undated) DEVICE — CANISTER SUCTION 3000ML MECHANICAL FILTER AUTO SHUTOFF MEDI-VAC NONSTERILE LF DISP  (40EA/CA)

## (undated) DEVICE — KIT RETROFIT PROBE COVERS (24EA/EA)

## (undated) DEVICE — STOPCOCK IV 400 PSI 3W ROT (50EA/BX)

## (undated) DEVICE — SUTURE 0 ETHIBOND MO6 C/R - (12/BX) 8-18 INCH ETHICON

## (undated) DEVICE — SUTURE 3-0 MONOCRYL PLUS PS-1 - 27 INCH (36/BX)

## (undated) DEVICE — PACK BREAST SM OR - (1EA/CA)

## (undated) DEVICE — WATER IRRIGATION STERILE 1000ML (12EA/CA)

## (undated) DEVICE — PAD PREP 24 X 48 CUFFED - (100/CA)

## (undated) DEVICE — GLIDESHEATH SLENDER NITINOL KIT .021 GW 6FR 10CM SINGLE WALL

## (undated) DEVICE — SPONGE GAUZESTER 4 X 4 4PLY - (128PK/CA)

## (undated) DEVICE — ARM BAND RADIAL TR BAND (5EA/BX)